# Patient Record
Sex: MALE | Race: WHITE | NOT HISPANIC OR LATINO
[De-identification: names, ages, dates, MRNs, and addresses within clinical notes are randomized per-mention and may not be internally consistent; named-entity substitution may affect disease eponyms.]

---

## 2017-03-26 ENCOUNTER — TRANSCRIPTION ENCOUNTER (OUTPATIENT)
Age: 77
End: 2017-03-26

## 2018-08-14 ENCOUNTER — TRANSCRIPTION ENCOUNTER (OUTPATIENT)
Age: 78
End: 2018-08-14

## 2019-01-01 ENCOUNTER — RESULT REVIEW (OUTPATIENT)
Age: 79
End: 2019-01-01

## 2019-01-01 ENCOUNTER — LABORATORY RESULT (OUTPATIENT)
Age: 79
End: 2019-01-01

## 2019-01-01 ENCOUNTER — APPOINTMENT (OUTPATIENT)
Dept: HEMATOLOGY ONCOLOGY | Facility: CLINIC | Age: 79
End: 2019-01-01

## 2019-01-01 ENCOUNTER — APPOINTMENT (OUTPATIENT)
Dept: HEMATOLOGY ONCOLOGY | Facility: CLINIC | Age: 79
End: 2019-01-01
Payer: MEDICARE

## 2019-01-01 ENCOUNTER — APPOINTMENT (OUTPATIENT)
Dept: INFUSION THERAPY | Facility: HOSPITAL | Age: 79
End: 2019-01-01

## 2019-01-01 ENCOUNTER — TRANSCRIPTION ENCOUNTER (OUTPATIENT)
Age: 79
End: 2019-01-01

## 2019-01-01 ENCOUNTER — INPATIENT (INPATIENT)
Facility: HOSPITAL | Age: 79
LOS: 15 days | Discharge: HOME | DRG: 814 | End: 2019-04-19
Attending: INTERNAL MEDICINE | Admitting: INTERNAL MEDICINE
Payer: MEDICARE

## 2019-01-01 ENCOUNTER — MESSAGE (OUTPATIENT)
Age: 79
End: 2019-01-01

## 2019-01-01 ENCOUNTER — MEDICATION RENEWAL (OUTPATIENT)
Age: 79
End: 2019-01-01

## 2019-01-01 ENCOUNTER — APPOINTMENT (OUTPATIENT)
Dept: NUCLEAR MEDICINE | Facility: CLINIC | Age: 79
End: 2019-01-01

## 2019-01-01 ENCOUNTER — OUTPATIENT (OUTPATIENT)
Dept: OUTPATIENT SERVICES | Facility: HOSPITAL | Age: 79
LOS: 1 days | Discharge: HOME | End: 2019-01-01

## 2019-01-01 ENCOUNTER — FORM ENCOUNTER (OUTPATIENT)
Age: 79
End: 2019-01-01

## 2019-01-01 ENCOUNTER — OTHER (OUTPATIENT)
Age: 79
End: 2019-01-01

## 2019-01-01 ENCOUNTER — RX RENEWAL (OUTPATIENT)
Age: 79
End: 2019-01-01

## 2019-01-01 ENCOUNTER — OUTPATIENT (OUTPATIENT)
Dept: OUTPATIENT SERVICES | Facility: HOSPITAL | Age: 79
LOS: 1 days | End: 2019-01-01
Payer: MEDICARE

## 2019-01-01 ENCOUNTER — INPATIENT (INPATIENT)
Facility: HOSPITAL | Age: 79
LOS: 12 days | DRG: 823 | End: 2019-11-17
Attending: INTERNAL MEDICINE | Admitting: INTERNAL MEDICINE
Payer: MEDICARE

## 2019-01-01 ENCOUNTER — OUTPATIENT (OUTPATIENT)
Dept: OUTPATIENT SERVICES | Facility: HOSPITAL | Age: 79
LOS: 1 days | Discharge: HOME | End: 2019-01-01
Payer: MEDICARE

## 2019-01-01 ENCOUNTER — APPOINTMENT (OUTPATIENT)
Dept: NUCLEAR MEDICINE | Facility: CLINIC | Age: 79
End: 2019-01-01
Payer: MEDICARE

## 2019-01-01 VITALS
OXYGEN SATURATION: 100 % | WEIGHT: 198.42 LBS | TEMPERATURE: 100.4 F | SYSTOLIC BLOOD PRESSURE: 122 MMHG | HEART RATE: 100 BPM | RESPIRATION RATE: 17 BRPM | DIASTOLIC BLOOD PRESSURE: 72 MMHG | BODY MASS INDEX: 26.58 KG/M2

## 2019-01-01 VITALS
HEART RATE: 110 BPM | DIASTOLIC BLOOD PRESSURE: 67 MMHG | RESPIRATION RATE: 17 BRPM | WEIGHT: 180.34 LBS | BODY MASS INDEX: 24.16 KG/M2 | TEMPERATURE: 97.6 F | SYSTOLIC BLOOD PRESSURE: 105 MMHG | OXYGEN SATURATION: 98 %

## 2019-01-01 VITALS
HEART RATE: 114 BPM | RESPIRATION RATE: 18 BRPM | SYSTOLIC BLOOD PRESSURE: 106 MMHG | DIASTOLIC BLOOD PRESSURE: 66 MMHG | OXYGEN SATURATION: 98 %

## 2019-01-01 VITALS
WEIGHT: 190.48 LBS | TEMPERATURE: 97.8 F | OXYGEN SATURATION: 99 % | DIASTOLIC BLOOD PRESSURE: 75 MMHG | RESPIRATION RATE: 16 BRPM | BODY MASS INDEX: 25.52 KG/M2 | SYSTOLIC BLOOD PRESSURE: 127 MMHG | HEART RATE: 93 BPM

## 2019-01-01 VITALS
OXYGEN SATURATION: 96 % | BODY MASS INDEX: 26.14 KG/M2 | WEIGHT: 195.11 LBS | SYSTOLIC BLOOD PRESSURE: 100 MMHG | RESPIRATION RATE: 17 BRPM | TEMPERATURE: 98.1 F | DIASTOLIC BLOOD PRESSURE: 63 MMHG | HEART RATE: 108 BPM

## 2019-01-01 VITALS
TEMPERATURE: 98 F | RESPIRATION RATE: 18 BRPM | HEIGHT: 75 IN | DIASTOLIC BLOOD PRESSURE: 70 MMHG | OXYGEN SATURATION: 96 % | WEIGHT: 199.96 LBS | SYSTOLIC BLOOD PRESSURE: 115 MMHG | HEART RATE: 112 BPM

## 2019-01-01 VITALS
DIASTOLIC BLOOD PRESSURE: 54 MMHG | RESPIRATION RATE: 18 BRPM | WEIGHT: 179.9 LBS | SYSTOLIC BLOOD PRESSURE: 95 MMHG | HEART RATE: 89 BPM | TEMPERATURE: 98 F | OXYGEN SATURATION: 98 % | HEIGHT: 75 IN

## 2019-01-01 VITALS
WEIGHT: 184.97 LBS | DIASTOLIC BLOOD PRESSURE: 82 MMHG | SYSTOLIC BLOOD PRESSURE: 112 MMHG | BODY MASS INDEX: 24.78 KG/M2 | HEART RATE: 80 BPM | HEIGHT: 72.44 IN | RESPIRATION RATE: 16 BRPM

## 2019-01-01 VITALS
RESPIRATION RATE: 16 BRPM | DIASTOLIC BLOOD PRESSURE: 69 MMHG | HEART RATE: 89 BPM | TEMPERATURE: 97.8 F | OXYGEN SATURATION: 100 % | WEIGHT: 185.19 LBS | SYSTOLIC BLOOD PRESSURE: 109 MMHG | BODY MASS INDEX: 24.81 KG/M2

## 2019-01-01 VITALS
RESPIRATION RATE: 16 BRPM | HEART RATE: 86 BPM | OXYGEN SATURATION: 99 % | BODY MASS INDEX: 24.13 KG/M2 | TEMPERATURE: 97.8 F | DIASTOLIC BLOOD PRESSURE: 73 MMHG | WEIGHT: 180.11 LBS | SYSTOLIC BLOOD PRESSURE: 122 MMHG

## 2019-01-01 VITALS
BODY MASS INDEX: 24.37 KG/M2 | SYSTOLIC BLOOD PRESSURE: 117 MMHG | DIASTOLIC BLOOD PRESSURE: 74 MMHG | OXYGEN SATURATION: 100 % | HEART RATE: 90 BPM | WEIGHT: 181.88 LBS | RESPIRATION RATE: 17 BRPM | TEMPERATURE: 97.7 F

## 2019-01-01 VITALS
BODY MASS INDEX: 24.04 KG/M2 | WEIGHT: 179.46 LBS | HEART RATE: 114 BPM | SYSTOLIC BLOOD PRESSURE: 110 MMHG | RESPIRATION RATE: 18 BRPM | OXYGEN SATURATION: 98 % | DIASTOLIC BLOOD PRESSURE: 69 MMHG | TEMPERATURE: 98.5 F

## 2019-01-01 VITALS
HEART RATE: 85 BPM | SYSTOLIC BLOOD PRESSURE: 101 MMHG | TEMPERATURE: 98 F | RESPIRATION RATE: 18 BRPM | DIASTOLIC BLOOD PRESSURE: 61 MMHG | OXYGEN SATURATION: 97 %

## 2019-01-01 VITALS
TEMPERATURE: 97.4 F | RESPIRATION RATE: 16 BRPM | SYSTOLIC BLOOD PRESSURE: 128 MMHG | DIASTOLIC BLOOD PRESSURE: 78 MMHG | HEART RATE: 54 BPM | OXYGEN SATURATION: 100 % | WEIGHT: 190.48 LBS | BODY MASS INDEX: 25.52 KG/M2

## 2019-01-01 VITALS
TEMPERATURE: 97.9 F | SYSTOLIC BLOOD PRESSURE: 138 MMHG | BODY MASS INDEX: 24.07 KG/M2 | DIASTOLIC BLOOD PRESSURE: 75 MMHG | RESPIRATION RATE: 16 BRPM | OXYGEN SATURATION: 98 % | HEART RATE: 101 BPM | WEIGHT: 179.68 LBS

## 2019-01-01 VITALS
TEMPERATURE: 97 F | DIASTOLIC BLOOD PRESSURE: 58 MMHG | OXYGEN SATURATION: 92 % | HEART RATE: 86 BPM | SYSTOLIC BLOOD PRESSURE: 58 MMHG | RESPIRATION RATE: 40 BRPM

## 2019-01-01 VITALS — TEMPERATURE: 99.8 F

## 2019-01-01 DIAGNOSIS — R11.2 NAUSEA WITH VOMITING, UNSPECIFIED: ICD-10-CM

## 2019-01-01 DIAGNOSIS — Z51.11 ENCOUNTER FOR ANTINEOPLASTIC CHEMOTHERAPY: ICD-10-CM

## 2019-01-01 DIAGNOSIS — Z98.890 OTHER SPECIFIED POSTPROCEDURAL STATES: Chronic | ICD-10-CM

## 2019-01-01 DIAGNOSIS — Z80.41 FAMILY HISTORY OF MALIGNANT NEOPLASM OF OVARY: ICD-10-CM

## 2019-01-01 DIAGNOSIS — D76.1 HEMOPHAGOCYTIC LYMPHOHISTIOCYTOSIS: ICD-10-CM

## 2019-01-01 DIAGNOSIS — R73.9 HYPERGLYCEMIA, UNSPECIFIED: ICD-10-CM

## 2019-01-01 DIAGNOSIS — R17 UNSPECIFIED JAUNDICE: ICD-10-CM

## 2019-01-01 DIAGNOSIS — C84.71: ICD-10-CM

## 2019-01-01 DIAGNOSIS — K46.9 UNSPECIFIED ABDOMINAL HERNIA WITHOUT OBSTRUCTION OR GANGRENE: Chronic | ICD-10-CM

## 2019-01-01 DIAGNOSIS — D61.818 OTHER PANCYTOPENIA: ICD-10-CM

## 2019-01-01 DIAGNOSIS — Z80.0 FAMILY HISTORY OF MALIGNANT NEOPLASM OF DIGESTIVE ORGANS: ICD-10-CM

## 2019-01-01 DIAGNOSIS — Z51.5 ENCOUNTER FOR PALLIATIVE CARE: ICD-10-CM

## 2019-01-01 DIAGNOSIS — A41.9 SEPSIS, UNSPECIFIED ORGANISM: ICD-10-CM

## 2019-01-01 DIAGNOSIS — Z51.89 ENCOUNTER FOR OTHER SPECIFIED AFTERCARE: ICD-10-CM

## 2019-01-01 DIAGNOSIS — Z71.89 OTHER SPECIFIED COUNSELING: ICD-10-CM

## 2019-01-01 DIAGNOSIS — G89.3 NEOPLASM RELATED PAIN (ACUTE) (CHRONIC): ICD-10-CM

## 2019-01-01 DIAGNOSIS — C84.68 ANAPLASTIC LARGE CELL LYMPHOMA, ALK-POSITIVE, LYMPH NODES OF MULTIPLE SITES: ICD-10-CM

## 2019-01-01 DIAGNOSIS — Z00.00 ENCOUNTER FOR GENERAL ADULT MEDICAL EXAMINATION W/OUT ABNORMAL FINDINGS: ICD-10-CM

## 2019-01-01 DIAGNOSIS — R53.2 FUNCTIONAL QUADRIPLEGIA: ICD-10-CM

## 2019-01-01 DIAGNOSIS — L03.039 CELLULITIS OF UNSPECIFIED TOE: ICD-10-CM

## 2019-01-01 DIAGNOSIS — N42.9 DISORDER OF PROSTATE, UNSPECIFIED: ICD-10-CM

## 2019-01-01 DIAGNOSIS — S73.109A UNSPECIFIED SPRAIN OF UNSPECIFIED HIP, INITIAL ENCOUNTER: ICD-10-CM

## 2019-01-01 DIAGNOSIS — D69.3 IMMUNE THROMBOCYTOPENIC PURPURA: ICD-10-CM

## 2019-01-01 DIAGNOSIS — D64.9 ANEMIA, UNSPECIFIED: ICD-10-CM

## 2019-01-01 DIAGNOSIS — R06.03 ACUTE RESPIRATORY DISTRESS: ICD-10-CM

## 2019-01-01 DIAGNOSIS — E87.1 HYPO-OSMOLALITY AND HYPONATREMIA: ICD-10-CM

## 2019-01-01 DIAGNOSIS — Z00.8 ENCOUNTER FOR OTHER GENERAL EXAMINATION: ICD-10-CM

## 2019-01-01 LAB
% ALBUMIN: 59 % — SIGNIFICANT CHANGE UP
% ALPHA 1: 8.5 % — SIGNIFICANT CHANGE UP
% ALPHA 2: 8.1 % — SIGNIFICANT CHANGE UP
% BETA: 11.9 % — SIGNIFICANT CHANGE UP
% GAMMA: 12.5 % — SIGNIFICANT CHANGE UP
A PHAGOCYTOPH DNA BLD QL NAA+PROBE: NEGATIVE — SIGNIFICANT CHANGE UP
A PHAGOCYTOPH IGG TITR SER IF: SIGNIFICANT CHANGE UP
A PHAGOCYTOPH IGM SER QL: SIGNIFICANT CHANGE UP
A PHAGOCYTOPH IGM SER QL: SIGNIFICANT CHANGE UP
A PHAGOCYTOPH IGM TITR SER IF: SIGNIFICANT CHANGE UP
ALBUMIN SERPL ELPH-MCNC: 1.4 G/DL — LOW (ref 3.3–5)
ALBUMIN SERPL ELPH-MCNC: 1.5 G/DL — LOW (ref 3.3–5)
ALBUMIN SERPL ELPH-MCNC: 1.6 G/DL — LOW (ref 3.3–5)
ALBUMIN SERPL ELPH-MCNC: 1.6 G/DL — LOW (ref 3.3–5)
ALBUMIN SERPL ELPH-MCNC: 1.7 G/DL — LOW (ref 3.3–5)
ALBUMIN SERPL ELPH-MCNC: 1.9 G/DL — LOW (ref 3.3–5)
ALBUMIN SERPL ELPH-MCNC: 1.9 G/DL — LOW (ref 3.3–5)
ALBUMIN SERPL ELPH-MCNC: 2 G/DL — LOW (ref 3.3–5)
ALBUMIN SERPL ELPH-MCNC: 2 G/DL — LOW (ref 3.3–5)
ALBUMIN SERPL ELPH-MCNC: 2.1 G/DL — LOW (ref 3.3–5)
ALBUMIN SERPL ELPH-MCNC: 2.1 G/DL — LOW (ref 3.3–5)
ALBUMIN SERPL ELPH-MCNC: 2.2 G/DL — LOW (ref 3.3–5)
ALBUMIN SERPL ELPH-MCNC: 2.3 G/DL — LOW (ref 3.3–5)
ALBUMIN SERPL ELPH-MCNC: 2.4 G/DL — LOW (ref 3.3–5)
ALBUMIN SERPL ELPH-MCNC: 2.5 G/DL — LOW (ref 3.3–5)
ALBUMIN SERPL ELPH-MCNC: 2.6 G/DL
ALBUMIN SERPL ELPH-MCNC: 2.6 G/DL — LOW (ref 3.3–5)
ALBUMIN SERPL ELPH-MCNC: 2.6 G/DL — LOW (ref 3.3–5)
ALBUMIN SERPL ELPH-MCNC: 2.7 G/DL
ALBUMIN SERPL ELPH-MCNC: 2.7 G/DL — LOW (ref 3.3–5)
ALBUMIN SERPL ELPH-MCNC: 2.7 G/DL — LOW (ref 3.6–5.5)
ALBUMIN SERPL ELPH-MCNC: 2.8 G/DL
ALBUMIN SERPL ELPH-MCNC: 2.8 G/DL — LOW (ref 3.3–5)
ALBUMIN SERPL ELPH-MCNC: 2.9 G/DL — LOW (ref 3.3–5)
ALBUMIN SERPL ELPH-MCNC: 2.9 G/DL — LOW (ref 3.3–5)
ALBUMIN SERPL ELPH-MCNC: 3 G/DL
ALBUMIN SERPL ELPH-MCNC: 3.1 G/DL
ALBUMIN SERPL ELPH-MCNC: 3.7 G/DL
ALBUMIN SERPL ELPH-MCNC: 3.9 G/DL
ALBUMIN SERPL ELPH-MCNC: 4 G/DL
ALBUMIN/GLOB SERPL ELPH: 1.4 RATIO — SIGNIFICANT CHANGE UP
ALP BLD-CCNC: 225 U/L
ALP BLD-CCNC: 241 U/L
ALP BLD-CCNC: 271 U/L
ALP BLD-CCNC: 343 U/L
ALP BLD-CCNC: 455 U/L
ALP BLD-CCNC: 545 U/L
ALP BLD-CCNC: 90 U/L
ALP BLD-CCNC: 98 U/L
ALP SERPL-CCNC: 1020 U/L — HIGH (ref 40–120)
ALP SERPL-CCNC: 251 U/L — HIGH (ref 40–120)
ALP SERPL-CCNC: 274 U/L — HIGH (ref 40–120)
ALP SERPL-CCNC: 311 U/L — HIGH (ref 40–120)
ALP SERPL-CCNC: 334 U/L — HIGH (ref 40–120)
ALP SERPL-CCNC: 337 U/L — HIGH (ref 40–120)
ALP SERPL-CCNC: 365 U/L — HIGH (ref 40–120)
ALP SERPL-CCNC: 370 U/L — HIGH (ref 40–120)
ALP SERPL-CCNC: 395 U/L — HIGH (ref 40–120)
ALP SERPL-CCNC: 424 U/L — HIGH (ref 40–120)
ALP SERPL-CCNC: 461 U/L — HIGH (ref 40–120)
ALP SERPL-CCNC: 503 U/L — HIGH (ref 40–120)
ALP SERPL-CCNC: 568 U/L — HIGH (ref 40–120)
ALP SERPL-CCNC: 569 U/L — HIGH (ref 40–120)
ALP SERPL-CCNC: 622 U/L — HIGH (ref 40–120)
ALP SERPL-CCNC: 626 U/L — HIGH (ref 40–120)
ALP SERPL-CCNC: 631 U/L — HIGH (ref 40–120)
ALP SERPL-CCNC: 637 U/L — HIGH (ref 40–120)
ALP SERPL-CCNC: 638 U/L — HIGH (ref 40–120)
ALP SERPL-CCNC: 651 U/L — HIGH (ref 40–120)
ALP SERPL-CCNC: 662 U/L — HIGH (ref 40–120)
ALP SERPL-CCNC: 707 U/L — HIGH (ref 40–120)
ALP SERPL-CCNC: 756 U/L — HIGH (ref 40–120)
ALP SERPL-CCNC: 778 U/L — HIGH (ref 40–120)
ALP SERPL-CCNC: 818 U/L — HIGH (ref 40–120)
ALP SERPL-CCNC: 819 U/L — HIGH (ref 40–120)
ALP SERPL-CCNC: 853 U/L — HIGH (ref 40–120)
ALP SERPL-CCNC: 993 U/L — HIGH (ref 40–120)
ALPHA1 GLOB SERPL ELPH-MCNC: 0.4 G/DL — SIGNIFICANT CHANGE UP (ref 0.1–0.4)
ALPHA2 GLOB SERPL ELPH-MCNC: 0.4 G/DL — LOW (ref 0.5–1)
ALT FLD-CCNC: 109 U/L — HIGH (ref 10–45)
ALT FLD-CCNC: 116 U/L — HIGH (ref 10–45)
ALT FLD-CCNC: 116 U/L — HIGH (ref 10–45)
ALT FLD-CCNC: 135 U/L — HIGH (ref 10–45)
ALT FLD-CCNC: 137 U/L — HIGH (ref 10–45)
ALT FLD-CCNC: 159 U/L — HIGH (ref 10–45)
ALT FLD-CCNC: 162 U/L — HIGH (ref 10–45)
ALT FLD-CCNC: 178 U/L — HIGH (ref 10–45)
ALT FLD-CCNC: 180 U/L — HIGH (ref 10–45)
ALT FLD-CCNC: 197 U/L — HIGH (ref 10–45)
ALT FLD-CCNC: 224 U/L — HIGH (ref 10–45)
ALT FLD-CCNC: 232 U/L — HIGH (ref 10–45)
ALT FLD-CCNC: 298 U/L — HIGH (ref 10–45)
ALT FLD-CCNC: 325 U/L — HIGH (ref 10–45)
ALT FLD-CCNC: 388 U/L — HIGH (ref 10–45)
ALT FLD-CCNC: 46 U/L — HIGH (ref 10–45)
ALT FLD-CCNC: 47 U/L — HIGH (ref 10–45)
ALT FLD-CCNC: 49 U/L — HIGH (ref 10–45)
ALT FLD-CCNC: 55 U/L — HIGH (ref 10–45)
ALT FLD-CCNC: 56 U/L — HIGH (ref 10–45)
ALT FLD-CCNC: 62 U/L — HIGH (ref 10–45)
ALT FLD-CCNC: 63 U/L — HIGH (ref 10–45)
ALT FLD-CCNC: 63 U/L — HIGH (ref 10–45)
ALT FLD-CCNC: 66 U/L — HIGH (ref 10–45)
ALT FLD-CCNC: 72 U/L — HIGH (ref 10–45)
ALT FLD-CCNC: 78 U/L — HIGH (ref 10–45)
ALT FLD-CCNC: 82 U/L — HIGH (ref 10–45)
ALT FLD-CCNC: 90 U/L — HIGH (ref 10–45)
ALT SERPL-CCNC: 102 U/L
ALT SERPL-CCNC: 121 U/L
ALT SERPL-CCNC: 21 U/L
ALT SERPL-CCNC: 222 U/L
ALT SERPL-CCNC: 23 U/L
ALT SERPL-CCNC: 48 U/L
ALT SERPL-CCNC: 88 U/L
ALT SERPL-CCNC: 95 U/L
AMYLASE P1 CFR SERPL: 59 U/L — SIGNIFICANT CHANGE UP (ref 25–125)
ANA TITR SER: NEGATIVE — SIGNIFICANT CHANGE UP
ANAPLASMA PHAGOCYTO IGM COMENT: SIGNIFICANT CHANGE UP
ANAPLASMA PHAGOCYTO IGM INTERP: SIGNIFICANT CHANGE UP
ANION GAP SERPL CALC-SCNC: 10 MMOL/L — SIGNIFICANT CHANGE UP (ref 5–17)
ANION GAP SERPL CALC-SCNC: 11 MMOL/L
ANION GAP SERPL CALC-SCNC: 11 MMOL/L — SIGNIFICANT CHANGE UP (ref 5–17)
ANION GAP SERPL CALC-SCNC: 12 MMOL/L
ANION GAP SERPL CALC-SCNC: 12 MMOL/L
ANION GAP SERPL CALC-SCNC: 12 MMOL/L — SIGNIFICANT CHANGE UP (ref 5–17)
ANION GAP SERPL CALC-SCNC: 13 MMOL/L — SIGNIFICANT CHANGE UP (ref 5–17)
ANION GAP SERPL CALC-SCNC: 14 MMOL/L
ANION GAP SERPL CALC-SCNC: 14 MMOL/L — SIGNIFICANT CHANGE UP (ref 5–17)
ANION GAP SERPL CALC-SCNC: 14 MMOL/L — SIGNIFICANT CHANGE UP (ref 5–17)
ANION GAP SERPL CALC-SCNC: 15 MMOL/L — SIGNIFICANT CHANGE UP (ref 5–17)
ANION GAP SERPL CALC-SCNC: 17 MMOL/L — SIGNIFICANT CHANGE UP (ref 5–17)
ANION GAP SERPL CALC-SCNC: 17 MMOL/L — SIGNIFICANT CHANGE UP (ref 5–17)
ANION GAP SERPL CALC-SCNC: 19 MMOL/L — HIGH (ref 5–17)
ANION GAP SERPL CALC-SCNC: 24 MMOL/L — HIGH (ref 5–17)
ANION GAP SERPL CALC-SCNC: 6 MMOL/L — SIGNIFICANT CHANGE UP (ref 5–17)
ANION GAP SERPL CALC-SCNC: 8 MMOL/L — SIGNIFICANT CHANGE UP (ref 5–17)
ANION GAP SERPL CALC-SCNC: 9 MMOL/L — SIGNIFICANT CHANGE UP (ref 5–17)
ANION GAP SERPL CALC-SCNC: 9 MMOL/L — SIGNIFICANT CHANGE UP (ref 5–17)
ANISOCYTOSIS BLD QL: SIGNIFICANT CHANGE UP
ANISOCYTOSIS BLD QL: SLIGHT — SIGNIFICANT CHANGE UP
ANTIBODY ID 1_1: SIGNIFICANT CHANGE UP
ANTIBODY ID 1_2: SIGNIFICANT CHANGE UP
ANTIBODY ID 1_2: SIGNIFICANT CHANGE UP
ANTIBODY ID 1_3: SIGNIFICANT CHANGE UP
APPEARANCE UR: ABNORMAL
APPEARANCE UR: CLEAR — SIGNIFICANT CHANGE UP
APPEARANCE UR: CLEAR — SIGNIFICANT CHANGE UP
APTT BLD: 35.2 SEC — SIGNIFICANT CHANGE UP (ref 27.5–36.3)
APTT BLD: 37 SEC — HIGH (ref 27.5–36.3)
APTT BLD: 37.1 SEC — HIGH (ref 27.5–36.3)
AST SERPL-CCNC: 109 U/L — HIGH (ref 10–40)
AST SERPL-CCNC: 112 U/L — HIGH (ref 10–40)
AST SERPL-CCNC: 114 U/L
AST SERPL-CCNC: 116 U/L
AST SERPL-CCNC: 118 U/L — HIGH (ref 10–40)
AST SERPL-CCNC: 123 U/L — HIGH (ref 10–40)
AST SERPL-CCNC: 132 U/L — HIGH (ref 10–40)
AST SERPL-CCNC: 14 U/L
AST SERPL-CCNC: 142 U/L — HIGH (ref 10–40)
AST SERPL-CCNC: 15 U/L
AST SERPL-CCNC: 156 U/L — HIGH (ref 10–40)
AST SERPL-CCNC: 187 U/L — HIGH (ref 10–40)
AST SERPL-CCNC: 221 U/L — HIGH (ref 10–40)
AST SERPL-CCNC: 228 U/L — HIGH (ref 10–40)
AST SERPL-CCNC: 23 U/L
AST SERPL-CCNC: 259 U/L — HIGH (ref 10–40)
AST SERPL-CCNC: 276 U/L — HIGH (ref 10–40)
AST SERPL-CCNC: 28 U/L — SIGNIFICANT CHANGE UP (ref 10–40)
AST SERPL-CCNC: 28 U/L — SIGNIFICANT CHANGE UP (ref 10–40)
AST SERPL-CCNC: 307 U/L — HIGH (ref 10–40)
AST SERPL-CCNC: 34 U/L — SIGNIFICANT CHANGE UP (ref 10–40)
AST SERPL-CCNC: 36 U/L — SIGNIFICANT CHANGE UP (ref 10–40)
AST SERPL-CCNC: 364 U/L — HIGH (ref 10–40)
AST SERPL-CCNC: 39 U/L — SIGNIFICANT CHANGE UP (ref 10–40)
AST SERPL-CCNC: 396 U/L — HIGH (ref 10–40)
AST SERPL-CCNC: 44 U/L
AST SERPL-CCNC: 458 U/L — HIGH (ref 10–40)
AST SERPL-CCNC: 49 U/L — HIGH (ref 10–40)
AST SERPL-CCNC: 64 U/L — HIGH (ref 10–40)
AST SERPL-CCNC: 71 U/L — HIGH (ref 10–40)
AST SERPL-CCNC: 75 U/L
AST SERPL-CCNC: 77 U/L
AST SERPL-CCNC: 77 U/L — HIGH (ref 10–40)
AST SERPL-CCNC: 80 U/L — HIGH (ref 10–40)
AST SERPL-CCNC: 80 U/L — HIGH (ref 10–40)
AST SERPL-CCNC: 89 U/L — HIGH (ref 10–40)
B-GLOBULIN SERPL ELPH-MCNC: 0.5 G/DL — SIGNIFICANT CHANGE UP (ref 0.5–1)
B-OH-BUTYR SERPL-SCNC: 0.1 MMOL/L — SIGNIFICANT CHANGE UP
B19V IGG SER-ACNC: 0.2 INDEX — SIGNIFICANT CHANGE UP (ref 0–0.8)
B19V IGG+IGM SER-IMP: NEGATIVE — SIGNIFICANT CHANGE UP
B19V IGG+IGM SER-IMP: SIGNIFICANT CHANGE UP
B19V IGM FLD-ACNC: 0.1 INDEX — SIGNIFICANT CHANGE UP (ref 0–0.8)
B19V IGM SER-ACNC: NEGATIVE — SIGNIFICANT CHANGE UP
BACTERIA # UR AUTO: ABNORMAL
BACTERIA # UR AUTO: NEGATIVE — SIGNIFICANT CHANGE UP
BASE EXCESS BLDV CALC-SCNC: -3.2 MMOL/L — LOW (ref -2–2)
BASE EXCESS BLDV CALC-SCNC: -3.5 MMOL/L — LOW (ref -2–2)
BASE EXCESS BLDV CALC-SCNC: -4.5 MMOL/L — LOW (ref -2–2)
BASO STIPL BLD QL SMEAR: PRESENT — SIGNIFICANT CHANGE UP
BASO STIPL BLD QL SMEAR: PRESENT — SIGNIFICANT CHANGE UP
BASOPHILS # BLD AUTO: 0 K/UL — SIGNIFICANT CHANGE UP (ref 0–0.2)
BASOPHILS # BLD AUTO: 0.01 K/UL — SIGNIFICANT CHANGE UP (ref 0–0.2)
BASOPHILS # BLD AUTO: 0.03 K/UL — SIGNIFICANT CHANGE UP (ref 0–0.2)
BASOPHILS # BLD AUTO: 0.1 K/UL — SIGNIFICANT CHANGE UP (ref 0–0.2)
BASOPHILS NFR BLD AUTO: 0 % — SIGNIFICANT CHANGE UP (ref 0–2)
BASOPHILS NFR BLD AUTO: 0.1 % — SIGNIFICANT CHANGE UP (ref 0–2)
BASOPHILS NFR BLD AUTO: 0.1 % — SIGNIFICANT CHANGE UP (ref 0–2)
BASOPHILS NFR BLD AUTO: 0.2 % — SIGNIFICANT CHANGE UP (ref 0–2)
BASOPHILS NFR BLD AUTO: 0.3 % — SIGNIFICANT CHANGE UP (ref 0–2)
BASOPHILS NFR BLD AUTO: 0.4 % — SIGNIFICANT CHANGE UP (ref 0–2)
BASOPHILS NFR BLD AUTO: 0.5 % — SIGNIFICANT CHANGE UP (ref 0–2)
BASOPHILS NFR BLD AUTO: 0.5 % — SIGNIFICANT CHANGE UP (ref 0–2)
BASOPHILS NFR BLD AUTO: 0.6 % — SIGNIFICANT CHANGE UP (ref 0–2)
BASOPHILS NFR BLD AUTO: 0.7 % — SIGNIFICANT CHANGE UP (ref 0–2)
BASOPHILS NFR BLD AUTO: 0.9 % — SIGNIFICANT CHANGE UP (ref 0–2)
BASOPHILS NFR BLD AUTO: 1 % — SIGNIFICANT CHANGE UP (ref 0–2)
BASOPHILS NFR BLD AUTO: 1.6 % — SIGNIFICANT CHANGE UP (ref 0–2)
BASOPHILS NFR BLD AUTO: 2.4 % — HIGH (ref 0–2)
BILIRUB DIRECT SERPL-MCNC: 1.2 MG/DL — HIGH (ref 0–0.2)
BILIRUB DIRECT SERPL-MCNC: 1.2 MG/DL — HIGH (ref 0–0.2)
BILIRUB DIRECT SERPL-MCNC: 1.7 MG/DL — HIGH (ref 0–0.2)
BILIRUB DIRECT SERPL-MCNC: 10 MG/DL — HIGH (ref 0–0.2)
BILIRUB DIRECT SERPL-MCNC: >10 MG/DL — HIGH (ref 0–0.2)
BILIRUB INDIRECT FLD-MCNC: 0.3 MG/DL — SIGNIFICANT CHANGE UP (ref 0.2–1)
BILIRUB INDIRECT FLD-MCNC: 0.5 MG/DL — SIGNIFICANT CHANGE UP (ref 0.2–1)
BILIRUB INDIRECT FLD-MCNC: 0.5 MG/DL — SIGNIFICANT CHANGE UP (ref 0.2–1)
BILIRUB INDIRECT FLD-MCNC: 3.8 MG/DL — HIGH (ref 0.2–1)
BILIRUB INDIRECT FLD-MCNC: <2.8 MG/DL — HIGH (ref 0.2–1)
BILIRUB INDIRECT FLD-MCNC: <4 MG/DL — HIGH (ref 0.2–1)
BILIRUB INDIRECT SERPL-MCNC: 1.6 MG/DL
BILIRUB SERPL-MCNC: 0.7 MG/DL
BILIRUB SERPL-MCNC: 0.8 MG/DL
BILIRUB SERPL-MCNC: 1 MG/DL
BILIRUB SERPL-MCNC: 1.2 MG/DL
BILIRUB SERPL-MCNC: 1.2 MG/DL — SIGNIFICANT CHANGE UP (ref 0.2–1.2)
BILIRUB SERPL-MCNC: 1.4 MG/DL
BILIRUB SERPL-MCNC: 1.7 MG/DL — HIGH (ref 0.2–1.2)
BILIRUB SERPL-MCNC: 1.7 MG/DL — HIGH (ref 0.2–1.2)
BILIRUB SERPL-MCNC: 1.8 MG/DL — HIGH (ref 0.2–1.2)
BILIRUB SERPL-MCNC: 1.8 MG/DL — HIGH (ref 0.2–1.2)
BILIRUB SERPL-MCNC: 1.9 MG/DL — HIGH (ref 0.2–1.2)
BILIRUB SERPL-MCNC: 11.3 MG/DL — HIGH (ref 0.2–1.2)
BILIRUB SERPL-MCNC: 11.4 MG/DL — HIGH (ref 0.2–1.2)
BILIRUB SERPL-MCNC: 12.8 MG/DL — HIGH (ref 0.2–1.2)
BILIRUB SERPL-MCNC: 13.8 MG/DL — HIGH (ref 0.2–1.2)
BILIRUB SERPL-MCNC: 14 MG/DL — HIGH (ref 0.2–1.2)
BILIRUB SERPL-MCNC: 16.3 MG/DL — HIGH (ref 0.2–1.2)
BILIRUB SERPL-MCNC: 16.8 MG/DL — HIGH (ref 0.2–1.2)
BILIRUB SERPL-MCNC: 17.6 MG/DL — HIGH (ref 0.2–1.2)
BILIRUB SERPL-MCNC: 17.8 MG/DL — HIGH (ref 0.2–1.2)
BILIRUB SERPL-MCNC: 18.4 MG/DL — HIGH (ref 0.2–1.2)
BILIRUB SERPL-MCNC: 19.2 MG/DL — HIGH (ref 0.2–1.2)
BILIRUB SERPL-MCNC: 2 MG/DL — HIGH (ref 0.2–1.2)
BILIRUB SERPL-MCNC: 2.1 MG/DL
BILIRUB SERPL-MCNC: 2.2 MG/DL — HIGH (ref 0.2–1.2)
BILIRUB SERPL-MCNC: 2.5 MG/DL — HIGH (ref 0.2–1.2)
BILIRUB SERPL-MCNC: 2.8 MG/DL
BILIRUB SERPL-MCNC: 3.9 MG/DL
BILIRUB SERPL-MCNC: 4 MG/DL — HIGH (ref 0.2–1.2)
BILIRUB SERPL-MCNC: 4.2 MG/DL — HIGH (ref 0.2–1.2)
BILIRUB SERPL-MCNC: 4.7 MG/DL — HIGH (ref 0.2–1.2)
BILIRUB SERPL-MCNC: 4.8 MG/DL — HIGH (ref 0.2–1.2)
BILIRUB SERPL-MCNC: 5.2 MG/DL — HIGH (ref 0.2–1.2)
BILIRUB SERPL-MCNC: 6.1 MG/DL — HIGH (ref 0.2–1.2)
BILIRUB SERPL-MCNC: 6.1 MG/DL — HIGH (ref 0.2–1.2)
BILIRUB SERPL-MCNC: 7.2 MG/DL — HIGH (ref 0.2–1.2)
BILIRUB SERPL-MCNC: 8.2 MG/DL — HIGH (ref 0.2–1.2)
BILIRUB SERPL-MCNC: 9.9 MG/DL — HIGH (ref 0.2–1.2)
BILIRUB UR-MCNC: ABNORMAL
BILIRUB UR-MCNC: ABNORMAL
BILIRUB UR-MCNC: NEGATIVE — SIGNIFICANT CHANGE UP
BLD GP AB SCN SERPL QL: NEGATIVE — SIGNIFICANT CHANGE UP
BLD GP AB SCN SERPL QL: POSITIVE — SIGNIFICANT CHANGE UP
BUN SERPL-MCNC: 100 MG/DL — HIGH (ref 7–23)
BUN SERPL-MCNC: 32 MG/DL
BUN SERPL-MCNC: 33 MG/DL — HIGH (ref 7–23)
BUN SERPL-MCNC: 34 MG/DL
BUN SERPL-MCNC: 35 MG/DL — HIGH (ref 7–23)
BUN SERPL-MCNC: 36 MG/DL — HIGH (ref 7–23)
BUN SERPL-MCNC: 37 MG/DL — HIGH (ref 7–23)
BUN SERPL-MCNC: 38 MG/DL — HIGH (ref 7–23)
BUN SERPL-MCNC: 38 MG/DL — HIGH (ref 7–23)
BUN SERPL-MCNC: 39 MG/DL
BUN SERPL-MCNC: 40 MG/DL
BUN SERPL-MCNC: 40 MG/DL — HIGH (ref 7–23)
BUN SERPL-MCNC: 40 MG/DL — HIGH (ref 7–23)
BUN SERPL-MCNC: 41 MG/DL
BUN SERPL-MCNC: 41 MG/DL
BUN SERPL-MCNC: 41 MG/DL — HIGH (ref 7–23)
BUN SERPL-MCNC: 42 MG/DL — HIGH (ref 7–23)
BUN SERPL-MCNC: 42 MG/DL — HIGH (ref 7–23)
BUN SERPL-MCNC: 43 MG/DL — HIGH (ref 7–23)
BUN SERPL-MCNC: 45 MG/DL — HIGH (ref 7–23)
BUN SERPL-MCNC: 46 MG/DL — HIGH (ref 7–23)
BUN SERPL-MCNC: 48 MG/DL — HIGH (ref 7–23)
BUN SERPL-MCNC: 50 MG/DL — HIGH (ref 7–23)
BUN SERPL-MCNC: 58 MG/DL
BUN SERPL-MCNC: 58 MG/DL — HIGH (ref 7–23)
BUN SERPL-MCNC: 58 MG/DL — HIGH (ref 7–23)
BUN SERPL-MCNC: 66 MG/DL — HIGH (ref 7–23)
BUN SERPL-MCNC: 68 MG/DL — HIGH (ref 7–23)
BUN SERPL-MCNC: 69 MG/DL
BUN SERPL-MCNC: 70 MG/DL — HIGH (ref 7–23)
BUN SERPL-MCNC: 91 MG/DL — HIGH (ref 7–23)
CA-I SERPL-SCNC: 1.06 MMOL/L — LOW (ref 1.12–1.3)
CA-I SERPL-SCNC: 1.12 MMOL/L — SIGNIFICANT CHANGE UP (ref 1.12–1.3)
CA-I SERPL-SCNC: 1.14 MMOL/L — SIGNIFICANT CHANGE UP (ref 1.12–1.3)
CALCIUM SERPL-MCNC: 10.1 MG/DL
CALCIUM SERPL-MCNC: 10.2 MG/DL
CALCIUM SERPL-MCNC: 6.9 MG/DL — LOW (ref 8.4–10.5)
CALCIUM SERPL-MCNC: 6.9 MG/DL — LOW (ref 8.4–10.5)
CALCIUM SERPL-MCNC: 7 MG/DL
CALCIUM SERPL-MCNC: 7.1 MG/DL — LOW (ref 8.4–10.5)
CALCIUM SERPL-MCNC: 7.3 MG/DL — LOW (ref 8.4–10.5)
CALCIUM SERPL-MCNC: 7.3 MG/DL — LOW (ref 8.4–10.5)
CALCIUM SERPL-MCNC: 7.4 MG/DL — LOW (ref 8.4–10.5)
CALCIUM SERPL-MCNC: 7.4 MG/DL — LOW (ref 8.4–10.5)
CALCIUM SERPL-MCNC: 7.5 MG/DL — LOW (ref 8.4–10.5)
CALCIUM SERPL-MCNC: 7.6 MG/DL — LOW (ref 8.4–10.5)
CALCIUM SERPL-MCNC: 7.8 MG/DL — LOW (ref 8.4–10.5)
CALCIUM SERPL-MCNC: 7.8 MG/DL — LOW (ref 8.4–10.5)
CALCIUM SERPL-MCNC: 7.9 MG/DL — LOW (ref 8.4–10.5)
CALCIUM SERPL-MCNC: 8 MG/DL
CALCIUM SERPL-MCNC: 8.2 MG/DL — LOW (ref 8.4–10.5)
CALCIUM SERPL-MCNC: 8.2 MG/DL — LOW (ref 8.4–10.5)
CALCIUM SERPL-MCNC: 8.3 MG/DL — LOW (ref 8.4–10.5)
CALCIUM SERPL-MCNC: 8.3 MG/DL — LOW (ref 8.4–10.5)
CALCIUM SERPL-MCNC: 8.4 MG/DL
CALCIUM SERPL-MCNC: 8.4 MG/DL — SIGNIFICANT CHANGE UP (ref 8.4–10.5)
CALCIUM SERPL-MCNC: 8.4 MG/DL — SIGNIFICANT CHANGE UP (ref 8.4–10.5)
CALCIUM SERPL-MCNC: 8.5 MG/DL — SIGNIFICANT CHANGE UP (ref 8.4–10.5)
CALCIUM SERPL-MCNC: 8.5 MG/DL — SIGNIFICANT CHANGE UP (ref 8.4–10.5)
CALCIUM SERPL-MCNC: 8.6 MG/DL — SIGNIFICANT CHANGE UP (ref 8.4–10.5)
CALCIUM SERPL-MCNC: 8.6 MG/DL — SIGNIFICANT CHANGE UP (ref 8.4–10.5)
CALCIUM SERPL-MCNC: 8.7 MG/DL
CALCIUM SERPL-MCNC: 8.7 MG/DL — SIGNIFICANT CHANGE UP (ref 8.4–10.5)
CALCIUM SERPL-MCNC: 8.8 MG/DL — SIGNIFICANT CHANGE UP (ref 8.4–10.5)
CALCIUM SERPL-MCNC: 9 MG/DL
CALCIUM SERPL-MCNC: 9 MG/DL — SIGNIFICANT CHANGE UP (ref 8.4–10.5)
CALCIUM SERPL-MCNC: 9 MG/DL — SIGNIFICANT CHANGE UP (ref 8.4–10.5)
CALCIUM SERPL-MCNC: 9.1 MG/DL — SIGNIFICANT CHANGE UP (ref 8.4–10.5)
CALCIUM SERPL-MCNC: 9.3 MG/DL — SIGNIFICANT CHANGE UP (ref 8.4–10.5)
CALCIUM SERPL-MCNC: 9.6 MG/DL
CHLORIDE BLDV-SCNC: 104 MMOL/L — SIGNIFICANT CHANGE UP (ref 96–108)
CHLORIDE BLDV-SCNC: 104 MMOL/L — SIGNIFICANT CHANGE UP (ref 96–108)
CHLORIDE BLDV-SCNC: 107 MMOL/L — SIGNIFICANT CHANGE UP (ref 96–108)
CHLORIDE SERPL-SCNC: 100 MMOL/L — SIGNIFICANT CHANGE UP (ref 96–108)
CHLORIDE SERPL-SCNC: 100 MMOL/L — SIGNIFICANT CHANGE UP (ref 96–108)
CHLORIDE SERPL-SCNC: 101 MMOL/L
CHLORIDE SERPL-SCNC: 101 MMOL/L
CHLORIDE SERPL-SCNC: 101 MMOL/L — SIGNIFICANT CHANGE UP (ref 96–108)
CHLORIDE SERPL-SCNC: 102 MMOL/L
CHLORIDE SERPL-SCNC: 102 MMOL/L — SIGNIFICANT CHANGE UP (ref 96–108)
CHLORIDE SERPL-SCNC: 102 MMOL/L — SIGNIFICANT CHANGE UP (ref 96–108)
CHLORIDE SERPL-SCNC: 103 MMOL/L
CHLORIDE SERPL-SCNC: 103 MMOL/L
CHLORIDE SERPL-SCNC: 103 MMOL/L — SIGNIFICANT CHANGE UP (ref 96–108)
CHLORIDE SERPL-SCNC: 104 MMOL/L — SIGNIFICANT CHANGE UP (ref 96–108)
CHLORIDE SERPL-SCNC: 104 MMOL/L — SIGNIFICANT CHANGE UP (ref 96–108)
CHLORIDE SERPL-SCNC: 105 MMOL/L — SIGNIFICANT CHANGE UP (ref 96–108)
CHLORIDE SERPL-SCNC: 106 MMOL/L — SIGNIFICANT CHANGE UP (ref 96–108)
CHLORIDE SERPL-SCNC: 107 MMOL/L
CHLORIDE SERPL-SCNC: 107 MMOL/L — SIGNIFICANT CHANGE UP (ref 96–108)
CHLORIDE SERPL-SCNC: 107 MMOL/L — SIGNIFICANT CHANGE UP (ref 96–108)
CHLORIDE SERPL-SCNC: 94 MMOL/L — LOW (ref 96–108)
CHLORIDE SERPL-SCNC: 95 MMOL/L — LOW (ref 96–108)
CHLORIDE SERPL-SCNC: 96 MMOL/L — SIGNIFICANT CHANGE UP (ref 96–108)
CHLORIDE SERPL-SCNC: 97 MMOL/L — SIGNIFICANT CHANGE UP (ref 96–108)
CHLORIDE SERPL-SCNC: 99 MMOL/L — SIGNIFICANT CHANGE UP (ref 96–108)
CHLORIDE SERPL-SCNC: 99 MMOL/L — SIGNIFICANT CHANGE UP (ref 96–108)
CHLORIDE UR-SCNC: <35 MMOL/L — SIGNIFICANT CHANGE UP
CHROM ANALY INTERPHASE BLD FISH-IMP: SIGNIFICANT CHANGE UP
CHROM ANALY INTERPHASE BLD FISH-IMP: SIGNIFICANT CHANGE UP
CHROM ANALY OVERALL INTERP SPEC-IMP: SIGNIFICANT CHANGE UP
CMV DNA CSF QL NAA+PROBE: SIGNIFICANT CHANGE UP
CMV DNA CSF QL NAA+PROBE: SIGNIFICANT CHANGE UP
CMV DNA SPEC NAA+PROBE-LOG#: SIGNIFICANT CHANGE UP LOGIU/ML
CMV DNA SPEC NAA+PROBE-LOG#: SIGNIFICANT CHANGE UP LOGIU/ML
CMV IGG FLD QL: <0.2 U/ML — SIGNIFICANT CHANGE UP
CMV IGG FLD QL: <0.2 U/ML — SIGNIFICANT CHANGE UP
CMV IGG SERPL-IMP: NEGATIVE — SIGNIFICANT CHANGE UP
CMV IGG SERPL-IMP: NEGATIVE — SIGNIFICANT CHANGE UP
CMV IGM FLD-ACNC: <8 AU/ML — SIGNIFICANT CHANGE UP
CMV IGM FLD-ACNC: <8 AU/ML — SIGNIFICANT CHANGE UP
CMV IGM SERPL QL: NEGATIVE — SIGNIFICANT CHANGE UP
CMV IGM SERPL QL: NEGATIVE — SIGNIFICANT CHANGE UP
CO2 BLDV-SCNC: 20 MMOL/L — LOW (ref 22–30)
CO2 BLDV-SCNC: 21 MMOL/L — LOW (ref 22–30)
CO2 BLDV-SCNC: 22 MMOL/L — SIGNIFICANT CHANGE UP (ref 22–30)
CO2 SERPL-SCNC: 12 MMOL/L — LOW (ref 22–31)
CO2 SERPL-SCNC: 15 MMOL/L — LOW (ref 22–31)
CO2 SERPL-SCNC: 15 MMOL/L — LOW (ref 22–31)
CO2 SERPL-SCNC: 16 MMOL/L
CO2 SERPL-SCNC: 17 MMOL/L
CO2 SERPL-SCNC: 17 MMOL/L — LOW (ref 22–31)
CO2 SERPL-SCNC: 18 MMOL/L
CO2 SERPL-SCNC: 18 MMOL/L — LOW (ref 22–31)
CO2 SERPL-SCNC: 19 MMOL/L
CO2 SERPL-SCNC: 19 MMOL/L — LOW (ref 22–31)
CO2 SERPL-SCNC: 20 MMOL/L — LOW (ref 22–31)
CO2 SERPL-SCNC: 21 MMOL/L
CO2 SERPL-SCNC: 21 MMOL/L
CO2 SERPL-SCNC: 22 MMOL/L
CO2 SERPL-SCNC: 22 MMOL/L — SIGNIFICANT CHANGE UP (ref 22–31)
CO2 SERPL-SCNC: 23 MMOL/L
CO2 SERPL-SCNC: 23 MMOL/L — SIGNIFICANT CHANGE UP (ref 22–31)
CO2 SERPL-SCNC: 23 MMOL/L — SIGNIFICANT CHANGE UP (ref 22–31)
COD CRY URNS QL: ABNORMAL
COLOR SPEC: ABNORMAL
COLOR SPEC: ABNORMAL
COLOR SPEC: YELLOW — SIGNIFICANT CHANGE UP
COMMENT - PATHOLOGY: SIGNIFICANT CHANGE UP
COMMENT - URINE: SIGNIFICANT CHANGE UP
CORTIS AM PEAK SERPL-MCNC: 17.2 UG/DL — SIGNIFICANT CHANGE UP (ref 6–18.4)
CREAT ?TM UR-MCNC: 64 MG/DL — SIGNIFICANT CHANGE UP
CREAT SERPL-MCNC: 1.08 MG/DL
CREAT SERPL-MCNC: 1.12 MG/DL — SIGNIFICANT CHANGE UP (ref 0.5–1.3)
CREAT SERPL-MCNC: 1.21 MG/DL
CREAT SERPL-MCNC: 1.23 MG/DL — SIGNIFICANT CHANGE UP (ref 0.5–1.3)
CREAT SERPL-MCNC: 1.26 MG/DL — SIGNIFICANT CHANGE UP (ref 0.5–1.3)
CREAT SERPL-MCNC: 1.27 MG/DL
CREAT SERPL-MCNC: 1.27 MG/DL — SIGNIFICANT CHANGE UP (ref 0.5–1.3)
CREAT SERPL-MCNC: 1.28 MG/DL — SIGNIFICANT CHANGE UP (ref 0.5–1.3)
CREAT SERPL-MCNC: 1.3 MG/DL
CREAT SERPL-MCNC: 1.32 MG/DL — HIGH (ref 0.5–1.3)
CREAT SERPL-MCNC: 1.33 MG/DL — HIGH (ref 0.5–1.3)
CREAT SERPL-MCNC: 1.38 MG/DL — HIGH (ref 0.5–1.3)
CREAT SERPL-MCNC: 1.39 MG/DL — HIGH (ref 0.5–1.3)
CREAT SERPL-MCNC: 1.4 MG/DL
CREAT SERPL-MCNC: 1.42 MG/DL — HIGH (ref 0.5–1.3)
CREAT SERPL-MCNC: 1.46 MG/DL — HIGH (ref 0.5–1.3)
CREAT SERPL-MCNC: 1.46 MG/DL — HIGH (ref 0.5–1.3)
CREAT SERPL-MCNC: 1.48 MG/DL — HIGH (ref 0.5–1.3)
CREAT SERPL-MCNC: 1.51 MG/DL — HIGH (ref 0.5–1.3)
CREAT SERPL-MCNC: 1.51 MG/DL — HIGH (ref 0.5–1.3)
CREAT SERPL-MCNC: 1.53 MG/DL — HIGH (ref 0.5–1.3)
CREAT SERPL-MCNC: 1.54 MG/DL — HIGH (ref 0.5–1.3)
CREAT SERPL-MCNC: 1.56 MG/DL
CREAT SERPL-MCNC: 1.58 MG/DL
CREAT SERPL-MCNC: 1.61 MG/DL — HIGH (ref 0.5–1.3)
CREAT SERPL-MCNC: 1.63 MG/DL — HIGH (ref 0.5–1.3)
CREAT SERPL-MCNC: 1.65 MG/DL — HIGH (ref 0.5–1.3)
CREAT SERPL-MCNC: 1.68 MG/DL — HIGH (ref 0.5–1.3)
CREAT SERPL-MCNC: 1.69 MG/DL — HIGH (ref 0.5–1.3)
CREAT SERPL-MCNC: 1.75 MG/DL — HIGH (ref 0.5–1.3)
CREAT SERPL-MCNC: 1.76 MG/DL — HIGH (ref 0.5–1.3)
CREAT SERPL-MCNC: 1.89 MG/DL — HIGH (ref 0.5–1.3)
CREAT SERPL-MCNC: 1.92 MG/DL
CREAT SERPL-MCNC: 2.08 MG/DL — HIGH (ref 0.5–1.3)
CREAT SERPL-MCNC: 2.46 MG/DL — HIGH (ref 0.5–1.3)
CREAT SERPL-MCNC: 3.15 MG/DL — HIGH (ref 0.5–1.3)
CRYPTOC AG FLD QL: NEGATIVE — SIGNIFICANT CHANGE UP
CULTURE RESULTS: NO GROWTH — SIGNIFICANT CHANGE UP
CULTURE RESULTS: NO GROWTH — SIGNIFICANT CHANGE UP
CULTURE RESULTS: SIGNIFICANT CHANGE UP
CYCLOSPORINE SER-MCNC: 122 NG/ML
DACRYOCYTES BLD QL SMEAR: SLIGHT — SIGNIFICANT CHANGE UP
DAT C3-SP REAG RBC QL: NEGATIVE — SIGNIFICANT CHANGE UP
DAT POLY-SP REAG RBC QL: NEGATIVE — SIGNIFICANT CHANGE UP
DATE OF TRANSF RX: SIGNIFICANT CHANGE UP
DIFF PNL FLD: ABNORMAL
DIRECT COOMBS IGG: NEGATIVE — SIGNIFICANT CHANGE UP
DNA PLOIDY SPEC FC-IMP: SIGNIFICANT CHANGE UP
DRVVT SCREEN TO CONFIRM RATIO: SIGNIFICANT CHANGE UP
E CHAFFEENSIS DNA BLD QL NAA+PROBE: NEGATIVE — SIGNIFICANT CHANGE UP
E CHAFFEENSIS IGG TITR SER IF: SIGNIFICANT CHANGE UP
E CHAFFEENSIS IGG TITR SER IF: SIGNIFICANT CHANGE UP
E CHAFFEENSIS IGG TITR SER: SIGNIFICANT CHANGE UP
E CHAFFEENSIS IGM TITR SER IF: SIGNIFICANT CHANGE UP
E EWINGII DNA SPEC QL NAA+PROBE: NEGATIVE — SIGNIFICANT CHANGE UP
EBV EA AB SER IA-ACNC: 93.5 U/ML — HIGH
EBV EA AB TITR SER IF: POSITIVE
EBV EA IGG SER-ACNC: POSITIVE
EBV NA IGG SER IA-ACNC: 211 U/ML — HIGH
EBV PATRN SPEC IB-IMP: SIGNIFICANT CHANGE UP
EBV VCA IGG AVIDITY SER QL IA: POSITIVE
EBV VCA IGM SER IA-ACNC: 694 U/ML — HIGH
EBV VCA IGM SER IA-ACNC: <10 U/ML — SIGNIFICANT CHANGE UP
EBV VCA IGM TITR FLD: NEGATIVE — SIGNIFICANT CHANGE UP
EHRLICHIA CHAFFEENSIS IGG COMENT: SIGNIFICANT CHANGE UP
EHRLICHIA CHAFFEENSIS IGG INTERP: SIGNIFICANT CHANGE UP
EHRLICHIA DNA SPEC QL NAA+PROBE: NEGATIVE — SIGNIFICANT CHANGE UP
ELLIPTOCYTES BLD QL SMEAR: SLIGHT — SIGNIFICANT CHANGE UP
EOSINOPHIL # BLD AUTO: 0 K/UL — SIGNIFICANT CHANGE UP (ref 0–0.5)
EOSINOPHIL # BLD AUTO: 0.01 K/UL — SIGNIFICANT CHANGE UP (ref 0–0.5)
EOSINOPHIL # BLD AUTO: 0.02 K/UL — SIGNIFICANT CHANGE UP (ref 0–0.5)
EOSINOPHIL # BLD AUTO: 0.03 K/UL — SIGNIFICANT CHANGE UP (ref 0–0.5)
EOSINOPHIL # BLD AUTO: 0.03 K/UL — SIGNIFICANT CHANGE UP (ref 0–0.5)
EOSINOPHIL # BLD AUTO: 0.06 K/UL — SIGNIFICANT CHANGE UP (ref 0–0.5)
EOSINOPHIL # BLD AUTO: 0.1 K/UL — SIGNIFICANT CHANGE UP (ref 0–0.5)
EOSINOPHIL # BLD AUTO: 0.2 K/UL — SIGNIFICANT CHANGE UP (ref 0–0.5)
EOSINOPHIL NFR BLD AUTO: 0 % — SIGNIFICANT CHANGE UP (ref 0–6)
EOSINOPHIL NFR BLD AUTO: 0.1 % — SIGNIFICANT CHANGE UP (ref 0–6)
EOSINOPHIL NFR BLD AUTO: 0.2 % — SIGNIFICANT CHANGE UP (ref 0–6)
EOSINOPHIL NFR BLD AUTO: 0.2 % — SIGNIFICANT CHANGE UP (ref 0–6)
EOSINOPHIL NFR BLD AUTO: 0.5 % — SIGNIFICANT CHANGE UP (ref 0–6)
EOSINOPHIL NFR BLD AUTO: 0.6 % — SIGNIFICANT CHANGE UP (ref 0–6)
EOSINOPHIL NFR BLD AUTO: 0.8 % — SIGNIFICANT CHANGE UP (ref 0–6)
EOSINOPHIL NFR BLD AUTO: 0.8 % — SIGNIFICANT CHANGE UP (ref 0–6)
EOSINOPHIL NFR BLD AUTO: 0.9 % — SIGNIFICANT CHANGE UP (ref 0–6)
EOSINOPHIL NFR BLD AUTO: 1 % — SIGNIFICANT CHANGE UP (ref 0–6)
EOSINOPHIL NFR BLD AUTO: 1.1 % — SIGNIFICANT CHANGE UP (ref 0–6)
EOSINOPHIL NFR BLD AUTO: 1.2 % — SIGNIFICANT CHANGE UP (ref 0–6)
EOSINOPHIL NFR BLD AUTO: 1.4 % — SIGNIFICANT CHANGE UP (ref 0–6)
EOSINOPHIL NFR BLD AUTO: 1.4 % — SIGNIFICANT CHANGE UP (ref 0–6)
EOSINOPHIL NFR BLD AUTO: 1.5 % — SIGNIFICANT CHANGE UP (ref 0–6)
EOSINOPHIL NFR BLD AUTO: 1.7 % — SIGNIFICANT CHANGE UP (ref 0–6)
EOSINOPHIL NFR BLD AUTO: 1.9 % — SIGNIFICANT CHANGE UP (ref 0–6)
EOSINOPHIL NFR BLD AUTO: 1.9 % — SIGNIFICANT CHANGE UP (ref 0–6)
EOSINOPHIL NFR BLD AUTO: 2 % — SIGNIFICANT CHANGE UP (ref 0–6)
EOSINOPHIL NFR BLD AUTO: 2 % — SIGNIFICANT CHANGE UP (ref 0–6)
EOSINOPHIL NFR BLD AUTO: 3 % — SIGNIFICANT CHANGE UP (ref 0–6)
EOSINOPHIL NFR BLD AUTO: 3 % — SIGNIFICANT CHANGE UP (ref 0–6)
EOSINOPHIL NFR BLD AUTO: 3.1 % — SIGNIFICANT CHANGE UP (ref 0–6)
EOSINOPHIL NFR BLD AUTO: 3.3 % — SIGNIFICANT CHANGE UP (ref 0–6)
EOSINOPHIL NFR BLD AUTO: 3.4 % — SIGNIFICANT CHANGE UP (ref 0–6)
EOSINOPHIL NFR BLD AUTO: 3.6 % — SIGNIFICANT CHANGE UP (ref 0–6)
EOSINOPHIL NFR BLD AUTO: 4.7 % — SIGNIFICANT CHANGE UP (ref 0–6)
EOSINOPHIL NFR BLD AUTO: 5 % — SIGNIFICANT CHANGE UP (ref 0–6)
EPI CELLS # UR: 1 /HPF — SIGNIFICANT CHANGE UP (ref 0–5)
EPI CELLS # UR: 1 — SIGNIFICANT CHANGE UP
EPI CELLS # UR: 2 /HPF — SIGNIFICANT CHANGE UP
EPI CELLS # UR: 2 — SIGNIFICANT CHANGE UP
FERRITIN SERPL-MCNC: 1469 NG/ML
FERRITIN SERPL-MCNC: 4778 NG/ML — HIGH (ref 30–400)
FERRITIN SERPL-MCNC: 4893 NG/ML — HIGH (ref 30–400)
FERRITIN SERPL-MCNC: 4918 NG/ML — HIGH (ref 30–400)
FERRITIN SERPL-MCNC: 5561 NG/ML — HIGH (ref 30–400)
FERRITIN SERPL-MCNC: 5652 NG/ML — HIGH (ref 30–400)
FERRITIN SERPL-MCNC: 677 NG/ML
FERRITIN SERPL-MCNC: 7563 NG/ML — HIGH (ref 30–400)
FERRITIN SERPL-MCNC: 7688 NG/ML
FERRITIN SERPL-MCNC: 9195 NG/ML — HIGH (ref 30–400)
FERRITIN SERPL-MCNC: 9880 NG/ML
FERRITIN SERPL-MCNC: ABNORMAL NG/ML
FERRITIN SERPL-MCNC: HIGH NG/ML (ref 30–400)
FIBRINOGEN PPP-MCNC: 100 MG/DL — CRITICAL LOW (ref 320–500)
FIBRINOGEN PPP-MCNC: 123 MG/DL — LOW (ref 320–500)
FIBRINOGEN PPP-MCNC: 139 MG/DL — LOW (ref 320–500)
FIBRINOGEN PPP-MCNC: 176 MG/DL — LOW (ref 350–510)
FIBRINOGEN PPP-MCNC: 230 MG/DL — LOW (ref 350–510)
FIBRINOGEN PPP-MCNC: 241 MG/DL — LOW (ref 320–500)
FIBRINOGEN PPP-MCNC: 260 MG/DL — LOW (ref 320–500)
FIBRINOGEN PPP-MCNC: 273 MG/DL — LOW (ref 350–510)
FIBRINOGEN PPP-MCNC: 274 MG/DL — LOW (ref 350–510)
FIBRINOGEN PPP-MCNC: 92 MG/DL — CRITICAL LOW (ref 320–500)
FOLATE SERPL-MCNC: 17 NG/ML — SIGNIFICANT CHANGE UP
FOLATE SERPL-MCNC: >20 NG/ML
FOLATE SERPL-MCNC: >20 NG/ML
FOLATE SERPL-MCNC: >20 NG/ML — SIGNIFICANT CHANGE UP
G6PD SER-CCNC: 15.7 U/G HGB
GAMMA GLOBULIN: 0.6 G/DL — SIGNIFICANT CHANGE UP (ref 0.6–1.6)
GAS PNL BLDV: 125 MMOL/L — LOW (ref 135–145)
GAS PNL BLDV: 127 MMOL/L — LOW (ref 135–145)
GAS PNL BLDV: 132 MMOL/L — LOW (ref 135–145)
GAS PNL BLDV: SIGNIFICANT CHANGE UP
GLUCOSE BLDC GLUCOMTR-MCNC: 100 MG/DL — HIGH (ref 70–99)
GLUCOSE BLDC GLUCOMTR-MCNC: 102 MG/DL — HIGH (ref 70–99)
GLUCOSE BLDC GLUCOMTR-MCNC: 104 MG/DL — HIGH (ref 70–99)
GLUCOSE BLDC GLUCOMTR-MCNC: 105 MG/DL — HIGH (ref 70–99)
GLUCOSE BLDC GLUCOMTR-MCNC: 107 MG/DL — HIGH (ref 70–99)
GLUCOSE BLDC GLUCOMTR-MCNC: 108 MG/DL — HIGH (ref 70–99)
GLUCOSE BLDC GLUCOMTR-MCNC: 108 MG/DL — HIGH (ref 70–99)
GLUCOSE BLDC GLUCOMTR-MCNC: 109 MG/DL — HIGH (ref 70–99)
GLUCOSE BLDC GLUCOMTR-MCNC: 109 MG/DL — HIGH (ref 70–99)
GLUCOSE BLDC GLUCOMTR-MCNC: 110 MG/DL — HIGH (ref 70–99)
GLUCOSE BLDC GLUCOMTR-MCNC: 111 MG/DL — HIGH (ref 70–99)
GLUCOSE BLDC GLUCOMTR-MCNC: 111 MG/DL — HIGH (ref 70–99)
GLUCOSE BLDC GLUCOMTR-MCNC: 112 MG/DL — HIGH (ref 70–99)
GLUCOSE BLDC GLUCOMTR-MCNC: 113 MG/DL — HIGH (ref 70–99)
GLUCOSE BLDC GLUCOMTR-MCNC: 116 MG/DL — HIGH (ref 70–99)
GLUCOSE BLDC GLUCOMTR-MCNC: 119 MG/DL — HIGH (ref 70–99)
GLUCOSE BLDC GLUCOMTR-MCNC: 120 MG/DL — HIGH (ref 70–99)
GLUCOSE BLDC GLUCOMTR-MCNC: 120 MG/DL — HIGH (ref 70–99)
GLUCOSE BLDC GLUCOMTR-MCNC: 121 MG/DL — HIGH (ref 70–99)
GLUCOSE BLDC GLUCOMTR-MCNC: 121 MG/DL — HIGH (ref 70–99)
GLUCOSE BLDC GLUCOMTR-MCNC: 122 MG/DL — HIGH (ref 70–99)
GLUCOSE BLDC GLUCOMTR-MCNC: 123 MG/DL — HIGH (ref 70–99)
GLUCOSE BLDC GLUCOMTR-MCNC: 124 MG/DL — HIGH (ref 70–99)
GLUCOSE BLDC GLUCOMTR-MCNC: 126 MG/DL — HIGH (ref 70–99)
GLUCOSE BLDC GLUCOMTR-MCNC: 128 MG/DL — HIGH (ref 70–99)
GLUCOSE BLDC GLUCOMTR-MCNC: 129 MG/DL — HIGH (ref 70–99)
GLUCOSE BLDC GLUCOMTR-MCNC: 129 MG/DL — HIGH (ref 70–99)
GLUCOSE BLDC GLUCOMTR-MCNC: 134 MG/DL — HIGH (ref 70–99)
GLUCOSE BLDC GLUCOMTR-MCNC: 135 MG/DL — HIGH (ref 70–99)
GLUCOSE BLDC GLUCOMTR-MCNC: 137 MG/DL — HIGH (ref 70–99)
GLUCOSE BLDC GLUCOMTR-MCNC: 137 MG/DL — HIGH (ref 70–99)
GLUCOSE BLDC GLUCOMTR-MCNC: 138 MG/DL — HIGH (ref 70–99)
GLUCOSE BLDC GLUCOMTR-MCNC: 161 MG/DL — HIGH (ref 70–99)
GLUCOSE BLDC GLUCOMTR-MCNC: 163 MG/DL — HIGH (ref 70–99)
GLUCOSE BLDC GLUCOMTR-MCNC: 189 MG/DL — HIGH (ref 70–99)
GLUCOSE BLDC GLUCOMTR-MCNC: 190 MG/DL — HIGH (ref 70–99)
GLUCOSE BLDC GLUCOMTR-MCNC: 216 MG/DL — HIGH (ref 70–99)
GLUCOSE BLDC GLUCOMTR-MCNC: 91 MG/DL — SIGNIFICANT CHANGE UP (ref 70–99)
GLUCOSE BLDC GLUCOMTR-MCNC: 97 MG/DL — SIGNIFICANT CHANGE UP (ref 70–99)
GLUCOSE BLDC GLUCOMTR-MCNC: 99 MG/DL — SIGNIFICANT CHANGE UP (ref 70–99)
GLUCOSE BLDV-MCNC: 88 MG/DL — SIGNIFICANT CHANGE UP (ref 70–99)
GLUCOSE BLDV-MCNC: 88 MG/DL — SIGNIFICANT CHANGE UP (ref 70–99)
GLUCOSE BLDV-MCNC: 91 MG/DL — SIGNIFICANT CHANGE UP (ref 70–99)
GLUCOSE SERPL-MCNC: 100 MG/DL — HIGH (ref 70–99)
GLUCOSE SERPL-MCNC: 101 MG/DL — HIGH (ref 70–99)
GLUCOSE SERPL-MCNC: 102 MG/DL — HIGH (ref 70–99)
GLUCOSE SERPL-MCNC: 102 MG/DL — HIGH (ref 70–99)
GLUCOSE SERPL-MCNC: 103 MG/DL — HIGH (ref 70–99)
GLUCOSE SERPL-MCNC: 105 MG/DL — HIGH (ref 70–99)
GLUCOSE SERPL-MCNC: 115 MG/DL
GLUCOSE SERPL-MCNC: 115 MG/DL — HIGH (ref 70–99)
GLUCOSE SERPL-MCNC: 116 MG/DL — HIGH (ref 70–99)
GLUCOSE SERPL-MCNC: 119 MG/DL — HIGH (ref 70–99)
GLUCOSE SERPL-MCNC: 123 MG/DL
GLUCOSE SERPL-MCNC: 127 MG/DL
GLUCOSE SERPL-MCNC: 144 MG/DL
GLUCOSE SERPL-MCNC: 151 MG/DL
GLUCOSE SERPL-MCNC: 168 MG/DL — HIGH (ref 70–99)
GLUCOSE SERPL-MCNC: 181 MG/DL
GLUCOSE SERPL-MCNC: 65 MG/DL — LOW (ref 70–99)
GLUCOSE SERPL-MCNC: 66 MG/DL — LOW (ref 70–99)
GLUCOSE SERPL-MCNC: 70 MG/DL — SIGNIFICANT CHANGE UP (ref 70–99)
GLUCOSE SERPL-MCNC: 73 MG/DL — SIGNIFICANT CHANGE UP (ref 70–99)
GLUCOSE SERPL-MCNC: 73 MG/DL — SIGNIFICANT CHANGE UP (ref 70–99)
GLUCOSE SERPL-MCNC: 76 MG/DL — SIGNIFICANT CHANGE UP (ref 70–99)
GLUCOSE SERPL-MCNC: 80 MG/DL — SIGNIFICANT CHANGE UP (ref 70–99)
GLUCOSE SERPL-MCNC: 82 MG/DL
GLUCOSE SERPL-MCNC: 82 MG/DL — SIGNIFICANT CHANGE UP (ref 70–99)
GLUCOSE SERPL-MCNC: 85 MG/DL — SIGNIFICANT CHANGE UP (ref 70–99)
GLUCOSE SERPL-MCNC: 89 MG/DL — SIGNIFICANT CHANGE UP (ref 70–99)
GLUCOSE SERPL-MCNC: 90 MG/DL — SIGNIFICANT CHANGE UP (ref 70–99)
GLUCOSE SERPL-MCNC: 91 MG/DL — SIGNIFICANT CHANGE UP (ref 70–99)
GLUCOSE SERPL-MCNC: 92 MG/DL — SIGNIFICANT CHANGE UP (ref 70–99)
GLUCOSE SERPL-MCNC: 93 MG/DL — SIGNIFICANT CHANGE UP (ref 70–99)
GLUCOSE SERPL-MCNC: 94 MG/DL — SIGNIFICANT CHANGE UP (ref 70–99)
GLUCOSE SERPL-MCNC: 95 MG/DL — SIGNIFICANT CHANGE UP (ref 70–99)
GLUCOSE SERPL-MCNC: 96 MG/DL — SIGNIFICANT CHANGE UP (ref 70–99)
GLUCOSE SERPL-MCNC: 96 MG/DL — SIGNIFICANT CHANGE UP (ref 70–99)
GLUCOSE SERPL-MCNC: 97 MG/DL — SIGNIFICANT CHANGE UP (ref 70–99)
GLUCOSE SERPL-MCNC: 98 MG/DL
GLUCOSE SERPL-MCNC: 98 MG/DL — SIGNIFICANT CHANGE UP (ref 70–99)
GLUCOSE SERPL-MCNC: 99 MG/DL — SIGNIFICANT CHANGE UP (ref 70–99)
GLUCOSE UR QL: NEGATIVE — SIGNIFICANT CHANGE UP
GLYCOPROTEIN IV ANTIBODY: NEGATIVE
HAPTOGLOB SERPL-MCNC: 105 MG/DL
HAPTOGLOB SERPL-MCNC: 110 MG/DL
HAPTOGLOB SERPL-MCNC: 54 MG/DL
HAPTOGLOB SERPL-MCNC: 63 MG/DL
HAPTOGLOB SERPL-MCNC: 67 MG/DL — SIGNIFICANT CHANGE UP (ref 34–200)
HAPTOGLOB SERPL-MCNC: 78 MG/DL — SIGNIFICANT CHANGE UP (ref 34–200)
HAPTOGLOB SERPL-MCNC: 79 MG/DL
HAPTOGLOB SERPL-MCNC: 81 MG/DL
HAPTOGLOB SERPL-MCNC: <20 MG/DL
HAPTOGLOB SERPL-MCNC: <20 MG/DL — LOW (ref 34–200)
HAV IGM SER-ACNC: SIGNIFICANT CHANGE UP
HAV IGM SER-ACNC: SIGNIFICANT CHANGE UP
HBA1C BLD-MCNC: 5.9 % — HIGH (ref 4–5.6)
HBV CORE AB SER-ACNC: SIGNIFICANT CHANGE UP
HBV CORE IGG+IGM SER QL: NONREACTIVE
HBV CORE IGM SER-ACNC: SIGNIFICANT CHANGE UP
HBV SURFACE AB SER QL: NONREACTIVE
HBV SURFACE AG SER QL: NONREACTIVE
HBV SURFACE AG SER-ACNC: SIGNIFICANT CHANGE UP
HBV SURFACE AG SER-ACNC: SIGNIFICANT CHANGE UP
HCO3 BLDV-SCNC: 19 MMOL/L — LOW (ref 21–29)
HCO3 BLDV-SCNC: 20 MMOL/L — LOW (ref 21–29)
HCO3 BLDV-SCNC: 21 MMOL/L — SIGNIFICANT CHANGE UP (ref 21–29)
HCT VFR BLD CALC: 19.1 % — CRITICAL LOW (ref 39–50)
HCT VFR BLD CALC: 19.7 % — CRITICAL LOW (ref 39–50)
HCT VFR BLD CALC: 20.5 % — CRITICAL LOW (ref 39–50)
HCT VFR BLD CALC: 20.7 % — CRITICAL LOW (ref 39–50)
HCT VFR BLD CALC: 21 % — CRITICAL LOW (ref 39–50)
HCT VFR BLD CALC: 21 % — CRITICAL LOW (ref 39–50)
HCT VFR BLD CALC: 21.1 % — LOW (ref 39–50)
HCT VFR BLD CALC: 21.1 % — LOW (ref 39–50)
HCT VFR BLD CALC: 21.6 % — LOW (ref 39–50)
HCT VFR BLD CALC: 21.8 % — LOW (ref 39–50)
HCT VFR BLD CALC: 21.8 % — LOW (ref 39–50)
HCT VFR BLD CALC: 22.3 % — LOW (ref 39–50)
HCT VFR BLD CALC: 23 % — LOW (ref 39–50)
HCT VFR BLD CALC: 23 % — LOW (ref 39–50)
HCT VFR BLD CALC: 23.1 % — LOW (ref 39–50)
HCT VFR BLD CALC: 23.1 % — LOW (ref 39–50)
HCT VFR BLD CALC: 23.2 % — LOW (ref 39–50)
HCT VFR BLD CALC: 23.2 % — LOW (ref 39–50)
HCT VFR BLD CALC: 23.4 % — LOW (ref 39–50)
HCT VFR BLD CALC: 23.7 % — LOW (ref 39–50)
HCT VFR BLD CALC: 24.4 % — LOW (ref 39–50)
HCT VFR BLD CALC: 24.6 % — LOW (ref 39–50)
HCT VFR BLD CALC: 24.6 % — LOW (ref 39–50)
HCT VFR BLD CALC: 24.8 % — LOW (ref 39–50)
HCT VFR BLD CALC: 25 % — LOW (ref 39–50)
HCT VFR BLD CALC: 25 % — LOW (ref 39–50)
HCT VFR BLD CALC: 25.1 % — LOW (ref 39–50)
HCT VFR BLD CALC: 25.2 % — LOW (ref 39–50)
HCT VFR BLD CALC: 25.2 % — LOW (ref 39–50)
HCT VFR BLD CALC: 25.3 % — LOW (ref 39–50)
HCT VFR BLD CALC: 25.4 % — LOW (ref 39–50)
HCT VFR BLD CALC: 25.4 % — LOW (ref 39–50)
HCT VFR BLD CALC: 25.7 % — LOW (ref 39–50)
HCT VFR BLD CALC: 25.8 % — LOW (ref 39–50)
HCT VFR BLD CALC: 25.8 % — LOW (ref 39–50)
HCT VFR BLD CALC: 25.9 % — LOW (ref 39–50)
HCT VFR BLD CALC: 26.1 % — LOW (ref 39–50)
HCT VFR BLD CALC: 26.4 % — LOW (ref 39–50)
HCT VFR BLD CALC: 27.2 % — LOW (ref 39–50)
HCT VFR BLD CALC: 27.6 % — LOW (ref 39–50)
HCT VFR BLD CALC: 27.8 % — LOW (ref 39–50)
HCT VFR BLD CALC: 28.2 % — LOW (ref 39–50)
HCT VFR BLD CALC: 28.8 % — LOW (ref 39–50)
HCT VFR BLD CALC: 28.9 % — LOW (ref 39–50)
HCT VFR BLD CALC: 29.1 % — LOW (ref 39–50)
HCT VFR BLD CALC: 29.4 % — LOW (ref 39–50)
HCT VFR BLD CALC: 29.4 % — LOW (ref 39–50)
HCT VFR BLD CALC: 29.7 % — LOW (ref 39–50)
HCT VFR BLD CALC: 29.9 % — LOW (ref 39–50)
HCT VFR BLD CALC: 30 % — LOW (ref 39–50)
HCT VFR BLD CALC: 30 % — LOW (ref 39–50)
HCT VFR BLD CALC: 30.1 % — LOW (ref 39–50)
HCT VFR BLD CALC: 30.6 % — LOW (ref 39–50)
HCT VFR BLD CALC: 30.8 % — LOW (ref 39–50)
HCT VFR BLD CALC: 31.3 % — LOW (ref 39–50)
HCT VFR BLD CALC: 32.2 % — LOW (ref 39–50)
HCT VFR BLD CALC: 32.2 % — LOW (ref 39–50)
HCT VFR BLD CALC: 32.4 % — LOW (ref 39–50)
HCT VFR BLD CALC: 34.3 % — LOW (ref 39–50)
HCT VFR BLD CALC: 35.9 % — LOW (ref 39–50)
HCT VFR BLD CALC: 39.2 % — SIGNIFICANT CHANGE UP (ref 39–50)
HCT VFR BLD CALC: 41.6 % — SIGNIFICANT CHANGE UP (ref 39–50)
HCT VFR BLDA CALC: 21 % — CRITICAL LOW (ref 39–50)
HCT VFR BLDA CALC: 23 % — LOW (ref 39–50)
HCT VFR BLDA CALC: 23 % — LOW (ref 39–50)
HCV AB S/CO SERPL IA: 0.08 S/CO — SIGNIFICANT CHANGE UP (ref 0–0.99)
HCV AB S/CO SERPL IA: 0.08 S/CO — SIGNIFICANT CHANGE UP (ref 0–0.99)
HCV AB SER QL: NONREACTIVE
HCV AB SERPL-IMP: SIGNIFICANT CHANGE UP
HCV AB SERPL-IMP: SIGNIFICANT CHANGE UP
HCV RNA FLD QL NAA+PROBE: SIGNIFICANT CHANGE UP
HCV RNA SPEC QL PROBE+SIG AMP: SIGNIFICANT CHANGE UP
HCV S/CO RATIO: 0.18 S/CO
HDV IGM SER QL IA: SIGNIFICANT CHANGE UP
HEMATOPATHOLOGY REPORT: SIGNIFICANT CHANGE UP
HEV IGM SER QL: SIGNIFICANT CHANGE UP
HGB BLD CALC-MCNC: 6.7 G/DL — CRITICAL LOW (ref 13–17)
HGB BLD CALC-MCNC: 7.3 G/DL — LOW (ref 13–17)
HGB BLD CALC-MCNC: 7.5 G/DL — LOW (ref 13–17)
HGB BLD-MCNC: 10 G/DL — LOW (ref 13–17)
HGB BLD-MCNC: 10.1 G/DL — LOW (ref 13–17)
HGB BLD-MCNC: 10.3 G/DL — LOW (ref 13–17)
HGB BLD-MCNC: 10.3 G/DL — LOW (ref 13–17)
HGB BLD-MCNC: 10.4 G/DL — LOW (ref 13–17)
HGB BLD-MCNC: 10.5 G/DL — LOW (ref 13–17)
HGB BLD-MCNC: 10.8 G/DL — LOW (ref 13–17)
HGB BLD-MCNC: 10.8 G/DL — LOW (ref 13–17)
HGB BLD-MCNC: 10.9 G/DL — LOW (ref 13–17)
HGB BLD-MCNC: 10.9 G/DL — LOW (ref 13–17)
HGB BLD-MCNC: 11.2 G/DL — LOW (ref 13–17)
HGB BLD-MCNC: 11.2 G/DL — LOW (ref 13–17)
HGB BLD-MCNC: 11.6 G/DL — LOW (ref 13–17)
HGB BLD-MCNC: 12.5 G/DL — LOW (ref 13–17)
HGB BLD-MCNC: 12.8 G/DL — LOW (ref 13–17)
HGB BLD-MCNC: 13.3 G/DL — SIGNIFICANT CHANGE UP (ref 13–17)
HGB BLD-MCNC: 6.3 G/DL — CRITICAL LOW (ref 13–17)
HGB BLD-MCNC: 6.4 G/DL — CRITICAL LOW (ref 13–17)
HGB BLD-MCNC: 6.6 G/DL — CRITICAL LOW (ref 13–17)
HGB BLD-MCNC: 6.9 G/DL — CRITICAL LOW (ref 13–17)
HGB BLD-MCNC: 7.1 G/DL — LOW (ref 13–17)
HGB BLD-MCNC: 7.2 G/DL — LOW (ref 13–17)
HGB BLD-MCNC: 7.3 G/DL — LOW (ref 13–17)
HGB BLD-MCNC: 7.4 G/DL — LOW (ref 13–17)
HGB BLD-MCNC: 7.5 G/DL — LOW (ref 13–17)
HGB BLD-MCNC: 7.5 G/DL — LOW (ref 13–17)
HGB BLD-MCNC: 7.6 G/DL — LOW (ref 13–17)
HGB BLD-MCNC: 7.6 G/DL — LOW (ref 13–17)
HGB BLD-MCNC: 7.7 G/DL — LOW (ref 13–17)
HGB BLD-MCNC: 7.8 G/DL — LOW (ref 13–17)
HGB BLD-MCNC: 7.9 G/DL — LOW (ref 13–17)
HGB BLD-MCNC: 8 G/DL — LOW (ref 13–17)
HGB BLD-MCNC: 8.1 G/DL — LOW (ref 13–17)
HGB BLD-MCNC: 8.3 G/DL — LOW (ref 13–17)
HGB BLD-MCNC: 8.4 G/DL — LOW (ref 13–17)
HGB BLD-MCNC: 8.5 G/DL — LOW (ref 13–17)
HGB BLD-MCNC: 8.5 G/DL — LOW (ref 13–17)
HGB BLD-MCNC: 8.6 G/DL — LOW (ref 13–17)
HGB BLD-MCNC: 8.6 G/DL — LOW (ref 13–17)
HGB BLD-MCNC: 8.7 G/DL — LOW (ref 13–17)
HGB BLD-MCNC: 8.8 G/DL — LOW (ref 13–17)
HGB BLD-MCNC: 8.9 G/DL — LOW (ref 13–17)
HGB BLD-MCNC: 8.9 G/DL — LOW (ref 13–17)
HGB BLD-MCNC: 9.1 G/DL — LOW (ref 13–17)
HGB BLD-MCNC: 9.1 G/DL — LOW (ref 13–17)
HGB BLD-MCNC: 9.2 G/DL — LOW (ref 13–17)
HGB BLD-MCNC: 9.2 G/DL — LOW (ref 13–17)
HGB BLD-MCNC: 9.3 G/DL — LOW (ref 13–17)
HGB BLD-MCNC: 9.5 G/DL — LOW (ref 13–17)
HGB BLD-MCNC: 9.7 G/DL — LOW (ref 13–17)
HGB BLD-MCNC: 9.8 G/DL — LOW (ref 13–17)
HGB BLD-MCNC: 9.9 G/DL — LOW (ref 13–17)
HIV 1+2 AB+HIV1 P24 AG SERPL QL IA: SIGNIFICANT CHANGE UP
HLA CLASS 1 AB: NEGATIVE
HYALINE CASTS # UR AUTO: 2 /LPF — SIGNIFICANT CHANGE UP (ref 0–2)
HYALINE CASTS # UR AUTO: 2 /LPF — SIGNIFICANT CHANGE UP (ref 0–7)
HYALINE CASTS # UR AUTO: 3 /LPF — SIGNIFICANT CHANGE UP (ref 0–7)
HYALINE CASTS # UR AUTO: 8 /LPF — HIGH (ref 0–7)
HYPOCHROMIA BLD QL: SLIGHT — SIGNIFICANT CHANGE UP
IA/IIA AB: NEGATIVE
IB/IX AB: POSITIVE
IIB/IIIA AB: NEGATIVE
IL2 SERPL-MCNC: HIGH PG/ML
IMM GRANULOCYTES NFR BLD AUTO: 1 % — SIGNIFICANT CHANGE UP (ref 0–1.5)
IMM GRANULOCYTES NFR BLD AUTO: 1.3 % — SIGNIFICANT CHANGE UP (ref 0–1.5)
IMM GRANULOCYTES NFR BLD AUTO: 1.7 % — HIGH (ref 0–1.5)
IMM GRANULOCYTES NFR BLD AUTO: 2.1 % — HIGH (ref 0–1.5)
IMM GRANULOCYTES NFR BLD AUTO: 2.3 % — HIGH (ref 0–1.5)
IMM GRANULOCYTES NFR BLD AUTO: 2.6 % — HIGH (ref 0–1.5)
INR BLD: 1.08 RATIO — SIGNIFICANT CHANGE UP (ref 0.88–1.16)
INR BLD: 1.18 RATIO — HIGH (ref 0.88–1.16)
INR BLD: 1.25 RATIO — HIGH (ref 0.88–1.16)
INR BLD: 1.29 RATIO — HIGH (ref 0.88–1.16)
INR BLD: 1.31 RATIO — HIGH (ref 0.88–1.16)
INTERPRETATION SERPL IFE-IMP: SIGNIFICANT CHANGE UP
IRON SATN MFR SERPL: 121 UG/DL — SIGNIFICANT CHANGE UP (ref 45–165)
IRON SATN MFR SERPL: 144 UG/DL — SIGNIFICANT CHANGE UP (ref 45–165)
IRON SATN MFR SERPL: 19 %
IRON SATN MFR SERPL: 70 % — HIGH (ref 16–55)
IRON SERPL-MCNC: 47 UG/DL
KETONES UR-MCNC: NEGATIVE — SIGNIFICANT CHANGE UP
LA NT DPL PPP QL: 32.6 SEC — SIGNIFICANT CHANGE UP
LACTATE BLDV-MCNC: 1.3 MMOL/L — SIGNIFICANT CHANGE UP (ref 0.7–2)
LACTATE BLDV-MCNC: 3.1 MMOL/L — HIGH (ref 0.7–2)
LACTATE BLDV-MCNC: 3.5 MMOL/L — HIGH (ref 0.7–2)
LACTATE SERPL-SCNC: 2 MMOL/L — SIGNIFICANT CHANGE UP (ref 0.7–2)
LACTATE SERPL-SCNC: 2.3 MMOL/L — HIGH (ref 0.7–2)
LDH SERPL L TO P-CCNC: 168 U/L — SIGNIFICANT CHANGE UP (ref 50–242)
LDH SERPL L TO P-CCNC: 169 U/L — SIGNIFICANT CHANGE UP (ref 50–242)
LDH SERPL L TO P-CCNC: 174 U/L — SIGNIFICANT CHANGE UP (ref 50–242)
LDH SERPL L TO P-CCNC: 180 U/L — SIGNIFICANT CHANGE UP (ref 50–242)
LDH SERPL L TO P-CCNC: 182 U/L — SIGNIFICANT CHANGE UP (ref 50–242)
LDH SERPL L TO P-CCNC: 185 U/L — SIGNIFICANT CHANGE UP (ref 50–242)
LDH SERPL L TO P-CCNC: 192 U/L — SIGNIFICANT CHANGE UP (ref 50–242)
LDH SERPL L TO P-CCNC: 216 U/L — SIGNIFICANT CHANGE UP (ref 50–242)
LDH SERPL L TO P-CCNC: 245 U/L — HIGH (ref 50–242)
LDH SERPL L TO P-CCNC: 283 U/L — HIGH (ref 50–242)
LDH SERPL L TO P-CCNC: 405 U/L — HIGH (ref 50–242)
LDH SERPL L TO P-CCNC: 460 U/L — HIGH (ref 50–242)
LDH SERPL L TO P-CCNC: 668 U/L — HIGH (ref 50–242)
LDH SERPL L TO P-CCNC: 683 U/L — HIGH (ref 50–242)
LDH SERPL-CCNC: 136 U/L
LDH SERPL-CCNC: 143 U/L
LDH SERPL-CCNC: 155 U/L
LDH SERPL-CCNC: 255 U/L
LDH SERPL-CCNC: 286 U/L
LDH SERPL-CCNC: 322 U/L
LDH SERPL-CCNC: 389 U/L
LDH SERPL-CCNC: 484 U/L
LEPTOSPIRA AB TITR SER: NEGATIVE — SIGNIFICANT CHANGE UP
LEUKOCYTE ESTERASE UR-ACNC: ABNORMAL
LEUKOCYTE ESTERASE UR-ACNC: NEGATIVE — SIGNIFICANT CHANGE UP
LG PLATELETS BLD QL AUTO: SLIGHT — SIGNIFICANT CHANGE UP
LIDOCAIN IGE QN: 18 U/L — SIGNIFICANT CHANGE UP (ref 7–60)
LIDOCAIN IGE QN: 18 U/L — SIGNIFICANT CHANGE UP (ref 7–60)
LYMPHOCYTES # BLD AUTO: 0.1 K/UL — LOW (ref 1–3.3)
LYMPHOCYTES # BLD AUTO: 0.2 K/UL — LOW (ref 1–3.3)
LYMPHOCYTES # BLD AUTO: 0.24 K/UL — LOW (ref 1–3.3)
LYMPHOCYTES # BLD AUTO: 0.27 K/UL — LOW (ref 1–3.3)
LYMPHOCYTES # BLD AUTO: 0.29 K/UL — LOW (ref 1–3.3)
LYMPHOCYTES # BLD AUTO: 0.3 K/UL — LOW (ref 1–3.3)
LYMPHOCYTES # BLD AUTO: 0.3 K/UL — LOW (ref 1–3.3)
LYMPHOCYTES # BLD AUTO: 0.32 K/UL — LOW (ref 1–3.3)
LYMPHOCYTES # BLD AUTO: 0.32 K/UL — LOW (ref 1–3.3)
LYMPHOCYTES # BLD AUTO: 0.33 K/UL — LOW (ref 1–3.3)
LYMPHOCYTES # BLD AUTO: 0.39 K/UL — LOW (ref 1–3.3)
LYMPHOCYTES # BLD AUTO: 0.4 K/UL — LOW (ref 1–3.3)
LYMPHOCYTES # BLD AUTO: 0.5 K/UL — LOW (ref 1–3.3)
LYMPHOCYTES # BLD AUTO: 0.57 K/UL — LOW (ref 1–3.3)
LYMPHOCYTES # BLD AUTO: 0.6 K/UL — LOW (ref 1–3.3)
LYMPHOCYTES # BLD AUTO: 0.7 K/UL — LOW (ref 1–3.3)
LYMPHOCYTES # BLD AUTO: 0.8 K/UL — LOW (ref 1–3.3)
LYMPHOCYTES # BLD AUTO: 0.8 K/UL — LOW (ref 1–3.3)
LYMPHOCYTES # BLD AUTO: 0.9 K/UL — LOW (ref 1–3.3)
LYMPHOCYTES # BLD AUTO: 0.9 K/UL — LOW (ref 1–3.3)
LYMPHOCYTES # BLD AUTO: 1.1 K/UL — SIGNIFICANT CHANGE UP (ref 1–3.3)
LYMPHOCYTES # BLD AUTO: 1.2 K/UL — SIGNIFICANT CHANGE UP (ref 1–3.3)
LYMPHOCYTES # BLD AUTO: 10 % — LOW (ref 13–44)
LYMPHOCYTES # BLD AUTO: 11 % — LOW (ref 13–44)
LYMPHOCYTES # BLD AUTO: 12 % — LOW (ref 13–44)
LYMPHOCYTES # BLD AUTO: 12 % — LOW (ref 13–44)
LYMPHOCYTES # BLD AUTO: 12.3 % — LOW (ref 13–44)
LYMPHOCYTES # BLD AUTO: 13 % — SIGNIFICANT CHANGE UP (ref 13–44)
LYMPHOCYTES # BLD AUTO: 14.2 % — SIGNIFICANT CHANGE UP (ref 13–44)
LYMPHOCYTES # BLD AUTO: 14.9 % — SIGNIFICANT CHANGE UP (ref 13–44)
LYMPHOCYTES # BLD AUTO: 15 % — SIGNIFICANT CHANGE UP (ref 13–44)
LYMPHOCYTES # BLD AUTO: 15.2 % — SIGNIFICANT CHANGE UP (ref 13–44)
LYMPHOCYTES # BLD AUTO: 16 % — SIGNIFICANT CHANGE UP (ref 13–44)
LYMPHOCYTES # BLD AUTO: 16.7 % — SIGNIFICANT CHANGE UP (ref 13–44)
LYMPHOCYTES # BLD AUTO: 17 % — SIGNIFICANT CHANGE UP (ref 13–44)
LYMPHOCYTES # BLD AUTO: 17.2 % — SIGNIFICANT CHANGE UP (ref 13–44)
LYMPHOCYTES # BLD AUTO: 17.8 % — SIGNIFICANT CHANGE UP (ref 13–44)
LYMPHOCYTES # BLD AUTO: 18.2 % — SIGNIFICANT CHANGE UP (ref 13–44)
LYMPHOCYTES # BLD AUTO: 19.5 % — SIGNIFICANT CHANGE UP (ref 13–44)
LYMPHOCYTES # BLD AUTO: 19.8 % — SIGNIFICANT CHANGE UP (ref 13–44)
LYMPHOCYTES # BLD AUTO: 2.9 % — LOW (ref 13–44)
LYMPHOCYTES # BLD AUTO: 22 % — SIGNIFICANT CHANGE UP (ref 13–44)
LYMPHOCYTES # BLD AUTO: 24.7 % — SIGNIFICANT CHANGE UP (ref 13–44)
LYMPHOCYTES # BLD AUTO: 28 % — SIGNIFICANT CHANGE UP (ref 13–44)
LYMPHOCYTES # BLD AUTO: 28.2 % — SIGNIFICANT CHANGE UP (ref 13–44)
LYMPHOCYTES # BLD AUTO: 29 % — SIGNIFICANT CHANGE UP (ref 13–44)
LYMPHOCYTES # BLD AUTO: 3.2 % — LOW (ref 13–44)
LYMPHOCYTES # BLD AUTO: 3.4 % — LOW (ref 13–44)
LYMPHOCYTES # BLD AUTO: 31.2 % — SIGNIFICANT CHANGE UP (ref 13–44)
LYMPHOCYTES # BLD AUTO: 31.4 % — SIGNIFICANT CHANGE UP (ref 13–44)
LYMPHOCYTES # BLD AUTO: 34 % — SIGNIFICANT CHANGE UP (ref 13–44)
LYMPHOCYTES # BLD AUTO: 4 % — LOW (ref 13–44)
LYMPHOCYTES # BLD AUTO: 4 % — LOW (ref 13–44)
LYMPHOCYTES # BLD AUTO: 50 % — HIGH (ref 13–44)
LYMPHOCYTES # BLD AUTO: 8.5 % — LOW (ref 13–44)
LYMPHOCYTES # BLD AUTO: 8.6 % — LOW (ref 13–44)
LYMPHOCYTES # BLD AUTO: 9 % — LOW (ref 13–44)
LYMPHOCYTES # BLD AUTO: 9 % — LOW (ref 13–44)
LYMPHOCYTES # BLD AUTO: 9.1 % — LOW (ref 13–44)
LYMPHOCYTES # BLD AUTO: 9.2 % — LOW (ref 13–44)
MACROCYTES BLD QL: SLIGHT — SIGNIFICANT CHANGE UP
MAGNESIUM SERPL-MCNC: 1.4 MG/DL
MAGNESIUM SERPL-MCNC: 1.8 MG/DL — SIGNIFICANT CHANGE UP (ref 1.6–2.6)
MAGNESIUM SERPL-MCNC: 2.4 MG/DL — SIGNIFICANT CHANGE UP (ref 1.6–2.6)
MANUAL SMEAR VERIFICATION: SIGNIFICANT CHANGE UP
MCHC RBC-ENTMCNC: 27.8 PG — SIGNIFICANT CHANGE UP (ref 27–34)
MCHC RBC-ENTMCNC: 28 PG — SIGNIFICANT CHANGE UP (ref 27–34)
MCHC RBC-ENTMCNC: 28.2 PG — SIGNIFICANT CHANGE UP (ref 27–34)
MCHC RBC-ENTMCNC: 28.3 PG — SIGNIFICANT CHANGE UP (ref 27–34)
MCHC RBC-ENTMCNC: 28.3 PG — SIGNIFICANT CHANGE UP (ref 27–34)
MCHC RBC-ENTMCNC: 28.4 PG — SIGNIFICANT CHANGE UP (ref 27–34)
MCHC RBC-ENTMCNC: 28.4 PG — SIGNIFICANT CHANGE UP (ref 27–34)
MCHC RBC-ENTMCNC: 28.6 PG — SIGNIFICANT CHANGE UP (ref 27–34)
MCHC RBC-ENTMCNC: 28.7 PG — SIGNIFICANT CHANGE UP (ref 27–34)
MCHC RBC-ENTMCNC: 28.7 PG — SIGNIFICANT CHANGE UP (ref 27–34)
MCHC RBC-ENTMCNC: 28.8 PG — SIGNIFICANT CHANGE UP (ref 27–34)
MCHC RBC-ENTMCNC: 28.9 PG — SIGNIFICANT CHANGE UP (ref 27–34)
MCHC RBC-ENTMCNC: 28.9 PG — SIGNIFICANT CHANGE UP (ref 27–34)
MCHC RBC-ENTMCNC: 29 PG — SIGNIFICANT CHANGE UP (ref 27–34)
MCHC RBC-ENTMCNC: 29 PG — SIGNIFICANT CHANGE UP (ref 27–34)
MCHC RBC-ENTMCNC: 29.1 PG — SIGNIFICANT CHANGE UP (ref 27–34)
MCHC RBC-ENTMCNC: 29.3 PG — SIGNIFICANT CHANGE UP (ref 27–34)
MCHC RBC-ENTMCNC: 29.4 PG — SIGNIFICANT CHANGE UP (ref 27–34)
MCHC RBC-ENTMCNC: 29.4 PG — SIGNIFICANT CHANGE UP (ref 27–34)
MCHC RBC-ENTMCNC: 29.5 PG — SIGNIFICANT CHANGE UP (ref 27–34)
MCHC RBC-ENTMCNC: 29.7 PG — SIGNIFICANT CHANGE UP (ref 27–34)
MCHC RBC-ENTMCNC: 29.8 PG — SIGNIFICANT CHANGE UP (ref 27–34)
MCHC RBC-ENTMCNC: 29.9 PG — SIGNIFICANT CHANGE UP (ref 27–34)
MCHC RBC-ENTMCNC: 29.9 PG — SIGNIFICANT CHANGE UP (ref 27–34)
MCHC RBC-ENTMCNC: 30.1 PG — SIGNIFICANT CHANGE UP (ref 27–34)
MCHC RBC-ENTMCNC: 30.2 PG — SIGNIFICANT CHANGE UP (ref 27–34)
MCHC RBC-ENTMCNC: 30.3 PG — SIGNIFICANT CHANGE UP (ref 27–34)
MCHC RBC-ENTMCNC: 30.3 PG — SIGNIFICANT CHANGE UP (ref 27–34)
MCHC RBC-ENTMCNC: 30.4 PG — SIGNIFICANT CHANGE UP (ref 27–34)
MCHC RBC-ENTMCNC: 30.4 PG — SIGNIFICANT CHANGE UP (ref 27–34)
MCHC RBC-ENTMCNC: 30.5 PG — SIGNIFICANT CHANGE UP (ref 27–34)
MCHC RBC-ENTMCNC: 30.6 PG — SIGNIFICANT CHANGE UP (ref 27–34)
MCHC RBC-ENTMCNC: 30.7 PG — SIGNIFICANT CHANGE UP (ref 27–34)
MCHC RBC-ENTMCNC: 30.8 PG — SIGNIFICANT CHANGE UP (ref 27–34)
MCHC RBC-ENTMCNC: 30.9 PG — SIGNIFICANT CHANGE UP (ref 27–34)
MCHC RBC-ENTMCNC: 31 PG — SIGNIFICANT CHANGE UP (ref 27–34)
MCHC RBC-ENTMCNC: 31.1 PG — SIGNIFICANT CHANGE UP (ref 27–34)
MCHC RBC-ENTMCNC: 31.2 GM/DL — LOW (ref 32–36)
MCHC RBC-ENTMCNC: 31.2 PG — SIGNIFICANT CHANGE UP (ref 27–34)
MCHC RBC-ENTMCNC: 31.3 PG — SIGNIFICANT CHANGE UP (ref 27–34)
MCHC RBC-ENTMCNC: 31.4 PG — SIGNIFICANT CHANGE UP (ref 27–34)
MCHC RBC-ENTMCNC: 31.5 GM/DL — LOW (ref 32–36)
MCHC RBC-ENTMCNC: 31.5 PG — SIGNIFICANT CHANGE UP (ref 27–34)
MCHC RBC-ENTMCNC: 31.9 PG — SIGNIFICANT CHANGE UP (ref 27–34)
MCHC RBC-ENTMCNC: 32 G/DL — SIGNIFICANT CHANGE UP (ref 32–36)
MCHC RBC-ENTMCNC: 32 GM/DL — SIGNIFICANT CHANGE UP (ref 32–36)
MCHC RBC-ENTMCNC: 32.1 GM/DL — SIGNIFICANT CHANGE UP (ref 32–36)
MCHC RBC-ENTMCNC: 32.2 GM/DL — SIGNIFICANT CHANGE UP (ref 32–36)
MCHC RBC-ENTMCNC: 32.2 PG — SIGNIFICANT CHANGE UP (ref 27–34)
MCHC RBC-ENTMCNC: 32.3 GM/DL — SIGNIFICANT CHANGE UP (ref 32–36)
MCHC RBC-ENTMCNC: 32.3 PG — SIGNIFICANT CHANGE UP (ref 27–34)
MCHC RBC-ENTMCNC: 32.5 GM/DL — SIGNIFICANT CHANGE UP (ref 32–36)
MCHC RBC-ENTMCNC: 32.6 G/DL — SIGNIFICANT CHANGE UP (ref 32–36)
MCHC RBC-ENTMCNC: 32.6 GM/DL — SIGNIFICANT CHANGE UP (ref 32–36)
MCHC RBC-ENTMCNC: 32.6 GM/DL — SIGNIFICANT CHANGE UP (ref 32–36)
MCHC RBC-ENTMCNC: 32.7 GM/DL — SIGNIFICANT CHANGE UP (ref 32–36)
MCHC RBC-ENTMCNC: 32.7 PG — SIGNIFICANT CHANGE UP (ref 27–34)
MCHC RBC-ENTMCNC: 32.8 G/DL — SIGNIFICANT CHANGE UP (ref 32–36)
MCHC RBC-ENTMCNC: 32.8 G/DL — SIGNIFICANT CHANGE UP (ref 32–36)
MCHC RBC-ENTMCNC: 32.8 GM/DL — SIGNIFICANT CHANGE UP (ref 32–36)
MCHC RBC-ENTMCNC: 32.9 GM/DL — SIGNIFICANT CHANGE UP (ref 32–36)
MCHC RBC-ENTMCNC: 32.9 GM/DL — SIGNIFICANT CHANGE UP (ref 32–36)
MCHC RBC-ENTMCNC: 33 GM/DL — SIGNIFICANT CHANGE UP (ref 32–36)
MCHC RBC-ENTMCNC: 33 PG — SIGNIFICANT CHANGE UP (ref 27–34)
MCHC RBC-ENTMCNC: 33.2 GM/DL — SIGNIFICANT CHANGE UP (ref 32–36)
MCHC RBC-ENTMCNC: 33.3 GM/DL — SIGNIFICANT CHANGE UP (ref 32–36)
MCHC RBC-ENTMCNC: 33.4 G/DL — SIGNIFICANT CHANGE UP (ref 32–36)
MCHC RBC-ENTMCNC: 33.5 G/DL — SIGNIFICANT CHANGE UP (ref 32–36)
MCHC RBC-ENTMCNC: 33.5 GM/DL — SIGNIFICANT CHANGE UP (ref 32–36)
MCHC RBC-ENTMCNC: 33.5 PG — SIGNIFICANT CHANGE UP (ref 27–34)
MCHC RBC-ENTMCNC: 33.6 G/DL — SIGNIFICANT CHANGE UP (ref 32–36)
MCHC RBC-ENTMCNC: 33.7 PG — SIGNIFICANT CHANGE UP (ref 27–34)
MCHC RBC-ENTMCNC: 33.8 G/DL — SIGNIFICANT CHANGE UP (ref 32–36)
MCHC RBC-ENTMCNC: 33.9 G/DL — SIGNIFICANT CHANGE UP (ref 32–36)
MCHC RBC-ENTMCNC: 34 PG — SIGNIFICANT CHANGE UP (ref 27–34)
MCHC RBC-ENTMCNC: 34.2 GM/DL — SIGNIFICANT CHANGE UP (ref 32–36)
MCHC RBC-ENTMCNC: 34.2 GM/DL — SIGNIFICANT CHANGE UP (ref 32–36)
MCHC RBC-ENTMCNC: 34.3 GM/DL — SIGNIFICANT CHANGE UP (ref 32–36)
MCHC RBC-ENTMCNC: 34.3 GM/DL — SIGNIFICANT CHANGE UP (ref 32–36)
MCHC RBC-ENTMCNC: 34.3 PG — HIGH (ref 27–34)
MCHC RBC-ENTMCNC: 34.5 G/DL — SIGNIFICANT CHANGE UP (ref 32–36)
MCHC RBC-ENTMCNC: 34.5 G/DL — SIGNIFICANT CHANGE UP (ref 32–36)
MCHC RBC-ENTMCNC: 34.5 GM/DL — SIGNIFICANT CHANGE UP (ref 32–36)
MCHC RBC-ENTMCNC: 34.6 PG — HIGH (ref 27–34)
MCHC RBC-ENTMCNC: 34.7 G/DL — SIGNIFICANT CHANGE UP (ref 32–36)
MCHC RBC-ENTMCNC: 34.7 GM/DL — SIGNIFICANT CHANGE UP (ref 32–36)
MCHC RBC-ENTMCNC: 34.8 G/DL — SIGNIFICANT CHANGE UP (ref 32–36)
MCHC RBC-ENTMCNC: 34.8 GM/DL — SIGNIFICANT CHANGE UP (ref 32–36)
MCHC RBC-ENTMCNC: 34.9 G/DL — SIGNIFICANT CHANGE UP (ref 32–36)
MCHC RBC-ENTMCNC: 34.9 GM/DL — SIGNIFICANT CHANGE UP (ref 32–36)
MCHC RBC-ENTMCNC: 34.9 GM/DL — SIGNIFICANT CHANGE UP (ref 32–36)
MCHC RBC-ENTMCNC: 34.9 PG — HIGH (ref 27–34)
MCHC RBC-ENTMCNC: 35 GM/DL — SIGNIFICANT CHANGE UP (ref 32–36)
MCHC RBC-ENTMCNC: 35.1 GM/DL — SIGNIFICANT CHANGE UP (ref 32–36)
MCHC RBC-ENTMCNC: 35.1 GM/DL — SIGNIFICANT CHANGE UP (ref 32–36)
MCHC RBC-ENTMCNC: 35.2 PG — HIGH (ref 27–34)
MCHC RBC-ENTMCNC: 35.2 PG — HIGH (ref 27–34)
MCHC RBC-ENTMCNC: 35.3 GM/DL — SIGNIFICANT CHANGE UP (ref 32–36)
MCHC RBC-ENTMCNC: 35.4 G/DL — SIGNIFICANT CHANGE UP (ref 32–36)
MCHC RBC-ENTMCNC: 35.5 G/DL — SIGNIFICANT CHANGE UP (ref 32–36)
MCHC RBC-ENTMCNC: 35.5 G/DL — SIGNIFICANT CHANGE UP (ref 32–36)
MCHC RBC-ENTMCNC: 35.5 GM/DL — SIGNIFICANT CHANGE UP (ref 32–36)
MCHC RBC-ENTMCNC: 35.9 G/DL — SIGNIFICANT CHANGE UP (ref 32–36)
MCHC RBC-ENTMCNC: 36 G/DL — SIGNIFICANT CHANGE UP (ref 32–36)
MCHC RBC-ENTMCNC: 36 G/DL — SIGNIFICANT CHANGE UP (ref 32–36)
MCHC RBC-ENTMCNC: 36.1 GM/DL — HIGH (ref 32–36)
MCHC RBC-ENTMCNC: 36.1 GM/DL — HIGH (ref 32–36)
MCHC RBC-ENTMCNC: 36.2 GM/DL — HIGH (ref 32–36)
MCHC RBC-ENTMCNC: 36.4 G/DL — HIGH (ref 32–36)
MCHC RBC-ENTMCNC: 36.4 G/DL — HIGH (ref 32–36)
MCV RBC AUTO: 100 FL — SIGNIFICANT CHANGE UP (ref 80–100)
MCV RBC AUTO: 100 FL — SIGNIFICANT CHANGE UP (ref 80–100)
MCV RBC AUTO: 101 FL — HIGH (ref 80–100)
MCV RBC AUTO: 79.7 FL — LOW (ref 80–100)
MCV RBC AUTO: 79.9 FL — LOW (ref 80–100)
MCV RBC AUTO: 80.1 FL — SIGNIFICANT CHANGE UP (ref 80–100)
MCV RBC AUTO: 80.2 FL — SIGNIFICANT CHANGE UP (ref 80–100)
MCV RBC AUTO: 80.8 FL — SIGNIFICANT CHANGE UP (ref 80–100)
MCV RBC AUTO: 80.9 FL — SIGNIFICANT CHANGE UP (ref 80–100)
MCV RBC AUTO: 81.9 FL — SIGNIFICANT CHANGE UP (ref 80–100)
MCV RBC AUTO: 82.1 FL — SIGNIFICANT CHANGE UP (ref 80–100)
MCV RBC AUTO: 82.2 FL — SIGNIFICANT CHANGE UP (ref 80–100)
MCV RBC AUTO: 82.3 FL — SIGNIFICANT CHANGE UP (ref 80–100)
MCV RBC AUTO: 82.3 FL — SIGNIFICANT CHANGE UP (ref 80–100)
MCV RBC AUTO: 82.8 FL — SIGNIFICANT CHANGE UP (ref 80–100)
MCV RBC AUTO: 82.9 FL — SIGNIFICANT CHANGE UP (ref 80–100)
MCV RBC AUTO: 83 FL — SIGNIFICANT CHANGE UP (ref 80–100)
MCV RBC AUTO: 83.4 FL — SIGNIFICANT CHANGE UP (ref 80–100)
MCV RBC AUTO: 83.5 FL — SIGNIFICANT CHANGE UP (ref 80–100)
MCV RBC AUTO: 83.9 FL — SIGNIFICANT CHANGE UP (ref 80–100)
MCV RBC AUTO: 83.9 FL — SIGNIFICANT CHANGE UP (ref 80–100)
MCV RBC AUTO: 84.2 FL — SIGNIFICANT CHANGE UP (ref 80–100)
MCV RBC AUTO: 84.4 FL — SIGNIFICANT CHANGE UP (ref 80–100)
MCV RBC AUTO: 85.6 FL — SIGNIFICANT CHANGE UP (ref 80–100)
MCV RBC AUTO: 85.9 FL — SIGNIFICANT CHANGE UP (ref 80–100)
MCV RBC AUTO: 86.4 FL — SIGNIFICANT CHANGE UP (ref 80–100)
MCV RBC AUTO: 86.6 FL — SIGNIFICANT CHANGE UP (ref 80–100)
MCV RBC AUTO: 86.6 FL — SIGNIFICANT CHANGE UP (ref 80–100)
MCV RBC AUTO: 86.8 FL — SIGNIFICANT CHANGE UP (ref 80–100)
MCV RBC AUTO: 87.2 FL — SIGNIFICANT CHANGE UP (ref 80–100)
MCV RBC AUTO: 87.7 FL — SIGNIFICANT CHANGE UP (ref 80–100)
MCV RBC AUTO: 88 FL — SIGNIFICANT CHANGE UP (ref 80–100)
MCV RBC AUTO: 88.2 FL — SIGNIFICANT CHANGE UP (ref 80–100)
MCV RBC AUTO: 90.3 FL — SIGNIFICANT CHANGE UP (ref 80–100)
MCV RBC AUTO: 90.5 FL — SIGNIFICANT CHANGE UP (ref 80–100)
MCV RBC AUTO: 91.7 FL — SIGNIFICANT CHANGE UP (ref 80–100)
MCV RBC AUTO: 92.1 FL — SIGNIFICANT CHANGE UP (ref 80–100)
MCV RBC AUTO: 92.3 FL — SIGNIFICANT CHANGE UP (ref 80–100)
MCV RBC AUTO: 93.6 FL — SIGNIFICANT CHANGE UP (ref 80–100)
MCV RBC AUTO: 93.9 FL — SIGNIFICANT CHANGE UP (ref 80–100)
MCV RBC AUTO: 94 FL — SIGNIFICANT CHANGE UP (ref 80–100)
MCV RBC AUTO: 94.1 FL — SIGNIFICANT CHANGE UP (ref 80–100)
MCV RBC AUTO: 94.1 FL — SIGNIFICANT CHANGE UP (ref 80–100)
MCV RBC AUTO: 94.2 FL — SIGNIFICANT CHANGE UP (ref 80–100)
MCV RBC AUTO: 94.3 FL — SIGNIFICANT CHANGE UP (ref 80–100)
MCV RBC AUTO: 94.4 FL — SIGNIFICANT CHANGE UP (ref 80–100)
MCV RBC AUTO: 94.5 FL — SIGNIFICANT CHANGE UP (ref 80–100)
MCV RBC AUTO: 94.6 FL — SIGNIFICANT CHANGE UP (ref 80–100)
MCV RBC AUTO: 95.5 FL — SIGNIFICANT CHANGE UP (ref 80–100)
MCV RBC AUTO: 95.7 FL — SIGNIFICANT CHANGE UP (ref 80–100)
MCV RBC AUTO: 95.9 FL — SIGNIFICANT CHANGE UP (ref 80–100)
MCV RBC AUTO: 96 FL — SIGNIFICANT CHANGE UP (ref 80–100)
MCV RBC AUTO: 96.3 FL — SIGNIFICANT CHANGE UP (ref 80–100)
MCV RBC AUTO: 96.5 FL — SIGNIFICANT CHANGE UP (ref 80–100)
MCV RBC AUTO: 96.7 FL — SIGNIFICANT CHANGE UP (ref 80–100)
MCV RBC AUTO: 97 FL — SIGNIFICANT CHANGE UP (ref 80–100)
MCV RBC AUTO: 97.1 FL — SIGNIFICANT CHANGE UP (ref 80–100)
MCV RBC AUTO: 98.2 FL — SIGNIFICANT CHANGE UP (ref 80–100)
MCV RBC AUTO: 98.5 FL — SIGNIFICANT CHANGE UP (ref 80–100)
MCV RBC AUTO: 98.8 FL — SIGNIFICANT CHANGE UP (ref 80–100)
MCV RBC AUTO: 99 FL — SIGNIFICANT CHANGE UP (ref 80–100)
MCV RBC AUTO: 99.4 FL — SIGNIFICANT CHANGE UP (ref 80–100)
MCV RBC AUTO: 99.6 FL — SIGNIFICANT CHANGE UP (ref 80–100)
MCV RBC AUTO: 99.6 FL — SIGNIFICANT CHANGE UP (ref 80–100)
METAMYELOCYTES # FLD: 1 % — HIGH (ref 0–0)
METAMYELOCYTES # FLD: 1 % — HIGH (ref 0–0)
METAMYELOCYTES # FLD: 3 % — HIGH (ref 0–0)
METAMYELOCYTES # FLD: 5 % — HIGH (ref 0–0)
MICROCYTES BLD QL: SLIGHT — SIGNIFICANT CHANGE UP
MITOCHONDRIA AB SER-ACNC: SIGNIFICANT CHANGE UP
MONOCYTES # BLD AUTO: 0 K/UL — SIGNIFICANT CHANGE UP (ref 0–0.9)
MONOCYTES # BLD AUTO: 0 K/UL — SIGNIFICANT CHANGE UP (ref 0–0.9)
MONOCYTES # BLD AUTO: 0.06 K/UL — SIGNIFICANT CHANGE UP (ref 0–0.9)
MONOCYTES # BLD AUTO: 0.1 K/UL — SIGNIFICANT CHANGE UP (ref 0–0.9)
MONOCYTES # BLD AUTO: 0.11 K/UL — SIGNIFICANT CHANGE UP (ref 0–0.9)
MONOCYTES # BLD AUTO: 0.11 K/UL — SIGNIFICANT CHANGE UP (ref 0–0.9)
MONOCYTES # BLD AUTO: 0.13 K/UL — SIGNIFICANT CHANGE UP (ref 0–0.9)
MONOCYTES # BLD AUTO: 0.16 K/UL — SIGNIFICANT CHANGE UP (ref 0–0.9)
MONOCYTES # BLD AUTO: 0.2 K/UL — SIGNIFICANT CHANGE UP (ref 0–0.9)
MONOCYTES # BLD AUTO: 0.3 K/UL — SIGNIFICANT CHANGE UP (ref 0–0.9)
MONOCYTES # BLD AUTO: 0.31 K/UL — SIGNIFICANT CHANGE UP (ref 0–0.9)
MONOCYTES # BLD AUTO: 0.36 K/UL — SIGNIFICANT CHANGE UP (ref 0–0.9)
MONOCYTES # BLD AUTO: 0.4 K/UL — SIGNIFICANT CHANGE UP (ref 0–0.9)
MONOCYTES # BLD AUTO: 0.4 K/UL — SIGNIFICANT CHANGE UP (ref 0–0.9)
MONOCYTES # BLD AUTO: 0.5 K/UL — SIGNIFICANT CHANGE UP (ref 0–0.9)
MONOCYTES # BLD AUTO: 0.6 K/UL — SIGNIFICANT CHANGE UP (ref 0–0.9)
MONOCYTES # BLD AUTO: 0.7 K/UL — SIGNIFICANT CHANGE UP (ref 0–0.9)
MONOCYTES # BLD AUTO: 1.3 K/UL — HIGH (ref 0–0.9)
MONOCYTES NFR BLD AUTO: 1 % — LOW (ref 2–14)
MONOCYTES NFR BLD AUTO: 1 % — LOW (ref 2–14)
MONOCYTES NFR BLD AUTO: 10 % — SIGNIFICANT CHANGE UP (ref 2–14)
MONOCYTES NFR BLD AUTO: 10.5 % — SIGNIFICANT CHANGE UP (ref 2–14)
MONOCYTES NFR BLD AUTO: 10.5 % — SIGNIFICANT CHANGE UP (ref 2–14)
MONOCYTES NFR BLD AUTO: 10.8 % — SIGNIFICANT CHANGE UP (ref 2–14)
MONOCYTES NFR BLD AUTO: 10.8 % — SIGNIFICANT CHANGE UP (ref 2–14)
MONOCYTES NFR BLD AUTO: 11 % — SIGNIFICANT CHANGE UP (ref 2–14)
MONOCYTES NFR BLD AUTO: 11.2 % — SIGNIFICANT CHANGE UP (ref 2–14)
MONOCYTES NFR BLD AUTO: 12.1 % — SIGNIFICANT CHANGE UP (ref 2–14)
MONOCYTES NFR BLD AUTO: 13.2 % — SIGNIFICANT CHANGE UP (ref 2–14)
MONOCYTES NFR BLD AUTO: 15.9 % — HIGH (ref 2–14)
MONOCYTES NFR BLD AUTO: 17 % — HIGH (ref 2–14)
MONOCYTES NFR BLD AUTO: 17 % — HIGH (ref 2–14)
MONOCYTES NFR BLD AUTO: 2 % — SIGNIFICANT CHANGE UP (ref 2–14)
MONOCYTES NFR BLD AUTO: 2.2 % — SIGNIFICANT CHANGE UP (ref 2–14)
MONOCYTES NFR BLD AUTO: 22 % — HIGH (ref 2–14)
MONOCYTES NFR BLD AUTO: 3 % — SIGNIFICANT CHANGE UP (ref 2–14)
MONOCYTES NFR BLD AUTO: 3.1 % — SIGNIFICANT CHANGE UP (ref 2–14)
MONOCYTES NFR BLD AUTO: 3.6 % — SIGNIFICANT CHANGE UP (ref 2–14)
MONOCYTES NFR BLD AUTO: 3.6 % — SIGNIFICANT CHANGE UP (ref 2–14)
MONOCYTES NFR BLD AUTO: 4 % — SIGNIFICANT CHANGE UP (ref 2–14)
MONOCYTES NFR BLD AUTO: 4.2 % — SIGNIFICANT CHANGE UP (ref 2–14)
MONOCYTES NFR BLD AUTO: 4.4 % — SIGNIFICANT CHANGE UP (ref 2–14)
MONOCYTES NFR BLD AUTO: 6 % — SIGNIFICANT CHANGE UP (ref 2–14)
MONOCYTES NFR BLD AUTO: 6.1 % — SIGNIFICANT CHANGE UP (ref 2–14)
MONOCYTES NFR BLD AUTO: 6.4 % — SIGNIFICANT CHANGE UP (ref 2–14)
MONOCYTES NFR BLD AUTO: 7 % — SIGNIFICANT CHANGE UP (ref 2–14)
MONOCYTES NFR BLD AUTO: 7 % — SIGNIFICANT CHANGE UP (ref 2–14)
MONOCYTES NFR BLD AUTO: 7.3 % — SIGNIFICANT CHANGE UP (ref 2–14)
MONOCYTES NFR BLD AUTO: 7.9 % — SIGNIFICANT CHANGE UP (ref 2–14)
MONOCYTES NFR BLD AUTO: 8 % — SIGNIFICANT CHANGE UP (ref 2–14)
MONOCYTES NFR BLD AUTO: 8.5 % — SIGNIFICANT CHANGE UP (ref 2–14)
MONOCYTES NFR BLD AUTO: 8.5 % — SIGNIFICANT CHANGE UP (ref 2–14)
MONOCYTES NFR BLD AUTO: 8.9 % — SIGNIFICANT CHANGE UP (ref 2–14)
MONOCYTES NFR BLD AUTO: 9 % — SIGNIFICANT CHANGE UP (ref 2–14)
MYELOCYTES NFR BLD: 1 % — HIGH (ref 0–0)
MYELOCYTES NFR BLD: 1 % — HIGH (ref 0–0)
MYELOCYTES NFR BLD: 3 % — HIGH (ref 0–0)
NEUTROPHILS # BLD AUTO: 0.4 K/UL — LOW (ref 1.8–7.4)
NEUTROPHILS # BLD AUTO: 0.9 K/UL — LOW (ref 1.8–7.4)
NEUTROPHILS # BLD AUTO: 1 K/UL — LOW (ref 1.8–7.4)
NEUTROPHILS # BLD AUTO: 1.03 K/UL — LOW (ref 1.8–7.4)
NEUTROPHILS # BLD AUTO: 1.1 K/UL — LOW (ref 1.8–7.4)
NEUTROPHILS # BLD AUTO: 1.2 K/UL — LOW (ref 1.8–7.4)
NEUTROPHILS # BLD AUTO: 1.3 K/UL — LOW (ref 1.8–7.4)
NEUTROPHILS # BLD AUTO: 1.36 K/UL — LOW (ref 1.8–7.4)
NEUTROPHILS # BLD AUTO: 1.42 K/UL — LOW (ref 1.8–7.4)
NEUTROPHILS # BLD AUTO: 1.6 K/UL — LOW (ref 1.8–7.4)
NEUTROPHILS # BLD AUTO: 1.6 K/UL — LOW (ref 1.8–7.4)
NEUTROPHILS # BLD AUTO: 1.64 K/UL — LOW (ref 1.8–7.4)
NEUTROPHILS # BLD AUTO: 1.7 K/UL — LOW (ref 1.8–7.4)
NEUTROPHILS # BLD AUTO: 1.8 K/UL — SIGNIFICANT CHANGE UP (ref 1.8–7.4)
NEUTROPHILS # BLD AUTO: 1.9 K/UL — SIGNIFICANT CHANGE UP (ref 1.8–7.4)
NEUTROPHILS # BLD AUTO: 1.91 K/UL — SIGNIFICANT CHANGE UP (ref 1.8–7.4)
NEUTROPHILS # BLD AUTO: 18.1 K/UL — HIGH (ref 1.8–7.4)
NEUTROPHILS # BLD AUTO: 19.5 K/UL — HIGH (ref 1.8–7.4)
NEUTROPHILS # BLD AUTO: 2 K/UL — SIGNIFICANT CHANGE UP (ref 1.8–7.4)
NEUTROPHILS # BLD AUTO: 2.2 K/UL — SIGNIFICANT CHANGE UP (ref 1.8–7.4)
NEUTROPHILS # BLD AUTO: 2.24 K/UL — SIGNIFICANT CHANGE UP (ref 1.8–7.4)
NEUTROPHILS # BLD AUTO: 2.29 K/UL — SIGNIFICANT CHANGE UP (ref 1.8–7.4)
NEUTROPHILS # BLD AUTO: 2.9 K/UL — SIGNIFICANT CHANGE UP (ref 1.8–7.4)
NEUTROPHILS # BLD AUTO: 3 K/UL — SIGNIFICANT CHANGE UP (ref 1.8–7.4)
NEUTROPHILS # BLD AUTO: 3.3 K/UL — SIGNIFICANT CHANGE UP (ref 1.8–7.4)
NEUTROPHILS # BLD AUTO: 3.6 K/UL — SIGNIFICANT CHANGE UP (ref 1.8–7.4)
NEUTROPHILS # BLD AUTO: 3.6 K/UL — SIGNIFICANT CHANGE UP (ref 1.8–7.4)
NEUTROPHILS # BLD AUTO: 3.8 K/UL — SIGNIFICANT CHANGE UP (ref 1.8–7.4)
NEUTROPHILS # BLD AUTO: 3.94 K/UL — SIGNIFICANT CHANGE UP (ref 1.8–7.4)
NEUTROPHILS # BLD AUTO: 4.2 K/UL — SIGNIFICANT CHANGE UP (ref 1.8–7.4)
NEUTROPHILS # BLD AUTO: 4.5 K/UL — SIGNIFICANT CHANGE UP (ref 1.8–7.4)
NEUTROPHILS # BLD AUTO: 4.6 K/UL — SIGNIFICANT CHANGE UP (ref 1.8–7.4)
NEUTROPHILS # BLD AUTO: 5.1 K/UL — SIGNIFICANT CHANGE UP (ref 1.8–7.4)
NEUTROPHILS # BLD AUTO: 5.5 K/UL — SIGNIFICANT CHANGE UP (ref 1.8–7.4)
NEUTROPHILS # BLD AUTO: SIGNIFICANT CHANGE UP (ref 1.8–7.4)
NEUTROPHILS NFR BLD AUTO: 39 % — LOW (ref 43–77)
NEUTROPHILS NFR BLD AUTO: 47 % — SIGNIFICANT CHANGE UP (ref 43–77)
NEUTROPHILS NFR BLD AUTO: 52 % — SIGNIFICANT CHANGE UP (ref 43–77)
NEUTROPHILS NFR BLD AUTO: 59 % — SIGNIFICANT CHANGE UP (ref 43–77)
NEUTROPHILS NFR BLD AUTO: 61.1 % — SIGNIFICANT CHANGE UP (ref 43–77)
NEUTROPHILS NFR BLD AUTO: 62 % — SIGNIFICANT CHANGE UP (ref 43–77)
NEUTROPHILS NFR BLD AUTO: 62 % — SIGNIFICANT CHANGE UP (ref 43–77)
NEUTROPHILS NFR BLD AUTO: 62.3 % — SIGNIFICANT CHANGE UP (ref 43–77)
NEUTROPHILS NFR BLD AUTO: 62.9 % — SIGNIFICANT CHANGE UP (ref 43–77)
NEUTROPHILS NFR BLD AUTO: 63.4 % — SIGNIFICANT CHANGE UP (ref 43–77)
NEUTROPHILS NFR BLD AUTO: 63.7 % — SIGNIFICANT CHANGE UP (ref 43–77)
NEUTROPHILS NFR BLD AUTO: 65.1 % — SIGNIFICANT CHANGE UP (ref 43–77)
NEUTROPHILS NFR BLD AUTO: 65.3 % — SIGNIFICANT CHANGE UP (ref 43–77)
NEUTROPHILS NFR BLD AUTO: 66.9 % — SIGNIFICANT CHANGE UP (ref 43–77)
NEUTROPHILS NFR BLD AUTO: 69.4 % — SIGNIFICANT CHANGE UP (ref 43–77)
NEUTROPHILS NFR BLD AUTO: 70.1 % — SIGNIFICANT CHANGE UP (ref 43–77)
NEUTROPHILS NFR BLD AUTO: 71 % — SIGNIFICANT CHANGE UP (ref 43–77)
NEUTROPHILS NFR BLD AUTO: 72.4 % — SIGNIFICANT CHANGE UP (ref 43–77)
NEUTROPHILS NFR BLD AUTO: 74 % — SIGNIFICANT CHANGE UP (ref 43–77)
NEUTROPHILS NFR BLD AUTO: 74.1 % — SIGNIFICANT CHANGE UP (ref 43–77)
NEUTROPHILS NFR BLD AUTO: 75 % — SIGNIFICANT CHANGE UP (ref 43–77)
NEUTROPHILS NFR BLD AUTO: 75.5 % — SIGNIFICANT CHANGE UP (ref 43–77)
NEUTROPHILS NFR BLD AUTO: 76.4 % — SIGNIFICANT CHANGE UP (ref 43–77)
NEUTROPHILS NFR BLD AUTO: 78 % — HIGH (ref 43–77)
NEUTROPHILS NFR BLD AUTO: 78.7 % — HIGH (ref 43–77)
NEUTROPHILS NFR BLD AUTO: 80.3 % — HIGH (ref 43–77)
NEUTROPHILS NFR BLD AUTO: 81 % — HIGH (ref 43–77)
NEUTROPHILS NFR BLD AUTO: 81 % — HIGH (ref 43–77)
NEUTROPHILS NFR BLD AUTO: 81.4 % — HIGH (ref 43–77)
NEUTROPHILS NFR BLD AUTO: 82 % — HIGH (ref 43–77)
NEUTROPHILS NFR BLD AUTO: 83 % — HIGH (ref 43–77)
NEUTROPHILS NFR BLD AUTO: 83.8 % — HIGH (ref 43–77)
NEUTROPHILS NFR BLD AUTO: 84 % — HIGH (ref 43–77)
NEUTROPHILS NFR BLD AUTO: 84.9 % — HIGH (ref 43–77)
NEUTROPHILS NFR BLD AUTO: 86 % — HIGH (ref 43–77)
NEUTROPHILS NFR BLD AUTO: 87 % — HIGH (ref 43–77)
NEUTROPHILS NFR BLD AUTO: 92.5 % — HIGH (ref 43–77)
NEUTROPHILS NFR BLD AUTO: 93.2 % — HIGH (ref 43–77)
NEUTS BAND # BLD: 21 % — HIGH (ref 0–8)
NEUTS BAND # BLD: 5 % — SIGNIFICANT CHANGE UP (ref 0–8)
NITRITE UR-MCNC: NEGATIVE — SIGNIFICANT CHANGE UP
NORMALIZED SCT PPP-RTO: 0.61 RATIO — SIGNIFICANT CHANGE UP (ref 0–1.16)
NORMALIZED SCT PPP-RTO: SIGNIFICANT CHANGE UP
NRBC # BLD: 0 /100 WBCS — SIGNIFICANT CHANGE UP (ref 0–0)
NRBC # BLD: 0 /100 — SIGNIFICANT CHANGE UP (ref 0–0)
OB PNL STL: NEGATIVE — SIGNIFICANT CHANGE UP
OB PNL STL: POSITIVE
OSMOLALITY UR: 399 MOS/KG — SIGNIFICANT CHANGE UP (ref 300–900)
OSMOLALITY UR: 560 MOS/KG — SIGNIFICANT CHANGE UP (ref 50–1200)
OTHER CELLS CSF MANUAL: 9 ML/DL — LOW (ref 18–22)
OVALOCYTES BLD QL SMEAR: SLIGHT — SIGNIFICANT CHANGE UP
PCO2 BLDV: 30 MMHG — LOW (ref 35–50)
PCO2 BLDV: 30 MMHG — LOW (ref 35–50)
PCO2 BLDV: 36 MMHG — SIGNIFICANT CHANGE UP (ref 35–50)
PH BLDV: 7.38 — SIGNIFICANT CHANGE UP (ref 7.35–7.45)
PH BLDV: 7.41 — SIGNIFICANT CHANGE UP (ref 7.35–7.45)
PH BLDV: 7.43 — SIGNIFICANT CHANGE UP (ref 7.35–7.45)
PH UR: 6 — SIGNIFICANT CHANGE UP (ref 5–8)
PHOSPHATE SERPL-MCNC: 3.3 MG/DL — SIGNIFICANT CHANGE UP (ref 2.5–4.5)
PHOSPHATE SERPL-MCNC: 3.5 MG/DL — SIGNIFICANT CHANGE UP (ref 2.5–4.5)
PLAT MORPH BLD: NORMAL — SIGNIFICANT CHANGE UP
PLATELET # BLD AUTO: 101 K/UL — SIGNIFICANT CHANGE UP (ref 150–400)
PLATELET # BLD AUTO: 111 K/UL — LOW (ref 150–400)
PLATELET # BLD AUTO: 126 K/UL — LOW (ref 150–400)
PLATELET # BLD AUTO: 128 K/UL — LOW (ref 150–400)
PLATELET # BLD AUTO: 143 K/UL — LOW (ref 150–400)
PLATELET # BLD AUTO: 15 K/UL — CRITICAL LOW (ref 150–400)
PLATELET # BLD AUTO: 15 K/UL — CRITICAL LOW (ref 150–400)
PLATELET # BLD AUTO: 16 K/UL — CRITICAL LOW (ref 150–400)
PLATELET # BLD AUTO: 165 K/UL — SIGNIFICANT CHANGE UP (ref 150–400)
PLATELET # BLD AUTO: 165 K/UL — SIGNIFICANT CHANGE UP (ref 150–400)
PLATELET # BLD AUTO: 18 K/UL — CRITICAL LOW (ref 150–400)
PLATELET # BLD AUTO: 19 K/UL — CRITICAL LOW (ref 150–400)
PLATELET # BLD AUTO: 193 K/UL — SIGNIFICANT CHANGE UP (ref 150–400)
PLATELET # BLD AUTO: 20 K/UL — CRITICAL LOW (ref 150–400)
PLATELET # BLD AUTO: 21 K/UL — LOW (ref 150–400)
PLATELET # BLD AUTO: 21 K/UL — LOW (ref 150–400)
PLATELET # BLD AUTO: 22 K/UL — LOW (ref 150–400)
PLATELET # BLD AUTO: 223 K/UL — SIGNIFICANT CHANGE UP (ref 150–400)
PLATELET # BLD AUTO: 23 K/UL — LOW (ref 150–400)
PLATELET # BLD AUTO: 24 K/UL — LOW (ref 150–400)
PLATELET # BLD AUTO: 25 K/UL — LOW (ref 150–400)
PLATELET # BLD AUTO: 257 K/UL — SIGNIFICANT CHANGE UP (ref 150–400)
PLATELET # BLD AUTO: 274 K/UL — SIGNIFICANT CHANGE UP (ref 150–400)
PLATELET # BLD AUTO: 28 K/UL — LOW (ref 150–400)
PLATELET # BLD AUTO: 32 K/UL — LOW (ref 150–400)
PLATELET # BLD AUTO: 33 K/UL — LOW (ref 150–400)
PLATELET # BLD AUTO: 34 K/UL — LOW (ref 150–400)
PLATELET # BLD AUTO: 341 K/UL — SIGNIFICANT CHANGE UP (ref 150–400)
PLATELET # BLD AUTO: 35 K/UL — LOW (ref 150–400)
PLATELET # BLD AUTO: 35 K/UL — LOW (ref 150–400)
PLATELET # BLD AUTO: 37 K/UL — LOW (ref 150–400)
PLATELET # BLD AUTO: 38 K/UL — LOW (ref 150–400)
PLATELET # BLD AUTO: 39 K/UL — LOW (ref 150–400)
PLATELET # BLD AUTO: 41 K/UL — LOW (ref 150–400)
PLATELET # BLD AUTO: 41 K/UL — LOW (ref 150–400)
PLATELET # BLD AUTO: 43 K/UL — LOW (ref 150–400)
PLATELET # BLD AUTO: 43 K/UL — LOW (ref 150–400)
PLATELET # BLD AUTO: 45 K/UL — LOW (ref 150–400)
PLATELET # BLD AUTO: 47 K/UL — LOW (ref 150–400)
PLATELET # BLD AUTO: 49 K/UL — LOW (ref 150–400)
PLATELET # BLD AUTO: 50 K/UL — LOW (ref 150–400)
PLATELET # BLD AUTO: 51 K/UL — LOW (ref 150–400)
PLATELET # BLD AUTO: 54 K/UL — LOW (ref 150–400)
PLATELET # BLD AUTO: 57 K/UL — LOW (ref 150–400)
PLATELET # BLD AUTO: 59 K/UL — LOW (ref 150–400)
PLATELET # BLD AUTO: 60 K/UL — LOW (ref 150–400)
PLATELET # BLD AUTO: 64 K/UL — LOW (ref 150–400)
PLATELET # BLD AUTO: 66 K/UL — LOW (ref 150–400)
PLATELET # BLD AUTO: 68 K/UL — LOW (ref 150–400)
PLATELET # BLD AUTO: 69 K/UL — LOW (ref 150–400)
PLATELET # BLD AUTO: 72 K/UL — LOW (ref 150–400)
PLATELET # BLD AUTO: 72 K/UL — LOW (ref 150–400)
PLATELET # BLD AUTO: 73 K/UL — LOW (ref 150–400)
PLATELET # BLD AUTO: 77 K/UL — LOW (ref 150–400)
PLATELET # BLD AUTO: 81 K/UL — LOW (ref 150–400)
PLATELET # BLD MANUAL: 26 K/UL — LOW (ref 150–400)
PNH GRANULOCYTES: 0 % — SIGNIFICANT CHANGE UP (ref 0–0.01)
PNH MONOCYTES: 0 % — SIGNIFICANT CHANGE UP (ref 0–0.05)
PNH RBC-COMPLETE AG LOSS: 0 % — SIGNIFICANT CHANGE UP (ref 0–0.01)
PNH RBC-PARTIAL AG LOSS: 0 % — SIGNIFICANT CHANGE UP (ref 0–0.99)
PO2 BLDV: 25 MMHG — SIGNIFICANT CHANGE UP (ref 25–45)
PO2 BLDV: 40 MMHG — SIGNIFICANT CHANGE UP (ref 25–45)
PO2 BLDV: 72 MMHG — HIGH (ref 25–45)
POIKILOCYTOSIS BLD QL AUTO: SLIGHT — SIGNIFICANT CHANGE UP
POLYCHROMASIA BLD QL SMEAR: SLIGHT — SIGNIFICANT CHANGE UP
POTASSIUM BLDV-SCNC: 3.9 MMOL/L — SIGNIFICANT CHANGE UP (ref 3.5–5.3)
POTASSIUM BLDV-SCNC: 4.2 MMOL/L — SIGNIFICANT CHANGE UP (ref 3.5–5.3)
POTASSIUM BLDV-SCNC: 4.3 MMOL/L — SIGNIFICANT CHANGE UP (ref 3.5–5.3)
POTASSIUM SERPL-MCNC: 3.7 MMOL/L — SIGNIFICANT CHANGE UP (ref 3.5–5.3)
POTASSIUM SERPL-MCNC: 3.7 MMOL/L — SIGNIFICANT CHANGE UP (ref 3.5–5.3)
POTASSIUM SERPL-MCNC: 3.8 MMOL/L — SIGNIFICANT CHANGE UP (ref 3.5–5.3)
POTASSIUM SERPL-MCNC: 3.9 MMOL/L — SIGNIFICANT CHANGE UP (ref 3.5–5.3)
POTASSIUM SERPL-MCNC: 4 MMOL/L — SIGNIFICANT CHANGE UP (ref 3.5–5.3)
POTASSIUM SERPL-MCNC: 4.1 MMOL/L — SIGNIFICANT CHANGE UP (ref 3.5–5.3)
POTASSIUM SERPL-MCNC: 4.1 MMOL/L — SIGNIFICANT CHANGE UP (ref 3.5–5.3)
POTASSIUM SERPL-MCNC: 4.2 MMOL/L — SIGNIFICANT CHANGE UP (ref 3.5–5.3)
POTASSIUM SERPL-MCNC: 4.3 MMOL/L — SIGNIFICANT CHANGE UP (ref 3.5–5.3)
POTASSIUM SERPL-MCNC: 4.4 MMOL/L — SIGNIFICANT CHANGE UP (ref 3.5–5.3)
POTASSIUM SERPL-MCNC: 4.5 MMOL/L — SIGNIFICANT CHANGE UP (ref 3.5–5.3)
POTASSIUM SERPL-MCNC: 4.6 MMOL/L — SIGNIFICANT CHANGE UP (ref 3.5–5.3)
POTASSIUM SERPL-MCNC: 4.7 MMOL/L — SIGNIFICANT CHANGE UP (ref 3.5–5.3)
POTASSIUM SERPL-MCNC: 5.2 MMOL/L — SIGNIFICANT CHANGE UP (ref 3.5–5.3)
POTASSIUM SERPL-SCNC: 3.7 MMOL/L — SIGNIFICANT CHANGE UP (ref 3.5–5.3)
POTASSIUM SERPL-SCNC: 3.7 MMOL/L — SIGNIFICANT CHANGE UP (ref 3.5–5.3)
POTASSIUM SERPL-SCNC: 3.8 MMOL/L — SIGNIFICANT CHANGE UP (ref 3.5–5.3)
POTASSIUM SERPL-SCNC: 3.9 MMOL/L — SIGNIFICANT CHANGE UP (ref 3.5–5.3)
POTASSIUM SERPL-SCNC: 4 MMOL/L — SIGNIFICANT CHANGE UP (ref 3.5–5.3)
POTASSIUM SERPL-SCNC: 4.1 MMOL/L — SIGNIFICANT CHANGE UP (ref 3.5–5.3)
POTASSIUM SERPL-SCNC: 4.1 MMOL/L — SIGNIFICANT CHANGE UP (ref 3.5–5.3)
POTASSIUM SERPL-SCNC: 4.2 MMOL/L — SIGNIFICANT CHANGE UP (ref 3.5–5.3)
POTASSIUM SERPL-SCNC: 4.3 MMOL/L — SIGNIFICANT CHANGE UP (ref 3.5–5.3)
POTASSIUM SERPL-SCNC: 4.4 MMOL/L — SIGNIFICANT CHANGE UP (ref 3.5–5.3)
POTASSIUM SERPL-SCNC: 4.5 MMOL/L
POTASSIUM SERPL-SCNC: 4.5 MMOL/L — SIGNIFICANT CHANGE UP (ref 3.5–5.3)
POTASSIUM SERPL-SCNC: 4.6 MMOL/L
POTASSIUM SERPL-SCNC: 4.6 MMOL/L
POTASSIUM SERPL-SCNC: 4.6 MMOL/L — SIGNIFICANT CHANGE UP (ref 3.5–5.3)
POTASSIUM SERPL-SCNC: 4.7 MMOL/L — SIGNIFICANT CHANGE UP (ref 3.5–5.3)
POTASSIUM SERPL-SCNC: 5 MMOL/L
POTASSIUM SERPL-SCNC: 5.1 MMOL/L
POTASSIUM SERPL-SCNC: 5.1 MMOL/L
POTASSIUM SERPL-SCNC: 5.2 MMOL/L
POTASSIUM SERPL-SCNC: 5.2 MMOL/L — SIGNIFICANT CHANGE UP (ref 3.5–5.3)
POTASSIUM SERPL-SCNC: 5.3 MMOL/L
POTASSIUM UR-SCNC: 55 MMOL/L — SIGNIFICANT CHANGE UP
PROCALCITONIN SERPL-MCNC: 1.57 NG/ML — HIGH (ref 0.02–0.1)
PROT PATTERN SERPL ELPH-IMP: SIGNIFICANT CHANGE UP
PROT SERPL-MCNC: 3 G/DL — LOW (ref 6–8.3)
PROT SERPL-MCNC: 3.2 G/DL — LOW (ref 6–8.3)
PROT SERPL-MCNC: 3.3 G/DL — LOW (ref 6–8.3)
PROT SERPL-MCNC: 3.3 G/DL — LOW (ref 6–8.3)
PROT SERPL-MCNC: 3.4 G/DL — LOW (ref 6–8.3)
PROT SERPL-MCNC: 3.6 G/DL — LOW (ref 6–8.3)
PROT SERPL-MCNC: 3.6 G/DL — LOW (ref 6–8.3)
PROT SERPL-MCNC: 3.7 G/DL — LOW (ref 6–8.3)
PROT SERPL-MCNC: 3.7 G/DL — LOW (ref 6–8.3)
PROT SERPL-MCNC: 3.8 G/DL — LOW (ref 6–8.3)
PROT SERPL-MCNC: 3.9 G/DL — LOW (ref 6–8.3)
PROT SERPL-MCNC: 3.9 G/DL — LOW (ref 6–8.3)
PROT SERPL-MCNC: 4.1 G/DL — LOW (ref 6–8.3)
PROT SERPL-MCNC: 4.1 G/DL — LOW (ref 6–8.3)
PROT SERPL-MCNC: 4.3 G/DL — LOW (ref 6–8.3)
PROT SERPL-MCNC: 4.3 G/DL — LOW (ref 6–8.3)
PROT SERPL-MCNC: 4.4 G/DL — LOW (ref 6–8.3)
PROT SERPL-MCNC: 4.4 G/DL — LOW (ref 6–8.3)
PROT SERPL-MCNC: 4.5 G/DL — LOW (ref 6–8.3)
PROT SERPL-MCNC: 4.5 G/DL — LOW (ref 6–8.3)
PROT SERPL-MCNC: 4.6 G/DL
PROT SERPL-MCNC: 4.6 G/DL — LOW (ref 6–8.3)
PROT SERPL-MCNC: 4.7 G/DL — LOW (ref 6–8.3)
PROT SERPL-MCNC: 4.9 G/DL
PROT SERPL-MCNC: 5.1 G/DL
PROT SERPL-MCNC: 5.1 G/DL — LOW (ref 6–8.3)
PROT SERPL-MCNC: 5.4 G/DL
PROT SERPL-MCNC: 5.4 G/DL
PROT SERPL-MCNC: 5.5 G/DL
PROT SERPL-MCNC: 5.7 G/DL
PROT SERPL-MCNC: 5.8 G/DL
PROT UR-MCNC: 100 — SIGNIFICANT CHANGE UP
PROT UR-MCNC: ABNORMAL
PROTHROM AB SERPL-ACNC: 12.4 SEC — SIGNIFICANT CHANGE UP (ref 10–13.1)
PROTHROM AB SERPL-ACNC: 13.5 SEC — HIGH (ref 10–12.9)
PROTHROM AB SERPL-ACNC: 14.4 SEC — HIGH (ref 10–12.9)
PROTHROM AB SERPL-ACNC: 14.9 SEC — HIGH (ref 10–12.9)
PROTHROM AB SERPL-ACNC: 15.2 SEC — HIGH (ref 10–12.9)
PTR INTERPRETATION: SIGNIFICANT CHANGE UP
RBC # BLD: 2.03 M/UL — LOW (ref 4.2–5.8)
RBC # BLD: 2.03 M/UL — LOW (ref 4.2–5.8)
RBC # BLD: 2.06 M/UL — LOW (ref 4.2–5.8)
RBC # BLD: 2.17 M/UL — LOW (ref 4.2–5.8)
RBC # BLD: 2.2 M/UL — LOW (ref 4.2–5.8)
RBC # BLD: 2.2 M/UL — LOW (ref 4.2–5.8)
RBC # BLD: 2.27 M/UL — LOW (ref 4.2–5.8)
RBC # BLD: 2.29 M/UL — LOW (ref 4.2–5.8)
RBC # BLD: 2.32 M/UL — LOW (ref 4.2–5.8)
RBC # BLD: 2.37 M/UL — LOW (ref 4.2–5.8)
RBC # BLD: 2.39 M/UL — LOW (ref 4.2–5.8)
RBC # BLD: 2.42 M/UL — LOW (ref 4.2–5.8)
RBC # BLD: 2.44 M/UL — LOW (ref 4.2–5.8)
RBC # BLD: 2.45 M/UL — LOW (ref 4.2–5.8)
RBC # BLD: 2.46 M/UL — LOW (ref 4.2–5.8)
RBC # BLD: 2.47 M/UL — LOW (ref 4.2–5.8)
RBC # BLD: 2.52 M/UL — LOW (ref 4.2–5.8)
RBC # BLD: 2.57 M/UL — LOW (ref 4.2–5.8)
RBC # BLD: 2.6 M/UL — LOW (ref 4.2–5.8)
RBC # BLD: 2.6 M/UL — LOW (ref 4.2–5.8)
RBC # BLD: 2.63 M/UL — LOW (ref 4.2–5.8)
RBC # BLD: 2.64 M/UL — LOW (ref 4.2–5.8)
RBC # BLD: 2.67 M/UL — LOW (ref 4.2–5.8)
RBC # BLD: 2.68 M/UL — LOW (ref 4.2–5.8)
RBC # BLD: 2.68 M/UL — LOW (ref 4.2–5.8)
RBC # BLD: 2.69 M/UL — LOW (ref 4.2–5.8)
RBC # BLD: 2.71 M/UL — LOW (ref 4.2–5.8)
RBC # BLD: 2.76 M/UL — LOW (ref 4.2–5.8)
RBC # BLD: 2.78 M/UL — LOW (ref 4.2–5.8)
RBC # BLD: 2.79 M/UL — LOW (ref 4.2–5.8)
RBC # BLD: 2.82 M/UL — LOW (ref 4.2–5.8)
RBC # BLD: 2.83 M/UL — LOW (ref 4.2–5.8)
RBC # BLD: 2.84 M/UL — LOW (ref 4.2–5.8)
RBC # BLD: 2.89 M/UL — LOW (ref 4.2–5.8)
RBC # BLD: 2.94 M/UL — LOW (ref 4.2–5.8)
RBC # BLD: 2.99 M/UL — LOW (ref 4.2–5.8)
RBC # BLD: 2.99 M/UL — LOW (ref 4.2–5.8)
RBC # BLD: 3 M/UL — LOW (ref 4.2–5.8)
RBC # BLD: 3.02 M/UL — LOW (ref 4.2–5.8)
RBC # BLD: 3.04 M/UL — LOW (ref 4.2–5.8)
RBC # BLD: 3.06 M/UL — LOW (ref 4.2–5.8)
RBC # BLD: 3.06 M/UL — LOW (ref 4.2–5.8)
RBC # BLD: 3.07 M/UL — LOW (ref 4.2–5.8)
RBC # BLD: 3.23 M/UL — LOW (ref 4.2–5.8)
RBC # BLD: 3.25 M/UL — LOW (ref 4.2–5.8)
RBC # BLD: 3.29 M/UL — LOW (ref 4.2–5.8)
RBC # BLD: 3.3 M/UL — LOW (ref 4.2–5.8)
RBC # BLD: 3.31 M/UL — LOW (ref 4.2–5.8)
RBC # BLD: 3.32 M/UL — LOW (ref 4.2–5.8)
RBC # BLD: 3.34 M/UL — LOW (ref 4.2–5.8)
RBC # BLD: 3.47 M/UL — LOW (ref 4.2–5.8)
RBC # BLD: 3.48 M/UL — LOW (ref 4.2–5.8)
RBC # BLD: 3.49 M/UL — LOW (ref 4.2–5.8)
RBC # BLD: 3.53 M/UL — LOW (ref 4.2–5.8)
RBC # BLD: 3.56 M/UL — LOW (ref 4.2–5.8)
RBC # BLD: 3.59 M/UL — LOW (ref 4.2–5.8)
RBC # BLD: 3.6 M/UL — LOW (ref 4.2–5.8)
RBC # BLD: 3.72 M/UL — LOW (ref 4.2–5.8)
RBC # BLD: 3.76 M/UL — LOW (ref 4.2–5.8)
RBC # BLD: 3.87 M/UL — LOW (ref 4.2–5.8)
RBC # BLD: 3.92 M/UL — LOW (ref 4.2–5.8)
RBC # BLD: 3.92 M/UL — LOW (ref 4.2–5.8)
RBC # BLD: 3.98 M/UL — LOW (ref 4.2–5.8)
RBC # BLD: 4.18 M/UL — LOW (ref 4.2–5.8)
RBC # BLD: 4.2 M/UL — SIGNIFICANT CHANGE UP (ref 4.2–5.8)
RBC # FLD: 15 % — HIGH (ref 10.3–14.5)
RBC # FLD: 16 % — HIGH (ref 10.3–14.5)
RBC # FLD: 16.2 % — HIGH (ref 10.3–14.5)
RBC # FLD: 16.7 % — HIGH (ref 10.3–14.5)
RBC # FLD: 16.8 % — HIGH (ref 10.3–14.5)
RBC # FLD: 16.9 % — HIGH (ref 10.3–14.5)
RBC # FLD: 17 % — HIGH (ref 10.3–14.5)
RBC # FLD: 17.1 % — HIGH (ref 10.3–14.5)
RBC # FLD: 17.3 % — HIGH (ref 10.3–14.5)
RBC # FLD: 17.4 % — HIGH (ref 10.3–14.5)
RBC # FLD: 17.6 % — HIGH (ref 10.3–14.5)
RBC # FLD: 17.7 % — HIGH (ref 10.3–14.5)
RBC # FLD: 17.7 % — HIGH (ref 10.3–14.5)
RBC # FLD: 17.8 % — HIGH (ref 10.3–14.5)
RBC # FLD: 17.8 % — HIGH (ref 10.3–14.5)
RBC # FLD: 17.9 % — HIGH (ref 10.3–14.5)
RBC # FLD: 17.9 % — HIGH (ref 10.3–14.5)
RBC # FLD: 18.1 % — HIGH (ref 10.3–14.5)
RBC # FLD: 18.4 % — HIGH (ref 10.3–14.5)
RBC # FLD: 18.5 % — HIGH (ref 10.3–14.5)
RBC # FLD: 18.6 % — HIGH (ref 10.3–14.5)
RBC # FLD: 18.6 % — HIGH (ref 10.3–14.5)
RBC # FLD: 18.7 % — HIGH (ref 10.3–14.5)
RBC # FLD: 18.8 % — HIGH (ref 10.3–14.5)
RBC # FLD: 18.9 % — HIGH (ref 10.3–14.5)
RBC # FLD: 19 % — HIGH (ref 10.3–14.5)
RBC # FLD: 19 % — HIGH (ref 10.3–14.5)
RBC # FLD: 19.3 % — HIGH (ref 10.3–14.5)
RBC # FLD: 19.4 % — HIGH (ref 10.3–14.5)
RBC # FLD: 19.6 % — HIGH (ref 10.3–14.5)
RBC # FLD: 19.6 % — HIGH (ref 10.3–14.5)
RBC # FLD: 19.9 % — HIGH (ref 10.3–14.5)
RBC # FLD: 20.4 % — HIGH (ref 10.3–14.5)
RBC # FLD: 20.4 % — HIGH (ref 10.3–14.5)
RBC # FLD: 20.6 % — HIGH (ref 10.3–14.5)
RBC # FLD: 20.6 % — HIGH (ref 10.3–14.5)
RBC # FLD: 20.8 % — HIGH (ref 10.3–14.5)
RBC # FLD: 21.1 % — HIGH (ref 10.3–14.5)
RBC # FLD: 21.1 % — HIGH (ref 10.3–14.5)
RBC # FLD: 21.3 % — HIGH (ref 10.3–14.5)
RBC # FLD: 21.5 % — HIGH (ref 10.3–14.5)
RBC # FLD: 21.7 % — HIGH (ref 10.3–14.5)
RBC # FLD: 21.9 % — HIGH (ref 10.3–14.5)
RBC # FLD: 22 % — HIGH (ref 10.3–14.5)
RBC # FLD: 22.1 % — HIGH (ref 10.3–14.5)
RBC # FLD: 23.2 % — HIGH (ref 10.3–14.5)
RBC # FLD: 23.5 % — HIGH (ref 10.3–14.5)
RBC # FLD: 23.8 % — HIGH (ref 10.3–14.5)
RBC # FLD: 24 % — HIGH (ref 10.3–14.5)
RBC # FLD: 24.1 % — HIGH (ref 10.3–14.5)
RBC # FLD: 24.4 % — HIGH (ref 10.3–14.5)
RBC # FLD: 24.5 % — HIGH (ref 10.3–14.5)
RBC # FLD: 24.7 % — HIGH (ref 10.3–14.5)
RBC # FLD: 24.9 % — HIGH (ref 10.3–14.5)
RBC # FLD: 25 % — HIGH (ref 10.3–14.5)
RBC # FLD: 25.1 % — HIGH (ref 10.3–14.5)
RBC # FLD: 25.7 % — HIGH (ref 10.3–14.5)
RBC # FLD: 25.7 % — HIGH (ref 10.3–14.5)
RBC # FLD: 26.2 % — HIGH (ref 10.3–14.5)
RBC # FLD: 26.2 % — HIGH (ref 10.3–14.5)
RBC # FLD: 26.3 % — HIGH (ref 10.3–14.5)
RBC # FLD: 26.4 % — HIGH (ref 10.3–14.5)
RBC # FLD: 26.6 % — HIGH (ref 10.3–14.5)
RBC # FLD: 27 % — HIGH (ref 10.3–14.5)
RBC BLD AUTO: ABNORMAL
RBC BLD AUTO: SIGNIFICANT CHANGE UP
RBC CASTS # UR COMP ASSIST: 246 /HPF — HIGH (ref 0–4)
RBC CASTS # UR COMP ASSIST: 3 /HPF — SIGNIFICANT CHANGE UP (ref 0–4)
RBC CASTS # UR COMP ASSIST: 5 /HPF — HIGH (ref 0–4)
RBC CASTS # UR COMP ASSIST: 712 /HPF — HIGH (ref 0–4)
RETICS #: 108 K/UL — SIGNIFICANT CHANGE UP (ref 25–125)
RETICS #: 133 K/UL — HIGH (ref 25–125)
RETICS #: 161.9 K/UL — HIGH (ref 25–125)
RETICS #: 41.9 K/UL — SIGNIFICANT CHANGE UP (ref 25–125)
RETICS #: 52.9 K/UL — SIGNIFICANT CHANGE UP (ref 25–125)
RETICS/RBC NFR: 1.1 % — SIGNIFICANT CHANGE UP (ref 0.5–2.5)
RETICS/RBC NFR: 1.5 % — SIGNIFICANT CHANGE UP (ref 0.5–2.5)
RETICS/RBC NFR: 3.6 % — HIGH (ref 0.5–2.5)
RETICS/RBC NFR: 4.4 % — HIGH (ref 0.5–2.5)
RETICS/RBC NFR: 7.1 % — HIGH (ref 0.5–2.5)
RH IG SCN BLD-IMP: POSITIVE — SIGNIFICANT CHANGE UP
SAO2 % BLDV: 38 % — LOW (ref 67–88)
SAO2 % BLDV: 67 % — SIGNIFICANT CHANGE UP (ref 67–88)
SAO2 % BLDV: 96 % — HIGH (ref 67–88)
SCHISTOCYTES BLD QL AUTO: SLIGHT — SIGNIFICANT CHANGE UP
SCHISTOCYTES BLD QL AUTO: SLIGHT — SIGNIFICANT CHANGE UP
SMOOTH MUSCLE AB SER-ACNC: SIGNIFICANT CHANGE UP
SODIUM SERPL-SCNC: 125 MMOL/L — LOW (ref 135–145)
SODIUM SERPL-SCNC: 128 MMOL/L — LOW (ref 135–145)
SODIUM SERPL-SCNC: 129 MMOL/L — LOW (ref 135–145)
SODIUM SERPL-SCNC: 129 MMOL/L — LOW (ref 135–145)
SODIUM SERPL-SCNC: 130 MMOL/L — LOW (ref 135–145)
SODIUM SERPL-SCNC: 131 MMOL/L
SODIUM SERPL-SCNC: 131 MMOL/L
SODIUM SERPL-SCNC: 131 MMOL/L — LOW (ref 135–145)
SODIUM SERPL-SCNC: 131 MMOL/L — LOW (ref 135–145)
SODIUM SERPL-SCNC: 132 MMOL/L — LOW (ref 135–145)
SODIUM SERPL-SCNC: 132 MMOL/L — LOW (ref 135–145)
SODIUM SERPL-SCNC: 133 MMOL/L
SODIUM SERPL-SCNC: 133 MMOL/L — LOW (ref 135–145)
SODIUM SERPL-SCNC: 134 MMOL/L
SODIUM SERPL-SCNC: 134 MMOL/L — LOW (ref 135–145)
SODIUM SERPL-SCNC: 135 MMOL/L — SIGNIFICANT CHANGE UP (ref 135–145)
SODIUM SERPL-SCNC: 135 MMOL/L — SIGNIFICANT CHANGE UP (ref 135–145)
SODIUM SERPL-SCNC: 136 MMOL/L
SODIUM SERPL-SCNC: 136 MMOL/L — SIGNIFICANT CHANGE UP (ref 135–145)
SODIUM SERPL-SCNC: 137 MMOL/L
SODIUM SERPL-SCNC: 137 MMOL/L
SODIUM SERPL-SCNC: 137 MMOL/L — SIGNIFICANT CHANGE UP (ref 135–145)
SODIUM SERPL-SCNC: 138 MMOL/L
SODIUM SERPL-SCNC: 138 MMOL/L — SIGNIFICANT CHANGE UP (ref 135–145)
SODIUM SERPL-SCNC: 138 MMOL/L — SIGNIFICANT CHANGE UP (ref 135–145)
SODIUM UR-SCNC: 24 MMOL/L — SIGNIFICANT CHANGE UP
SODIUM UR-SCNC: 46 MMOL/L — SIGNIFICANT CHANGE UP
SP GR SPEC: 1.02 — SIGNIFICANT CHANGE UP (ref 1.01–1.02)
SPECIMEN SOURCE: SIGNIFICANT CHANGE UP
SPHEROCYTES BLD QL SMEAR: SLIGHT — SIGNIFICANT CHANGE UP
TARGETS BLD QL SMEAR: SLIGHT — SIGNIFICANT CHANGE UP
TIBC SERPL-MCNC: 205 UG/DL — LOW (ref 220–430)
TIBC SERPL-MCNC: 252 UG/DL
TM INTERPRETATION: SIGNIFICANT CHANGE UP
TRIGL SERPL-MCNC: 252 MG/DL — HIGH (ref 10–149)
TRIGL SERPL-MCNC: 320 MG/DL — HIGH (ref 10–149)
TROPONIN T, HIGH SENSITIVITY RESULT: 65 NG/L — HIGH (ref 0–51)
TROPONIN T, HIGH SENSITIVITY RESULT: 99 NG/L — HIGH (ref 0–51)
TSH SERPL-MCNC: 1.93 UIU/ML — SIGNIFICANT CHANGE UP (ref 0.27–4.2)
UIBC SERPL-MCNC: 205 UG/DL
UIBC SERPL-MCNC: 61 UG/DL — LOW (ref 110–370)
URATE SERPL-MCNC: 5.7 MG/DL — SIGNIFICANT CHANGE UP (ref 3.4–8.8)
URATE SERPL-MCNC: 6 MG/DL — SIGNIFICANT CHANGE UP (ref 3.4–8.8)
UROBILINOGEN FLD QL: ABNORMAL
UROBILINOGEN FLD QL: NEGATIVE — SIGNIFICANT CHANGE UP
UROBILINOGEN FLD QL: SIGNIFICANT CHANGE UP
VARIANT LYMPHS # BLD: 2 % — SIGNIFICANT CHANGE UP (ref 0–6)
VARIANT LYMPHS # BLD: 9 % — HIGH (ref 0–6)
VIT B12 SERPL-MCNC: 1111 PG/ML
VIT B12 SERPL-MCNC: 1932 PG/ML — HIGH (ref 232–1245)
VIT B12 SERPL-MCNC: 743 PG/ML
VIT B12 SERPL-MCNC: >2000 PG/ML — HIGH (ref 232–1245)
WBC # BLD: 0.3 K/UL — CRITICAL LOW (ref 3.8–10.5)
WBC # BLD: 0.3 K/UL — CRITICAL LOW (ref 3.8–10.5)
WBC # BLD: 0.4 K/UL — CRITICAL LOW (ref 3.8–10.5)
WBC # BLD: 1 K/UL — CRITICAL LOW (ref 3.8–10.5)
WBC # BLD: 1.58 K/UL — LOW (ref 3.8–10.5)
WBC # BLD: 1.6 K/UL — LOW (ref 3.8–10.5)
WBC # BLD: 1.62 K/UL — LOW (ref 3.8–10.5)
WBC # BLD: 1.64 K/UL — LOW (ref 3.8–10.5)
WBC # BLD: 1.8 K/UL — LOW (ref 3.8–10.5)
WBC # BLD: 1.88 K/UL — LOW (ref 3.8–10.5)
WBC # BLD: 1.9 K/UL — LOW (ref 3.8–10.5)
WBC # BLD: 1.92 K/UL — LOW (ref 3.8–10.5)
WBC # BLD: 1.95 K/UL — LOW (ref 3.8–10.5)
WBC # BLD: 19.6 K/UL — HIGH (ref 3.8–10.5)
WBC # BLD: 2 K/UL — LOW (ref 3.8–10.5)
WBC # BLD: 2.02 K/UL — LOW (ref 3.8–10.5)
WBC # BLD: 2.1 K/UL — LOW (ref 3.8–10.5)
WBC # BLD: 2.13 K/UL — LOW (ref 3.8–10.5)
WBC # BLD: 2.17 K/UL — LOW (ref 3.8–10.5)
WBC # BLD: 2.2 K/UL — LOW (ref 3.8–10.5)
WBC # BLD: 2.3 K/UL — LOW (ref 3.8–10.5)
WBC # BLD: 2.3 K/UL — LOW (ref 3.8–10.5)
WBC # BLD: 2.36 K/UL — LOW (ref 3.8–10.5)
WBC # BLD: 2.4 K/UL — LOW (ref 3.8–10.5)
WBC # BLD: 2.43 K/UL — LOW (ref 3.8–10.5)
WBC # BLD: 2.44 K/UL — LOW (ref 3.8–10.5)
WBC # BLD: 2.58 K/UL — LOW (ref 3.8–10.5)
WBC # BLD: 2.6 K/UL — LOW (ref 3.8–10.5)
WBC # BLD: 2.6 K/UL — LOW (ref 3.8–10.5)
WBC # BLD: 2.63 K/UL — LOW (ref 3.8–10.5)
WBC # BLD: 2.7 K/UL — LOW (ref 3.8–10.5)
WBC # BLD: 2.88 K/UL — LOW (ref 3.8–10.5)
WBC # BLD: 2.9 K/UL — LOW (ref 3.8–10.5)
WBC # BLD: 2.94 K/UL — LOW (ref 3.8–10.5)
WBC # BLD: 20.9 K/UL — HIGH (ref 3.8–10.5)
WBC # BLD: 3.05 K/UL — LOW (ref 3.8–10.5)
WBC # BLD: 3.08 K/UL — LOW (ref 3.8–10.5)
WBC # BLD: 3.1 K/UL — LOW (ref 3.8–10.5)
WBC # BLD: 3.2 K/UL — LOW (ref 3.8–10.5)
WBC # BLD: 3.3 K/UL — LOW (ref 3.8–10.5)
WBC # BLD: 3.33 K/UL — LOW (ref 3.8–10.5)
WBC # BLD: 3.8 K/UL — SIGNIFICANT CHANGE UP (ref 3.8–10.5)
WBC # BLD: 4 K/UL — SIGNIFICANT CHANGE UP (ref 3.8–10.5)
WBC # BLD: 4.04 K/UL — SIGNIFICANT CHANGE UP (ref 3.8–10.5)
WBC # BLD: 4.7 K/UL — SIGNIFICANT CHANGE UP (ref 3.8–10.5)
WBC # BLD: 4.89 K/UL — SIGNIFICANT CHANGE UP (ref 3.8–10.5)
WBC # BLD: 5 K/UL — SIGNIFICANT CHANGE UP (ref 3.8–10.5)
WBC # BLD: 5.19 K/UL — SIGNIFICANT CHANGE UP (ref 3.8–10.5)
WBC # BLD: 5.5 K/UL — SIGNIFICANT CHANGE UP (ref 3.8–10.5)
WBC # BLD: 5.8 K/UL — SIGNIFICANT CHANGE UP (ref 3.8–10.5)
WBC # BLD: 5.87 K/UL — SIGNIFICANT CHANGE UP (ref 3.8–10.5)
WBC # BLD: 5.9 K/UL — SIGNIFICANT CHANGE UP (ref 3.8–10.5)
WBC # BLD: 6 K/UL — SIGNIFICANT CHANGE UP (ref 3.8–10.5)
WBC # BLD: 6.4 K/UL — SIGNIFICANT CHANGE UP (ref 3.8–10.5)
WBC # BLD: 6.4 K/UL — SIGNIFICANT CHANGE UP (ref 3.8–10.5)
WBC # BLD: 7.1 K/UL — SIGNIFICANT CHANGE UP (ref 3.8–10.5)
WBC # FLD AUTO: 0.3 K/UL — CRITICAL LOW (ref 3.8–10.5)
WBC # FLD AUTO: 0.3 K/UL — CRITICAL LOW (ref 3.8–10.5)
WBC # FLD AUTO: 0.4 K/UL — CRITICAL LOW (ref 3.8–10.5)
WBC # FLD AUTO: 1 K/UL — CRITICAL LOW (ref 3.8–10.5)
WBC # FLD AUTO: 1.58 K/UL — LOW (ref 3.8–10.5)
WBC # FLD AUTO: 1.6 K/UL — LOW (ref 3.8–10.5)
WBC # FLD AUTO: 1.62 K/UL — LOW (ref 3.8–10.5)
WBC # FLD AUTO: 1.64 K/UL — LOW (ref 3.8–10.5)
WBC # FLD AUTO: 1.8 K/UL — LOW (ref 3.8–10.5)
WBC # FLD AUTO: 1.88 K/UL — LOW (ref 3.8–10.5)
WBC # FLD AUTO: 1.9 K/UL — LOW (ref 3.8–10.5)
WBC # FLD AUTO: 1.92 K/UL — LOW (ref 3.8–10.5)
WBC # FLD AUTO: 1.95 K/UL — LOW (ref 3.8–10.5)
WBC # FLD AUTO: 19.6 K/UL — HIGH (ref 3.8–10.5)
WBC # FLD AUTO: 2 K/UL — LOW (ref 3.8–10.5)
WBC # FLD AUTO: 2.02 K/UL — LOW (ref 3.8–10.5)
WBC # FLD AUTO: 2.1 K/UL — LOW (ref 3.8–10.5)
WBC # FLD AUTO: 2.13 K/UL — LOW (ref 3.8–10.5)
WBC # FLD AUTO: 2.17 K/UL — LOW (ref 3.8–10.5)
WBC # FLD AUTO: 2.2 K/UL — LOW (ref 3.8–10.5)
WBC # FLD AUTO: 2.3 K/UL — LOW (ref 3.8–10.5)
WBC # FLD AUTO: 2.3 K/UL — LOW (ref 3.8–10.5)
WBC # FLD AUTO: 2.36 K/UL — LOW (ref 3.8–10.5)
WBC # FLD AUTO: 2.4 K/UL — LOW (ref 3.8–10.5)
WBC # FLD AUTO: 2.43 K/UL — LOW (ref 3.8–10.5)
WBC # FLD AUTO: 2.44 K/UL — LOW (ref 3.8–10.5)
WBC # FLD AUTO: 2.58 K/UL — LOW (ref 3.8–10.5)
WBC # FLD AUTO: 2.6 K/UL — LOW (ref 3.8–10.5)
WBC # FLD AUTO: 2.6 K/UL — LOW (ref 3.8–10.5)
WBC # FLD AUTO: 2.63 K/UL — LOW (ref 3.8–10.5)
WBC # FLD AUTO: 2.7 K/UL — LOW (ref 3.8–10.5)
WBC # FLD AUTO: 2.88 K/UL — LOW (ref 3.8–10.5)
WBC # FLD AUTO: 2.9 K/UL — LOW (ref 3.8–10.5)
WBC # FLD AUTO: 2.94 K/UL — LOW (ref 3.8–10.5)
WBC # FLD AUTO: 20.9 K/UL — HIGH (ref 3.8–10.5)
WBC # FLD AUTO: 3.05 K/UL — LOW (ref 3.8–10.5)
WBC # FLD AUTO: 3.08 K/UL — LOW (ref 3.8–10.5)
WBC # FLD AUTO: 3.1 K/UL — LOW (ref 3.8–10.5)
WBC # FLD AUTO: 3.2 K/UL — LOW (ref 3.8–10.5)
WBC # FLD AUTO: 3.3 K/UL — LOW (ref 3.8–10.5)
WBC # FLD AUTO: 3.33 K/UL — LOW (ref 3.8–10.5)
WBC # FLD AUTO: 3.8 K/UL — SIGNIFICANT CHANGE UP (ref 3.8–10.5)
WBC # FLD AUTO: 4 K/UL — SIGNIFICANT CHANGE UP (ref 3.8–10.5)
WBC # FLD AUTO: 4.04 K/UL — SIGNIFICANT CHANGE UP (ref 3.8–10.5)
WBC # FLD AUTO: 4.7 K/UL — SIGNIFICANT CHANGE UP (ref 3.8–10.5)
WBC # FLD AUTO: 4.89 K/UL — SIGNIFICANT CHANGE UP (ref 3.8–10.5)
WBC # FLD AUTO: 5 K/UL — SIGNIFICANT CHANGE UP (ref 3.8–10.5)
WBC # FLD AUTO: 5.19 K/UL — SIGNIFICANT CHANGE UP (ref 3.8–10.5)
WBC # FLD AUTO: 5.5 K/UL — SIGNIFICANT CHANGE UP (ref 3.8–10.5)
WBC # FLD AUTO: 5.8 K/UL — SIGNIFICANT CHANGE UP (ref 3.8–10.5)
WBC # FLD AUTO: 5.87 K/UL — SIGNIFICANT CHANGE UP (ref 3.8–10.5)
WBC # FLD AUTO: 5.9 K/UL — SIGNIFICANT CHANGE UP (ref 3.8–10.5)
WBC # FLD AUTO: 6 K/UL — SIGNIFICANT CHANGE UP (ref 3.8–10.5)
WBC # FLD AUTO: 6.4 K/UL — SIGNIFICANT CHANGE UP (ref 3.8–10.5)
WBC # FLD AUTO: 6.4 K/UL — SIGNIFICANT CHANGE UP (ref 3.8–10.5)
WBC # FLD AUTO: 7.1 K/UL — SIGNIFICANT CHANGE UP (ref 3.8–10.5)
WBC UR QL: 2 /HPF — SIGNIFICANT CHANGE UP (ref 0–5)
WBC UR QL: 3 /HPF — SIGNIFICANT CHANGE UP (ref 0–5)
WBC UR QL: 7 /HPF — HIGH (ref 0–5)
WBC UR QL: 8 /HPF — HIGH (ref 0–5)

## 2019-01-01 PROCEDURE — 84443 ASSAY THYROID STIM HORMONE: CPT

## 2019-01-01 PROCEDURE — 96374 THER/PROPH/DIAG INJ IV PUSH: CPT

## 2019-01-01 PROCEDURE — 37191 INS ENDOVAS VENA CAVA FILTR: CPT

## 2019-01-01 PROCEDURE — 81340 TRB@ GENE REARRANGE AMPLIFY: CPT

## 2019-01-01 PROCEDURE — 83690 ASSAY OF LIPASE: CPT

## 2019-01-01 PROCEDURE — 99223 1ST HOSP IP/OBS HIGH 75: CPT | Mod: GC

## 2019-01-01 PROCEDURE — P9040: CPT

## 2019-01-01 PROCEDURE — 88264 CHROMOSOME ANALYSIS 20-25: CPT

## 2019-01-01 PROCEDURE — 86078 PHYS BLOOD BANK SERV REACTJ: CPT

## 2019-01-01 PROCEDURE — 97162 PT EVAL MOD COMPLEX 30 MIN: CPT

## 2019-01-01 PROCEDURE — A9585: CPT

## 2019-01-01 PROCEDURE — 99232 SBSQ HOSP IP/OBS MODERATE 35: CPT

## 2019-01-01 PROCEDURE — 99213 OFFICE O/P EST LOW 20 MIN: CPT

## 2019-01-01 PROCEDURE — C1880: CPT

## 2019-01-01 PROCEDURE — G0452: CPT | Mod: 26

## 2019-01-01 PROCEDURE — 74176 CT ABD & PELVIS W/O CONTRAST: CPT

## 2019-01-01 PROCEDURE — A9552: CPT

## 2019-01-01 PROCEDURE — 80048 BASIC METABOLIC PNL TOTAL CA: CPT

## 2019-01-01 PROCEDURE — 86900 BLOOD TYPING SEROLOGIC ABO: CPT

## 2019-01-01 PROCEDURE — 74176 CT ABD & PELVIS W/O CONTRAST: CPT | Mod: 26

## 2019-01-01 PROCEDURE — 80076 HEPATIC FUNCTION PANEL: CPT

## 2019-01-01 PROCEDURE — 84165 PROTEIN E-PHORESIS SERUM: CPT

## 2019-01-01 PROCEDURE — 93005 ELECTROCARDIOGRAM TRACING: CPT

## 2019-01-01 PROCEDURE — 99214 OFFICE O/P EST MOD 30 MIN: CPT

## 2019-01-01 PROCEDURE — 87521 HEPATITIS C PROBE&RVRS TRNSC: CPT

## 2019-01-01 PROCEDURE — 88305 TISSUE EXAM BY PATHOLOGIST: CPT

## 2019-01-01 PROCEDURE — 83935 ASSAY OF URINE OSMOLALITY: CPT

## 2019-01-01 PROCEDURE — 88184 FLOWCYTOMETRY/ TC 1 MARKER: CPT

## 2019-01-01 PROCEDURE — 86880 COOMBS TEST DIRECT: CPT

## 2019-01-01 PROCEDURE — 82435 ASSAY OF BLOOD CHLORIDE: CPT

## 2019-01-01 PROCEDURE — 86901 BLOOD TYPING SEROLOGIC RH(D): CPT

## 2019-01-01 PROCEDURE — 78815 PET IMAGE W/CT SKULL-THIGH: CPT

## 2019-01-01 PROCEDURE — 78582 LUNG VENTILAT&PERFUS IMAGING: CPT

## 2019-01-01 PROCEDURE — 72156 MRI NECK SPINE W/O & W/DYE: CPT | Mod: 26

## 2019-01-01 PROCEDURE — 71045 X-RAY EXAM CHEST 1 VIEW: CPT

## 2019-01-01 PROCEDURE — 77262 THER RADIOLOGY TX PLNG INTRM: CPT

## 2019-01-01 PROCEDURE — 80074 ACUTE HEPATITIS PANEL: CPT

## 2019-01-01 PROCEDURE — 83605 ASSAY OF LACTIC ACID: CPT

## 2019-01-01 PROCEDURE — 83010 ASSAY OF HAPTOGLOBIN QUANT: CPT

## 2019-01-01 PROCEDURE — 88271 CYTOGENETICS DNA PROBE: CPT

## 2019-01-01 PROCEDURE — 86922 COMPATIBILITY TEST ANTIGLOB: CPT

## 2019-01-01 PROCEDURE — 81241 F5 GENE: CPT

## 2019-01-01 PROCEDURE — 76770 US EXAM ABDO BACK WALL COMP: CPT | Mod: 26

## 2019-01-01 PROCEDURE — 88364 INSITU HYBRIDIZATION (FISH): CPT | Mod: 26

## 2019-01-01 PROCEDURE — 71250 CT THORAX DX C-: CPT

## 2019-01-01 PROCEDURE — 78815 PET IMAGE W/CT SKULL-THIGH: CPT | Mod: 26,PS

## 2019-01-01 PROCEDURE — 86850 RBC ANTIBODY SCREEN: CPT

## 2019-01-01 PROCEDURE — 88341 IMHCHEM/IMCYTCHM EA ADD ANTB: CPT | Mod: 26,59

## 2019-01-01 PROCEDURE — 74183 MRI ABD W/O CNTR FLWD CNTR: CPT

## 2019-01-01 PROCEDURE — 84478 ASSAY OF TRIGLYCERIDES: CPT

## 2019-01-01 PROCEDURE — 82272 OCCULT BLD FECES 1-3 TESTS: CPT

## 2019-01-01 PROCEDURE — 88365 INSITU HYBRIDIZATION (FISH): CPT | Mod: 26,59

## 2019-01-01 PROCEDURE — 85097 BONE MARROW INTERPRETATION: CPT

## 2019-01-01 PROCEDURE — 88185 FLOWCYTOMETRY/TC ADD-ON: CPT

## 2019-01-01 PROCEDURE — 72156 MRI NECK SPINE W/O & W/DYE: CPT

## 2019-01-01 PROCEDURE — 88237 TISSUE CULTURE BONE MARROW: CPT

## 2019-01-01 PROCEDURE — 97161 PT EVAL LOW COMPLEX 20 MIN: CPT

## 2019-01-01 PROCEDURE — 72157 MRI CHEST SPINE W/O & W/DYE: CPT

## 2019-01-01 PROCEDURE — 99285 EMERGENCY DEPT VISIT HI MDM: CPT

## 2019-01-01 PROCEDURE — 87205 SMEAR GRAM STAIN: CPT

## 2019-01-01 PROCEDURE — 81240 F2 GENE: CPT

## 2019-01-01 PROCEDURE — 87040 BLOOD CULTURE FOR BACTERIA: CPT

## 2019-01-01 PROCEDURE — 99232 SBSQ HOSP IP/OBS MODERATE 35: CPT | Mod: GC

## 2019-01-01 PROCEDURE — 78227 HEPATOBIL SYST IMAGE W/DRUG: CPT | Mod: 26

## 2019-01-01 PROCEDURE — 88280 CHROMOSOME KARYOTYPE STUDY: CPT

## 2019-01-01 PROCEDURE — 82803 BLOOD GASES ANY COMBINATION: CPT

## 2019-01-01 PROCEDURE — 86720 LEPTOSPIRA ANTIBODY: CPT

## 2019-01-01 PROCEDURE — 88342 IMHCHEM/IMCYTCHM 1ST ANTB: CPT

## 2019-01-01 PROCEDURE — 87507 IADNA-DNA/RNA PROBE TQ 12-25: CPT

## 2019-01-01 PROCEDURE — 83540 ASSAY OF IRON: CPT

## 2019-01-01 PROCEDURE — 86790 VIRUS ANTIBODY NOS: CPT

## 2019-01-01 PROCEDURE — 82248 BILIRUBIN DIRECT: CPT

## 2019-01-01 PROCEDURE — 88275 CYTOGENETICS 100-300: CPT

## 2019-01-01 PROCEDURE — 99233 SBSQ HOSP IP/OBS HIGH 50: CPT

## 2019-01-01 PROCEDURE — 84133 ASSAY OF URINE POTASSIUM: CPT

## 2019-01-01 PROCEDURE — 93970 EXTREMITY STUDY: CPT | Mod: 26

## 2019-01-01 PROCEDURE — 93306 TTE W/DOPPLER COMPLETE: CPT | Mod: 26

## 2019-01-01 PROCEDURE — 85027 COMPLETE CBC AUTOMATED: CPT

## 2019-01-01 PROCEDURE — 86665 EPSTEIN-BARR CAPSID VCA: CPT

## 2019-01-01 PROCEDURE — 86923 COMPATIBILITY TEST ELECTRIC: CPT

## 2019-01-01 PROCEDURE — 86705 HEP B CORE ANTIBODY IGM: CPT

## 2019-01-01 PROCEDURE — P9016: CPT

## 2019-01-01 PROCEDURE — 88342 IMHCHEM/IMCYTCHM 1ST ANTB: CPT | Mod: 26,59

## 2019-01-01 PROCEDURE — 88365 INSITU HYBRIDIZATION (FISH): CPT | Mod: 26

## 2019-01-01 PROCEDURE — 88360 TUMOR IMMUNOHISTOCHEM/MANUAL: CPT

## 2019-01-01 PROCEDURE — 87045 FECES CULTURE AEROBIC BACT: CPT

## 2019-01-01 PROCEDURE — 86645 CMV ANTIBODY IGM: CPT

## 2019-01-01 PROCEDURE — 99231 SBSQ HOSP IP/OBS SF/LOW 25: CPT

## 2019-01-01 PROCEDURE — 83735 ASSAY OF MAGNESIUM: CPT

## 2019-01-01 PROCEDURE — 85014 HEMATOCRIT: CPT

## 2019-01-01 PROCEDURE — 86905 BLOOD TYPING RBC ANTIGENS: CPT

## 2019-01-01 PROCEDURE — 71045 X-RAY EXAM CHEST 1 VIEW: CPT | Mod: 26

## 2019-01-01 PROCEDURE — 85730 THROMBOPLASTIN TIME PARTIAL: CPT

## 2019-01-01 PROCEDURE — 77290 THER RAD SIMULAJ FIELD CPLX: CPT | Mod: 26

## 2019-01-01 PROCEDURE — 81261 IGH GENE REARRANGE AMP METH: CPT

## 2019-01-01 PROCEDURE — 82962 GLUCOSE BLOOD TEST: CPT

## 2019-01-01 PROCEDURE — 84300 ASSAY OF URINE SODIUM: CPT

## 2019-01-01 PROCEDURE — 99497 ADVNCD CARE PLAN 30 MIN: CPT | Mod: 25

## 2019-01-01 PROCEDURE — 82150 ASSAY OF AMYLASE: CPT

## 2019-01-01 PROCEDURE — 76700 US EXAM ABDOM COMPLETE: CPT

## 2019-01-01 PROCEDURE — 81342 TRG GENE REARRANGEMENT ANAL: CPT

## 2019-01-01 PROCEDURE — 86902 BLOOD TYPE ANTIGEN DONOR EA: CPT

## 2019-01-01 PROCEDURE — 86077 PHYS BLOOD BANK SERV XMATCH: CPT

## 2019-01-01 PROCEDURE — 82330 ASSAY OF CALCIUM: CPT

## 2019-01-01 PROCEDURE — 84295 ASSAY OF SERUM SODIUM: CPT

## 2019-01-01 PROCEDURE — 93010 ELECTROCARDIOGRAM REPORT: CPT

## 2019-01-01 PROCEDURE — C1894: CPT

## 2019-01-01 PROCEDURE — 84100 ASSAY OF PHOSPHORUS: CPT

## 2019-01-01 PROCEDURE — 88305 TISSUE EXAM BY PATHOLOGIST: CPT | Mod: 26

## 2019-01-01 PROCEDURE — 83615 LACTATE (LD) (LDH) ENZYME: CPT

## 2019-01-01 PROCEDURE — 74177 CT ABD & PELVIS W/CONTRAST: CPT

## 2019-01-01 PROCEDURE — 97530 THERAPEUTIC ACTIVITIES: CPT

## 2019-01-01 PROCEDURE — 87389 HIV-1 AG W/HIV-1&-2 AB AG IA: CPT

## 2019-01-01 PROCEDURE — 84550 ASSAY OF BLOOD/URIC ACID: CPT

## 2019-01-01 PROCEDURE — 82728 ASSAY OF FERRITIN: CPT

## 2019-01-01 PROCEDURE — 88365 INSITU HYBRIDIZATION (FISH): CPT

## 2019-01-01 PROCEDURE — 86704 HEP B CORE ANTIBODY TOTAL: CPT

## 2019-01-01 PROCEDURE — 86644 CMV ANTIBODY: CPT

## 2019-01-01 PROCEDURE — 83520 IMMUNOASSAY QUANT NOS NONAB: CPT

## 2019-01-01 PROCEDURE — 99223 1ST HOSP IP/OBS HIGH 75: CPT

## 2019-01-01 PROCEDURE — 87086 URINE CULTURE/COLONY COUNT: CPT

## 2019-01-01 PROCEDURE — 99233 SBSQ HOSP IP/OBS HIGH 50: CPT | Mod: GC

## 2019-01-01 PROCEDURE — 87207 SMEAR SPECIAL STAIN: CPT

## 2019-01-01 PROCEDURE — 83550 IRON BINDING TEST: CPT

## 2019-01-01 PROCEDURE — 38222 DX BONE MARROW BX & ASPIR: CPT | Mod: GC

## 2019-01-01 PROCEDURE — 84132 ASSAY OF SERUM POTASSIUM: CPT

## 2019-01-01 PROCEDURE — 86255 FLUORESCENT ANTIBODY SCREEN: CPT

## 2019-01-01 PROCEDURE — 86381 MITOCHONDRIAL ANTIBODY EACH: CPT

## 2019-01-01 PROCEDURE — 86403 PARTICLE AGGLUT ANTBDY SCRN: CPT

## 2019-01-01 PROCEDURE — 85610 PROTHROMBIN TIME: CPT

## 2019-01-01 PROCEDURE — 88189 FLOWCYTOMETRY/READ 16 & >: CPT

## 2019-01-01 PROCEDURE — 88360 TUMOR IMMUNOHISTOCHEM/MANUAL: CPT | Mod: 26

## 2019-01-01 PROCEDURE — 86747 PARVOVIRUS ANTIBODY: CPT

## 2019-01-01 PROCEDURE — 82746 ASSAY OF FOLIC ACID SERUM: CPT

## 2019-01-01 PROCEDURE — 74183 MRI ABD W/O CNTR FLWD CNTR: CPT | Mod: 26

## 2019-01-01 PROCEDURE — 88341 IMHCHEM/IMCYTCHM EA ADD ANTB: CPT

## 2019-01-01 PROCEDURE — 86870 RBC ANTIBODY IDENTIFICATION: CPT

## 2019-01-01 PROCEDURE — 99222 1ST HOSP IP/OBS MODERATE 55: CPT | Mod: GC

## 2019-01-01 PROCEDURE — 81001 URINALYSIS AUTO W/SCOPE: CPT

## 2019-01-01 PROCEDURE — 86664 EPSTEIN-BARR NUCLEAR ANTIGEN: CPT

## 2019-01-01 PROCEDURE — 86692 HEPATITIS DELTA AGENT ANTBDY: CPT

## 2019-01-01 PROCEDURE — 77334 RADIATION TREATMENT AID(S): CPT | Mod: 26

## 2019-01-01 PROCEDURE — 97110 THERAPEUTIC EXERCISES: CPT

## 2019-01-01 PROCEDURE — 80053 COMPREHEN METABOLIC PANEL: CPT

## 2019-01-01 PROCEDURE — 38222 DX BONE MARROW BX & ASPIR: CPT | Mod: LT

## 2019-01-01 PROCEDURE — 88313 SPECIAL STAINS GROUP 2: CPT | Mod: 26

## 2019-01-01 PROCEDURE — 85384 FIBRINOGEN ACTIVITY: CPT

## 2019-01-01 PROCEDURE — 99291 CRITICAL CARE FIRST HOUR: CPT

## 2019-01-01 PROCEDURE — 96375 TX/PRO/DX INJ NEW DRUG ADDON: CPT

## 2019-01-01 PROCEDURE — P9045: CPT

## 2019-01-01 PROCEDURE — 86666 EHRLICHIA ANTIBODY: CPT

## 2019-01-01 PROCEDURE — 86038 ANTINUCLEAR ANTIBODIES: CPT

## 2019-01-01 PROCEDURE — 85045 AUTOMATED RETICULOCYTE COUNT: CPT

## 2019-01-01 PROCEDURE — 76700 US EXAM ABDOM COMPLETE: CPT | Mod: 26

## 2019-01-01 PROCEDURE — P9012: CPT

## 2019-01-01 PROCEDURE — 82607 VITAMIN B-12: CPT

## 2019-01-01 PROCEDURE — 99285 EMERGENCY DEPT VISIT HI MDM: CPT | Mod: 25

## 2019-01-01 PROCEDURE — 72158 MRI LUMBAR SPINE W/O & W/DYE: CPT

## 2019-01-01 PROCEDURE — 84145 PROCALCITONIN (PCT): CPT

## 2019-01-01 PROCEDURE — 72158 MRI LUMBAR SPINE W/O & W/DYE: CPT | Mod: 26

## 2019-01-01 PROCEDURE — 84484 ASSAY OF TROPONIN QUANT: CPT

## 2019-01-01 PROCEDURE — 82570 ASSAY OF URINE CREATININE: CPT

## 2019-01-01 PROCEDURE — 86663 EPSTEIN-BARR ANTIBODY: CPT

## 2019-01-01 PROCEDURE — 99232 SBSQ HOSP IP/OBS MODERATE 35: CPT | Mod: GC,25

## 2019-01-01 PROCEDURE — 83036 HEMOGLOBIN GLYCOSYLATED A1C: CPT

## 2019-01-01 PROCEDURE — 72157 MRI CHEST SPINE W/O & W/DYE: CPT | Mod: 26

## 2019-01-01 PROCEDURE — 93306 TTE W/DOPPLER COMPLETE: CPT

## 2019-01-01 PROCEDURE — 88313 SPECIAL STAINS GROUP 2: CPT

## 2019-01-01 PROCEDURE — 36430 TRANSFUSION BLD/BLD COMPNT: CPT

## 2019-01-01 PROCEDURE — 71250 CT THORAX DX C-: CPT | Mod: 26

## 2019-01-01 PROCEDURE — A9567: CPT

## 2019-01-01 PROCEDURE — 81264 IGK REARRANGEABN CLONAL POP: CPT

## 2019-01-01 PROCEDURE — 76770 US EXAM ABDO BACK WALL COMP: CPT

## 2019-01-01 PROCEDURE — 78582 LUNG VENTILAT&PERFUS IMAGING: CPT | Mod: 26

## 2019-01-01 PROCEDURE — 78227 HEPATOBIL SYST IMAGE W/DRUG: CPT

## 2019-01-01 PROCEDURE — 82010 KETONE BODYS QUAN: CPT

## 2019-01-01 PROCEDURE — 77306 TELETHX ISODOSE PLAN SIMPLE: CPT | Mod: 26

## 2019-01-01 PROCEDURE — 86334 IMMUNOFIX E-PHORESIS SERUM: CPT

## 2019-01-01 PROCEDURE — 82947 ASSAY GLUCOSE BLOOD QUANT: CPT

## 2019-01-01 PROCEDURE — 82436 ASSAY OF URINE CHLORIDE: CPT

## 2019-01-01 PROCEDURE — 88188 FLOWCYTOMETRY/READ 9-15: CPT | Mod: 59

## 2019-01-01 PROCEDURE — C1769: CPT

## 2019-01-01 PROCEDURE — 87046 STOOL CULTR AEROBIC BACT EA: CPT

## 2019-01-01 PROCEDURE — A9540: CPT

## 2019-01-01 PROCEDURE — 74177 CT ABD & PELVIS W/CONTRAST: CPT | Mod: 26

## 2019-01-01 PROCEDURE — 82247 BILIRUBIN TOTAL: CPT

## 2019-01-01 PROCEDURE — 87798 DETECT AGENT NOS DNA AMP: CPT

## 2019-01-01 PROCEDURE — 93970 EXTREMITY STUDY: CPT

## 2019-01-01 PROCEDURE — 84155 ASSAY OF PROTEIN SERUM: CPT

## 2019-01-01 PROCEDURE — 77431 RADIATION THERAPY MANAGEMENT: CPT

## 2019-01-01 PROCEDURE — C8929: CPT

## 2019-01-01 PROCEDURE — 82533 TOTAL CORTISOL: CPT

## 2019-01-01 PROCEDURE — P9037: CPT

## 2019-01-01 RX ORDER — TAMSULOSIN HYDROCHLORIDE 0.4 MG/1
0.4 CAPSULE ORAL
Qty: 90 | Refills: 3 | Status: ACTIVE | COMMUNITY
Start: 2019-01-01 | End: 1900-01-01

## 2019-01-01 RX ORDER — DEXAMETHASONE 0.5 MG/5ML
5 ELIXIR ORAL
Qty: 98 | Refills: 0 | OUTPATIENT
Start: 2019-01-01 | End: 2019-01-01

## 2019-01-01 RX ORDER — LIDOCAINE HCL 20 MG/ML
20 VIAL (ML) INJECTION ONCE
Qty: 0 | Refills: 0 | Status: COMPLETED | OUTPATIENT
Start: 2019-01-01 | End: 2019-01-01

## 2019-01-01 RX ORDER — TAMSULOSIN HYDROCHLORIDE 0.4 MG/1
0.4 CAPSULE ORAL
Qty: 0 | Refills: 0 | Status: DISCONTINUED | OUTPATIENT
Start: 2019-01-01 | End: 2019-01-01

## 2019-01-01 RX ORDER — HYDROMORPHONE HYDROCHLORIDE 2 MG/ML
1 INJECTION INTRAMUSCULAR; INTRAVENOUS; SUBCUTANEOUS
Refills: 0 | Status: DISCONTINUED | OUTPATIENT
Start: 2019-01-01 | End: 2019-01-01

## 2019-01-01 RX ORDER — DEXAMETHASONE 0.5 MG/5ML
20 ELIXIR ORAL ONCE
Qty: 0 | Refills: 0 | Status: DISCONTINUED | OUTPATIENT
Start: 2019-01-01 | End: 2019-01-01

## 2019-01-01 RX ORDER — FUROSEMIDE 40 MG
20 TABLET ORAL DAILY
Refills: 0 | Status: DISCONTINUED | OUTPATIENT
Start: 2019-01-01 | End: 2019-01-01

## 2019-01-01 RX ORDER — HEPARIN SODIUM 5000 [USP'U]/ML
5000 INJECTION INTRAVENOUS; SUBCUTANEOUS ONCE
Qty: 0 | Refills: 0 | Status: COMPLETED | OUTPATIENT
Start: 2019-01-01 | End: 2019-01-01

## 2019-01-01 RX ORDER — SODIUM CHLORIDE 9 MG/ML
250 INJECTION INTRAMUSCULAR; INTRAVENOUS; SUBCUTANEOUS ONCE
Refills: 0 | Status: COMPLETED | OUTPATIENT
Start: 2019-01-01 | End: 2019-01-01

## 2019-01-01 RX ORDER — PIPERACILLIN AND TAZOBACTAM 4; .5 G/20ML; G/20ML
3.38 INJECTION, POWDER, LYOPHILIZED, FOR SOLUTION INTRAVENOUS ONCE
Qty: 0 | Refills: 0 | Status: COMPLETED | OUTPATIENT
Start: 2019-01-01 | End: 2019-01-01

## 2019-01-01 RX ORDER — ATOVAQUONE 750 MG/5ML
1500 SUSPENSION ORAL DAILY
Refills: 0 | Status: DISCONTINUED | OUTPATIENT
Start: 2019-01-01 | End: 2019-01-01

## 2019-01-01 RX ORDER — SODIUM CHLORIDE 9 MG/ML
500 INJECTION INTRAMUSCULAR; INTRAVENOUS; SUBCUTANEOUS ONCE
Refills: 0 | Status: COMPLETED | OUTPATIENT
Start: 2019-01-01 | End: 2019-01-01

## 2019-01-01 RX ORDER — DEXAMETHASONE 2 MG/1
2 TABLET ORAL
Qty: 112 | Refills: 0 | Status: DISCONTINUED | COMMUNITY
Start: 2019-01-01 | End: 2019-01-01

## 2019-01-01 RX ORDER — ACETAMINOPHEN 500 MG
650 TABLET ORAL ONCE
Refills: 0 | Status: COMPLETED | OUTPATIENT
Start: 2019-01-01 | End: 2019-01-01

## 2019-01-01 RX ORDER — HYDROMORPHONE HYDROCHLORIDE 2 MG/ML
0.2 INJECTION INTRAMUSCULAR; INTRAVENOUS; SUBCUTANEOUS
Refills: 0 | Status: DISCONTINUED | OUTPATIENT
Start: 2019-01-01 | End: 2019-01-01

## 2019-01-01 RX ORDER — SODIUM BICARBONATE 1 MEQ/ML
650 SYRINGE (ML) INTRAVENOUS
Refills: 0 | Status: DISCONTINUED | OUTPATIENT
Start: 2019-01-01 | End: 2019-01-01

## 2019-01-01 RX ORDER — INSULIN LISPRO 100/ML
VIAL (ML) SUBCUTANEOUS
Qty: 0 | Refills: 0 | Status: DISCONTINUED | OUTPATIENT
Start: 2019-01-01 | End: 2019-01-01

## 2019-01-01 RX ORDER — ATORVASTATIN CALCIUM 80 MG/1
1 TABLET, FILM COATED ORAL
Qty: 0 | Refills: 0 | DISCHARGE

## 2019-01-01 RX ORDER — ALBUMIN HUMAN 25 %
250 VIAL (ML) INTRAVENOUS ONCE
Refills: 0 | Status: COMPLETED | OUTPATIENT
Start: 2019-01-01 | End: 2019-01-01

## 2019-01-01 RX ORDER — ALFUZOSIN HYDROCHLORIDE 10 MG/1
10 TABLET, EXTENDED RELEASE ORAL DAILY
Refills: 0 | Status: DISCONTINUED | COMMUNITY
Start: 2019-01-01 | End: 2019-01-01

## 2019-01-01 RX ORDER — ASPIRIN/CALCIUM CARB/MAGNESIUM 324 MG
1 TABLET ORAL
Qty: 0 | Refills: 0 | DISCHARGE

## 2019-01-01 RX ORDER — SODIUM CHLORIDE 9 MG/ML
1000 INJECTION, SOLUTION INTRAVENOUS
Refills: 0 | Status: DISCONTINUED | OUTPATIENT
Start: 2019-01-01 | End: 2019-01-01

## 2019-01-01 RX ORDER — ALFUZOSIN HYDROCHLORIDE 10 MG/1
10 TABLET, EXTENDED RELEASE ORAL AT BEDTIME
Qty: 0 | Refills: 0 | Status: DISCONTINUED | OUTPATIENT
Start: 2019-01-01 | End: 2019-01-01

## 2019-01-01 RX ORDER — GABAPENTIN 400 MG/1
200 CAPSULE ORAL THREE TIMES A DAY
Refills: 0 | Status: DISCONTINUED | OUTPATIENT
Start: 2019-01-01 | End: 2019-01-01

## 2019-01-01 RX ORDER — L.ACIDOPH/B.ANIMALIS/B.LONGUM 15B CELL
1 CAPSULE ORAL
Qty: 0 | Refills: 0 | COMMUNITY

## 2019-01-01 RX ORDER — CEFEPIME 1 G/1
1000 INJECTION, POWDER, FOR SOLUTION INTRAMUSCULAR; INTRAVENOUS ONCE
Refills: 0 | Status: COMPLETED | OUTPATIENT
Start: 2019-01-01 | End: 2019-01-01

## 2019-01-01 RX ORDER — FINASTERIDE 5 MG/1
5 TABLET, FILM COATED ORAL DAILY
Qty: 30 | Refills: 5 | Status: ACTIVE | COMMUNITY
Start: 2019-01-01

## 2019-01-01 RX ORDER — TAMSULOSIN HYDROCHLORIDE 0.4 MG/1
0.8 CAPSULE ORAL AT BEDTIME
Refills: 0 | Status: DISCONTINUED | OUTPATIENT
Start: 2019-01-01 | End: 2019-01-01

## 2019-01-01 RX ORDER — CALCIUM GLUCONATE 100 MG/ML
1 VIAL (ML) INTRAVENOUS ONCE
Refills: 0 | Status: COMPLETED | OUTPATIENT
Start: 2019-01-01 | End: 2019-01-01

## 2019-01-01 RX ORDER — HEPARIN SODIUM 5000 [USP'U]/ML
5000 INJECTION INTRAVENOUS; SUBCUTANEOUS EVERY 12 HOURS
Qty: 0 | Refills: 0 | Status: DISCONTINUED | OUTPATIENT
Start: 2019-01-01 | End: 2019-01-01

## 2019-01-01 RX ORDER — PREDNISONE 50 MG/1
50 TABLET ORAL
Qty: 40 | Refills: 0 | Status: ACTIVE | COMMUNITY
Start: 2019-01-01 | End: 1900-01-01

## 2019-01-01 RX ORDER — DIPHENHYDRAMINE HCL 50 MG
25 CAPSULE ORAL ONCE
Refills: 0 | Status: COMPLETED | OUTPATIENT
Start: 2019-01-01 | End: 2019-01-01

## 2019-01-01 RX ORDER — CYCLOSPORINE 100 MG/1
100 CAPSULE, GELATIN COATED ORAL TWICE DAILY
Qty: 60 | Refills: 1 | Status: DISCONTINUED | COMMUNITY
Start: 2019-01-01 | End: 2019-01-01

## 2019-01-01 RX ORDER — PIPERACILLIN AND TAZOBACTAM 4; .5 G/20ML; G/20ML
3.38 INJECTION, POWDER, LYOPHILIZED, FOR SOLUTION INTRAVENOUS EVERY 8 HOURS
Refills: 0 | Status: DISCONTINUED | OUTPATIENT
Start: 2019-01-01 | End: 2019-01-01

## 2019-01-01 RX ORDER — ATOVAQUONE 750 MG/5ML
750 SUSPENSION ORAL TWICE DAILY
Refills: 0 | Status: DISCONTINUED | COMMUNITY
Start: 2019-01-01 | End: 2019-01-01

## 2019-01-01 RX ORDER — SODIUM CHLORIDE 9 MG/ML
1000 INJECTION INTRAMUSCULAR; INTRAVENOUS; SUBCUTANEOUS ONCE
Refills: 0 | Status: COMPLETED | OUTPATIENT
Start: 2019-01-01 | End: 2019-01-01

## 2019-01-01 RX ORDER — URSODIOL 250 MG/1
500 TABLET, FILM COATED ORAL
Qty: 0 | Refills: 0 | Status: DISCONTINUED | OUTPATIENT
Start: 2019-01-01 | End: 2019-01-01

## 2019-01-01 RX ORDER — DEXTROSE 50 % IN WATER 50 %
25 SYRINGE (ML) INTRAVENOUS ONCE
Qty: 0 | Refills: 0 | Status: DISCONTINUED | OUTPATIENT
Start: 2019-01-01 | End: 2019-01-01

## 2019-01-01 RX ORDER — ONDANSETRON 8 MG/1
4 TABLET, FILM COATED ORAL EVERY 6 HOURS
Refills: 0 | Status: DISCONTINUED | OUTPATIENT
Start: 2019-01-01 | End: 2019-01-01

## 2019-01-01 RX ORDER — PANTOPRAZOLE SODIUM 20 MG/1
1 TABLET, DELAYED RELEASE ORAL
Qty: 30 | Refills: 0
Start: 2019-01-01 | End: 2019-01-01

## 2019-01-01 RX ORDER — ETOPOSIDE 20 MG/ML
160 VIAL (ML) INTRAVENOUS ONCE
Qty: 0 | Refills: 0 | Status: COMPLETED | OUTPATIENT
Start: 2019-01-01 | End: 2019-01-01

## 2019-01-01 RX ORDER — FINASTERIDE 5 MG/1
5 TABLET, FILM COATED ORAL DAILY
Qty: 0 | Refills: 0 | Status: DISCONTINUED | OUTPATIENT
Start: 2019-01-01 | End: 2019-01-01

## 2019-01-01 RX ORDER — ETOPOSIDE 20 MG/ML
156 VIAL (ML) INTRAVENOUS ONCE
Qty: 0 | Refills: 0 | Status: DISCONTINUED | OUTPATIENT
Start: 2019-01-01 | End: 2019-01-01

## 2019-01-01 RX ORDER — ATOVAQUONE 750 MG/5ML
1500 SUSPENSION ORAL DAILY
Qty: 0 | Refills: 0 | Status: DISCONTINUED | OUTPATIENT
Start: 2019-01-01 | End: 2019-01-01

## 2019-01-01 RX ORDER — OXYCODONE AND ACETAMINOPHEN 5; 325 MG/1; MG/1
5-325 TABLET ORAL EVERY 6 HOURS
Qty: 60 | Refills: 0 | Status: DISCONTINUED | COMMUNITY
Start: 2019-01-01 | End: 2019-01-01

## 2019-01-01 RX ORDER — FINASTERIDE 5 MG/1
5 TABLET, FILM COATED ORAL DAILY
Refills: 0 | Status: DISCONTINUED | OUTPATIENT
Start: 2019-01-01 | End: 2019-01-01

## 2019-01-01 RX ORDER — ACETAMINOPHEN 500 MG
1000 TABLET ORAL ONCE
Qty: 0 | Refills: 0 | Status: COMPLETED | OUTPATIENT
Start: 2019-01-01 | End: 2019-01-01

## 2019-01-01 RX ORDER — GABAPENTIN 400 MG/1
100 CAPSULE ORAL THREE TIMES A DAY
Refills: 0 | Status: DISCONTINUED | OUTPATIENT
Start: 2019-01-01 | End: 2019-01-01

## 2019-01-01 RX ORDER — DEXAMETHASONE 0.5 MG/5ML
1 ELIXIR ORAL
Qty: 14 | Refills: 0
Start: 2019-01-01 | End: 2019-01-01

## 2019-01-01 RX ORDER — ACETAMINOPHEN 500 MG
1000 TABLET ORAL ONCE
Qty: 0 | Refills: 0 | Status: DISCONTINUED | OUTPATIENT
Start: 2019-01-01 | End: 2019-01-01

## 2019-01-01 RX ORDER — PANTOPRAZOLE SODIUM 20 MG/1
40 TABLET, DELAYED RELEASE ORAL EVERY 12 HOURS
Refills: 0 | Status: DISCONTINUED | OUTPATIENT
Start: 2019-01-01 | End: 2019-01-01

## 2019-01-01 RX ORDER — DEXTROSE 50 % IN WATER 50 %
15 SYRINGE (ML) INTRAVENOUS ONCE
Qty: 0 | Refills: 0 | Status: DISCONTINUED | OUTPATIENT
Start: 2019-01-01 | End: 2019-01-01

## 2019-01-01 RX ORDER — METOCLOPRAMIDE 10 MG/1
10 TABLET ORAL 3 TIMES DAILY
Qty: 90 | Refills: 0 | Status: ACTIVE | COMMUNITY
Start: 2019-01-01 | End: 1900-01-01

## 2019-01-01 RX ORDER — DEXAMETHASONE 1 MG/1
1 TABLET ORAL
Qty: 90 | Refills: 0 | Status: DISCONTINUED | COMMUNITY
Start: 2019-01-01 | End: 2019-01-01

## 2019-01-01 RX ORDER — METOCLOPRAMIDE 10 MG/1
10 TABLET ORAL 3 TIMES DAILY
Qty: 30 | Refills: 1 | Status: ACTIVE | COMMUNITY
Start: 2019-01-01 | End: 1900-01-01

## 2019-01-01 RX ORDER — DEXAMETHASONE 1 MG/1
1 TABLET ORAL DAILY
Qty: 90 | Refills: 0 | Status: DISCONTINUED | COMMUNITY
Start: 2019-01-01 | End: 2019-01-01

## 2019-01-01 RX ORDER — PANTOPRAZOLE SODIUM 20 MG/1
1 TABLET, DELAYED RELEASE ORAL
Qty: 30 | Refills: 0 | OUTPATIENT
Start: 2019-01-01 | End: 2019-01-01

## 2019-01-01 RX ORDER — FUROSEMIDE 40 MG
20 TABLET ORAL ONCE
Refills: 0 | Status: COMPLETED | OUTPATIENT
Start: 2019-01-01 | End: 2019-01-01

## 2019-01-01 RX ORDER — DEXAMETHASONE 4 MG/1
4 TABLET ORAL DAILY
Qty: 150 | Refills: 0 | Status: DISCONTINUED | COMMUNITY
Start: 2019-01-01 | End: 2019-01-01

## 2019-01-01 RX ORDER — ATOVAQUONE 750 MG/5ML
10 SUSPENSION ORAL
Qty: 300 | Refills: 0 | OUTPATIENT
Start: 2019-01-01 | End: 2019-01-01

## 2019-01-01 RX ORDER — SULFAMETHOXAZOLE AND TRIMETHOPRIM 800; 160 MG/1; MG/1
800-160 TABLET ORAL DAILY
Qty: 30 | Refills: 3 | Status: ACTIVE | COMMUNITY
Start: 2019-01-01 | End: 1900-01-01

## 2019-01-01 RX ORDER — TRAMADOL HYDROCHLORIDE 50 MG/1
50 TABLET, COATED ORAL 4 TIMES DAILY
Qty: 120 | Refills: 0 | Status: ACTIVE | COMMUNITY
Start: 2019-01-01 | End: 1900-01-01

## 2019-01-01 RX ORDER — URSODIOL 250 MG/1
1 TABLET, FILM COATED ORAL
Qty: 60 | Refills: 0 | OUTPATIENT
Start: 2019-01-01 | End: 2019-01-01

## 2019-01-01 RX ORDER — PANTOPRAZOLE 40 MG/1
40 TABLET, DELAYED RELEASE ORAL DAILY
Qty: 1 | Refills: 3 | Status: ACTIVE | COMMUNITY
Start: 2019-01-01 | End: 1900-01-01

## 2019-01-01 RX ORDER — URSODIOL 500 MG/1
500 TABLET ORAL TWICE DAILY
Refills: 0 | Status: DISCONTINUED | COMMUNITY
Start: 2019-01-01 | End: 2019-01-01

## 2019-01-01 RX ORDER — HYDROMORPHONE HYDROCHLORIDE 2 MG/ML
0.5 INJECTION INTRAMUSCULAR; INTRAVENOUS; SUBCUTANEOUS
Refills: 0 | Status: DISCONTINUED | OUTPATIENT
Start: 2019-01-01 | End: 2019-01-01

## 2019-01-01 RX ORDER — SODIUM CHLORIDE 9 MG/ML
1000 INJECTION, SOLUTION INTRAVENOUS
Qty: 0 | Refills: 0 | Status: DISCONTINUED | OUTPATIENT
Start: 2019-01-01 | End: 2019-01-01

## 2019-01-01 RX ORDER — CYCLOSPORINE 100 MG/1
100 CAPSULE, GELATIN COATED ORAL
Qty: 60 | Refills: 1 | Status: DISCONTINUED | COMMUNITY
Start: 2019-01-01 | End: 2019-01-01

## 2019-01-01 RX ORDER — DEXTROSE 50 % IN WATER 50 %
12.5 SYRINGE (ML) INTRAVENOUS ONCE
Qty: 0 | Refills: 0 | Status: DISCONTINUED | OUTPATIENT
Start: 2019-01-01 | End: 2019-01-01

## 2019-01-01 RX ORDER — FUROSEMIDE 40 MG
40 TABLET ORAL ONCE
Refills: 0 | Status: DISCONTINUED | OUTPATIENT
Start: 2019-01-01 | End: 2019-01-01

## 2019-01-01 RX ORDER — DEXAMETHASONE 4 MG/1
4 TABLET ORAL
Qty: 120 | Refills: 0 | Status: DISCONTINUED | COMMUNITY
Start: 2019-01-01 | End: 2019-01-01

## 2019-01-01 RX ORDER — PIPERACILLIN AND TAZOBACTAM 4; .5 G/20ML; G/20ML
3.38 INJECTION, POWDER, LYOPHILIZED, FOR SOLUTION INTRAVENOUS ONCE
Refills: 0 | Status: COMPLETED | OUTPATIENT
Start: 2019-01-01 | End: 2019-01-01

## 2019-01-01 RX ORDER — OXYCODONE HYDROCHLORIDE 5 MG/1
10 TABLET ORAL ONCE
Refills: 0 | Status: DISCONTINUED | OUTPATIENT
Start: 2019-01-01 | End: 2019-01-01

## 2019-01-01 RX ORDER — DEXAMETHASONE 0.5 MG/5ML
20 ELIXIR ORAL DAILY
Qty: 0 | Refills: 0 | Status: COMPLETED | OUTPATIENT
Start: 2019-01-01 | End: 2019-01-01

## 2019-01-01 RX ORDER — PANTOPRAZOLE SODIUM 20 MG/1
40 TABLET, DELAYED RELEASE ORAL
Refills: 0 | Status: DISCONTINUED | OUTPATIENT
Start: 2019-01-01 | End: 2019-01-01

## 2019-01-01 RX ORDER — ACETAMINOPHEN 500 MG
650 TABLET ORAL EVERY 6 HOURS
Refills: 0 | Status: DISCONTINUED | OUTPATIENT
Start: 2019-01-01 | End: 2019-01-01

## 2019-01-01 RX ORDER — ACYCLOVIR 400 MG/1
400 TABLET ORAL TWICE DAILY
Qty: 60 | Refills: 3 | Status: ACTIVE | COMMUNITY
Start: 2019-01-01 | End: 1900-01-01

## 2019-01-01 RX ORDER — SALIVA SUBSTITUTE COMB NO.11 351 MG
5 POWDER IN PACKET (EA) MUCOUS MEMBRANE
Refills: 0 | Status: DISCONTINUED | OUTPATIENT
Start: 2019-01-01 | End: 2019-01-01

## 2019-01-01 RX ORDER — URSODIOL 250 MG/1
1 TABLET, FILM COATED ORAL
Qty: 60 | Refills: 0
Start: 2019-01-01 | End: 2019-01-01

## 2019-01-01 RX ORDER — SODIUM CHLORIDE 9 MG/ML
1000 INJECTION INTRAMUSCULAR; INTRAVENOUS; SUBCUTANEOUS
Refills: 0 | Status: DISCONTINUED | OUTPATIENT
Start: 2019-01-01 | End: 2019-01-01

## 2019-01-01 RX ORDER — FUROSEMIDE 40 MG
20 TABLET ORAL ONCE
Refills: 0 | Status: DISCONTINUED | OUTPATIENT
Start: 2019-01-01 | End: 2019-01-01

## 2019-01-01 RX ORDER — FUROSEMIDE 40 MG
40 TABLET ORAL ONCE
Refills: 0 | Status: COMPLETED | OUTPATIENT
Start: 2019-01-01 | End: 2019-01-01

## 2019-01-01 RX ORDER — FUROSEMIDE 40 MG
20 TABLET ORAL DAILY
Qty: 0 | Refills: 0 | Status: DISCONTINUED | OUTPATIENT
Start: 2019-01-01 | End: 2019-01-01

## 2019-01-01 RX ORDER — SODIUM CHLORIDE 9 MG/ML
1000 INJECTION INTRAMUSCULAR; INTRAVENOUS; SUBCUTANEOUS
Qty: 0 | Refills: 0 | Status: DISCONTINUED | OUTPATIENT
Start: 2019-01-01 | End: 2019-01-01

## 2019-01-01 RX ORDER — DEXAMETHASONE 0.5 MG/5ML
1 ELIXIR ORAL
Qty: 14 | Refills: 0 | OUTPATIENT
Start: 2019-01-01 | End: 2019-01-01

## 2019-01-01 RX ORDER — MIDODRINE HYDROCHLORIDE 2.5 MG/1
10 TABLET ORAL ONCE
Refills: 0 | Status: COMPLETED | OUTPATIENT
Start: 2019-01-01 | End: 2019-01-01

## 2019-01-01 RX ORDER — AMOXICILLIN AND CLAVULANATE POTASSIUM 875; 125 MG/1; MG/1
875-125 TABLET, COATED ORAL
Qty: 14 | Refills: 0 | Status: ACTIVE | COMMUNITY
Start: 2019-01-01 | End: 1900-01-01

## 2019-01-01 RX ORDER — LIDOCAINE 4 G/100G
1 CREAM TOPICAL DAILY
Refills: 0 | Status: DISCONTINUED | OUTPATIENT
Start: 2019-01-01 | End: 2019-01-01

## 2019-01-01 RX ORDER — ATOVAQUONE 750 MG/5ML
10 SUSPENSION ORAL
Qty: 300 | Refills: 0
Start: 2019-01-01 | End: 2019-01-01

## 2019-01-01 RX ORDER — SODIUM CHLORIDE 9 MG/ML
500 INJECTION INTRAMUSCULAR; INTRAVENOUS; SUBCUTANEOUS ONCE
Qty: 0 | Refills: 0 | Status: COMPLETED | OUTPATIENT
Start: 2019-01-01 | End: 2019-01-01

## 2019-01-01 RX ORDER — MEPERIDINE HYDROCHLORIDE 50 MG/1
50 TABLET ORAL
Qty: 60 | Refills: 0 | Status: ACTIVE | COMMUNITY
Start: 2019-01-01 | End: 1900-01-01

## 2019-01-01 RX ORDER — DEXAMETHASONE 0.5 MG/5ML
20 ELIXIR ORAL DAILY
Qty: 0 | Refills: 0 | Status: DISCONTINUED | OUTPATIENT
Start: 2019-01-01 | End: 2019-01-01

## 2019-01-01 RX ORDER — INSULIN LISPRO 100/ML
VIAL (ML) SUBCUTANEOUS AT BEDTIME
Qty: 0 | Refills: 0 | Status: DISCONTINUED | OUTPATIENT
Start: 2019-01-01 | End: 2019-01-01

## 2019-01-01 RX ORDER — DEXAMETHASONE 0.5 MG/5ML
5 ELIXIR ORAL
Qty: 98 | Refills: 0
Start: 2019-01-01 | End: 2019-01-01

## 2019-01-01 RX ORDER — LOPERAMIDE HCL 2 MG
2 TABLET ORAL
Refills: 0 | Status: DISCONTINUED | OUTPATIENT
Start: 2019-01-01 | End: 2019-01-01

## 2019-01-01 RX ORDER — CALCIUM CARBONATE 500(1250)
1 TABLET ORAL
Qty: 0 | Refills: 0 | DISCHARGE

## 2019-01-01 RX ORDER — PIPERACILLIN AND TAZOBACTAM 4; .5 G/20ML; G/20ML
3.38 INJECTION, POWDER, LYOPHILIZED, FOR SOLUTION INTRAVENOUS EVERY 8 HOURS
Qty: 0 | Refills: 0 | Status: DISCONTINUED | OUTPATIENT
Start: 2019-01-01 | End: 2019-01-01

## 2019-01-01 RX ORDER — VANCOMYCIN HCL 1 G
1000 VIAL (EA) INTRAVENOUS ONCE
Refills: 0 | Status: COMPLETED | OUTPATIENT
Start: 2019-01-01 | End: 2019-01-01

## 2019-01-01 RX ORDER — GLUCAGON INJECTION, SOLUTION 0.5 MG/.1ML
1 INJECTION, SOLUTION SUBCUTANEOUS ONCE
Qty: 0 | Refills: 0 | Status: DISCONTINUED | OUTPATIENT
Start: 2019-01-01 | End: 2019-01-01

## 2019-01-01 RX ORDER — FUROSEMIDE 20 MG/1
20 TABLET ORAL DAILY
Qty: 30 | Refills: 0 | Status: ACTIVE | COMMUNITY
Start: 2019-01-01 | End: 1900-01-01

## 2019-01-01 RX ORDER — ALFUZOSIN HYDROCHLORIDE 10 MG/1
10 TABLET, EXTENDED RELEASE ORAL AT BEDTIME
Refills: 0 | Status: DISCONTINUED | OUTPATIENT
Start: 2019-01-01 | End: 2019-01-01

## 2019-01-01 RX ORDER — PANTOPRAZOLE SODIUM 20 MG/1
40 TABLET, DELAYED RELEASE ORAL DAILY
Qty: 0 | Refills: 0 | Status: DISCONTINUED | OUTPATIENT
Start: 2019-01-01 | End: 2019-01-01

## 2019-01-01 RX ORDER — ASCORBIC ACID 60 MG
500 TABLET,CHEWABLE ORAL DAILY
Refills: 0 | Status: DISCONTINUED | OUTPATIENT
Start: 2019-01-01 | End: 2019-01-01

## 2019-01-01 RX ADMIN — ALFUZOSIN HYDROCHLORIDE 10 MILLIGRAM(S): 10 TABLET, EXTENDED RELEASE ORAL at 17:02

## 2019-01-01 RX ADMIN — PANTOPRAZOLE SODIUM 40 MILLIGRAM(S): 20 TABLET, DELAYED RELEASE ORAL at 05:16

## 2019-01-01 RX ADMIN — LIDOCAINE 1 PATCH: 4 CREAM TOPICAL at 05:15

## 2019-01-01 RX ADMIN — PIPERACILLIN AND TAZOBACTAM 25 GRAM(S): 4; .5 INJECTION, POWDER, LYOPHILIZED, FOR SOLUTION INTRAVENOUS at 10:09

## 2019-01-01 RX ADMIN — PANTOPRAZOLE SODIUM 40 MILLIGRAM(S): 20 TABLET, DELAYED RELEASE ORAL at 05:01

## 2019-01-01 RX ADMIN — Medication 25 MILLIGRAM(S): at 03:42

## 2019-01-01 RX ADMIN — Medication 500 MILLIGRAM(S): at 12:21

## 2019-01-01 RX ADMIN — LIDOCAINE 1 PATCH: 4 CREAM TOPICAL at 00:18

## 2019-01-01 RX ADMIN — ALFUZOSIN HYDROCHLORIDE 10 MILLIGRAM(S): 10 TABLET, EXTENDED RELEASE ORAL at 17:45

## 2019-01-01 RX ADMIN — LIDOCAINE 1 PATCH: 4 CREAM TOPICAL at 17:00

## 2019-01-01 RX ADMIN — Medication 110 MILLIGRAM(S): at 06:00

## 2019-01-01 RX ADMIN — LIDOCAINE 1 PATCH: 4 CREAM TOPICAL at 01:51

## 2019-01-01 RX ADMIN — ATOVAQUONE 1500 MILLIGRAM(S): 750 SUSPENSION ORAL at 12:31

## 2019-01-01 RX ADMIN — FINASTERIDE 5 MILLIGRAM(S): 5 TABLET, FILM COATED ORAL at 17:51

## 2019-01-01 RX ADMIN — Medication 650 MILLIGRAM(S): at 20:15

## 2019-01-01 RX ADMIN — Medication 25 MILLIGRAM(S): at 01:20

## 2019-01-01 RX ADMIN — LIDOCAINE 1 PATCH: 4 CREAM TOPICAL at 17:25

## 2019-01-01 RX ADMIN — URSODIOL 500 MILLIGRAM(S): 250 TABLET, FILM COATED ORAL at 05:57

## 2019-01-01 RX ADMIN — FINASTERIDE 5 MILLIGRAM(S): 5 TABLET, FILM COATED ORAL at 17:24

## 2019-01-01 RX ADMIN — Medication 20 MILLILITER(S): at 17:30

## 2019-01-01 RX ADMIN — Medication 650 MILLIGRAM(S): at 17:33

## 2019-01-01 RX ADMIN — LIDOCAINE 1 PATCH: 4 CREAM TOPICAL at 13:18

## 2019-01-01 RX ADMIN — FINASTERIDE 5 MILLIGRAM(S): 5 TABLET, FILM COATED ORAL at 17:40

## 2019-01-01 RX ADMIN — Medication 110 MILLIGRAM(S): at 06:24

## 2019-01-01 RX ADMIN — PIPERACILLIN AND TAZOBACTAM 25 GRAM(S): 4; .5 INJECTION, POWDER, LYOPHILIZED, FOR SOLUTION INTRAVENOUS at 05:00

## 2019-01-01 RX ADMIN — PANTOPRAZOLE SODIUM 40 MILLIGRAM(S): 20 TABLET, DELAYED RELEASE ORAL at 05:36

## 2019-01-01 RX ADMIN — LIDOCAINE 1 PATCH: 4 CREAM TOPICAL at 19:47

## 2019-01-01 RX ADMIN — Medication 5 MILLILITER(S): at 18:24

## 2019-01-01 RX ADMIN — URSODIOL 500 MILLIGRAM(S): 250 TABLET, FILM COATED ORAL at 17:02

## 2019-01-01 RX ADMIN — Medication 40 MILLIGRAM(S): at 15:17

## 2019-01-01 RX ADMIN — Medication 25 MILLIGRAM(S): at 22:08

## 2019-01-01 RX ADMIN — Medication 650 MILLIGRAM(S): at 17:36

## 2019-01-01 RX ADMIN — Medication 1000 MILLIGRAM(S): at 18:04

## 2019-01-01 RX ADMIN — URSODIOL 500 MILLIGRAM(S): 250 TABLET, FILM COATED ORAL at 18:53

## 2019-01-01 RX ADMIN — PANTOPRAZOLE SODIUM 40 MILLIGRAM(S): 20 TABLET, DELAYED RELEASE ORAL at 05:17

## 2019-01-01 RX ADMIN — ALFUZOSIN HYDROCHLORIDE 10 MILLIGRAM(S): 10 TABLET, EXTENDED RELEASE ORAL at 21:41

## 2019-01-01 RX ADMIN — FINASTERIDE 5 MILLIGRAM(S): 5 TABLET, FILM COATED ORAL at 13:19

## 2019-01-01 RX ADMIN — Medication 650 MILLIGRAM(S): at 16:20

## 2019-01-01 RX ADMIN — FINASTERIDE 5 MILLIGRAM(S): 5 TABLET, FILM COATED ORAL at 11:44

## 2019-01-01 RX ADMIN — PIPERACILLIN AND TAZOBACTAM 25 GRAM(S): 4; .5 INJECTION, POWDER, LYOPHILIZED, FOR SOLUTION INTRAVENOUS at 22:34

## 2019-01-01 RX ADMIN — PANTOPRAZOLE SODIUM 40 MILLIGRAM(S): 20 TABLET, DELAYED RELEASE ORAL at 05:12

## 2019-01-01 RX ADMIN — Medication 5 MILLILITER(S): at 05:20

## 2019-01-01 RX ADMIN — Medication 650 MILLIGRAM(S): at 04:21

## 2019-01-01 RX ADMIN — PANTOPRAZOLE SODIUM 40 MILLIGRAM(S): 20 TABLET, DELAYED RELEASE ORAL at 17:36

## 2019-01-01 RX ADMIN — ALFUZOSIN HYDROCHLORIDE 10 MILLIGRAM(S): 10 TABLET, EXTENDED RELEASE ORAL at 21:40

## 2019-01-01 RX ADMIN — PIPERACILLIN AND TAZOBACTAM 25 GRAM(S): 4; .5 INJECTION, POWDER, LYOPHILIZED, FOR SOLUTION INTRAVENOUS at 12:55

## 2019-01-01 RX ADMIN — SODIUM CHLORIDE 1000 MILLILITER(S): 9 INJECTION INTRAMUSCULAR; INTRAVENOUS; SUBCUTANEOUS at 12:30

## 2019-01-01 RX ADMIN — ALFUZOSIN HYDROCHLORIDE 10 MILLIGRAM(S): 10 TABLET, EXTENDED RELEASE ORAL at 17:24

## 2019-01-01 RX ADMIN — PANTOPRAZOLE SODIUM 40 MILLIGRAM(S): 20 TABLET, DELAYED RELEASE ORAL at 17:27

## 2019-01-01 RX ADMIN — Medication 650 MILLIGRAM(S): at 12:21

## 2019-01-01 RX ADMIN — ATOVAQUONE 1500 MILLIGRAM(S): 750 SUSPENSION ORAL at 11:48

## 2019-01-01 RX ADMIN — SODIUM CHLORIDE 1000 MILLILITER(S): 9 INJECTION INTRAMUSCULAR; INTRAVENOUS; SUBCUTANEOUS at 19:10

## 2019-01-01 RX ADMIN — Medication 650 MILLIGRAM(S): at 15:34

## 2019-01-01 RX ADMIN — URSODIOL 500 MILLIGRAM(S): 250 TABLET, FILM COATED ORAL at 06:13

## 2019-01-01 RX ADMIN — SODIUM CHLORIDE 1000 MILLILITER(S): 9 INJECTION INTRAMUSCULAR; INTRAVENOUS; SUBCUTANEOUS at 17:45

## 2019-01-01 RX ADMIN — FINASTERIDE 5 MILLIGRAM(S): 5 TABLET, FILM COATED ORAL at 17:46

## 2019-01-01 RX ADMIN — URSODIOL 500 MILLIGRAM(S): 250 TABLET, FILM COATED ORAL at 06:33

## 2019-01-01 RX ADMIN — PIPERACILLIN AND TAZOBACTAM 25 GRAM(S): 4; .5 INJECTION, POWDER, LYOPHILIZED, FOR SOLUTION INTRAVENOUS at 22:00

## 2019-01-01 RX ADMIN — ALFUZOSIN HYDROCHLORIDE 10 MILLIGRAM(S): 10 TABLET, EXTENDED RELEASE ORAL at 21:27

## 2019-01-01 RX ADMIN — Medication 650 MILLIGRAM(S): at 11:00

## 2019-01-01 RX ADMIN — PIPERACILLIN AND TAZOBACTAM 25 GRAM(S): 4; .5 INJECTION, POWDER, LYOPHILIZED, FOR SOLUTION INTRAVENOUS at 05:23

## 2019-01-01 RX ADMIN — URSODIOL 500 MILLIGRAM(S): 250 TABLET, FILM COATED ORAL at 17:46

## 2019-01-01 RX ADMIN — Medication 5 MILLILITER(S): at 20:54

## 2019-01-01 RX ADMIN — PANTOPRAZOLE SODIUM 40 MILLIGRAM(S): 20 TABLET, DELAYED RELEASE ORAL at 06:28

## 2019-01-01 RX ADMIN — SODIUM CHLORIDE 60 MILLILITER(S): 9 INJECTION INTRAMUSCULAR; INTRAVENOUS; SUBCUTANEOUS at 23:27

## 2019-01-01 RX ADMIN — Medication 650 MILLIGRAM(S): at 11:48

## 2019-01-01 RX ADMIN — ALFUZOSIN HYDROCHLORIDE 10 MILLIGRAM(S): 10 TABLET, EXTENDED RELEASE ORAL at 21:26

## 2019-01-01 RX ADMIN — PIPERACILLIN AND TAZOBACTAM 25 GRAM(S): 4; .5 INJECTION, POWDER, LYOPHILIZED, FOR SOLUTION INTRAVENOUS at 08:35

## 2019-01-01 RX ADMIN — FINASTERIDE 5 MILLIGRAM(S): 5 TABLET, FILM COATED ORAL at 18:30

## 2019-01-01 RX ADMIN — Medication 20 MILLIGRAM(S): at 05:18

## 2019-01-01 RX ADMIN — Medication 1 TABLET(S): at 11:42

## 2019-01-01 RX ADMIN — Medication 650 MILLIGRAM(S): at 06:10

## 2019-01-01 RX ADMIN — GABAPENTIN 100 MILLIGRAM(S): 400 CAPSULE ORAL at 05:11

## 2019-01-01 RX ADMIN — FINASTERIDE 5 MILLIGRAM(S): 5 TABLET, FILM COATED ORAL at 11:55

## 2019-01-01 RX ADMIN — PIPERACILLIN AND TAZOBACTAM 25 GRAM(S): 4; .5 INJECTION, POWDER, LYOPHILIZED, FOR SOLUTION INTRAVENOUS at 05:15

## 2019-01-01 RX ADMIN — HYDROMORPHONE HYDROCHLORIDE 0.5 MILLIGRAM(S): 2 INJECTION INTRAMUSCULAR; INTRAVENOUS; SUBCUTANEOUS at 17:35

## 2019-01-01 RX ADMIN — Medication 20 MILLIGRAM(S): at 05:17

## 2019-01-01 RX ADMIN — Medication 650 MILLIGRAM(S): at 22:21

## 2019-01-01 RX ADMIN — GABAPENTIN 200 MILLIGRAM(S): 400 CAPSULE ORAL at 05:18

## 2019-01-01 RX ADMIN — Medication 110 MILLIGRAM(S): at 05:26

## 2019-01-01 RX ADMIN — Medication 5 MILLILITER(S): at 05:15

## 2019-01-01 RX ADMIN — Medication 650 MILLIGRAM(S): at 01:20

## 2019-01-01 RX ADMIN — PIPERACILLIN AND TAZOBACTAM 25 GRAM(S): 4; .5 INJECTION, POWDER, LYOPHILIZED, FOR SOLUTION INTRAVENOUS at 13:37

## 2019-01-01 RX ADMIN — Medication 650 MILLIGRAM(S): at 13:00

## 2019-01-01 RX ADMIN — Medication 5 MILLILITER(S): at 05:01

## 2019-01-01 RX ADMIN — Medication 650 MILLIGRAM(S): at 01:45

## 2019-01-01 RX ADMIN — LIDOCAINE 1 PATCH: 4 CREAM TOPICAL at 13:38

## 2019-01-01 RX ADMIN — ATOVAQUONE 1500 MILLIGRAM(S): 750 SUSPENSION ORAL at 11:54

## 2019-01-01 RX ADMIN — PANTOPRAZOLE SODIUM 40 MILLIGRAM(S): 20 TABLET, DELAYED RELEASE ORAL at 13:07

## 2019-01-01 RX ADMIN — PANTOPRAZOLE SODIUM 40 MILLIGRAM(S): 20 TABLET, DELAYED RELEASE ORAL at 05:18

## 2019-01-01 RX ADMIN — FINASTERIDE 5 MILLIGRAM(S): 5 TABLET, FILM COATED ORAL at 17:02

## 2019-01-01 RX ADMIN — Medication 508 MILLIGRAM(S): at 15:14

## 2019-01-01 RX ADMIN — TAMSULOSIN HYDROCHLORIDE 0.8 MILLIGRAM(S): 0.4 CAPSULE ORAL at 21:59

## 2019-01-01 RX ADMIN — URSODIOL 500 MILLIGRAM(S): 250 TABLET, FILM COATED ORAL at 17:24

## 2019-01-01 RX ADMIN — Medication 500 MILLIGRAM(S): at 11:54

## 2019-01-01 RX ADMIN — PANTOPRAZOLE SODIUM 40 MILLIGRAM(S): 20 TABLET, DELAYED RELEASE ORAL at 05:50

## 2019-01-01 RX ADMIN — SODIUM CHLORIDE 60 MILLILITER(S): 9 INJECTION INTRAMUSCULAR; INTRAVENOUS; SUBCUTANEOUS at 05:23

## 2019-01-01 RX ADMIN — PIPERACILLIN AND TAZOBACTAM 25 GRAM(S): 4; .5 INJECTION, POWDER, LYOPHILIZED, FOR SOLUTION INTRAVENOUS at 21:55

## 2019-01-01 RX ADMIN — Medication 20 MILLIGRAM(S): at 06:33

## 2019-01-01 RX ADMIN — PIPERACILLIN AND TAZOBACTAM 25 GRAM(S): 4; .5 INJECTION, POWDER, LYOPHILIZED, FOR SOLUTION INTRAVENOUS at 06:29

## 2019-01-01 RX ADMIN — Medication 5 MILLILITER(S): at 17:03

## 2019-01-01 RX ADMIN — Medication 1 TABLET(S): at 11:46

## 2019-01-01 RX ADMIN — Medication 110 MILLIGRAM(S): at 05:57

## 2019-01-01 RX ADMIN — Medication 110 MILLIGRAM(S): at 23:27

## 2019-01-01 RX ADMIN — LIDOCAINE 1 PATCH: 4 CREAM TOPICAL at 01:34

## 2019-01-01 RX ADMIN — URSODIOL 500 MILLIGRAM(S): 250 TABLET, FILM COATED ORAL at 05:28

## 2019-01-01 RX ADMIN — Medication 500 MILLIGRAM(S): at 13:38

## 2019-01-01 RX ADMIN — Medication 650 MILLIGRAM(S): at 05:16

## 2019-01-01 RX ADMIN — PIPERACILLIN AND TAZOBACTAM 25 GRAM(S): 4; .5 INJECTION, POWDER, LYOPHILIZED, FOR SOLUTION INTRAVENOUS at 05:49

## 2019-01-01 RX ADMIN — PANTOPRAZOLE SODIUM 40 MILLIGRAM(S): 20 TABLET, DELAYED RELEASE ORAL at 12:43

## 2019-01-01 RX ADMIN — SODIUM CHLORIDE 60 MILLILITER(S): 9 INJECTION INTRAMUSCULAR; INTRAVENOUS; SUBCUTANEOUS at 05:20

## 2019-01-01 RX ADMIN — ATOVAQUONE 1500 MILLIGRAM(S): 750 SUSPENSION ORAL at 12:22

## 2019-01-01 RX ADMIN — ATOVAQUONE 1500 MILLIGRAM(S): 750 SUSPENSION ORAL at 11:27

## 2019-01-01 RX ADMIN — ALFUZOSIN HYDROCHLORIDE 10 MILLIGRAM(S): 10 TABLET, EXTENDED RELEASE ORAL at 17:51

## 2019-01-01 RX ADMIN — Medication 1 TABLET(S): at 11:57

## 2019-01-01 RX ADMIN — Medication 650 MILLIGRAM(S): at 22:43

## 2019-01-01 RX ADMIN — PANTOPRAZOLE SODIUM 40 MILLIGRAM(S): 20 TABLET, DELAYED RELEASE ORAL at 17:40

## 2019-01-01 RX ADMIN — URSODIOL 500 MILLIGRAM(S): 250 TABLET, FILM COATED ORAL at 06:15

## 2019-01-01 RX ADMIN — Medication 20 MILLIGRAM(S): at 16:15

## 2019-01-01 RX ADMIN — Medication 1 TABLET(S): at 13:37

## 2019-01-01 RX ADMIN — PIPERACILLIN AND TAZOBACTAM 25 GRAM(S): 4; .5 INJECTION, POWDER, LYOPHILIZED, FOR SOLUTION INTRAVENOUS at 14:57

## 2019-01-01 RX ADMIN — ATOVAQUONE 1500 MILLIGRAM(S): 750 SUSPENSION ORAL at 13:19

## 2019-01-01 RX ADMIN — Medication 650 MILLIGRAM(S): at 12:00

## 2019-01-01 RX ADMIN — LIDOCAINE 1 PATCH: 4 CREAM TOPICAL at 19:59

## 2019-01-01 RX ADMIN — GABAPENTIN 200 MILLIGRAM(S): 400 CAPSULE ORAL at 21:26

## 2019-01-01 RX ADMIN — Medication 650 MILLIGRAM(S): at 03:35

## 2019-01-01 RX ADMIN — FINASTERIDE 5 MILLIGRAM(S): 5 TABLET, FILM COATED ORAL at 12:30

## 2019-01-01 RX ADMIN — PANTOPRAZOLE SODIUM 40 MILLIGRAM(S): 20 TABLET, DELAYED RELEASE ORAL at 12:24

## 2019-01-01 RX ADMIN — PIPERACILLIN AND TAZOBACTAM 25 GRAM(S): 4; .5 INJECTION, POWDER, LYOPHILIZED, FOR SOLUTION INTRAVENOUS at 17:30

## 2019-01-01 RX ADMIN — OXYCODONE HYDROCHLORIDE 10 MILLIGRAM(S): 5 TABLET ORAL at 18:24

## 2019-01-01 RX ADMIN — Medication 25 MILLIGRAM(S): at 16:33

## 2019-01-01 RX ADMIN — PIPERACILLIN AND TAZOBACTAM 25 GRAM(S): 4; .5 INJECTION, POWDER, LYOPHILIZED, FOR SOLUTION INTRAVENOUS at 20:55

## 2019-01-01 RX ADMIN — GABAPENTIN 100 MILLIGRAM(S): 400 CAPSULE ORAL at 05:16

## 2019-01-01 RX ADMIN — HYDROMORPHONE HYDROCHLORIDE 1 MILLIGRAM(S): 2 INJECTION INTRAMUSCULAR; INTRAVENOUS; SUBCUTANEOUS at 21:16

## 2019-01-01 RX ADMIN — ALFUZOSIN HYDROCHLORIDE 10 MILLIGRAM(S): 10 TABLET, EXTENDED RELEASE ORAL at 21:22

## 2019-01-01 RX ADMIN — PANTOPRAZOLE SODIUM 40 MILLIGRAM(S): 20 TABLET, DELAYED RELEASE ORAL at 12:18

## 2019-01-01 RX ADMIN — ALFUZOSIN HYDROCHLORIDE 10 MILLIGRAM(S): 10 TABLET, EXTENDED RELEASE ORAL at 21:33

## 2019-01-01 RX ADMIN — LIDOCAINE 1 PATCH: 4 CREAM TOPICAL at 11:55

## 2019-01-01 RX ADMIN — URSODIOL 500 MILLIGRAM(S): 250 TABLET, FILM COATED ORAL at 17:15

## 2019-01-01 RX ADMIN — PIPERACILLIN AND TAZOBACTAM 25 GRAM(S): 4; .5 INJECTION, POWDER, LYOPHILIZED, FOR SOLUTION INTRAVENOUS at 05:58

## 2019-01-01 RX ADMIN — LIDOCAINE 1 PATCH: 4 CREAM TOPICAL at 12:31

## 2019-01-01 RX ADMIN — Medication 110 MILLIGRAM(S): at 05:28

## 2019-01-01 RX ADMIN — Medication 250 MILLIGRAM(S): at 18:31

## 2019-01-01 RX ADMIN — PANTOPRAZOLE SODIUM 40 MILLIGRAM(S): 20 TABLET, DELAYED RELEASE ORAL at 12:45

## 2019-01-01 RX ADMIN — FINASTERIDE 5 MILLIGRAM(S): 5 TABLET, FILM COATED ORAL at 18:53

## 2019-01-01 RX ADMIN — LIDOCAINE 1 PATCH: 4 CREAM TOPICAL at 20:09

## 2019-01-01 RX ADMIN — PANTOPRAZOLE SODIUM 40 MILLIGRAM(S): 20 TABLET, DELAYED RELEASE ORAL at 11:23

## 2019-01-01 RX ADMIN — FINASTERIDE 5 MILLIGRAM(S): 5 TABLET, FILM COATED ORAL at 12:22

## 2019-01-01 RX ADMIN — Medication 500 MILLIGRAM(S): at 11:42

## 2019-01-01 RX ADMIN — Medication 650 MILLIGRAM(S): at 21:26

## 2019-01-01 RX ADMIN — Medication 110 MILLIGRAM(S): at 11:02

## 2019-01-01 RX ADMIN — Medication 500 MILLIGRAM(S): at 13:19

## 2019-01-01 RX ADMIN — ATOVAQUONE 1500 MILLIGRAM(S): 750 SUSPENSION ORAL at 11:56

## 2019-01-01 RX ADMIN — Medication 650 MILLIGRAM(S): at 02:48

## 2019-01-01 RX ADMIN — PIPERACILLIN AND TAZOBACTAM 25 GRAM(S): 4; .5 INJECTION, POWDER, LYOPHILIZED, FOR SOLUTION INTRAVENOUS at 16:00

## 2019-01-01 RX ADMIN — LIDOCAINE 1 PATCH: 4 CREAM TOPICAL at 11:57

## 2019-01-01 RX ADMIN — Medication 110 MILLIGRAM(S): at 05:20

## 2019-01-01 RX ADMIN — ALFUZOSIN HYDROCHLORIDE 10 MILLIGRAM(S): 10 TABLET, EXTENDED RELEASE ORAL at 18:57

## 2019-01-01 RX ADMIN — URSODIOL 500 MILLIGRAM(S): 250 TABLET, FILM COATED ORAL at 17:45

## 2019-01-01 RX ADMIN — GABAPENTIN 100 MILLIGRAM(S): 400 CAPSULE ORAL at 15:19

## 2019-01-01 RX ADMIN — PIPERACILLIN AND TAZOBACTAM 25 GRAM(S): 4; .5 INJECTION, POWDER, LYOPHILIZED, FOR SOLUTION INTRAVENOUS at 14:45

## 2019-01-01 RX ADMIN — URSODIOL 500 MILLIGRAM(S): 250 TABLET, FILM COATED ORAL at 06:00

## 2019-01-01 RX ADMIN — FINASTERIDE 5 MILLIGRAM(S): 5 TABLET, FILM COATED ORAL at 17:45

## 2019-01-01 RX ADMIN — Medication 650 MILLIGRAM(S): at 12:10

## 2019-01-01 RX ADMIN — Medication 508 MILLIGRAM(S): at 15:59

## 2019-01-01 RX ADMIN — SODIUM CHLORIDE 1000 MILLILITER(S): 9 INJECTION INTRAMUSCULAR; INTRAVENOUS; SUBCUTANEOUS at 00:19

## 2019-01-01 RX ADMIN — ATOVAQUONE 1500 MILLIGRAM(S): 750 SUSPENSION ORAL at 13:30

## 2019-01-01 RX ADMIN — LIDOCAINE 1 PATCH: 4 CREAM TOPICAL at 12:39

## 2019-01-01 RX ADMIN — LIDOCAINE 1 PATCH: 4 CREAM TOPICAL at 23:13

## 2019-01-01 RX ADMIN — Medication 1 TABLET(S): at 12:21

## 2019-01-01 RX ADMIN — FINASTERIDE 5 MILLIGRAM(S): 5 TABLET, FILM COATED ORAL at 11:47

## 2019-01-01 RX ADMIN — URSODIOL 500 MILLIGRAM(S): 250 TABLET, FILM COATED ORAL at 05:30

## 2019-01-01 RX ADMIN — Medication 650 MILLIGRAM(S): at 22:08

## 2019-01-01 RX ADMIN — ALFUZOSIN HYDROCHLORIDE 10 MILLIGRAM(S): 10 TABLET, EXTENDED RELEASE ORAL at 21:55

## 2019-01-01 RX ADMIN — MIDODRINE HYDROCHLORIDE 10 MILLIGRAM(S): 2.5 TABLET ORAL at 00:36

## 2019-01-01 RX ADMIN — URSODIOL 500 MILLIGRAM(S): 250 TABLET, FILM COATED ORAL at 06:27

## 2019-01-01 RX ADMIN — HYDROMORPHONE HYDROCHLORIDE 0.2 MILLIGRAM(S): 2 INJECTION INTRAMUSCULAR; INTRAVENOUS; SUBCUTANEOUS at 16:17

## 2019-01-01 RX ADMIN — Medication 5 MILLILITER(S): at 05:22

## 2019-01-01 RX ADMIN — Medication 650 MILLIGRAM(S): at 01:31

## 2019-01-01 RX ADMIN — Medication 500 MILLIGRAM(S): at 11:57

## 2019-01-01 RX ADMIN — ALFUZOSIN HYDROCHLORIDE 10 MILLIGRAM(S): 10 TABLET, EXTENDED RELEASE ORAL at 20:54

## 2019-01-01 RX ADMIN — Medication 2: at 12:43

## 2019-01-01 RX ADMIN — PIPERACILLIN AND TAZOBACTAM 25 GRAM(S): 4; .5 INJECTION, POWDER, LYOPHILIZED, FOR SOLUTION INTRAVENOUS at 00:25

## 2019-01-01 RX ADMIN — SODIUM CHLORIDE 60 MILLILITER(S): 9 INJECTION INTRAMUSCULAR; INTRAVENOUS; SUBCUTANEOUS at 13:17

## 2019-01-01 RX ADMIN — HYDROMORPHONE HYDROCHLORIDE 0.5 MILLIGRAM(S): 2 INJECTION INTRAMUSCULAR; INTRAVENOUS; SUBCUTANEOUS at 19:39

## 2019-01-01 RX ADMIN — FINASTERIDE 5 MILLIGRAM(S): 5 TABLET, FILM COATED ORAL at 17:57

## 2019-01-01 RX ADMIN — GABAPENTIN 200 MILLIGRAM(S): 400 CAPSULE ORAL at 14:35

## 2019-01-01 RX ADMIN — Medication 400 MILLIGRAM(S): at 18:00

## 2019-01-01 RX ADMIN — Medication 110 MILLIGRAM(S): at 06:32

## 2019-01-01 RX ADMIN — FINASTERIDE 5 MILLIGRAM(S): 5 TABLET, FILM COATED ORAL at 12:53

## 2019-01-01 RX ADMIN — Medication 5 MILLILITER(S): at 05:50

## 2019-01-01 RX ADMIN — ATOVAQUONE 1500 MILLIGRAM(S): 750 SUSPENSION ORAL at 12:21

## 2019-01-01 RX ADMIN — Medication 650 MILLIGRAM(S): at 16:30

## 2019-01-01 RX ADMIN — FINASTERIDE 5 MILLIGRAM(S): 5 TABLET, FILM COATED ORAL at 11:42

## 2019-01-01 RX ADMIN — LIDOCAINE 1 PATCH: 4 CREAM TOPICAL at 11:37

## 2019-01-01 RX ADMIN — FINASTERIDE 5 MILLIGRAM(S): 5 TABLET, FILM COATED ORAL at 14:45

## 2019-01-01 RX ADMIN — Medication 0.5 MILLIGRAM(S): at 20:10

## 2019-01-01 RX ADMIN — ALFUZOSIN HYDROCHLORIDE 10 MILLIGRAM(S): 10 TABLET, EXTENDED RELEASE ORAL at 21:14

## 2019-01-01 RX ADMIN — URSODIOL 500 MILLIGRAM(S): 250 TABLET, FILM COATED ORAL at 06:17

## 2019-01-01 RX ADMIN — PIPERACILLIN AND TAZOBACTAM 25 GRAM(S): 4; .5 INJECTION, POWDER, LYOPHILIZED, FOR SOLUTION INTRAVENOUS at 00:08

## 2019-01-01 RX ADMIN — PIPERACILLIN AND TAZOBACTAM 25 GRAM(S): 4; .5 INJECTION, POWDER, LYOPHILIZED, FOR SOLUTION INTRAVENOUS at 12:00

## 2019-01-01 RX ADMIN — Medication 508 MILLIGRAM(S): at 15:12

## 2019-01-01 RX ADMIN — HYDROMORPHONE HYDROCHLORIDE 0.2 MILLIGRAM(S): 2 INJECTION INTRAMUSCULAR; INTRAVENOUS; SUBCUTANEOUS at 15:17

## 2019-01-01 RX ADMIN — SODIUM CHLORIDE 500 MILLILITER(S): 9 INJECTION INTRAMUSCULAR; INTRAVENOUS; SUBCUTANEOUS at 16:23

## 2019-01-01 RX ADMIN — PIPERACILLIN AND TAZOBACTAM 25 GRAM(S): 4; .5 INJECTION, POWDER, LYOPHILIZED, FOR SOLUTION INTRAVENOUS at 22:12

## 2019-01-01 RX ADMIN — ALFUZOSIN HYDROCHLORIDE 10 MILLIGRAM(S): 10 TABLET, EXTENDED RELEASE ORAL at 17:57

## 2019-01-01 RX ADMIN — PANTOPRAZOLE SODIUM 40 MILLIGRAM(S): 20 TABLET, DELAYED RELEASE ORAL at 18:19

## 2019-01-01 RX ADMIN — SODIUM CHLORIDE 75 MILLILITER(S): 9 INJECTION, SOLUTION INTRAVENOUS at 22:17

## 2019-01-01 RX ADMIN — SODIUM CHLORIDE 60 MILLILITER(S): 9 INJECTION INTRAMUSCULAR; INTRAVENOUS; SUBCUTANEOUS at 18:45

## 2019-01-01 RX ADMIN — CEFEPIME 100 MILLIGRAM(S): 1 INJECTION, POWDER, FOR SOLUTION INTRAMUSCULAR; INTRAVENOUS at 18:04

## 2019-01-01 RX ADMIN — ALFUZOSIN HYDROCHLORIDE 10 MILLIGRAM(S): 10 TABLET, EXTENDED RELEASE ORAL at 23:10

## 2019-01-01 RX ADMIN — Medication 650 MILLIGRAM(S): at 00:49

## 2019-01-01 RX ADMIN — Medication 5 MILLILITER(S): at 17:58

## 2019-01-01 RX ADMIN — URSODIOL 500 MILLIGRAM(S): 250 TABLET, FILM COATED ORAL at 17:57

## 2019-01-01 RX ADMIN — LIDOCAINE 1 PATCH: 4 CREAM TOPICAL at 23:23

## 2019-01-01 RX ADMIN — Medication 650 MILLIGRAM(S): at 04:30

## 2019-01-01 RX ADMIN — ATOVAQUONE 1500 MILLIGRAM(S): 750 SUSPENSION ORAL at 12:52

## 2019-01-01 RX ADMIN — Medication 650 MILLIGRAM(S): at 21:14

## 2019-01-01 RX ADMIN — ALFUZOSIN HYDROCHLORIDE 10 MILLIGRAM(S): 10 TABLET, EXTENDED RELEASE ORAL at 22:47

## 2019-01-01 RX ADMIN — Medication 650 MILLIGRAM(S): at 18:10

## 2019-01-01 RX ADMIN — ATOVAQUONE 1500 MILLIGRAM(S): 750 SUSPENSION ORAL at 13:37

## 2019-01-01 RX ADMIN — LIDOCAINE 1 PATCH: 4 CREAM TOPICAL at 16:54

## 2019-01-01 RX ADMIN — Medication 650 MILLIGRAM(S): at 10:30

## 2019-01-01 RX ADMIN — LIDOCAINE 1 PATCH: 4 CREAM TOPICAL at 22:43

## 2019-01-01 RX ADMIN — Medication 1 TABLET(S): at 11:55

## 2019-01-01 RX ADMIN — ALFUZOSIN HYDROCHLORIDE 10 MILLIGRAM(S): 10 TABLET, EXTENDED RELEASE ORAL at 17:27

## 2019-01-01 RX ADMIN — ALFUZOSIN HYDROCHLORIDE 10 MILLIGRAM(S): 10 TABLET, EXTENDED RELEASE ORAL at 21:11

## 2019-01-01 RX ADMIN — LIDOCAINE 1 PATCH: 4 CREAM TOPICAL at 20:11

## 2019-01-01 RX ADMIN — Medication 125 MILLILITER(S): at 00:59

## 2019-01-01 RX ADMIN — FINASTERIDE 5 MILLIGRAM(S): 5 TABLET, FILM COATED ORAL at 11:48

## 2019-01-01 RX ADMIN — FINASTERIDE 5 MILLIGRAM(S): 5 TABLET, FILM COATED ORAL at 17:27

## 2019-01-01 RX ADMIN — PANTOPRAZOLE SODIUM 40 MILLIGRAM(S): 20 TABLET, DELAYED RELEASE ORAL at 11:27

## 2019-01-01 RX ADMIN — ALFUZOSIN HYDROCHLORIDE 10 MILLIGRAM(S): 10 TABLET, EXTENDED RELEASE ORAL at 17:46

## 2019-01-01 RX ADMIN — Medication 650 MILLIGRAM(S): at 06:16

## 2019-01-01 RX ADMIN — PIPERACILLIN AND TAZOBACTAM 25 GRAM(S): 4; .5 INJECTION, POWDER, LYOPHILIZED, FOR SOLUTION INTRAVENOUS at 14:30

## 2019-01-01 RX ADMIN — FINASTERIDE 5 MILLIGRAM(S): 5 TABLET, FILM COATED ORAL at 12:45

## 2019-01-01 RX ADMIN — Medication 650 MILLIGRAM(S): at 05:14

## 2019-01-01 RX ADMIN — Medication 650 MILLIGRAM(S): at 05:01

## 2019-01-01 RX ADMIN — Medication 110 MILLIGRAM(S): at 06:27

## 2019-01-01 RX ADMIN — Medication 650 MILLIGRAM(S): at 11:47

## 2019-01-01 RX ADMIN — FINASTERIDE 5 MILLIGRAM(S): 5 TABLET, FILM COATED ORAL at 11:57

## 2019-01-01 RX ADMIN — ATOVAQUONE 1500 MILLIGRAM(S): 750 SUSPENSION ORAL at 17:48

## 2019-01-01 RX ADMIN — ALFUZOSIN HYDROCHLORIDE 10 MILLIGRAM(S): 10 TABLET, EXTENDED RELEASE ORAL at 18:53

## 2019-01-01 RX ADMIN — Medication 650 MILLIGRAM(S): at 13:55

## 2019-01-01 RX ADMIN — Medication 1 TABLET(S): at 13:20

## 2019-01-01 RX ADMIN — Medication 20 MILLIGRAM(S): at 05:11

## 2019-01-01 RX ADMIN — PIPERACILLIN AND TAZOBACTAM 25 GRAM(S): 4; .5 INJECTION, POWDER, LYOPHILIZED, FOR SOLUTION INTRAVENOUS at 21:45

## 2019-01-01 RX ADMIN — FINASTERIDE 5 MILLIGRAM(S): 5 TABLET, FILM COATED ORAL at 11:49

## 2019-01-01 RX ADMIN — PANTOPRAZOLE SODIUM 40 MILLIGRAM(S): 20 TABLET, DELAYED RELEASE ORAL at 06:38

## 2019-01-01 RX ADMIN — Medication 650 MILLIGRAM(S): at 22:50

## 2019-01-01 RX ADMIN — PIPERACILLIN AND TAZOBACTAM 25 GRAM(S): 4; .5 INJECTION, POWDER, LYOPHILIZED, FOR SOLUTION INTRAVENOUS at 13:17

## 2019-01-01 RX ADMIN — PANTOPRAZOLE SODIUM 40 MILLIGRAM(S): 20 TABLET, DELAYED RELEASE ORAL at 13:30

## 2019-01-01 RX ADMIN — SODIUM CHLORIDE 60 MILLILITER(S): 9 INJECTION INTRAMUSCULAR; INTRAVENOUS; SUBCUTANEOUS at 11:50

## 2019-01-01 RX ADMIN — SODIUM CHLORIDE 1000 MILLILITER(S): 9 INJECTION INTRAMUSCULAR; INTRAVENOUS; SUBCUTANEOUS at 13:25

## 2019-01-01 RX ADMIN — PANTOPRAZOLE SODIUM 40 MILLIGRAM(S): 20 TABLET, DELAYED RELEASE ORAL at 11:49

## 2019-01-01 RX ADMIN — URSODIOL 500 MILLIGRAM(S): 250 TABLET, FILM COATED ORAL at 17:51

## 2019-01-01 RX ADMIN — Medication 650 MILLIGRAM(S): at 23:08

## 2019-01-01 RX ADMIN — GABAPENTIN 100 MILLIGRAM(S): 400 CAPSULE ORAL at 21:41

## 2019-01-01 RX ADMIN — Medication 650 MILLIGRAM(S): at 05:06

## 2019-01-01 RX ADMIN — GABAPENTIN 100 MILLIGRAM(S): 400 CAPSULE ORAL at 21:33

## 2019-01-01 RX ADMIN — ATOVAQUONE 1500 MILLIGRAM(S): 750 SUSPENSION ORAL at 17:40

## 2019-01-01 RX ADMIN — FINASTERIDE 5 MILLIGRAM(S): 5 TABLET, FILM COATED ORAL at 12:18

## 2019-01-01 RX ADMIN — LIDOCAINE 1 PATCH: 4 CREAM TOPICAL at 20:10

## 2019-01-01 RX ADMIN — PANTOPRAZOLE SODIUM 40 MILLIGRAM(S): 20 TABLET, DELAYED RELEASE ORAL at 18:03

## 2019-01-01 RX ADMIN — SODIUM CHLORIDE 75 MILLILITER(S): 9 INJECTION, SOLUTION INTRAVENOUS at 03:47

## 2019-01-01 RX ADMIN — ATOVAQUONE 1500 MILLIGRAM(S): 750 SUSPENSION ORAL at 11:42

## 2019-01-01 RX ADMIN — ATOVAQUONE 1500 MILLIGRAM(S): 750 SUSPENSION ORAL at 13:15

## 2019-01-01 RX ADMIN — ALFUZOSIN HYDROCHLORIDE 10 MILLIGRAM(S): 10 TABLET, EXTENDED RELEASE ORAL at 21:06

## 2019-01-01 RX ADMIN — PANTOPRAZOLE SODIUM 40 MILLIGRAM(S): 20 TABLET, DELAYED RELEASE ORAL at 12:27

## 2019-01-01 RX ADMIN — SODIUM CHLORIDE 1000 MILLILITER(S): 9 INJECTION INTRAMUSCULAR; INTRAVENOUS; SUBCUTANEOUS at 18:23

## 2019-01-01 RX ADMIN — SODIUM CHLORIDE 75 MILLILITER(S): 9 INJECTION, SOLUTION INTRAVENOUS at 12:10

## 2019-01-01 RX ADMIN — Medication 400 MILLIGRAM(S): at 16:22

## 2019-01-01 RX ADMIN — PANTOPRAZOLE SODIUM 40 MILLIGRAM(S): 20 TABLET, DELAYED RELEASE ORAL at 06:10

## 2019-01-01 RX ADMIN — PANTOPRAZOLE SODIUM 40 MILLIGRAM(S): 20 TABLET, DELAYED RELEASE ORAL at 05:21

## 2019-01-01 RX ADMIN — PIPERACILLIN AND TAZOBACTAM 25 GRAM(S): 4; .5 INJECTION, POWDER, LYOPHILIZED, FOR SOLUTION INTRAVENOUS at 23:09

## 2019-01-01 RX ADMIN — ALFUZOSIN HYDROCHLORIDE 10 MILLIGRAM(S): 10 TABLET, EXTENDED RELEASE ORAL at 21:38

## 2019-01-01 RX ADMIN — FINASTERIDE 5 MILLIGRAM(S): 5 TABLET, FILM COATED ORAL at 13:37

## 2019-01-01 RX ADMIN — Medication 650 MILLIGRAM(S): at 17:41

## 2019-01-01 RX ADMIN — PIPERACILLIN AND TAZOBACTAM 25 GRAM(S): 4; .5 INJECTION, POWDER, LYOPHILIZED, FOR SOLUTION INTRAVENOUS at 06:10

## 2019-01-01 RX ADMIN — PIPERACILLIN AND TAZOBACTAM 25 GRAM(S): 4; .5 INJECTION, POWDER, LYOPHILIZED, FOR SOLUTION INTRAVENOUS at 23:27

## 2019-01-01 RX ADMIN — ATOVAQUONE 1500 MILLIGRAM(S): 750 SUSPENSION ORAL at 12:53

## 2019-01-01 RX ADMIN — PANTOPRAZOLE SODIUM 40 MILLIGRAM(S): 20 TABLET, DELAYED RELEASE ORAL at 05:10

## 2019-01-01 RX ADMIN — Medication 110 MILLIGRAM(S): at 14:27

## 2019-01-01 RX ADMIN — PIPERACILLIN AND TAZOBACTAM 200 GRAM(S): 4; .5 INJECTION, POWDER, LYOPHILIZED, FOR SOLUTION INTRAVENOUS at 07:03

## 2019-01-01 RX ADMIN — URSODIOL 500 MILLIGRAM(S): 250 TABLET, FILM COATED ORAL at 05:20

## 2019-01-01 RX ADMIN — Medication 650 MILLIGRAM(S): at 11:43

## 2019-01-01 RX ADMIN — Medication 500 MILLIGRAM(S): at 11:47

## 2019-01-01 RX ADMIN — Medication 650 MILLIGRAM(S): at 15:08

## 2019-01-01 RX ADMIN — PIPERACILLIN AND TAZOBACTAM 25 GRAM(S): 4; .5 INJECTION, POWDER, LYOPHILIZED, FOR SOLUTION INTRAVENOUS at 12:48

## 2019-01-01 RX ADMIN — PIPERACILLIN AND TAZOBACTAM 200 GRAM(S): 4; .5 INJECTION, POWDER, LYOPHILIZED, FOR SOLUTION INTRAVENOUS at 17:28

## 2019-01-01 RX ADMIN — HEPARIN SODIUM 5000 UNIT(S): 5000 INJECTION INTRAVENOUS; SUBCUTANEOUS at 17:31

## 2019-01-01 RX ADMIN — Medication 650 MILLIGRAM(S): at 12:24

## 2019-01-01 RX ADMIN — Medication 650 MILLIGRAM(S): at 20:24

## 2019-01-01 RX ADMIN — FINASTERIDE 5 MILLIGRAM(S): 5 TABLET, FILM COATED ORAL at 17:15

## 2019-01-01 RX ADMIN — URSODIOL 500 MILLIGRAM(S): 250 TABLET, FILM COATED ORAL at 18:30

## 2019-01-01 RX ADMIN — URSODIOL 500 MILLIGRAM(S): 250 TABLET, FILM COATED ORAL at 17:27

## 2019-01-01 RX ADMIN — Medication 650 MILLIGRAM(S): at 16:33

## 2019-01-01 RX ADMIN — Medication 650 MILLIGRAM(S): at 04:31

## 2019-01-01 RX ADMIN — Medication 650 MILLIGRAM(S): at 05:52

## 2019-01-01 RX ADMIN — Medication 25 MILLIGRAM(S): at 17:36

## 2019-01-01 RX ADMIN — PANTOPRAZOLE SODIUM 40 MILLIGRAM(S): 20 TABLET, DELAYED RELEASE ORAL at 13:15

## 2019-01-01 RX ADMIN — PANTOPRAZOLE SODIUM 40 MILLIGRAM(S): 20 TABLET, DELAYED RELEASE ORAL at 13:54

## 2019-01-01 RX ADMIN — Medication 200 GRAM(S): at 21:41

## 2019-01-01 RX ADMIN — PANTOPRAZOLE SODIUM 40 MILLIGRAM(S): 20 TABLET, DELAYED RELEASE ORAL at 12:13

## 2019-01-01 RX ADMIN — LIDOCAINE 1 PATCH: 4 CREAM TOPICAL at 19:53

## 2019-04-03 NOTE — PROVIDER CONTACT NOTE (OTHER) - ACTION/TREATMENT ORDERED:
BC x2 drawn and sent to lab; fluid 500 Ns and tylenol administered. zosyn x1 dose given. repeat temp 99. 7

## 2019-04-03 NOTE — ED PROVIDER NOTE - OBJECTIVE STATEMENT
77 y/o male presenting c/ a cc of jaundice having been found to have abnormal LFTs on an out-patient basis. His wife said he had a CT of his A/P on Friday which also was abnormal. He has been jaundice for three weeks. It has been constant, moderate, without relieving or exacerbating factor and a/w diminished appetite and dark urine.

## 2019-04-03 NOTE — CHART NOTE - NSCHARTNOTEFT_GEN_A_CORE
LEODAN BURGOS  78y Male  Patient is a 78y old  Male who presents with a chief complaint of same (03 Apr 2019 10:19)      Event Summary:  Notified by RN, pt febrile 102.6 (first recorded temp since arrival to ED). Pt admitted for concerning ?obstructive jaundice, with elevated LFTs & bilirubin, and hematuria. US abd & HIDA scan negative for acute cholecystitis.     #SIRS (unidentified source of infection at this time)  1.) BC x2, Ucx, lactate STAT  2.) Zosyn IV x 1  3.) IV hydration  4.) Discussed with patient the importance of performing CT abd/pel to attempt to identify source of infection. Pt initially refusing due to recent outpatient CT scan, now agreeable. CT abd/pel w/IV & PO pending    Dr. Judge aware of fever & sepsis w/u, agrees with above plan. Pending consults to be called by attending.    Nathaniel Saintus [facility]  #[phone]

## 2019-04-03 NOTE — ED ADULT NURSE NOTE - OBJECTIVE STATEMENT
0945 78 yr old WM sent to ER by Dr Marin PATRICIA for further eval and tx of elevated liver enzymes 0945 78 yr old WM sent to ER by Dr Marin PATRICIA for further eval and tx of elevated liver enzymes. Not feeling well x 2 wks associated with fatigue, decreased appetite, dark urine and generalized weakness. A&Ox4. ambulatory. Skin and sclera jaundiced. Voided 100cc rust colored urine. Denies fever or chills. Denies chest pain, SOB or palp. Lungs clear. abd soft

## 2019-04-03 NOTE — ED CLERICAL - NS ED CLERK NOTE PRE-ARRIVAL INFORMATION; ADDITIONAL PRE-ARRIVAL INFORMATION
CC/Reason For referral:  DEHYDRATION & ELEVATED LFT  Preferred Consultant(if applicable):  Who admits for you (if needed):   Do you have documents you would like to fax over? NO  Would you still like to speak to an ED attending?  PLEASE CALL AFTER PATIENT IS SEEN

## 2019-04-03 NOTE — H&P ADULT - ASSESSMENT
79 yo male w cholestatic hepatitis w pericholecystic fluid and GB sludge, high fevers, w elevated PT and INR, elev lactate, w ARF, hyponatremia, w hematuria, pancytopenia, but not neutropenic no biliary obstruction on CT scan, neg HIDA scan, splenic infarct on CT,  Blood Cx sent, urine Cx sent. start empiric Doxy and ZOSYN  differential diagnoses include infectious , auto-immune, neoplastic etiologies. Gi and ID on board, will call renal and Heme for consult. Echo ordered. DDx ID list: parvovirus, infectious hepatits, CMV/EBV, rickestial dz, ehrlichia, babesia, leptospirosis. PMD reported outptn ferritin level is nearly 30,000, Need to R/O HLH, will need a Bone Marrow Bx. DVT ppx w PAS 2/2 elevated INR.

## 2019-04-03 NOTE — H&P ADULT - HISTORY OF PRESENT ILLNESS
79 yo male w h/o BPH, HLD presented to his urologist last week w hematuria, was given 3 days BACTRIM( neg final Cx) and sent for CT scan, Scan was done last week and 4 days ago saw his PMD for blood work who noted PTN was jaundiced and sent him to the ER. Ptn states he has been lethargic for about a month w achiness, hasnt noted to be jaundiced but has had a dark urine for a few weeks, denies fevers but has had chills, no N/V, has poor appetite and lost 20 lbs. Denies abd pain, denies gross hematuria , thought "dark urine" was 2/2 blood. denies recent travel, eats shellfish and pork

## 2019-04-03 NOTE — H&P ADULT - NSHPPHYSICALEXAM_GEN_ALL_CORE
wd frail male, NAD, jaundiced  115/04-69-29-TMAX 102.6F-98%RA  HEENT-NC/AT, PERRL, CP/Sicteric  NECK-SUPPLE  LUNGS- CTA  CARD- RRR, S1S2  ABD- NT, ND, POS BS, NO HSM  EXT- NO EDEMA  NEURO- NONFOCAL, AxOx3

## 2019-04-03 NOTE — ED PROVIDER NOTE - CLINICAL SUMMARY MEDICAL DECISION MAKING FREE TEXT BOX
Jaundiced. Sent in due to abnormal LFTs. CT of A/P already performed on o/p basis. Basic screening studies for admission ordered. Spoke to his PCP. Admit to Dr. Judge.

## 2019-04-03 NOTE — CONSULT NOTE ADULT - SUBJECTIVE AND OBJECTIVE BOX
Patient is a 78y Male     Patient is a 78y old  Male who presents with a chief complaint of     HPI:      PAST MEDICAL & SURGICAL HISTORY:  BPH (benign prostatic hyperplasia)  H/O knee surgery  Hernia      MEDICATIONS  (STANDING):      Allergies    No Known Allergies    Intolerances        SOCIAL HISTORY:  Denies ETOh,Smoking,     FAMILY HISTORY:      REVIEW OF SYSTEMS:    CONSTITUTIONAL: No weakness, fevers or chills  EYES/ENT: No visual changes;  No vertigo or throat pain   NECK: No pain or stiffness  RESPIRATORY: No cough, wheezing, hemoptysis; No shortness of breath  CARDIOVASCULAR: No chest pain or palpitations  GASTROINTESTINAL: No abdominal or epigastric pain. No nausea, vomiting, or hematemesis; No diarrhea or constipation. No melena or hematochezia.  GENITOURINARY: No dysuria, frequency or hematuria  NEUROLOGICAL: No numbness or weakness  SKIN: No itching, burning, rashes, or lesions   All other review of systems is negative unless indicated above.    VITAL:  T(C): , Max: 36.7 (04-03-19 @ 09:07)  T(F): , Max: 98 (04-03-19 @ 09:07)  HR: 112 (04-03-19 @ 09:07)  BP: 115/70 (04-03-19 @ 09:07)  BP(mean): --  RR: 18 (04-03-19 @ 09:07)  SpO2: 96% (04-03-19 @ 09:07)  Wt(kg): --    I and O's:    Height (cm): 190.5 (04-03 @ 09:07)  Weight (kg): 90.7 (04-03 @ 09:07)  BMI (kg/m2): 25 (04-03 @ 09:07)  BSA (m2): 2.19 (04-03 @ 09:07)    PHYSICAL EXAM:    Constitutional: NAD  HEENT: PERRLA,   Neck: No JVD  Respiratory: CTA B/L  Cardiovascular: S1 and S2  Gastrointestinal: BS+, soft, NT/ND  Extremities: No peripheral edema  Neurological: A/O x 3, no focal deficits  Psychiatric: Normal mood, normal affect  : No Arnold  Skin: No rashes  Access: Not applicable  Back: No CVA tenderness    LABS:                RADIOLOGY & ADDITIONAL STUDIES:

## 2019-04-03 NOTE — ED ADULT NURSE NOTE - ED STAT RN HANDOFF DETAILS
hand off given to oncoming RN Stephane Ovalle. Pt out of department at Nuclear Medicine. TBA. No bed yet

## 2019-04-03 NOTE — CONSULT NOTE ADULT - ASSESSMENT
pleaseant 77 year old man wih not reported medical problems.  has bph, denies meds, 2 wks of hematuria, ct at [facility], saw celestine in office,painless jaundice.   I do not have reports.  margaret's syndrome uncommone.  will get blood wrok and ct and make further recommenations. pt nottoxi

## 2019-04-04 NOTE — PROGRESS NOTE ADULT - ASSESSMENT
I did not have fever curve or blood work yesterday from hospital when seen.  pt's labs and ct reviewed from last week.  Pt now with fever, negative ct and significant cholestatic hepatitis.  Pt does not report shellfish or recent travel to [facility] or [facility]  although this can all be sepsis related (cholangitis lenta - cholestasis of sepsis) with resulting thrombocytopenia, it can also be tick born illness.  would consider starting emperic doxycycline and order thick and thin.  can check hep e.  will follow with you.  Pt is very ill.  Pt relates being wobly, but is stable.

## 2019-04-04 NOTE — DISCHARGE NOTE NURSING/CASE MANAGEMENT/SOCIAL WORK - NSDCPEWEB_GEN_ALL_CORE
[facility] for Tobacco Control website --- [email or web address] website --- [email or web address]

## 2019-04-04 NOTE — CONSULT NOTE ADULT - SUBJECTIVE AND OBJECTIVE BOX
Patient is a 78y old  Male who presents with a chief complaint of Jaundice, hematuria (2019 08:21)      HPI: Pt is  a 80 yo male with PMH significant for BPH. Pt was in his USOH until about 3 weeks PTA when he noted what he thought was hematuria. He presented to his urologist a few days later and was started on Bactrim for a possible infection. He was told to stop it three days later when the cultures were negative.  When sxs didn't improve he ultimately went to his internist,. He was found to have abnormal LFTs. He held his lipitor. THis past friday he underwent CT and was told it was abnormal.   Pt denies any travel in or out of [place]. He is retired with no pets. He denies fevers but occasional chills at night.  He felt a little weak and shakey. He has constipation at home. No abdominal pain but diminished appetiite      PAST MEDICAL & SURGICAL HISTORY:  BPH (benign prostatic hyperplasia)  HLD  H/O knee surgery  Hernia      Social history:   Marital Status:    Occupation:  retired, was a  for [employer]  Lives with: wife in [place]    Substance Use : denies  Tobacco Usage:  ( x  ) never smoked   (   ) former smoker   (   ) current smoker  (     ) pack year  (        ) last tobacco use date  Alcohol Usage: denies  Travel: denies  Pets: denies    FAMILY HISTORY:  mother - deceaed colon cancer  father :  ; renal cancer with mets    REVIEW OF SYSTEMS  General: fatigue  occasinal chills occasional sweats    Skin:No rash  	  Ophthalmologic:Denies any visual complaints,discharge redness or photophobia  	  ENMT:No nasal discharge,headache,sinus congestion or throat pain.No dental complaints    Respiratory and Thorax:No cough,sputum or chest pain.Denies shortness of breath  	  Cardiovascular:	No chest pain,palpitaions or dizziness    Gastrointestinal:	NO nausea,abdominal pain or diarrhea.    Genitourinary:	dark urine    Musculoskeletal:	No joint swelling or pain.No weakness    Neurological:No confusion,diziness.No extremity weakness.No bladder or bowel incontinence	    Psychiatric:No delusions or hallucinations	    Hematology/Lymphatics:	No LN swelling.No gum bleeding     Allergic/Immunologic:	No hives or rash     Allergies    No Known Allergies    Intolerances    meds at home included a MVI, finestasisde, proscar and lipitor    Antimicrobials:    doxycycline IVPB      doxycycline IVPB 100 milliGRAM(s) IV Intermittent once  doxycycline IVPB 100 milliGRAM(s) IV Intermittent every 12 hours  piperacillin/tazobactam IVPB. 3.375 Gram(s) IV Intermittent every 8 hours      MEDICATIONS  (STANDING):  doxycycline IVPB      doxycycline IVPB 100 milliGRAM(s) IV Intermittent once  doxycycline IVPB 100 milliGRAM(s) IV Intermittent every 12 hours  piperacillin/tazobactam IVPB. 3.375 Gram(s) IV Intermittent every 8 hours  sodium chloride 0.45%. 1000 milliLiter(s) (60 mL/Hr) IV Continuous <Continuous>    MEDICATIONS  (PRN):        Vital Signs Last 24 Hrs  T(C): 37.7 (2019 04:35), Max: 39.2 (2019 15:55)  T(F): 99.8 (2019 04:35), Max: 102.6 (2019 15:55)  HR: 97 (2019 04:35) (89 - 112)  BP: 117/71 (2019 04:35) (110/67 - 119/68)  BP(mean): --  RR: 18 (2019 04:35) (16 - 18)  SpO2: 97% (2019 04:35) (94% - 100%)    PHYSICAL EXAM:Pleasant patient in no acute distress. Markedly jaundices      Constitutional:Comfortable.Awake and alert       Eyes:PERRL EOMI.NO discharge or conjunctival injection  sclera icteric    ENMT:No sinus tenderness.No thrush.No pharyngeal exudate or erythema .Fair - poor dental hygiene    Neck:Supple,No LN,no JVD      Respiratory:Good air entry bilaterally,CTA    Cardiovascular:S1 S2 wnl,    Gastrointestinal:Soft BS(+) no tenderness no masses ,    Extremities:No cyanosis,clubbing or edema.    Vascular:peripheral pulses felt    Neurological:AAO X 3,No grossly focal deficits    Skin:No rash     Lymph Nodes:No palpable LNs    Musculoskeletal:No joint swelling or LOM    Psychiatric:Affect normal.                                11.2   2.6   )-----------( 43       ( 2019 17:23 )             32.4     LIVER FUNCTIONS - ( 2019 17:23 )  Alb: 2.4 g/dL / Pro: 4.4 g/dL / ALK PHOS: 638 U/L / ALT: 325 U/L / AST: 396 U/L / GGT: x                 128<L>  |  96  |  38<H>  ----------------------------<  95  4.6   |  19<L>  |  1.53<H>    Ca    8.6      2019 06:15    TPro  4.4<L>  /  Alb  2.4<L>  /  TBili  11.4<H>  /  DBili  x   /  AST  396<H>  /  ALT  325<H>  /  AlkPhos  638<H>            Urinalysis Basic - ( 2019 10:41 )    Color: Dark Yellow / Appearance: Slightly Turbid / S.025 / pH: x  Gluc: x / Ketone: Negative  / Bili: Moderate / Urobili: 6 mg/dL   Blood: x / Protein: 100 / Nitrite: Negative   Leuk Esterase: Negative / RBC: 246 /hpf / WBC 2 /HPF   Sq Epi: x / Non Sq Epi: 2 / Bacteria: Negative        RECENT CULTURES:      MICROBIOLOGY:          Radiology: Patient is a 78y old  Male who presents with a chief complaint of Jaundice, hematuria (2019 08:21)      HPI: Pt is  a 80 yo male with PMH significant for BPH. Pt was in his USOH until about 3 weeks PTA when he noted what he thought was hematuria. He presented to his urologist a few days later and was started on Bactrim for a possible infection. He was told to stop it three days later when the cultures were negative.  When sxs didn't improve he ultimately went to his internist,. He was found to have abnormal LFTs. He held his lipitor. THis past friday he underwent CT and was told it was abnormal.   Pt denies any travel in or out of [place]. He is retired with no pets. He denies fevers but occasional chills at night.  He felt a little weak and shakey. He has constipation at home. No abdominal pain but diminished appetiite Pt notes about 20 lb weight loss He eats shellfish ( clams ) and pork      PAST MEDICAL & SURGICAL HISTORY:  BPH (benign prostatic hyperplasia)  HLD  H/O knee surgery  Hernia      Social history:   Marital Status:    Occupation:  retired, was a  for [employer]  Lives with: wife in [place]    Substance Use : denies  Tobacco Usage:  ( x  ) never smoked   (   ) former smoker   (   ) current smoker  (     ) pack year  (        ) last tobacco use date  Alcohol Usage: denies  Travel: denies  Pets: denies    FAMILY HISTORY:  mother - deceaed colon cancer  father :  ; renal cancer with mets    REVIEW OF SYSTEMS  General: fatigue  occasinal chills occasional sweats    Skin:No rash  	  Ophthalmologic:Denies any visual complaints,discharge redness or photophobia  	  ENMT:No nasal discharge,headache,sinus congestion or throat pain.No dental complaints    Respiratory and Thorax:No cough,sputum or chest pain.Denies shortness of breath  	  Cardiovascular:	No chest pain,palpitaions or dizziness    Gastrointestinal:	NO nausea,abdominal pain or diarrhea.    Genitourinary:	dark urine    Musculoskeletal:	No joint swelling or pain.No weakness    Neurological:No confusion,diziness.No extremity weakness.No bladder or bowel incontinence	    Psychiatric:No delusions or hallucinations	    Hematology/Lymphatics:	No LN swelling.No gum bleeding     Allergic/Immunologic:	No hives or rash     Allergies    No Known Allergies    Intolerances    meds at home included a MVI, finestasisde, proscar and lipitor    Antimicrobials:    doxycycline IVPB      doxycycline IVPB 100 milliGRAM(s) IV Intermittent once  doxycycline IVPB 100 milliGRAM(s) IV Intermittent every 12 hours  piperacillin/tazobactam IVPB. 3.375 Gram(s) IV Intermittent every 8 hours      MEDICATIONS  (STANDING):  doxycycline IVPB      doxycycline IVPB 100 milliGRAM(s) IV Intermittent once  doxycycline IVPB 100 milliGRAM(s) IV Intermittent every 12 hours  piperacillin/tazobactam IVPB. 3.375 Gram(s) IV Intermittent every 8 hours  sodium chloride 0.45%. 1000 milliLiter(s) (60 mL/Hr) IV Continuous <Continuous>    MEDICATIONS  (PRN):        Vital Signs Last 24 Hrs  T(C): 37.7 (2019 04:35), Max: 39.2 (2019 15:55)  T(F): 99.8 (2019 04:35), Max: 102.6 (2019 15:55)  HR: 97 (2019 04:35) (89 - 112)  BP: 117/71 (2019 04:35) (110/67 - 119/68)  BP(mean): --  RR: 18 (2019 04:35) (16 - 18)  SpO2: 97% (2019 04:35) (94% - 100%)    PHYSICAL EXAM:Pleasant patient in no acute distress. Markedly jaundices      Constitutional:Comfortable.Awake and alert       Eyes:PERRL EOMI.NO discharge or conjunctival injection  sclera icteric    ENMT:No sinus tenderness.No thrush.No pharyngeal exudate or erythema .Fair - poor dental hygiene    Neck:Supple,No LN,no JVD      Respiratory:Good air entry bilaterally,CTA    Cardiovascular:S1 S2 wnl,    Gastrointestinal:Soft BS(+) no tenderness no masses ,    Extremities:No cyanosis,clubbing or edema.    Vascular:peripheral pulses felt    Neurological:AAO X 3,No grossly focal deficits    Skin:No rash     Lymph Nodes:No palpable LNs    Musculoskeletal:No joint swelling or LOM    Psychiatric:Affect normal.                                11.2   2.6   )-----------( 43       ( 2019 17:23 )             32.4     LIVER FUNCTIONS - ( 2019 17:23 )  Alb: 2.4 g/dL / Pro: 4.4 g/dL / ALK PHOS: 638 U/L / ALT: 325 U/L / AST: 396 U/L / GGT: x                 128<L>  |  96  |  38<H>  ----------------------------<  95  4.6   |  19<L>  |  1.53<H>    Ca    8.6      2019 06:15    TPro  4.4<L>  /  Alb  2.4<L>  /  TBili  11.4<H>  /  DBili  x   /  AST  396<H>  /  ALT  325<H>  /  AlkPhos  638<H>            Urinalysis Basic - ( 2019 10:41 )    Color: Dark Yellow / Appearance: Slightly Turbid / S.025 / pH: x  Gluc: x / Ketone: Negative  / Bili: Moderate / Urobili: 6 mg/dL   Blood: x / Protein: 100 / Nitrite: Negative   Leuk Esterase: Negative / RBC: 246 /hpf / WBC 2 /HPF   Sq Epi: x / Non Sq Epi: 2 / Bacteria: Negative        RECENT CULTURES:      MICROBIOLOGY:          Radiology: Patient is a 78y old  Male who presents with a chief complaint of Jaundice, hematuria (2019 08:21)      HPI: Pt is  a 80 yo male with PMH significant for BPH. Pt was in his USOH until about 3 weeks PTA when he noted what he thought was hematuria. He presented to his urologist a few days later and was started on Bactrim for a possible infection. He was told to stop it three days later when the cultures were negative.  When sxs didn't improve he ultimately went to his internist,. He was found to have abnormal LFTs. He held his lipitor. THis past friday he underwent CT and was told it was abnormal.   Pt denies any travel in or out of [place]. He is retired with no pets. He denies fevers but occasional chills at night.  He felt a little weak and shakey. He has constipation at home. No abdominal pain but diminished appetiite Pt notes about 20 lb weight loss He eats shellfish ( clams ) and pork      PAST MEDICAL & SURGICAL HISTORY:  BPH (benign prostatic hyperplasia)  HLD  H/O knee surgery  Hernia      Social history:   Marital Status:    Occupation:  retired, was a  for [employer]  Lives with: wife in [place]    Substance Use : denies  Tobacco Usage:  ( x  ) never smoked   (   ) former smoker   (   ) current smoker  (     ) pack year  (        ) last tobacco use date  Alcohol Usage: denies  Travel: denies  Pets: denies    FAMILY HISTORY:  mother - deceaed colon cancer  father :  ; renal cancer with mets    REVIEW OF SYSTEMS  General: fatigue  occasinal chills occasional sweats    Skin:No rash  	  Ophthalmologic:Denies any visual complaints,discharge redness or photophobia  	  ENMT:No nasal discharge,headache,sinus congestion or throat pain.No dental complaints    Respiratory and Thorax:No cough,sputum or chest pain.Denies shortness of breath  	  Cardiovascular:	No chest pain,palpitaions or dizziness    Gastrointestinal:	NO nausea,abdominal pain or diarrhea.    Genitourinary:	dark urine    Musculoskeletal:	No joint swelling or pain.No weakness    Neurological:No confusion,diziness.No extremity weakness.No bladder or bowel incontinence	    Psychiatric:No delusions or hallucinations	    Hematology/Lymphatics:	No LN swelling.No gum bleeding     Allergic/Immunologic:	No hives or rash     Allergies    No Known Allergies    Intolerances    meds at home included a MVI, finestasisde, proscar and lipitor    Antimicrobials:    doxycycline IVPB      doxycycline IVPB 100 milliGRAM(s) IV Intermittent once  doxycycline IVPB 100 milliGRAM(s) IV Intermittent every 12 hours  piperacillin/tazobactam IVPB. 3.375 Gram(s) IV Intermittent every 8 hours      MEDICATIONS  (STANDING):  doxycycline IVPB      doxycycline IVPB 100 milliGRAM(s) IV Intermittent once  doxycycline IVPB 100 milliGRAM(s) IV Intermittent every 12 hours  piperacillin/tazobactam IVPB. 3.375 Gram(s) IV Intermittent every 8 hours  sodium chloride 0.45%. 1000 milliLiter(s) (60 mL/Hr) IV Continuous <Continuous>    MEDICATIONS  (PRN):        Vital Signs Last 24 Hrs  T(C): 37.7 (2019 04:35), Max: 39.2 (2019 15:55)  T(F): 99.8 (2019 04:35), Max: 102.6 (2019 15:55)  HR: 97 (2019 04:35) (89 - 112)  BP: 117/71 (2019 04:35) (110/67 - 119/68)  BP(mean): --  RR: 18 (2019 04:35) (16 - 18)  SpO2: 97% (2019 04:35) (94% - 100%)    PHYSICAL EXAM:Pleasant patient in no acute distress. Markedly jaundices      Constitutional:Comfortable.Awake and alert       Eyes:PERRL EOMI.NO discharge or conjunctival injection  sclera icteric    ENMT:No sinus tenderness.No thrush.No pharyngeal exudate or erythema .Fair - poor dental hygiene  crusted lips     Neck:Supple,No LN,no JVD      Respiratory:Good air entry bilaterally,CTA    Cardiovascular:S1 S2 wnl,    Gastrointestinal:Soft BS(+) no tenderness no masses ,    Extremities:No cyanosis,clubbing or edema.    Vascular:peripheral pulses felt    Neurological:AAO X 3,No grossly focal deficits    Skin:No rash     Lymph Nodes:No palpable LNs    Musculoskeletal:No joint swelling or LOM    Psychiatric:Affect normal.                            11.2   2.6   )-----------( 43       ( 2019 17:23 )             32.4     LIVER FUNCTIONS - ( 2019 17:23 )  Alb: 2.4 g/dL / Pro: 4.4 g/dL / ALK PHOS: 638 U/L / ALT: 325 U/L / AST: 396 U/L / GGT: x                 128<L>  |  96  |  38<H>  ----------------------------<  95  4.6   |  19<L>  |  1.53<H>    Ca    8.6      2019 06:15    TPro  4.4<L>  /  Alb  2.4<L>  /  TBili  11.4<H>  /  DBili  x   /  AST  396<H>  /  ALT  325<H>  /  AlkPhos  638<H>            Urinalysis Basic - ( 2019 10:41 )    Color: Dark Yellow / Appearance: Slightly Turbid / S.025 / pH: x  Gluc: x / Ketone: Negative  / Bili: Moderate / Urobili: 6 mg/dL   Blood: x / Protein: 100 / Nitrite: Negative   Leuk Esterase: Negative / RBC: 246 /hpf / WBC 2 /HPF   Sq Epi: x / Non Sq Epi: 2 / Bacteria: Negative        RECENT CULTURES:      MICROBIOLOGY:          Radiology: Patient is a 78y old  Male who presents with a chief complaint of Jaundice, hematuria (2019 08:21)      HPI: Pt is  a 80 yo male with PMH significant for BPH. Pt was in his USOH until about 3 weeks PTA when he noted what he thought was hematuria. He presented to his urologist a few days later and was started on Bactrim for a possible infection. He was told to stop it three days later when the cultures were negative.  When sxs didn't improve he ultimately went to his internist,. He was found to have abnormal LFTs. He held his lipitor. THis past friday he underwent CT and was told it was abnormal.   Pt denies any travel in or out of [place]. He is retired with no pets. He denies fevers but occasional chills at night.  He felt a little weak and shakey. He has constipation at home. No abdominal pain but diminished appetiite Pt notes about 20 lb weight loss He eats shellfish ( clams ) and pork      PAST MEDICAL & SURGICAL HISTORY:  BPH (benign prostatic hyperplasia)  HLD  H/O knee surgery  Hernia      Social history:   Marital Status:    Occupation:  retired, was a  for [employer]  Lives with: wife in [place]    Substance Use : denies  Tobacco Usage:  ( x  ) never smoked   (   ) former smoker   (   ) current smoker  (     ) pack year  (        ) last tobacco use date  Alcohol Usage: denies  Travel: denies  Pets: denies    FAMILY HISTORY:  mother - deceaed colon cancer  father :  ; renal cancer with mets    REVIEW OF SYSTEMS  General: fatigue  occasinal chills occasional sweats    Skin:No rash  	  Ophthalmologic:Denies any visual complaints,discharge redness or photophobia  	  ENMT:No nasal discharge,headache,sinus congestion or throat pain.No dental complaints    Respiratory and Thorax:No cough,sputum or chest pain.Denies shortness of breath  	  Cardiovascular:	No chest pain,palpitaions or dizziness    Gastrointestinal:	NO nausea,abdominal pain or diarrhea.    Genitourinary:	dark urine    Musculoskeletal:	No joint swelling or pain.No weakness    Neurological:No confusion,diziness.No extremity weakness.No bladder or bowel incontinence	    Psychiatric:No delusions or hallucinations	    Hematology/Lymphatics:	No LN swelling.No gum bleeding     Allergic/Immunologic:	No hives or rash     Allergies    No Known Allergies    Intolerances    meds at home included a MVI, finestasisde, proscar and lipitor    Antimicrobials:    doxycycline IVPB      doxycycline IVPB 100 milliGRAM(s) IV Intermittent once  doxycycline IVPB 100 milliGRAM(s) IV Intermittent every 12 hours  piperacillin/tazobactam IVPB. 3.375 Gram(s) IV Intermittent every 8 hours      MEDICATIONS  (STANDING):  doxycycline IVPB      doxycycline IVPB 100 milliGRAM(s) IV Intermittent once  doxycycline IVPB 100 milliGRAM(s) IV Intermittent every 12 hours  piperacillin/tazobactam IVPB. 3.375 Gram(s) IV Intermittent every 8 hours  sodium chloride 0.45%. 1000 milliLiter(s) (60 mL/Hr) IV Continuous <Continuous>    MEDICATIONS  (PRN):        Vital Signs Last 24 Hrs  T(C): 37.7 (2019 04:35), Max: 39.2 (2019 15:55)  T(F): 99.8 (2019 04:35), Max: 102.6 (2019 15:55)  HR: 97 (2019 04:35) (89 - 112)  BP: 117/71 (2019 04:35) (110/67 - 119/68)  BP(mean): --  RR: 18 (2019 04:35) (16 - 18)  SpO2: 97% (2019 04:35) (94% - 100%)    PHYSICAL EXAM:Pleasant patient in no acute distress. Markedly jaundices      Constitutional:Comfortable.Awake and alert       Eyes:PERRL EOMI.NO discharge or conjunctival injection  sclera icteric    ENMT:No sinus tenderness.No thrush.No pharyngeal exudate or erythema .Fair - poor dental hygiene  crusted lips     Neck:Supple,No LN,no JVD      Respiratory:Good air entry bilaterally,CTA    Cardiovascular:S1 S2 wnl,    Gastrointestinal:Soft BS(+) no tenderness no masses ,    Extremities:No cyanosis,clubbing or edema.    Vascular:peripheral pulses felt    Neurological:AAO X 3,No grossly focal deficits    Skin:No rash     Lymph Nodes:No palpable LNs    Musculoskeletal:No joint swelling or LOM    Psychiatric:Affect normal.                            11.2   2.6   )-----------( 43       ( 2019 17:23 )             32.4     LIVER FUNCTIONS - ( 2019 17:23 )  Alb: 2.4 g/dL / Pro: 4.4 g/dL / ALK PHOS: 638 U/L / ALT: 325 U/L / AST: 396 U/L / GGT: x                 128<L>  |  96  |  38<H>  ----------------------------<  95  4.6   |  19<L>  |  1.53<H>    Ca    8.6      2019 06:15    TPro  4.4<L>  /  Alb  2.4<L>  /  TBili  11.4<H>  /  DBili  x   /  AST  396<H>  /  ALT  325<H>  /  AlkPhos  638<H>            Urinalysis Basic - ( 2019 10:41 )    Color: Dark Yellow / Appearance: Slightly Turbid / S.025 / pH: x  Gluc: x / Ketone: Negative  / Bili: Moderate / Urobili: 6 mg/dL   Blood: x / Protein: 100 / Nitrite: Negative   Leuk Esterase: Negative / RBC: 246 /hpf / WBC 2 /HPF   Sq Epi: x / Non Sq Epi: 2 / Bacteria: Negative        RECENT CULTURES:      MICROBIOLOGY:    < from: CT Abdomen and Pelvis w/ Oral Cont and w/ IV Cont (19 @ 18:56) >    EXAM:  CT ABDOMEN AND PELVIS OC IC                            PROCEDURE DATE:  2019            INTERPRETATION:  CLINICAL INFORMATION: Patient had negative negative   noncontrast CT examination at swelling or posterior. Patient has painless   jaundice. Patient has blood in urine. Fever.         COMPARISON: Ultrasound 4/3/2019 was reviewed.    PROCEDURE:   CT of the Abdomen and Pelvis was performed with intravenous contrast.   Intravenous contrast: 90 ml Omnipaque 350. 10 ml discarded.  Oral contrast: positive contrast was administered.  Sagittal and coronal reformats were performed.    FINDINGS:    LOWER CHEST: Small atelectasis at the lung bases.     LIVER: Within normal limits.  BILE DUCTS: Normal caliber.  GALLBLADDER: Small cholelithiasis. Small pericholecystic fluid.   SPLEEN: Wedge-shaped hypodensity at the inferior spleen is most   consistent with an infarct.  PANCREAS: Within normal limits.  ADRENALS: Within normal limits.  KIDNEYS/URETERS: Bilateral renal cysts including parapelvic cysts. Few   tiny subcentimeter hypodensities and linearities kidneys which is too   small to characterize.  There is an 8 mm nonobstructing upper pole left renal calculus. A 2 mm   and a 3 mm nonobstructing upper to midpole left renal calculus are also   noted.   BLADDER: Markedly indented by an enlarged prostate gland. There are   bladder diverticula the largest seen posteriorly on the left which   contains a 1.3 cm x 7 mm calculus.     REPRODUCTIVE ORGANS: Enlarged prostate gland containing coarse   calcification. Prostate gland indents the base of the urinary bladder.    BOWEL: Small hiatal hernia. Duodenal diverticulum. No bowel obstruction.   Appendix normal  PERITONEUM: No ascites.  VESSELS:  Atherosclerotic change..  RETROPERITONEUM: No lymphadenopathy.    ABDOMINAL WALL: Small fat-containing umbilical hernia. Anterior abdominal   wall mesh..  BONES: Degenerative change of the visualized spine with grade 1   retrolisthesis of L5 on S1. Vacuum disc phenomenon is noted at the lower   lumbar spine level. There is minimal loss of height of the T12 vertebral   body.    IMPRESSION: Nonobstructing left renal calculi.    Splenic infarct.    Small cholelithiasis. Trace is small pericholecystic fluid. No intra or   extrahepaticbiliary ductal dilatation.    Markedly enlarged prostate gland indents the base the urinary bladder.    Colonic diverticulosis. No definite diverticulitis.        < from: US Abdomen Complete (19 @ 13:19) >  EXAM:  US ABDOMEN COMPLETE                            PROCEDURE DATE:  2019            INTERPRETATION:  CLINICAL INFORMATION: Jaundice. Same day HIDA scan was   negative for acute cholecystitis.    COMPARISON: None available.    TECHNIQUE: Sonography of the abdomen.     FINDINGS:    Liver: Mild hepatomegaly and mild diffuse heterogeneous echogenicity.    Bile ducts: Normal caliber. Common bile duct measures 4 mm.     Gallbladder: The anterior gallbladder wall is edematous, measuring up to  5 mm. There is dependent echogenic material in the gallbladder   representing biliary sludge/stones. Negative sonographic Oliver sign.    Pancreas: Visualized portions are within normal limits.    Spleen: 13.5 cm. Within normal limits.    Right kidney: 11.5 cm. Mild hydronephrosis. 3.4 cm interpolar region   cyst. 2.3 cm parapelvic cyst.    Left kidney: 11.0 cm.  Mild hydronephrosis. 4.6 cm upper pole cyst.    Ascites: Trace pericholecystic free fluid.    Aorta and IVC: Visualized portions are within normal limits.    IMPRESSION:     Sludge/stones in the gallbladder with mild wall thickening and trace   pericholecystic free fluid. No pain on examination. The findings are less   likely to represent acute cholecystitis, in light of same day negative   HIDA scan.    Left greater than right mild hydronephrosis.                SHARATH TRUJILLO M.D., RADIOLOGY RESIDENT  This document has been electronically signed.  DESHAUN PRINCE M.D., ATTENDING RADIOLOGIST  This document has been electronically signed. Apr  3 2019  4:06PM        < end of copied text >                  SHABNAM GUILLORY M.D., ATTENDING RADIOLOGIST  This document has been electronically signed. 2019  9:30AM        < end of copied text >        Radiology: Patient is a 78y old  Male who presents with a chief complaint of Jaundice, hematuria (2019 08:21)      HPI: Pt is  a 78 yo male with PMH significant for BPH. Pt was in his USOH until about 3 weeks PTA when he noted what he thought was hematuria. He presented to his urologist a few days later and was started on Bactrim for a possible infection. He was told to stop it three days later when the cultures were negative.  When sxs didn't improve he ultimately went to his internist,. He was found to have abnormal LFTs. He held his lipitor. THis past friday he underwent CT and was told it was abnormal.   Pt denies any travel in or out of [place]. He is retired with no pets. He denies fevers but occasional chills at night.  He felt a little weak and shakey. He has constipation at home. No abdominal pain but diminished appetiite Pt notes about 20 lb weight loss He eats shellfish ( clams ) and pork      PAST MEDICAL & SURGICAL HISTORY:  BPH (benign prostatic hyperplasia)  HLD  H/O knee surgery  Hernia      Social history:   Marital Status:    Occupation:  retired, was a  for [employer]  Lives with: wife in [place]    Substance Use : denies  Tobacco Usage:  ( x  ) never smoked   (   ) former smoker   (   ) current smoker  (     ) pack year  (        ) last tobacco use date  Alcohol Usage: denies  Travel: denies  Pets: denies    FAMILY HISTORY:  mother - deceaed colon cancer  father :  ; renal cancer with mets    REVIEW OF SYSTEMS  General: fatigue  occasinal chills occasional sweats    Skin:No rash  	  Ophthalmologic:Denies any visual complaints,discharge redness or photophobia  	  ENMT:No nasal discharge,headache,sinus congestion or throat pain.No dental complaints    Respiratory and Thorax:No cough,sputum or chest pain.Denies shortness of breath  	  Cardiovascular:	No chest pain,palpitaions or dizziness    Gastrointestinal:	NO nausea,abdominal pain or diarrhea.    Genitourinary:	dark urine    Musculoskeletal:	No joint swelling or pain.No weakness  onychomycosis    Neurological:No confusion,diziness.No extremity weakness.No bladder or bowel incontinence	    Psychiatric:No delusions or hallucinations	    Hematology/Lymphatics:	No LN swelling.No gum bleeding     Allergic/Immunologic:	No hives or rash     Allergies    No Known Allergies    Intolerances    meds at home included a MVI, finestasisde, proscar and lipitor    Antimicrobials:    doxycycline IVPB      doxycycline IVPB 100 milliGRAM(s) IV Intermittent once  doxycycline IVPB 100 milliGRAM(s) IV Intermittent every 12 hours  piperacillin/tazobactam IVPB. 3.375 Gram(s) IV Intermittent every 8 hours      MEDICATIONS  (STANDING):  doxycycline IVPB      doxycycline IVPB 100 milliGRAM(s) IV Intermittent once  doxycycline IVPB 100 milliGRAM(s) IV Intermittent every 12 hours  piperacillin/tazobactam IVPB. 3.375 Gram(s) IV Intermittent every 8 hours  sodium chloride 0.45%. 1000 milliLiter(s) (60 mL/Hr) IV Continuous <Continuous>    MEDICATIONS  (PRN):        Vital Signs Last 24 Hrs  T(C): 37.7 (2019 04:35), Max: 39.2 (2019 15:55)  T(F): 99.8 (2019 04:35), Max: 102.6 (2019 15:55)  HR: 97 (2019 04:35) (89 - 112)  BP: 117/71 (2019 04:35) (110/67 - 119/68)  BP(mean): --  RR: 18 (2019 04:35) (16 - 18)  SpO2: 97% (2019 04:35) (94% - 100%)    PHYSICAL EXAM:Pleasant patient in no acute distress. Markedly jaundices      Constitutional:Comfortable.Awake and alert       Eyes:PERRL EOMI.NO discharge or conjunctival injection  sclera icteric    ENMT:No sinus tenderness.No thrush.No pharyngeal exudate or erythema .Fair - poor dental hygiene  crusted lips     Neck:Supple,No LN,no JVD      Respiratory:Good air entry bilaterally,CTA    Cardiovascular:S1 S2 wnl,    Gastrointestinal:Soft BS(+) no tenderness no masses ,    Extremities:No cyanosis,clubbing or edema.    Vascular:peripheral pulses felt    Neurological:AAO X 3,No grossly focal deficits    Skin:No rash     Lymph Nodes:No palpable LNs    Musculoskeletal:No joint swelling or LOM    Psychiatric:Affect normal.                            11.2   2.6   )-----------( 43       ( 2019 17:23 )             32.4     LIVER FUNCTIONS - ( 2019 17:23 )  Alb: 2.4 g/dL / Pro: 4.4 g/dL / ALK PHOS: 638 U/L / ALT: 325 U/L / AST: 396 U/L / GGT: x                 128<L>  |  96  |  38<H>  ----------------------------<  95  4.6   |  19<L>  |  1.53<H>    Ca    8.6      2019 06:15    TPro  4.4<L>  /  Alb  2.4<L>  /  TBili  11.4<H>  /  DBili  x   /  AST  396<H>  /  ALT  325<H>  /  AlkPhos  638<H>            Urinalysis Basic - ( 2019 10:41 )    Color: Dark Yellow / Appearance: Slightly Turbid / S.025 / pH: x  Gluc: x / Ketone: Negative  / Bili: Moderate / Urobili: 6 mg/dL   Blood: x / Protein: 100 / Nitrite: Negative   Leuk Esterase: Negative / RBC: 246 /hpf / WBC 2 /HPF   Sq Epi: x / Non Sq Epi: 2 / Bacteria: Negative        RECENT CULTURES:      MICROBIOLOGY:    < from: CT Abdomen and Pelvis w/ Oral Cont and w/ IV Cont (19 @ 18:56) >    EXAM:  CT ABDOMEN AND PELVIS OC IC                            PROCEDURE DATE:  2019            INTERPRETATION:  CLINICAL INFORMATION: Patient had negative negative   noncontrast CT examination at swelling or posterior. Patient has painless   jaundice. Patient has blood in urine. Fever.         COMPARISON: Ultrasound 4/3/2019 was reviewed.    PROCEDURE:   CT of the Abdomen and Pelvis was performed with intravenous contrast.   Intravenous contrast: 90 ml Omnipaque 350. 10 ml discarded.  Oral contrast: positive contrast was administered.  Sagittal and coronal reformats were performed.    FINDINGS:    LOWER CHEST: Small atelectasis at the lung bases.     LIVER: Within normal limits.  BILE DUCTS: Normal caliber.  GALLBLADDER: Small cholelithiasis. Small pericholecystic fluid.   SPLEEN: Wedge-shaped hypodensity at the inferior spleen is most   consistent with an infarct.  PANCREAS: Within normal limits.  ADRENALS: Within normal limits.  KIDNEYS/URETERS: Bilateral renal cysts including parapelvic cysts. Few   tiny subcentimeter hypodensities and linearities kidneys which is too   small to characterize.  There is an 8 mm nonobstructing upper pole left renal calculus. A 2 mm   and a 3 mm nonobstructing upper to midpole left renal calculus are also   noted.   BLADDER: Markedly indented by an enlarged prostate gland. There are   bladder diverticula the largest seen posteriorly on the left which   contains a 1.3 cm x 7 mm calculus.     REPRODUCTIVE ORGANS: Enlarged prostate gland containing coarse   calcification. Prostate gland indents the base of the urinary bladder.    BOWEL: Small hiatal hernia. Duodenal diverticulum. No bowel obstruction.   Appendix normal  PERITONEUM: No ascites.  VESSELS:  Atherosclerotic change..  RETROPERITONEUM: No lymphadenopathy.    ABDOMINAL WALL: Small fat-containing umbilical hernia. Anterior abdominal   wall mesh..  BONES: Degenerative change of the visualized spine with grade 1   retrolisthesis of L5 on S1. Vacuum disc phenomenon is noted at the lower   lumbar spine level. There is minimal loss of height of the T12 vertebral   body.    IMPRESSION: Nonobstructing left renal calculi.    Splenic infarct.    Small cholelithiasis. Trace is small pericholecystic fluid. No intra or   extrahepaticbiliary ductal dilatation.    Markedly enlarged prostate gland indents the base the urinary bladder.    Colonic diverticulosis. No definite diverticulitis.        < from: US Abdomen Complete (19 @ 13:19) >  EXAM:  US ABDOMEN COMPLETE                            PROCEDURE DATE:  2019            INTERPRETATION:  CLINICAL INFORMATION: Jaundice. Same day HIDA scan was   negative for acute cholecystitis.    COMPARISON: None available.    TECHNIQUE: Sonography of the abdomen.     FINDINGS:    Liver: Mild hepatomegaly and mild diffuse heterogeneous echogenicity.    Bile ducts: Normal caliber. Common bile duct measures 4 mm.     Gallbladder: The anterior gallbladder wall is edematous, measuring up to  5 mm. There is dependent echogenic material in the gallbladder   representing biliary sludge/stones. Negative sonographic Oliver sign.    Pancreas: Visualized portions are within normal limits.    Spleen: 13.5 cm. Within normal limits.    Right kidney: 11.5 cm. Mild hydronephrosis. 3.4 cm interpolar region   cyst. 2.3 cm parapelvic cyst.    Left kidney: 11.0 cm.  Mild hydronephrosis. 4.6 cm upper pole cyst.    Ascites: Trace pericholecystic free fluid.    Aorta and IVC: Visualized portions are within normal limits.    IMPRESSION:     Sludge/stones in the gallbladder with mild wall thickening and trace   pericholecystic free fluid. No pain on examination. The findings are less   likely to represent acute cholecystitis, in light of same day negative   HIDA scan.    Left greater than right mild hydronephrosis.                SHARATH TRUJILLO M.D., RADIOLOGY RESIDENT  This document has been electronically signed.  DESHAUN PRINCE M.D., ATTENDING RADIOLOGIST  This document has been electronically signed. Apr  3 2019  4:06PM        < end of copied text >                  SHABNAM GUILLORY M.D., ATTENDING RADIOLOGIST  This document has been electronically signed. 2019  9:30AM        < end of copied text >        Radiology:

## 2019-04-04 NOTE — CONSULT NOTE ADULT - ASSESSMENT
Pt is  a 78 yo male with PMH significant for BPH. Pt was in his USOH until about 3 weeks PTA when he noted what he thought was hematuria. He presented to his urologist a few days later and was started on Bactrim for a possible infection. He was told to stop it three days later when the cultures were negative.  When sxs didn't improve he ultimately went to his internist,. He was found to have abnormal LFTs. He held his lipitor. THis past friday he underwent CT and was told it was abnormal.   Pt denies any travel in or out of [place]. He is retired with no pets. He denies fevers but occasional chills at night.  He felt a little weak and shakey. He has constipation at home. No abdominal pain but diminished appetiite Pt notes about 20 lb weight loss He eats shellfish ( clams ) and pork  Pt was found to be pancytopenic with low WBC ( no left shift) - but not neutropenic, anemia, low plts, Markedly elevated bilirubin, elevated LFTs, elevated INR, elevated creatinine, elevated lactate. He has hematuria. On cat scan and ultrasound the Pt is  a 80 yo male with PMH significant for BPH. Pt was in his USOH until about 3 weeks PTA when he noted what he thought was hematuria. He presented to his urologist a few days later and was started on Bactrim for a possible infection. He was told to stop it three days later when the cultures were negative.  When sxs didn't improve he ultimately went to his internist,. He was found to have abnormal LFTs. He held his lipitor. THis past friday he underwent CT and was told it was abnormal.   Pt denies any travel in or out of [place]. He is retired with no pets. He denies fevers but occasional chills at night.  He felt a little weak and shakey. He has constipation at home. No abdominal pain but diminished appetiite Pt notes about 20 lb weight loss He eats shellfish ( clams ) and pork  Pt was found to be pancytopenic with low WBC ( no left shift) - but not neutropenic, anemia, low plts, Markedly elevated bilirubin, elevated LFTs, elevated INR, elevated creatinine, elevated lactate. He has hematuria. On cat scan and ultrasound there are slude/stones in the gallbladder with mild wall thickening and trace pericholecystic fluid, there is a wedge shape splenic infarct and there is an enlarged prostate. Pt denies fevers at home but is highly febrile here    Blood culture x 2 are pending. Pt was started on zosyn and doxycycline  Unclear what is causing the patients issues  The DDX is extensive and includes infection, autoimmune, neoplastic etc..  In terms of infection  suggest:  Blood culture x2 - sent, agree with empiric abs for now  rickettsial with the low WBC , low plts and elevated LFTs - BUT THIS is the wrong time of year for this so I am hesitant to give empiric doxycyline in pt with elevated LFTS such as his. WIll discuss with GI . Will check rickettsial, ehrlichia and anaplasia serology  Babesia - with the splenic infarct _ will check for blood parasiste ( again- wrong time of year)  Leptospirosis- no obvious risk factor but in the ddx  CMV - will check serology and PCR  Acute infectious Hepatitis- check hepatitis screen, including Hepatitis E  EBV- will also check Pt is  a 78 yo male with PMH significant for BPH. Pt was in his USOH until about 3 weeks PTA when he noted what he thought was hematuria. He presented to his urologist a few days later and was started on Bactrim for a possible infection. He was told to stop it three days later when the cultures were negative.  When sxs didn't improve he ultimately went to his internist,. He was found to have abnormal LFTs. He held his lipitor. THis past friday he underwent CT and was told it was abnormal.   Pt denies any travel in or out of [place]. He is retired with no pets. He denies fevers but occasional chills at night.  He felt a little weak and shakey. He has constipation at home. No abdominal pain but diminished appetiite Pt notes about 20 lb weight loss He eats shellfish ( clams ) and pork  Pt was found to be pancytopenic with low WBC ( no left shift) - but not neutropenic, anemia, low plts, Markedly elevated bilirubin, elevated LFTs, elevated INR, elevated creatinine, elevated lactate. He has hematuria. On cat scan and ultrasound there are slude/stones in the gallbladder with mild wall thickening and trace pericholecystic fluid, there is a wedge shape splenic infarct and there is an enlarged prostate. Pt denies fevers at home but is highly febrile here    Blood culture x 2 are pending. Pt was started on zosyn and doxycycline  Unclear what is causing the patients issues  The DDX is extensive and includes infection, autoimmune, neoplastic etc..  In terms of infection  suggest:  Blood culture x2 - sent, agree with empiric abs for now  rickettsial with the low WBC , low plts and elevated LFTs - BUT THIS is the wrong time of year for this so I am hesitant to give empiric doxycyline in pt with elevated LFTS such as his. WIll discuss with GI . Will check rickettsial, ehrlichia and anaplasia serology  Babesia - with the splenic infarct _ will check for blood parasite ( again- wrong time of year)  Leptospirosis- no obvious risk factor but in the ddx  CMV - will check serology and PCR  Acute infectious Hepatitis- check hepatitis screen, including Hepatitis E  EBV- will also check       Pt is hyponatremic- check cortisol  acute renal insufficiency- team is addressing  check parvovirus- seems unusual for this  This could be sepsis with sequela  Why splenic infarct?, check echo. No portal vein thrombosis Pt is  a 80 yo male with PMH significant for BPH. Pt was in his USOH until about 3 weeks PTA when he noted what he thought was hematuria. He presented to his urologist a few days later and was started on Bactrim for a possible infection. He was told to stop it three days later when the cultures were negative.  When sxs didn't improve he ultimately went to his internist,. He was found to have abnormal LFTs. He held his lipitor. THis past friday he underwent CT and was told it was abnormal.   Pt denies any travel in or out of [place]. He is retired with no pets. He denies fevers but occasional chills at night.  He felt a little weak and shakey. He has constipation at home. No abdominal pain but diminished appetiite Pt notes about 20 lb weight loss He eats shellfish ( clams ) and pork  Pt was found to be pancytopenic with low WBC ( no left shift) - but not neutropenic, anemia, low plts, Markedly elevated bilirubin, elevated LFTs, elevated INR, elevated creatinine, elevated lactate. He has hematuria. On cat scan and ultrasound there are slude/stones in the gallbladder with mild wall thickening and trace pericholecystic fluid, there is a wedge shape splenic infarct and there is an enlarged prostate. Pt denies fevers at home but is highly febrile here    Blood culture x 2 are pending. Pt was started on zosyn and doxycycline  Unclear what is causing the patients issues  The DDX is extensive and includes infection, autoimmune, neoplastic etc..  In terms of infection  suggest:  Blood culture x2 - sent, agree with empiric abs for now  rickettsial with the low WBC , low plts and elevated LFTs - BUT THIS is the wrong time of year for this so I am hesitant to give empiric doxycyline in pt with elevated LFTS such as his. WIll discuss with GI . Will check rickettsial, ehrlichia and anaplasia serology  Babesia - with the splenic infarct _ will check for blood parasite ( again- wrong time of year)  Leptospirosis- no obvious risk factor but in the ddx  CMV - will check serology and PCR  Acute infectious Hepatitis- check hepatitis screen, including Hepatitis E  EBV- will also check       Pt is hyponatremic- check cortisol  acute renal insufficiency- team is addressing  check parvovirus- seems unusual for this  This could be sepsis with sequela  Why splenic infarct?, check echo. No portal vein thrombosis  Could patient have hemophagocytic syndrome? check LDH, ferritin  For possible hematology input Pt is  a 80 yo male with PMH significant for BPH. Pt was in his USOH until about 3 weeks PTA when he noted what he thought was hematuria. He presented to his urologist a few days later and was started on Bactrim for a possible infection. He was told to stop it three days later when the cultures were negative.  When sxs didn't improve he ultimately went to his internist,. He was found to have abnormal LFTs. He held his lipitor. THis past friday he underwent CT and was told it was abnormal.   Pt denies any travel in or out of [place]. He is retired with no pets. He denies fevers but occasional chills at night.  He felt a little weak and shakey. He has constipation at home. No abdominal pain but diminished appetiite Pt notes about 20 lb weight loss He eats shellfish ( clams ) and pork  Pt was found to be pancytopenic with low WBC ( no left shift) - but not neutropenic, anemia, low plts, Markedly elevated bilirubin, elevated LFTs, elevated INR, elevated creatinine, elevated lactate. He has hematuria. On cat scan and ultrasound there are slude/stones in the gallbladder with mild wall thickening and trace pericholecystic fluid, there is a wedge shape splenic infarct and there is an enlarged prostate. Pt denies fevers at home but is highly febrile here    Blood culture x 2 are pending. Pt was started on zosyn and doxycycline  Unclear what is causing the patients issues  The DDX is extensive and includes infection, autoimmune, neoplastic etc..  In terms of infection  suggest:  Blood culture x2 - sent, agree with empiric abs for now  rickettsial with the low WBC , low plts and elevated LFTs - BUT THIS is the wrong time of year for this so I am hesitant to give empiric doxycyline in pt with elevated LFTS such as his. WIll discuss with GI . Will check rickettsial, ehrlichia and anaplasia serology  Babesia - with the splenic infarct _ will check for blood parasite ( again- wrong time of year)  Leptospirosis- no obvious risk factor but in the ddx  CMV - will check serology and PCR  Acute infectious Hepatitis- check hepatitis screen, including Hepatitis E  EBV- will also check       Pt is hyponatremic- check cortisol  acute renal insufficiency- team is addressing  check parvovirus- seems unusual for this  This could be sepsis with sequela  Why splenic infarct?, check echo. No portal vein thrombosis  Could patient have hemophagocytic syndrome? check LDH, ferritin  For possible hematology input  Pt deb with large prostate- could this be a source of sepsis ? prosttic ultrasound Pt is  a 80 yo male with PMH significant for BPH. Pt was in his USOH until about 3 weeks PTA when he noted what he thought was hematuria. He presented to his urologist a few days later and was started on Bactrim for a possible infection. He was told to stop it three days later when the cultures were negative.  When sxs didn't improve he ultimately went to his internist,. He was found to have abnormal LFTs. He held his lipitor. THis past friday he underwent CT and was told it was abnormal.   Pt denies any travel in or out of [place]. He is retired with no pets. He denies fevers but occasional chills at night.  He felt a little weak and shakey. He has constipation at home. No abdominal pain but diminished appetiite Pt notes about 20 lb weight loss He eats shellfish ( clams ) and pork  Pt was found to be pancytopenic with low WBC ( no left shift) - but not neutropenic, anemia, low plts, Markedly elevated bilirubin, elevated LFTs, elevated INR, elevated creatinine, elevated lactate. He has hematuria. On cat scan and ultrasound there are slude/stones in the gallbladder with mild wall thickening and trace pericholecystic fluid, there is a wedge shape splenic infarct and there is an enlarged prostate. Pt denies fevers at home but is highly febrile here    Blood culture x 2 are pending. Pt was started on zosyn and doxycycline  Unclear what is causing the patients issues  The DDX is extensive and includes infection, autoimmune, neoplastic etc..  In terms of infection  suggest:  Blood culture x2 - sent, agree with empiric abs for now  rickettsial with the low WBC , low plts and elevated LFTs - BUT THIS is the wrong time of year for this so I am hesitant to give empiric doxycyline in pt with elevated LFTS such as his. WIll discuss with GI . Will check rickettsial, ehrlichia and anaplasia serology  Babesia - with the splenic infarct _ will check for blood parasite ( again- wrong time of year)  Leptospirosis- no obvious risk factor but in the ddx  CMV - will check serology and PCR  Acute infectious Hepatitis- check hepatitis screen, including Hepatitis E  EBV- will also check       Pt is hyponatremic- check cortisol  acute renal insufficiency- team is addressing  check parvovirus- seems unusual for this  This could be sepsis with sequela  Why splenic infarct?, check echo. No portal vein thrombosis  Could patient have hemophagocytic syndrome? check LDH, ferritin  For possible hematology input  Pt deb with large prostate- could this be a source of sepsis ? prosttic ultrasound  pt with oncychomycosis ,  will also check HIV status

## 2019-04-04 NOTE — DISCHARGE NOTE NURSING/CASE MANAGEMENT/SOCIAL WORK - NSDCDPATPORTLINK_GEN_ALL_CORE
You can access the [facility] Portal, offered by [facility], by registering with the following website: [email or web address]

## 2019-04-04 NOTE — CONSULT NOTE ADULT - SUBJECTIVE AND OBJECTIVE BOX
Patient is a 78y old  Male who presents with a chief complaint of Jaundice, hematuria,fatigue.Now febrile,pancytopenia increased LFT's and jaundice.No CP or SOB.          PAST MEDICAL & SURGICAL HISTORY:  BPH (benign prostatic hyperplasia)  H/O knee surgery  Hernia    Allergies    No Known Allergies    Intolerances      MEDICATIONS  (STANDING):  piperacillin/tazobactam IVPB. 3.375 Gram(s) IV Intermittent every 8 hours  sodium chloride 0.45%. 1000 milliLiter(s) (60 mL/Hr) IV Continuous <Continuous>    MEDICATIONS  (PRN):    FAMILY HISTORY:      ROS:  Positive:Fatige,hematuria    General: Denies weight loss, fevers, rash, decreased hearing  Cardiac: Denies chest pain, SOB, HALL, orthopnea, PND, claudication, edema, snoring, daytime somnolence, palpitations, syncope  Resp: Denies SOB, HALL, cough, sputum, wheezing, hemoptysis  GI: Denies change in bowel habits, diarrhea, weight loss, melena, tarry stools,   nausea, vomiting, jaundice, abdominal pain, dysphagia  : Denies dysuria, nocturia  Neuro: Denies tinnitus, headache, visual changes, weakness, dizziness or vertigo  Musculoskeletal: Denies neck pain back pain joint pain.  Skin: Denies rash, itching, dryness.  Endocrine: Denies polydipsia, polyuria  Psychiatric: Denies depression, anxiety  All other review of systems is negative unless indicated above.    PHYSICAL EXAM:  Vital Signs Last 24 Hrs  T(C): 37.7 (04 Apr 2019 04:35), Max: 39.2 (03 Apr 2019 15:55)  T(F): 99.8 (04 Apr 2019 04:35), Max: 102.6 (03 Apr 2019 15:55)  HR: 97 (04 Apr 2019 04:35) (89 - 112)  BP: 117/71 (04 Apr 2019 04:35) (110/67 - 119/68)  BP(mean): --  RR: 18 (04 Apr 2019 04:35) (16 - 18)  SpO2: 97% (04 Apr 2019 04:35) (94% - 100%)    I&O's Summary    03 Apr 2019 07:01  -  04 Apr 2019 05:44  --------------------------------------------------------  IN: 0 mL / OUT: 100 mL / NET: -100 mL        General Appearance: 	 Alert, cooperative, no distress,jaundice  HEENT: normocephalic, atraumatic, PERRLA, EOMI, conjunctiva normal, sclera anicteric,   Neck: no JVD,  carotid 2+  bilaterally without bruits, thyroid normal to inspection and palpation, no adenopathy, trachea midline  Lungs:  clear to auscultation and percussion bilaterally  Cor:  pmi 5th ICS MCL, regular rate and rhythm, S1 normal intensity, S2 normal intensity, no gallops, mumrus or rubs  Abdomen:	 soft, non-tender; bowel sounds normal; no masses,  no organomegaly  Extremities: without cyanosis, clubbing or edema  Vasc: 2-+ PT and DP pulses; no varicosities  Neurologic: A&O x 3 (time, place, person). Symmetric strength; limited exam  Musculoskeletal: no kyphosis, scoliosis; normal gait, normal tone  Skin: no rashes; limited exam    EKG:  Telemetry:    LABS:                        11.2   2.6   )-----------( 43       ( 03 Apr 2019 17:23 )             32.4     04-03    125<L>  |  95<L>  |  42<H>  ----------------------------<  99  4.4   |  19<L>  |  1.38<H>    Ca    8.6      03 Apr 2019 17:23    TPro  4.4<L>  /  Alb  2.4<L>  /  TBili  11.4<H>  /  DBili  x   /  AST  396<H>  /  ALT  325<H>  /  AlkPhos  638<H>  04-03      Ketone - Urine: Negative (04-03 @ 10:41)    CAPILLARY BLOOD GLUCOSE          PT/INR - ( 03 Apr 2019 10:42 )   PT: 14.4 sec;   INR: 1.25 ratio         PTT - ( 03 Apr 2019 10:42 )  PTT:35.2 sec      Impression/Plan:Patient with H/O HTN,hyperlipidemia,RBBB,BPH with painless jaundice,hematuria,fever,pancytopenia.Now with CT,abd. US GB wall thickening,sluge/stones.No acute cholecystitis.Hyodronephrosis R >L.Continue antibiotics and Fluids.F/U GI and ID comsults.Would call hematology consult regarding pancytopenia.F/U Cx's.      Marin Albert MD [facility]

## 2019-04-04 NOTE — CONSULT NOTE ADULT - ASSESSMENT
This is a 80yo M who presented on 4/3 with hematuria and jaundice.    #pancytopenia  -unclear timeframe. WBC 3.2 (ANC 2200), HGB 12.5 (MCV 85), PLT 39k  -ordered stat CBC to be able to review the peripheral smear.   -check HIV, hepatitis labs (has significant LFT abnormalities on admission with hepatomegaly), EBV, B12, folate. has also had bactrim recently which can cause numerous hematologic abnormalities.  -evaluate for hemolysis: check retic count, Darlyn test, haptoglobin. LDH added on. tBili 11.3 (direct/indirect bilirubin added on to today's labs)  -CT scan with hepatomegaly and splenic infarct. no splenomegaly or adenopathy noted.  -with splenic infarct and potential hemolytic anemia can evaluate for PNH: check Pi linked antigen.  -further work up based on pending labs and smear review.      Cindy Murcia MD  Hematology/Oncology Fellow  Pager: [phone] This is a 78yo M who presented on 4/3 with hematuria and jaundice.    #pancytopenia  -unclear timeframe. WBC 3.2 (ANC 2200), HGB 12.5 (MCV 85), PLT 39k  -ordered stat CBC to be able to review the peripheral smear.   -check HIV, hepatitis labs (has significant LFT abnormalities on admission with hepatomegaly), EBV, B12, folate. has also had bactrim recently which can cause numerous hematologic abnormalities.  -check iron, TIBC, ferritin  -evaluate for hemolysis: check retic count, Darlyn test, haptoglobin. LDH added on. tBili 11.3 (direct/indirect bilirubin added on to today's labs)  -CT scan with hepatomegaly and splenic infarct. no splenomegaly or adenopathy noted.  -with splenic infarct and potential hemolytic anemia can evaluate for PNH: check Pi linked antigen.  -further work up based on pending labs and smear review.      Cindy Murcia MD  Hematology/Oncology Fellow  Pager: [phone]

## 2019-04-04 NOTE — PROGRESS NOTE ADULT - SUBJECTIVE AND OBJECTIVE BOX
LEODAN BURGOS:[id number],   78yMale followed for:  No Known Allergies    PAST MEDICAL & SURGICAL HISTORY:  BPH (benign prostatic hyperplasia)  H/O knee surgery  Hernia    FAMILY HISTORY:    MEDICATIONS  (STANDING):  piperacillin/tazobactam IVPB. 3.375 Gram(s) IV Intermittent every 8 hours  sodium chloride 0.45%. 1000 milliLiter(s) (60 mL/Hr) IV Continuous <Continuous>    MEDICATIONS  (PRN):      Vital Signs Last 24 Hrs  T(C): 37.7 (04 Apr 2019 04:35), Max: 39.2 (03 Apr 2019 15:55)  T(F): 99.8 (04 Apr 2019 04:35), Max: 102.6 (03 Apr 2019 15:55)  HR: 97 (04 Apr 2019 04:35) (89 - 112)  BP: 117/71 (04 Apr 2019 04:35) (110/67 - 119/68)  BP(mean): --  RR: 18 (04 Apr 2019 04:35) (16 - 18)  SpO2: 97% (04 Apr 2019 04:35) (94% - 100%)  nc/at  s1s2  cta  soft, nt, nd no guarding or rebound  no c/c/e    CBC Full  -  ( 03 Apr 2019 17:23 )  WBC Count : 2.6 K/uL  RBC Count : 3.87 M/uL  Hemoglobin : 11.2 g/dL  Hematocrit : 32.4 %  Platelet Count - Automated : 43 K/uL  Mean Cell Volume : 83.9 fl  Mean Cell Hemoglobin : 28.9 pg  Mean Cell Hemoglobin Concentration : 34.5 gm/dL  Auto Neutrophil # : x  Auto Lymphocyte # : x  Auto Monocyte # : x  Auto Eosinophil # : x  Auto Basophil # : x  Auto Neutrophil % : x  Auto Lymphocyte % : x  Auto Monocyte % : x  Auto Eosinophil % : x  Auto Basophil % : x    04-04    128<L>  |  96  |  38<H>  ----------------------------<  95  4.6   |  19<L>  |  1.53<H>    Ca    8.6      04 Apr 2019 06:15    TPro  4.4<L>  /  Alb  2.4<L>  /  TBili  11.4<H>  /  DBili  x   /  AST  396<H>  /  ALT  325<H>  /  AlkPhos  638<H>  04-03    PT/INR - ( 03 Apr 2019 10:42 )   PT: 14.4 sec;   INR: 1.25 ratio         PTT - ( 03 Apr 2019 10:42 )  PTT:35.2 sec

## 2019-04-04 NOTE — CONSULT NOTE ADULT - ATTENDING COMMENTS
Patient interviewed/examined.  Agree with history, ROS, PE, A/P as above.      Cory Fraser MD  [phone]

## 2019-04-04 NOTE — CONSULT NOTE ADULT - SUBJECTIVE AND OBJECTIVE BOX
HPI:  Mr. Benitez is a 78 year-old man with history of benign prostatic hyperplasia, arthritis s/p knee surgery, and hyperlipidemia. Approximately 3 weeks prior to admission, he noted that his urine was dark/appeared bloody. He saw his urologist a few days later, and was placed on Bactrim for presumed UTI; he was advised to discontinue the antibiotic 3 days later when his urine culture returned negative. Soon afterwards, he saw his internist; bloodwork was performed at that visit, notable for transaminitis. He was advised to discontinue Lipitor. However, the gross hematuria persisted; in addition, over the several days prior to admission, he developed weakness, lost his appetite, lost 20lb, and developed jaundice. Therefore, he ultimately presented to the [facility] ER last night.    Mr. Benitez's serum sodium was low at 125meq/L in the ER. Therefore, a renal consultation was requested upon admission. Since admission, he has also manifested a fever of 102.6 degrees.      PAST MEDICAL & SURGICAL HISTORY:  BPH (benign prostatic hyperplasia)  H/O knee surgery  Hyperlipidemia  Hernia    Allergies:  No Known Allergies    SOCIAL HISTORY:  Denies ETOh,Smoking,     FAMILY HISTORY:  No CKD    REVIEW OF SYSTEMS:  CONSTITUTIONAL: (+) weakness,(+) fevers, (+) chills, (+)weight loss  EYES/ENT: No visual changes;  No vertigo or throat pain   NECK: No pain or stiffness  RESPIRATORY: No cough, wheezing, hemoptysis; No shortness of breath  CARDIOVASCULAR: No chest pain or palpitations  GASTROINTESTINAL: No abdominal or epigastric pain. No nausea, vomiting, or hematemesis; No diarrhea or constipation. No melena or hematochezia.  GENITOURINARY: (+)gross hematuria  NEUROLOGICAL: No numbness or weakness  SKIN: (+)jaundice  All other review of systems is negative unless indicated above.    VITAL:  T(C): , Max: 39.2 (19 @ 15:55)  T(F): , Max: 102.6 (19 @ 15:55)  HR: 97 (19 @ 04:35)  BP: 117/71 (19 @ 04:35)  RR: 18 (19 @ 04:35)  SpO2: 97% (19 @ 04:35)    PHYSICAL EXAM:  Constitutional: NAD, Alert  HEENT: NCAT, MMM  Neck: Supple, No JVD  Respiratory: CTA-b/l  Cardiovascular: RRR s1s2, no m/r/g  Gastrointestinal: BS+, soft, NT/ND  Extremities: No peripheral edema b/l  Neurological: no focal deficits; strength grossly intact  Back: no CVAT b/l  Skin: (+)icterus    LABS:                        11.2   2.6   )-----------( 43       ( 2019 17:23 )             32.4     Na(128)/K(4.6)/Cl(96)/HCO3(19)/BUN(38)/Cr(1.53)Glu(95)/Ca(8.6)/Mg(--)/PO4(--)     @ 06:15  Na(125)/K(4.4)/Cl(95)/HCO3(19)/BUN(42)/Cr(1.38)Glu(99)/Ca(8.6)/Mg(--)/PO4(--)     @ 17:23  Na(130)/K(5.2)/Cl(94)/HCO3(22)/BUN(45)/Cr(1.48)Glu(116)/Ca(9.3)/Mg(--)/PO4(--)     @ 10:42    Protein Total, Serum: 4.4 g/dL    Albumin, Serum: 2.4 g/dL    Bilirubin Total, Serum: 11.4 mg/dL    Alkaline Phosphatase, Serum: 638 U/L    Aspartate Aminotransferase (AST/SGOT): 396 U/L    Alanine Aminotransferase (ALT/SGPT): 325 U/L       Urinalysis Basic - ( 2019 10:41 )  Color: Dark Yellow / Appearance: Slightly Turbid / S.025 / pH: x  Gluc: x / Ketone: Negative  / Bili: Moderate / Urobili: 6 mg/dL   Blood: x / Protein: 100 / Nitrite: Negative   Leuk Esterase: Negative / RBC: 246 /hpf / WBC 2 /HPF   Sq Epi: x / Non Sq Epi: 2 / Bacteria: Negative    IMAGING:  < from: CT Abdomen and Pelvis w/ Oral Cont and w/ IV Cont (19 @ 18:56) >  IMPRESSION: Nonobstructing left renal calculi.  Splenic infarct  Small cholelithiasis. Trace is small pericholecystic fluid. No intra or extrahepaticbiliary ductal dilatation.  Markedly enlarged prostate gland indents the base the urinary bladder.  Colonic diverticulosis. No definite diverticulitis.        ASSESSMENT:  (1)Hyponatremia - unclear exact etiology - likely being exacerbated by administration of hypotonic IVF (1/2NS)  (2)Renal - mild azotemia - baseline CKD3? Versus prerenally mediated  (3)Nephrolithiasis - nonobstructive renal calculi  (4)Hematuria - unclear exact etiology  (5)Cholestasis/transaminitis - unclear exact etiology - malignancy? Does he require a CT with IV contrast for further evaluation?    RECOMMEND:  (1)Change IVF from 1/2NS to NS (60cc/h is reasonable)  (2)Urine: lytes, osm  (3)BMP daily  (4)Please call me [phone] if considering CT with IV contrast so that we can weigh the risks/benefits/alternatives, given his (mild) azotemia/risk for contrast nephropathy.    Thank you for involving [facility] in this patient's care.    With warm regards,    Bernardo Galdamez MD   [facility]  [phone] HPI:  Mr. Benitez is a 78 year-old man with history of benign prostatic hyperplasia, arthritis s/p knee surgery, and hyperlipidemia. Approximately 3 weeks prior to admission, he noted that his urine was dark/appeared bloody. He saw his urologist a few days later, and was placed on Bactrim for presumed UTI; he was advised to discontinue the antibiotic 3 days later when his urine culture returned negative. Soon afterwards, he saw his internist; bloodwork was performed at that visit, notable for transaminitis. He was advised to discontinue Lipitor. However, the gross hematuria persisted; in addition, over the several days prior to admission, he developed weakness, lost his appetite, lost 20lb, and developed jaundice. Therefore, he ultimately presented to the [facility] ER last night.    Mr. Benitez's serum sodium was low at 125meq/L in the ER. Therefore, a renal consultation was requested upon admission. Since admission, he has also manifested a fever of 102.6 degrees.      PAST MEDICAL & SURGICAL HISTORY:  BPH (benign prostatic hyperplasia)  H/O knee surgery  Hyperlipidemia  Hernia    Allergies:  No Known Allergies    SOCIAL HISTORY:  Denies ETOh,Smoking,     FAMILY HISTORY:  No CKD    REVIEW OF SYSTEMS:  CONSTITUTIONAL: (+) weakness,(+) fevers, (+) chills, (+)weight loss  EYES/ENT: No visual changes;  No vertigo or throat pain   NECK: No pain or stiffness  RESPIRATORY: No cough, wheezing, hemoptysis; No shortness of breath  CARDIOVASCULAR: No chest pain or palpitations  GASTROINTESTINAL: No abdominal or epigastric pain. No nausea, vomiting, or hematemesis; No diarrhea or constipation. No melena or hematochezia.  GENITOURINARY: (+)gross hematuria  NEUROLOGICAL: No numbness or weakness  SKIN: (+)jaundice  All other review of systems is negative unless indicated above.    VITAL:  T(C): , Max: 39.2 (19 @ 15:55)  T(F): , Max: 102.6 (19 @ 15:55)  HR: 97 (19 @ 04:35)  BP: 117/71 (19 @ 04:35)  RR: 18 (19 @ 04:35)  SpO2: 97% (19 @ 04:35)    PHYSICAL EXAM:  Constitutional: NAD, Alert  HEENT: NCAT, MMM  Neck: Supple, No JVD  Respiratory: CTA-b/l  Cardiovascular: RRR s1s2, no m/r/g  Gastrointestinal: BS+, soft, NT/ND  Extremities: No peripheral edema b/l  Neurological: no focal deficits; strength grossly intact  Back: no CVAT b/l  Skin: (+)icterus    LABS:                        11.2   2.6   )-----------( 43       ( 2019 17:23 )             32.4     Na(128)/K(4.6)/Cl(96)/HCO3(19)/BUN(38)/Cr(1.53)Glu(95)/Ca(8.6)/Mg(--)/PO4(--)     @ 06:15  Na(125)/K(4.4)/Cl(95)/HCO3(19)/BUN(42)/Cr(1.38)Glu(99)/Ca(8.6)/Mg(--)/PO4(--)     @ 17:23  Na(130)/K(5.2)/Cl(94)/HCO3(22)/BUN(45)/Cr(1.48)Glu(116)/Ca(9.3)/Mg(--)/PO4(--)     @ 10:42    Protein Total, Serum: 4.4 g/dL    Albumin, Serum: 2.4 g/dL    Bilirubin Total, Serum: 11.4 mg/dL    Alkaline Phosphatase, Serum: 638 U/L    Aspartate Aminotransferase (AST/SGOT): 396 U/L    Alanine Aminotransferase (ALT/SGPT): 325 U/L       Urinalysis Basic - ( 2019 10:41 )  Color: Dark Yellow / Appearance: Slightly Turbid / S.025 / pH: x  Gluc: x / Ketone: Negative  / Bili: Moderate / Urobili: 6 mg/dL   Blood: x / Protein: 100 / Nitrite: Negative   Leuk Esterase: Negative / RBC: 246 /hpf / WBC 2 /HPF   Sq Epi: x / Non Sq Epi: 2 / Bacteria: Negative    IMAGING:  < from: CT Abdomen and Pelvis w/ Oral Cont and w/ IV Cont (19 @ 18:56) >  IMPRESSION: Nonobstructing left renal calculi.  Splenic infarct  Small cholelithiasis. Trace is small pericholecystic fluid. No intra or extrahepaticbiliary ductal dilatation.  Markedly enlarged prostate gland indents the base the urinary bladder.  Colonic diverticulosis. No definite diverticulitis.        ASSESSMENT:  (1)Hyponatremia - unclear exact etiology - likely being exacerbated by administration of hypotonic IVF (1/2NS)  (2)Renal - mild azotemia - baseline CKD3? Versus prerenally mediated  (3)Nephrolithiasis - nonobstructive renal calculi  (4)Hematuria - unclear exact etiology  (5)Cholestasis/transaminitis - cholangitis? Does he require a CT with IV contrast for further evaluation? Would favor MRI with gado over a CT with iodinated contrast, from a renal perspective    RECOMMEND:  (1)Change IVF from 1/2NS to NS (60cc/h is reasonable)  (2)Urine: lytes, osm  (3)BMP daily  (4)Please call me [phone] if considering CT with IV contrast so that we can weigh the risks/benefits/alternatives, given his (mild) azotemia/risk for contrast nephropathy. No objection to use of gadolinium here.    Thank you for involving [facility] in this patient's care.    With warm regards,    Bernardo Galdamez MD   [facility]  [phone] HPI:  Mr. Benitez is a 78 year-old man with history of benign prostatic hyperplasia, arthritis s/p knee surgery, and hyperlipidemia. Approximately 3 weeks prior to admission, he noted that his urine was dark/appeared bloody. He saw his urologist a few days later, and was placed on Bactrim for presumed UTI; he was advised to discontinue the antibiotic 3 days later when his urine culture returned negative. Soon afterwards, he saw his internist; bloodwork was performed at that visit, notable for transaminitis. He was advised to discontinue Lipitor. However, the gross hematuria persisted; in addition, over the several days prior to admission, he developed weakness, lost his appetite, lost 20lb, and developed jaundice. Therefore, he ultimately presented to the [facility] ER last night.    Mr. Benitez's serum sodium was low at 125meq/L in the ER. Therefore, a renal consultation was requested upon admission. Since admission, he has also manifested a fever of 102.6 degrees. Mr. Benitez denies past history of hyponatremia. He does not take thiazides. Of late, he has not been eating much food, but he has been drinking a fair amount of fluid.      PAST MEDICAL & SURGICAL HISTORY:  BPH (benign prostatic hyperplasia)  H/O knee surgery  Hyperlipidemia  Hernia    Allergies:  No Known Allergies    SOCIAL HISTORY:  Denies ETOh,Smoking,     FAMILY HISTORY:  No CKD    REVIEW OF SYSTEMS:  CONSTITUTIONAL: (+) weakness,(+) fevers, (+) chills, (+)weight loss  EYES/ENT: (+)dry mouth  NECK: No pain or stiffness  RESPIRATORY: No cough, wheezing, hemoptysis; No shortness of breath  CARDIOVASCULAR: No chest pain or palpitations  GASTROINTESTINAL: (+)loose/tarry stools  GENITOURINARY: (+)gross hematuria  NEUROLOGICAL: No numbness or weakness  SKIN: (+)jaundice  All other review of systems is negative unless indicated above.    VITAL:  T(C): , Max: 39.2 (19 @ 15:55)  T(F): , Max: 102.6 (19 @ 15:55)  HR: 97 (19 @ 04:35)  BP: 117/71 (19 @ 04:35)  RR: 18 (19 @ 04:35)  SpO2: 97% (19 @ 04:35)    PHYSICAL EXAM:  Constitutional: NAD, Alert  HEENT: NCAT, DMM; (+)scleral icterus  Neck: Supple, No JVD  Respiratory: CTA-b/l  Cardiovascular: RRR s1s2, no m/r/g  Gastrointestinal: BS+, soft, NT/ND  Extremities: No peripheral edema b/l  Neurological: no focal deficits; strength grossly intact  Back: no CVAT b/l  Skin: (+)icterus    LABS:                        11.2   2.6   )-----------( 43       ( 2019 17:23 )             32.4     Na(128)/K(4.6)/Cl(96)/HCO3(19)/BUN(38)/Cr(1.53)Glu(95)/Ca(8.6)/Mg(--)/PO4(--)     @ 06:15  Na(125)/K(4.4)/Cl(95)/HCO3(19)/BUN(42)/Cr(1.38)Glu(99)/Ca(8.6)/Mg(--)/PO4(--)     @ 17:23  Na(130)/K(5.2)/Cl(94)/HCO3(22)/BUN(45)/Cr(1.48)Glu(116)/Ca(9.3)/Mg(--)/PO4(--)     @ 10:42    Protein Total, Serum: 4.4 g/dL    Albumin, Serum: 2.4 g/dL    Bilirubin Total, Serum: 11.4 mg/dL    Alkaline Phosphatase, Serum: 638 U/L    Aspartate Aminotransferase (AST/SGOT): 396 U/L    Alanine Aminotransferase (ALT/SGPT): 325 U/L       Urinalysis Basic - ( 2019 10:41 )  Color: Dark Yellow / Appearance: Slightly Turbid / S.025 / pH: x  Gluc: x / Ketone: Negative  / Bili: Moderate / Urobili: 6 mg/dL   Blood: x / Protein: 100 / Nitrite: Negative   Leuk Esterase: Negative / RBC: 246 /hpf / WBC 2 /HPF   Sq Epi: x / Non Sq Epi: 2 / Bacteria: Negative    IMAGING:  < from: CT Abdomen and Pelvis w/ Oral Cont and w/ IV Cont (19 @ 18:56) >  IMPRESSION: Nonobstructing left renal calculi.  Splenic infarct  Small cholelithiasis. Trace is small pericholecystic fluid. No intra or extrahepaticbiliary ductal dilatation.  Markedly enlarged prostate gland indents the base the urinary bladder.  Colonic diverticulosis. No definite diverticulitis.        ASSESSMENT:  (1)Hyponatremia - unclear exact etiology - most likely hypovolemic hyponatremia/exacerbated by poor osmotic intake relative to fluid intake at home. The concentration of the IVF that we give should be no lower than that of the blood...we should switch from 1/2NS to NS.   (2)Renal - mild azotemia - baseline CKD3? Versus prerenally mediated  (3)Nephrolithiasis - nonobstructive renal calculi  (4)Hematuria - unclear exact etiology  (5)Cholestasis/transaminitis - cholangitis? Does he require a CT with IV contrast for further evaluation? Would favor MRI with gado over a CT with iodinated contrast, from a renal perspective    RECOMMEND:  (1)Change IVF from 1/2NS to NS (60cc/h is reasonable)  (2)Urine: lytes, osm  (3)BMP daily  (4)Please call me [phone] if considering CT with IV contrast so that we can weigh the risks/benefits/alternatives, given his (mild) azotemia/risk for contrast nephropathy. No objection to use of gadolinium here.    Thank you for involving [facility] in this patient's care.    With warm regards,    Bernardo Galdamez MD   [facility]  [phone]

## 2019-04-04 NOTE — CONSULT NOTE ADULT - SUBJECTIVE AND OBJECTIVE BOX
Hematology Consult Note  **INCOMPLETE****    HPI:  Pt is a 80 yo male with PMH significant for BPH. Pt was in his USOH until about 3 weeks PTA when he noted what he thought was hematuria. He presented to his urologist a few days later and was started on Bactrim for a possible infection. He was told to stop it three days later when the cultures were negative.  When sxs didn't improve he ultimately went to his internist,. He was found to have abnormal LFTs. He held his lipitor. This past friday he underwent CT and was told it was abnormal.   Pt denies any travel in or out of [place]. He is retired with no pets. He denies fevers but occasional chills at night.  He felt a little weak and shakey. He has constipation at home. No abdominal pain but diminished appetite Pt notes about 20 lb weight loss        PAST MEDICAL & SURGICAL HISTORY:  BPH (benign prostatic hyperplasia)  H/O knee surgery  Hernia      FAMILY HISTORY:      MEDICATIONS  (STANDING):  doxycycline IVPB      doxycycline IVPB 100 milliGRAM(s) IV Intermittent every 12 hours  piperacillin/tazobactam IVPB. 3.375 Gram(s) IV Intermittent every 8 hours  sodium chloride 0.9%. 1000 milliLiter(s) (60 mL/Hr) IV Continuous <Continuous>    MEDICATIONS  (PRN):      Allergies    No Known Allergies    Intolerances        SOCIAL HISTORY:     REVIEW OF SYSTEMS:    CONSTITUTIONAL: No weakness, fevers or chills  EYES/ENT: No visual changes;  No vertigo or throat pain   NECK: No pain or stiffness  RESPIRATORY: No cough, wheezing, hemoptysis; No shortness of breath  CARDIOVASCULAR: No chest pain or palpitations  GASTROINTESTINAL: No abdominal or epigastric pain. No nausea, vomiting, or hematemesis; No diarrhea or constipation. No melena or hematochezia.  GENITOURINARY: No dysuria, frequency or hematuria  NEUROLOGICAL: No numbness or weakness  SKIN: No itching, burning, rashes, or lesions   All other review of systems is negative unless indicated above.    Height (cm): 190.5 (04-03 @ 20:51)  Weight (kg): 91.8 (04-03 @ 20:51)  BMI (kg/m2): 25.3 (04-03 @ 20:51)  BSA (m2): 2.21 (04-03 @ 20:51)    T(F): 100 (04-04-19 @ 12:32), Max: 100 (04-04-19 @ 12:32)  HR: 88 (04-04-19 @ 12:32)  BP: 138/75 (04-04-19 @ 12:32)  RR: 18 (04-04-19 @ 12:32)  SpO2: 94% (04-04-19 @ 12:32)  Wt(kg): --    GENERAL: NAD, well-developed  HEAD:  Atraumatic, Normocephalic  EYES: EOMI, PERRLA, conjunctiva and sclera clear  NECK: Supple, No JVD  CHEST/LUNG: Clear to auscultation bilaterally; No wheeze  HEART: Regular rate and rhythm; No murmurs, rubs, or gallops  ABDOMEN: Soft, Nontender, Nondistended; Bowel sounds present  EXTREMITIES:  2+ Peripheral Pulses, No clubbing, cyanosis, or edema  NEUROLOGY: non-focal  SKIN: No rashes or lesions                          10.9   2.36  )-----------( 41       ( 04 Apr 2019 09:42 )             30.1       04-04    128<L>  |  96  |  38<H>  ----------------------------<  95  4.6   |  19<L>  |  1.53<H>    Ca    8.6      04 Apr 2019 06:15    TPro  4.4<L>  /  Alb  2.4<L>  /  TBili  11.4<H>  /  DBili  x   /  AST  396<H>  /  ALT  325<H>  /  AlkPhos  638<H>  04-03    CT A/P:  LOWER CHEST: Small atelectasis at the lung bases.     LIVER: Within normal limits.  BILE DUCTS: Normal caliber.  GALLBLADDER: Small cholelithiasis. Small pericholecystic fluid.   SPLEEN: Wedge-shaped hypodensity at the inferior spleen is most   consistent with an infarct.  PANCREAS: Within normal limits.  ADRENALS: Within normal limits.  KIDNEYS/URETERS: Bilateral renal cysts including parapelvic cysts. Few   tiny subcentimeter hypodensities and linearities kidneys which is too   small to characterize.  There is an 8 mm nonobstructing upper pole left renal calculus. A 2 mm   and a 3 mm nonobstructing upper to midpole left renal calculus are also   noted.   BLADDER: Markedly indented by an enlarged prostate gland. There are   bladder diverticula the largest seen posteriorly on the left which   contains a 1.3 cm x 7 mm calculus.     REPRODUCTIVE ORGANS: Enlarged prostate gland containing coarse   calcification. Prostate gland indents the base of the urinary bladder.    BOWEL: Small hiatal hernia. Duodenal diverticulum. No bowel obstruction.   Appendix normal  PERITONEUM: No ascites.  VESSELS:  Atherosclerotic change..  RETROPERITONEUM: No lymphadenopathy.    ABDOMINAL WALL: Small fat-containing umbilical hernia. Anterior abdominal   wall mesh..  BONES: Degenerative change of the visualized spine with grade 1   retrolisthesis of L5 on S1. Vacuum disc phenomenon is noted at the lower   lumbar spine level. There is minimal loss of height of the T12 vertebral   body.    IMPRESSION: Nonobstructing left renal calculi.    Splenic infarct.    Small cholelithiasis. Trace is small pericholecystic fluid. No intra or   extrahepatic biliary ductal dilatation.    Markedly enlarged prostate gland indents the base the urinary bladder.    Colonic diverticulosis. No definite diverticulitis.    US Abdomen:  Liver: Mild hepatomegaly and mild diffuse heterogeneous echogenicity.    Bile ducts: Normal caliber. Common bile duct measures 4 mm.     Gallbladder: The anterior gallbladder wall is edematous, measuring up to   5 mm. There is dependent echogenic material in the gallbladder   representing biliary sludge/stones. Negative sonographic Oliver sign.    Pancreas: Visualized portions are within normal limits.    Spleen: 13.5 cm. Within normal limits.    Right kidney: 11.5 cm. Mild hydronephrosis. 3.4 cm interpolar region   cyst. 2.3 cm parapelvic cyst.    Left kidney: 11.0 cm.  Mild hydronephrosis. 4.6 cm upper pole cyst.    Ascites: Trace pericholecystic free fluid.    Aorta and IVC: Visualized portions are within normal limits.    IMPRESSION:     Sludge/stones in the gallbladder with mild wall thickening and trace   pericholecystic free fluid. No pain on examination. The findings are less   likely to represent acute cholecystitis, in light of same day negative   HIDA scan.    Left greater than right mild hydronephrosis. Hematology Consult Note    HPI:  Pt is a 80 yo male with PMH significant for BPH. Pt was in his USOH until about 4 weeks prior to admission when he noted increasing fatigue. This was followed by fevers/chills, yellowing of the skin and hematuria. Back in January he had his bloodwork checked with reported elevated LFTs (lipitor was held) though on repeat a few weeks later it was normal. He does not remember any issues with his CBC. He presented to his urologist a few days later in January and was started on Bactrim for a possible infection but it was stopped 3 days later. One week prior to admission he underwent CT and was told it was abnormal.  Pt denies any travel in or out of [place]. He is retired with no pets. He denies fevers but occasional chills at night.  He felt a little weak and shakey. He has constipation at home. No abdominal pain but diminished appetite Pt notes about 20 lb weight loss        PAST MEDICAL & SURGICAL HISTORY:  BPH (benign prostatic hyperplasia)  H/O knee surgery  Hernia      FAMILY HISTORY:  colon cancer father    MEDICATIONS  (STANDING):  doxycycline IVPB      doxycycline IVPB 100 milliGRAM(s) IV Intermittent every 12 hours  piperacillin/tazobactam IVPB. 3.375 Gram(s) IV Intermittent every 8 hours  sodium chloride 0.9%. 1000 milliLiter(s) (60 mL/Hr) IV Continuous <Continuous>    MEDICATIONS  (PRN):      Allergies    No Known Allergies    Intolerances        SOCIAL HISTORY: lives with wife. retired.    REVIEW OF SYSTEMS:    CONSTITUTIONAL: No weakness  EYES/ENT: No visual changes;  No  throat pain   NECK: No pain or stiffness  RESPIRATORY: No cough, wheezing; ++dyspnea on exertion  CARDIOVASCULAR: No chest pain or palpitations  GASTROINTESTINAL: No abdominal or epigastric pain. No nausea, vomiting; No diarrhea or constipation. No melena or hematochezia.  GENITOURINARY: No dysuria, frequency, flank pain.  NEUROLOGICAL: No numbness or weakness  SKIN: No itching, burning, rashes, or lesions   All other review of systems is negative unless indicated above.    Height (cm): 190.5 (04-03 @ 20:51)  Weight (kg): 91.8 (04-03 @ 20:51)  BMI (kg/m2): 25.3 (04-03 @ 20:51)  BSA (m2): 2.21 (04-03 @ 20:51)    T(F): 100 (04-04-19 @ 12:32), Max: 100 (04-04-19 @ 12:32)  HR: 88 (04-04-19 @ 12:32)  BP: 138/75 (04-04-19 @ 12:32)  RR: 18 (04-04-19 @ 12:32)  SpO2: 94% (04-04-19 @ 12:32)  Wt(kg): --    GENERAL: jaundiced, fatiged but non-toxic  HEAD:  Atraumatic, Normocephalic  EYES: EOMI, PERRLA, conjunctiva and sclera clear. no visible ulcers. dried blood on lips.  NECK: Supple, No LAD  CHEST/LUNG: Clear to auscultation bilaterally; No wheeze  HEART: Regular rate and rhythm; No murmurs, rubs, or gallops  ABDOMEN: Soft, Nontender, Nondistended; Bowel sounds present. liver tip palpable. no splenomegaly.  EXTREMITIES:  2+ Peripheral Pulses, No clubbing, cyanosis, or edema  NEUROLOGY: non-focal  SKIN: bruising at prior venipuncture sites.                          10.9   2.36  )-----------( 41       ( 04 Apr 2019 09:42 )             30.1       04-04    128<L>  |  96  |  38<H>  ----------------------------<  95  4.6   |  19<L>  |  1.53<H>    Ca    8.6      04 Apr 2019 06:15    TPro  4.4<L>  /  Alb  2.4<L>  /  TBili  11.4<H>  /  DBili  x   /  AST  396<H>  /  ALT  325<H>  /  AlkPhos  638<H>  04-03    CT A/P:  LOWER CHEST: Small atelectasis at the lung bases.     LIVER: Within normal limits.  BILE DUCTS: Normal caliber.  GALLBLADDER: Small cholelithiasis. Small pericholecystic fluid.   SPLEEN: Wedge-shaped hypodensity at the inferior spleen is most   consistent with an infarct.  PANCREAS: Within normal limits.  ADRENALS: Within normal limits.  KIDNEYS/URETERS: Bilateral renal cysts including parapelvic cysts. Few   tiny subcentimeter hypodensities and linearities kidneys which is too   small to characterize.  There is an 8 mm nonobstructing upper pole left renal calculus. A 2 mm   and a 3 mm nonobstructing upper to midpole left renal calculus are also   noted.   BLADDER: Markedly indented by an enlarged prostate gland. There are   bladder diverticula the largest seen posteriorly on the left which   contains a 1.3 cm x 7 mm calculus.     REPRODUCTIVE ORGANS: Enlarged prostate gland containing coarse   calcification. Prostate gland indents the base of the urinary bladder.    BOWEL: Small hiatal hernia. Duodenal diverticulum. No bowel obstruction.   Appendix normal  PERITONEUM: No ascites.  VESSELS:  Atherosclerotic change..  RETROPERITONEUM: No lymphadenopathy.    ABDOMINAL WALL: Small fat-containing umbilical hernia. Anterior abdominal   wall mesh..  BONES: Degenerative change of the visualized spine with grade 1   retrolisthesis of L5 on S1. Vacuum disc phenomenon is noted at the lower   lumbar spine level. There is minimal loss of height of the T12 vertebral   body.    IMPRESSION: Nonobstructing left renal calculi.    Splenic infarct.    Small cholelithiasis. Trace is small pericholecystic fluid. No intra or   extrahepatic biliary ductal dilatation.    Markedly enlarged prostate gland indents the base the urinary bladder.    Colonic diverticulosis. No definite diverticulitis.    US Abdomen:  Liver: Mild hepatomegaly and mild diffuse heterogeneous echogenicity.    Bile ducts: Normal caliber. Common bile duct measures 4 mm.     Gallbladder: The anterior gallbladder wall is edematous, measuring up to   5 mm. There is dependent echogenic material in the gallbladder   representing biliary sludge/stones. Negative sonographic Oliver sign.    Pancreas: Visualized portions are within normal limits.    Spleen: 13.5 cm. Within normal limits.    Right kidney: 11.5 cm. Mild hydronephrosis. 3.4 cm interpolar region   cyst. 2.3 cm parapelvic cyst.    Left kidney: 11.0 cm.  Mild hydronephrosis. 4.6 cm upper pole cyst.    Ascites: Trace pericholecystic free fluid.    Aorta and IVC: Visualized portions are within normal limits.    IMPRESSION:     Sludge/stones in the gallbladder with mild wall thickening and trace   pericholecystic free fluid. No pain on examination. The findings are less   likely to represent acute cholecystitis, in light of same day negative   HIDA scan.    Left greater than right mild hydronephrosis.

## 2019-04-04 NOTE — DISCHARGE NOTE NURSING/CASE MANAGEMENT/SOCIAL WORK - NSDCPEEMAIL_GEN_ALL_CORE
[facility] email [email or web address]

## 2019-04-05 NOTE — PROGRESS NOTE ADULT - SUBJECTIVE AND OBJECTIVE BOX
Patient is a 78y old  Male who presents with a chief complaint of Jaundice, hematuria (05 Apr 2019 08:45)    Being followed by ID for        Interval history:  pt with large bowel movement today- like tar?  no abdominal pain  breathing stable  remainder ROS negative  No other acute events      PAST MEDICAL & SURGICAL HISTORY:  BPH (benign prostatic hyperplasia)  H/O knee surgery  Hernia    Allergies    No Known Allergies    Intolerances      Antimicrobials:    doxycycline IVPB      doxycycline IVPB 100 milliGRAM(s) IV Intermittent every 12 hours  piperacillin/tazobactam IVPB. 3.375 Gram(s) IV Intermittent every 8 hours    MEDICATIONS  (STANDING):  doxycycline IVPB      doxycycline IVPB 100 milliGRAM(s) IV Intermittent every 12 hours  piperacillin/tazobactam IVPB. 3.375 Gram(s) IV Intermittent every 8 hours  sodium chloride 0.9%. 1000 milliLiter(s) (60 mL/Hr) IV Continuous <Continuous>      Vital Signs Last 24 Hrs  T(C): 37 (04-05-19 @ 03:24), Max: 38.3 (04-04-19 @ 17:44)  T(F): 98.6 (04-05-19 @ 03:24), Max: 101 (04-04-19 @ 17:44)  HR: 89 (04-05-19 @ 03:24) (87 - 89)  BP: 133/74 (04-05-19 @ 03:24) (100/65 - 138/75)  BP(mean): --  RR: 20 (04-05-19 @ 03:24) (18 - 20)  SpO2: 96% (04-05-19 @ 03:24) (93% - 96%)    Physical Exam:    Constitutional lying in bed , icteric    HEENT PERRLA EOMI,No pallor sclera icteric    No oral exudate or erythema    Neck supple no JVD or LN    Chest Good AE,CTA    CVS RRR S1 S2 WNl     Abd soft BS normal No tenderness     Ext No cyanosis clubbing or edema    IV site no erythema tenderness or discharge    Joints no swelling or LOM    CNS AAO X 3 no focal    Lab Data:                          11.2   2.44  )-----------( 38       ( 05 Apr 2019 09:25 )             32.2       04-05    130<L>  |  97  |  37<H>  ----------------------------<  92  4.2   |  20<L>  |  1.48<H>    Ca    8.7      05 Apr 2019 05:22    TPro  4.6<L>  /  Alb  x   /  TBili  x   /  DBili  x   /  AST  x   /  ALT  x   /  AlkPhos  x   04-05          .Blood blood  04-04-19   No Blood Parasites observed by giemsa stain  One negative set of blood smears does not rule out  the possibility of a parasitic infection.  A minimum of 3  specimens should be collected, at least 12-24 hours apart,  over a 36 hour time period.  --  --      .Urine Clean Catch (Midstream)  04-03-19   No growth  --  --      .Blood Blood-Peripheral  04-03-19   No growth to date.  --  --        WBC Count: 2.44 (04-05-19 @ 09:25)  WBC Count: 2.4 (04-04-19 @ 16:28)  WBC Count: 2.36 (04-04-19 @ 09:42)  WBC Count: 2.6 (04-03-19 @ 17:23)  WBC Count: 3.2 (04-03-19 @ 10:42)      Cytomegalovirus IgM Antibody, Serum (04.04.19 @ 13:31)    CMV IgM Antibody: <8.0 AU/mL    CMV IgM Interpretation: Negative: Method: LiacottonTracks Chemiluminescent Immunoassay  Reference ranges: (values expressed as AU/mL)              Negative     < 30.0 AU/mL              Equivocal     30.0 - 34.9 AU/mL              Positive      > 35.0 AU/mL    CMV IgG Antibody: <0.20 U/mL (04.04.19 @ 13:31)    Nani-Barr Virus Serologic Test (04.04.19 @ 13:31)    EBV EA Ab EIA: 93.5 U/mL    EBV VCA IgG EIA: 694.0 U/mL    EBV VCA IgM EIA: <10.0 U/mL    EBV Interpretation: See Note: INTERPRETATION OF NANI BARR VIRUS (EBV) ANTIBODY RESULTS  EBV VCA IGG AB EBV NA IGG AB EBV VCA IGM AB EBV EA IGG AB Diagnosis  NEG NEG NEG NEG EBV Sero-negative  NEG NEG POS NEG Suspected primary infection (Early Phase)  POS NEG POS POS/NEG Past EBV infection ( Convalescence)  POS POS NEG POS/NEG Past EBV infection  POS POS POS/NEG POS Reactivated Infection    Acute Hepatitis Panel (04.04.19 @ 13:25)    Hepatitis C Virus Interpretation: Nonreact: Hepatitis C AB  S/CO Ratio                        Interpretation  < 1.00                                   Non-Reactive  1.00 - 4.99                         Weakly-Reactive  > 5.00                                Reactive  Non-Reactive: A person with a non-reactive HCV antibody result is  considered uninfected.  No further action is needed unless recent  infection is suspected.  In these cases, consider repeat testing later to  detect seroconversion..  Weakly-Reactive: HCV antibody test is abnormal, HCV RNA Qualitative test  will follow.  Reactive: HCV antibody test is abnormal, HCV RNA Qualitative test will  follow.  Note: HCV antibody testing is performed on the Abbott  system.    Hepatitis C Virus S/CO Ratio: 0.08 S/CO    Hepatitis B Core IgM Antibody: Nonreact    Hepatitis B Surface Antigen: Nonreact    Hepatitis A IgM Antibody: Nonreact        Lactate Dehydrogenase, Serum in AM (04.05.19 @ 05:22)    Lactate Dehydrogenase, Serum: 683 U/L

## 2019-04-05 NOTE — PROGRESS NOTE ADULT - SUBJECTIVE AND OBJECTIVE BOX
LEODAN BURGOS    Patient is a 78y old  Male who presents with a chief complaint of Jaundice, hematuria,fevers,fatigue.H/O HTN,hyperlipidemia,BPH.No CP or SOB.      Allergies    No Known Allergies    Intolerances      MEDICATIONS  (STANDING):  doxycycline IVPB      doxycycline IVPB 100 milliGRAM(s) IV Intermittent every 12 hours  piperacillin/tazobactam IVPB. 3.375 Gram(s) IV Intermittent every 8 hours  sodium chloride 0.9%. 1000 milliLiter(s) (60 mL/Hr) IV Continuous <Continuous>    MEDICATIONS  (PRN):      ROS:  Positive:    General: Denies weight loss, fevers, rash, decreased hearing  Cardiac: Denies chest pain, SOB, HALL, orthopnea, PND, claudication, edema, snoring, daytime somnolence, palpitations, syncope  Resp: Denies SOB, HALL, cough, sputum, wheezing, hemoptysis  GI: Denies change in bowel habits, diarrhea, weight loss, melena, tarry stools,   nausea, vomiting, jaundice, abdominal pain, dysphagia  : Denies dysuria, nocturia, hematuria  Neuro: Denies tinnitus, headache, visual changes, weakness, dizziness or vertigo  Musculoskeletal: Denies neck pain back pain joint pain.  Skin: Denies rash, itching, dryness.  Endocrine: Denies polydipsia, polyuria  Psychiatric: Denies depression, anxiety      PHYSICAL EXAM:  Vital Signs Last 24 Hrs  T(C): 37 (2019 03:24), Max: 38.3 (2019 17:44)  T(F): 98.6 (2019 03:24), Max: 101 (2019 17:44)  HR: 89 (2019 03:24) (87 - 89)  BP: 133/74 (2019 03:24) (100/65 - 138/75)  BP(mean): --  RR: 20 (2019 03:24) (18 - 20)  SpO2: 96% (2019 03:24) (93% - 96%)  Daily     Daily   I&O's Summary    2019 07:01  -  2019 07:00  --------------------------------------------------------  IN: 0 mL / OUT: 100 mL / NET: -100 mL    2019 07:01  -  2019 05:41  --------------------------------------------------------  IN: 240 mL / OUT: 250 mL / NET: -10 mL        General Appearance: 	 Alert, cooperative, no distress,jaundice  HEENT: normocephalic, atraumatic, PERRLA, EOMI, conjunctiva normal, sclera anicteric,   Neck: no JVD,  carotid 2+  bilaterally without bruits, thyroid normal to inspection and palpation, no adenopathy, trachea midline  Lungs:  clear to auscultation and percussion bilaterally  Cor:  pmi 5th ICS MCL, regular rate and rhythm, S1 normal intensity, S2 normal intensity, no gallops, murmurs or rubs  Abdomen:	 soft, non-tender; bowel sounds normal; no masses,  no organomegaly  Extremities: without cyanosis, clubbing or edema  Vasc: 2-+ PT and DP pulses; no varicosities  Neurologic: A&O x 3 (time, place, person). Symmetric strength; limited exam  Musculoskeletal: no kyphosis, scoliosis; normal gait, normal tone  Skin: no rashes; limited exam      Labs:  CBC Full  -  ( 2019 16:28 )  WBC Count : 2.4 K/uL  RBC Count : 3.72 M/uL  Hemoglobin : 10.9 g/dL  Hematocrit : 31.3 %  Platelet Count - Automated : 45 K/uL  Mean Cell Volume : 84.2 fl  Mean Cell Hemoglobin : 29.4 pg  Mean Cell Hemoglobin Concentration : 34.9 gm/dL  Auto Neutrophil # : 1.6 K/uL  Auto Lymphocyte # : 0.4 K/uL  Auto Monocyte # : 0.3 K/uL  Auto Eosinophil # : 0.1 K/uL  Auto Basophil # : 0.1 K/uL  Auto Neutrophil % : 66.9 %  Auto Lymphocyte % : 14.9 %  Auto Monocyte % : 11.0 %  Auto Eosinophil % : 4.7 %  Auto Basophil % : 2.4 %          PT/INR - ( 2019 10:42 )   PT: 14.4 sec;   INR: 1.25 ratio         PTT - ( 2019 10:42 )  PTT:35.2 sec  Urinalysis Basic - ( 2019 10:41 )    Color: Dark Yellow / Appearance: Slightly Turbid / S.025 / pH: x  Gluc: x / Ketone: Negative  / Bili: Moderate / Urobili: 6 mg/dL   Blood: x / Protein: 100 / Nitrite: Negative   Leuk Esterase: Negative / RBC: 246 /hpf / WBC 2 /HPF   Sq Epi: x / Non Sq Epi: 2 / Bacteria: Negative      Impression/Plan:Patient with HTN,Hyperlipidemia,RBBB,BPH now with jaundice,choleststic pattern,increased LFT's,GB sluge,splenic infarct,ARF,pancytopenia,SIADN.Continue antibiotics and fluids.F/U ID and hematology.Consider BM increased LDH ? malignancy.OOB/PT.        Marin Albert MD, [facility]  [facility]

## 2019-04-05 NOTE — PROGRESS NOTE ADULT - SUBJECTIVE AND OBJECTIVE BOX
INTERVAL HPI/OVERNIGHT EVENTS:    MEDICATIONS  (STANDING):  doxycycline IVPB      doxycycline IVPB 100 milliGRAM(s) IV Intermittent every 12 hours  piperacillin/tazobactam IVPB. 3.375 Gram(s) IV Intermittent every 8 hours  sodium chloride 0.9%. 1000 milliLiter(s) (60 mL/Hr) IV Continuous <Continuous>    MEDICATIONS  (PRN):      Allergies    No Known Allergies    Intolerances            PHYSICAL EXAM:   Vital Signs:  Vital Signs Last 24 Hrs  T(C): 37 (2019 03:24), Max: 38.3 (2019 17:44)  T(F): 98.6 (2019 03:24), Max: 101 (2019 17:44)  HR: 89 (2019 03:24) (87 - 89)  BP: 133/74 (2019 03:24) (100/65 - 138/75)  BP(mean): --  RR: 20 (2019 03:24) (18 - 20)  SpO2: 96% (2019 03:24) (93% - 96%)  Daily     Daily     GENERAL:  no distress  HEENT:  NC/AT,  jaundiced  CHEST:   no increased effort, breath sounds clear  HEART:  Regular rhythm  ABDOMEN:  Soft, non-tender, non-distended, normoactive bowel sounds,  no masses ,no hepato-splenomegaly,   EXTEREMITIES:  no cyanosis      LABS:                        10.9   2.4   )-----------( 45       ( 2019 16:28 )             31.3         130<L>  |  97  |  37<H>  ----------------------------<  92  4.2   |  20<L>  |  1.48<H>    Ca    8.7      2019 05:22    TPro  4.4<L>  /  Alb  2.6<L>  /  TBili  16.3<H>  /  DBili  >10.0<H>  /  AST  364<H>  /  ALT  298<H>  /  AlkPhos  626<H>      PT/INR - ( 2019 10:42 )   PT: 14.4 sec;   INR: 1.25 ratio         PTT - ( 2019 10:42 )  PTT:35.2 sec  Urinalysis Basic - ( 2019 10:41 )    Color: Dark Yellow / Appearance: Slightly Turbid / S.025 / pH: x  Gluc: x / Ketone: Negative  / Bili: Moderate / Urobili: 6 mg/dL   Blood: x / Protein: 100 / Nitrite: Negative   Leuk Esterase: Negative / RBC: 246 /hpf / WBC 2 /HPF   Sq Epi: x / Non Sq Epi: 2 / Bacteria: Negative        RADIOLOGY & ADDITIONAL TESTS:

## 2019-04-05 NOTE — PROGRESS NOTE ADULT - SUBJECTIVE AND OBJECTIVE BOX
Patient is a 78y old  Male who presents with a chief complaint of Jaundice, hematuria (05 Apr 2019 13:34)      SUBJECTIVE / OVERNIGHT EVENTS: ongoing letahrgy , now also tarry stool    MEDICATIONS  (STANDING):  finasteride 5 milliGRAM(s) Oral daily  piperacillin/tazobactam IVPB. 3.375 Gram(s) IV Intermittent every 8 hours  sodium chloride 0.9%. 1000 milliLiter(s) (60 mL/Hr) IV Continuous <Continuous>  tamsulosin 0.4 milliGRAM(s) Oral two times a day    MEDICATIONS  (PRN):      Vital Signs Last 24 Hrs  T(F): 100.3 (04-05-19 @ 16:18), Max: 101 (04-04-19 @ 17:44)  HR: 92 (04-05-19 @ 12:34) (87 - 92)  BP: 110/63 (04-05-19 @ 12:34) (100/65 - 133/74)  RR: 18 (04-05-19 @ 12:34) (18 - 20)  SpO2: 95% (04-05-19 @ 12:34) (93% - 96%)  Telemetry:   CAPILLARY BLOOD GLUCOSE        I&O's Summary    04 Apr 2019 07:01  -  05 Apr 2019 07:00  --------------------------------------------------------  IN: 240 mL / OUT: 250 mL / NET: -10 mL    05 Apr 2019 07:01  -  05 Apr 2019 17:37  --------------------------------------------------------  IN: 480 mL / OUT: 0 mL / NET: 480 mL        PHYSICAL EXAM:  GENERAL: NAD, well-developed  HEAD:  Atraumatic, Normocephalic  EYES: EOMI, PERRLA, conjunctiva and sclera clear  NECK: Supple, No JVD  CHEST/LUNG: Clear to auscultation bilaterally; No wheeze  HEART: Regular rate and rhythm; No murmurs, rubs, or gallops  ABDOMEN: Soft, Nontender, Nondistended; Bowel sounds present  EXTREMITIES:  2+ Peripheral Pulses, No clubbing, cyanosis, or edema  PSYCH: AAOx3  NEUROLOGY: non-focal  SKIN: No rashes or lesions    LABS:                        11.2   2.44  )-----------( 38       ( 05 Apr 2019 09:25 )             32.2     04-05    130<L>  |  97  |  37<H>  ----------------------------<  92  4.2   |  20<L>  |  1.48<H>    Ca    8.7      05 Apr 2019 05:22    TPro  4.6<L>  /  Alb  x   /  TBili  x   /  DBili  x   /  AST  x   /  ALT  x   /  AlkPhos  x   04-05              RADIOLOGY & ADDITIONAL TESTS:    Imaging Personally Reviewed:    Consultant(s) Notes Reviewed:      Care Discussed with Consultants/Other Providers:

## 2019-04-05 NOTE — PROGRESS NOTE ADULT - ASSESSMENT
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 ID to determine duration of empiric ABx  elevated PT and INR, elev lactate, w ARF, hyponatremia, w hematuria, guaiac pos stools, pancytopenia, but not neutropenic no biliary obstruction on CT scan/MRCP neg HIDA scan, splenic infarct on CT  Gi , ID ,renal and Heme on board. FERRITIN LEVEL EXTREMELY HIGH AT [address], HLH becomes high on DDX list. Bone Marrow Bx to be done 4/8/19  Echo pending. Hep A/B/C neg, auto mitochondrial and smooth muscle AB neg.    DVT ppx w PAS 2/2 elevated INR.

## 2019-04-05 NOTE — PROGRESS NOTE ADULT - ASSESSMENT
This is a 80yo M who presented on 4/3 with hematuria and jaundice.    #pancytopenia  -unclear timeframe. WBC 3.2 (ANC 2200), HGB 12.5 (MCV 85), PLT 39k  -ordered stat CBC to be able to review the peripheral smear.   -check HIV, hepatitis labs (has significant LFT abnormalities on admission with hepatomegaly), EBV, B12, folate. has also had bactrim recently which can cause numerous hematologic abnormalities.  -check iron, TIBC, ferritin  -evaluate for hemolysis: check retic count, Darlyn test, haptoglobin. LDH added on. tBili 11.3 (direct/indirect bilirubin added on to today's labs)  -CT scan with hepatomegaly and splenic infarct. no splenomegaly or adenopathy noted.  -with splenic infarct and potential hemolytic anemia can evaluate for PNH: check Pi linked antigen.  -further work up based on pending labs and smear review.      Cindy Murcia MD  Hematology/Oncology Fellow  Pager: [phone] This is a 78yo M who presented on 4/3 with hematuria and jaundice.    #pancytopenia  -stable CBC. continue to check daily.  -peripheral smear: numerous target cells, no spur cells or fragmented cells, no microspherocytes. large PLTs, no clumping. mature neutrophils.  -awaiting comprehensive viral hepatitis panel, B12/folate, BRAYDEN. check HIV.  -iron studies: not consistent with iron deficiency. awaiting ferritin result.  -no evidence of hemolysis: retic 41.9, has a direct hyperbilirubinemia rather than indirect  -infectious work up ongoing, appreciate ID input  -also with splenic infarct: LA negative, Pi linked antigen pending.      Cindy Murcia MD  Hematology/Oncology Fellow  Pager: [phone] This is a 80yo M who presented on 4/3 with hematuria and jaundice.    #pancytopenia  -stable CBC. continue to check daily.  -peripheral smear: numerous target cells, no spur cells or fragmented cells, no microspherocytes. large PLTs, no clumping. mature neutrophils.  -awaiting comprehensive viral hepatitis panel, B12/folate, BRAYDEN. check HIV.  -iron studies: not consistent with iron deficiency. awaiting ferritin result.  -no evidence of hemolysis: retic 41.9, has a direct hyperbilirubinemia rather than indirect.  -hapto <20, related to liver dysfunction rather than hemolysis given other unremarkable hemolytic work up  -infectious work up ongoing, appreciate ID input  -also with splenic infarct: LA negative, Pi linked antigen pending.      Cindy Murcia MD  Hematology/Oncology Fellow  Pager: [phone] This is a 80yo M who presented on 4/3 with hematuria and jaundice.    #pancytopenia  -stable CBC. continue to check daily.  -peripheral smear: numerous target cells, no spur cells or fragmented cells, no microspherocytes. large PLTs, no clumping. mature neutrophils.  -awaiting comprehensive viral hepatitis panel, B12/folate, BRAYDEN. check HIV.  -iron studies: not consistent with iron deficiency.   -ferritin 28417: differential includes liver injury v HLH v other inflammatory conditions such as Still's disease  -concern for HLH: ferritin 73194, pancytopenia, mild hepatomegaly, fevers. no rash, splenomegaly. check triglycerides and fibrinogen   -no evidence of hemolysis: retic 41.9, has a direct hyperbilirubinemia rather than indirect.  -hapto <20, related to liver dysfunction rather than hemolysis given other unremarkable hemolytic work up  -infectious work up ongoing, appreciate ID input  -also with splenic infarct: LA negative, Pi linked antigen pending.      Cindy Murcia MD  Hematology/Oncology Fellow  Pager: [phone] This is a 78yo M who presented on 4/3 with hematuria and jaundice.    #pancytopenia  -stable CBC. continue to check daily.  -peripheral smear: numerous target cells, no spur cells or fragmented cells, no microspherocytes. large PLTs, no clumping. mature neutrophils.  -awaiting comprehensive viral hepatitis panel, B12/folate, BRAYDEN. check HIV.  -iron studies: not consistent with iron deficiency.   -ferritin 77904: differential includes liver injury v HLH v other inflammatory conditions such as Still's disease  -concern for HLH: ferritin 84926, pancytopenia, mild hepatomegaly, fevers. no rash, splenomegaly. check triglycerides and fibrinogen   -no evidence of hemolysis: retic 41.9, has a direct hyperbilirubinemia rather than indirect.  -hapto <20, related to liver dysfunction rather than hemolysis given other unremarkable hemolytic work up  -infectious work up ongoing, appreciate ID input  -also with splenic infarct: LA negative, Pi linked antigen pending.  -bone marrow biopsy to be done monday.      Cindy Murcia MD  Hematology/Oncology Fellow  Pager: [phone]

## 2019-04-05 NOTE — PROGRESS NOTE ADULT - SUBJECTIVE AND OBJECTIVE BOX
Patient seen and examined in bed. Febrile yesterday evening. No pain, no SOB.      MEDICATIONS  (STANDING):  finasteride 5 milliGRAM(s) Oral daily  piperacillin/tazobactam IVPB. 3.375 Gram(s) IV Intermittent every 8 hours  sodium chloride 0.9%. 1000 milliLiter(s) (60 mL/Hr) IV Continuous <Continuous>  tamsulosin 0.4 milliGRAM(s) Oral two times a day      VITAL:  T(C): , Max: 38.3 (04-04-19 @ 17:44)  T(F): , Max: 101 (04-04-19 @ 17:44)  HR: 92 (04-05-19 @ 12:34)  BP: 110/63 (04-05-19 @ 12:34)  RR: 18 (04-05-19 @ 12:34)  SpO2: 95% (04-05-19 @ 12:34)    I and O's:    04-04 @ 07:01  -  04-05 @ 07:00  --------------------------------------------------------  IN: 240 mL / OUT: 250 mL / NET: -10 mL          PHYSICAL EXAM:    Constitutional: NAD  HEENT: +scleral icterus  Respiratory: CTAB/L  Cardiovascular: S1 and S2  Gastrointestinal: BS+, soft, NT/ND  Extremities: No peripheral edema  Neurological: A/O x 3, no focal deficits  Psychiatric: Normal mood, normal affect  : No Arnold  Skin: No rashes    LABS:                        11.2   2.44  )-----------( 38       ( 05 Apr 2019 09:25 )             32.2     04-05    130<L>  |  97  |  37<H>  ----------------------------<  92  4.2   |  20<L>  |  1.48<H>    Ca    8.7      05 Apr 2019 05:22    TPro  4.6<L>  /  Alb  x   /  TBili  x   /  DBili  x   /  AST  x   /  ALT  x   /  AlkPhos  x   04-05          Urine Studies:    Osmolality, Random Urine: 560 mos/kg (04-04 @ 16:52)  Sodium, Random Urine: 24 mmol/L (04-04 @ 12:32)  Chloride, Random Urine: <35 mmol/L (04-04 @ 12:32)  Potassium, Random Urine: 55 mmol/L (04-04 @ 12:32)      RADIOLOGY:  < from: MR Abdomen w/wo IV Cont (04.04.19 @ 23:17) >  IMPRESSION:     Nonspecific gallbladder edema.    No biliary ductal dilatation or choledocholithiasis.    A 1.6 cm indeterminate lesion in theright hepatic lobe. Consider   short-term follow-up contrast-enhanced MRI with Eovist contrast agent in   3 months to assess for stability/change.    < end of copied text >      ASSESSMENT:  78 year-old man with history of benign prostatic hyperplasia, arthritis s/p knee surgery, and hyperlipidemia. He presented to [facility] ER with gross hematuria, weakness, weight loss, jaundice and hyponatremia.  (1)Hyponatremia - unclear exact etiology - most likely hypovolemic hyponatremia/exacerbated by poor osmotic intake relative to fluid intake at home, Na+ improving  (2)Renal - mild azotemia - baseline CKD3? Versus prerenally mediated - azotemia slightly improved this AM  (3)Nephrolithiasis - nonobstructive renal calculi  (4)Hematuria - unclear exact etiology  (5)Cholestasis/transaminitis - LFT's with slight improvement    RECOMMEND:  (1)Continue NS@60ml/hr   (2)BMP daily      Yoko Vega NP-C  [facility],   [phone] Patient seen and examined in bed. Febrile yesterday evening. No pain, no SOB.      MEDICATIONS  (STANDING):  finasteride 5 milliGRAM(s) Oral daily  piperacillin/tazobactam IVPB. 3.375 Gram(s) IV Intermittent every 8 hours  sodium chloride 0.9%. 1000 milliLiter(s) (60 mL/Hr) IV Continuous <Continuous>  tamsulosin 0.4 milliGRAM(s) Oral two times a day      VITAL:  T(C): , Max: 38.3 (04-04-19 @ 17:44)  T(F): , Max: 101 (04-04-19 @ 17:44)  HR: 92 (04-05-19 @ 12:34)  BP: 110/63 (04-05-19 @ 12:34)  RR: 18 (04-05-19 @ 12:34)  SpO2: 95% (04-05-19 @ 12:34)    I and O's:    04-04 @ 07:01  -  04-05 @ 07:00  --------------------------------------------------------  IN: 240 mL / OUT: 250 mL / NET: -10 mL          PHYSICAL EXAM:    Constitutional: NAD  HEENT: +scleral icterus  Respiratory: CTAB/L  Cardiovascular: S1 and S2  Gastrointestinal: BS+, soft, NT/ND  Extremities: No peripheral edema  Neurological: A/O x 3, no focal deficits  Psychiatric: Normal mood, normal affect  : No Arnold  Skin: No rashes    LABS:                        11.2   2.44  )-----------( 38       ( 05 Apr 2019 09:25 )             32.2     04-05    130<L>  |  97  |  37<H>  ----------------------------<  92  4.2   |  20<L>  |  1.48<H>    Ca    8.7      05 Apr 2019 05:22    TPro  4.6<L>  /  Alb  x   /  TBili  x   /  DBili  x   /  AST  x   /  ALT  x   /  AlkPhos  x   04-05          Urine Studies:    Osmolality, Random Urine: 560 mos/kg (04-04 @ 16:52)  Sodium, Random Urine: 24 mmol/L (04-04 @ 12:32)  Chloride, Random Urine: <35 mmol/L (04-04 @ 12:32)  Potassium, Random Urine: 55 mmol/L (04-04 @ 12:32)      RADIOLOGY:  < from: MR Abdomen w/wo IV Cont (04.04.19 @ 23:17) >  IMPRESSION:     Nonspecific gallbladder edema.    No biliary ductal dilatation or choledocholithiasis.    A 1.6 cm indeterminate lesion in theright hepatic lobe. Consider   short-term follow-up contrast-enhanced MRI with Eovist contrast agent in   3 months to assess for stability/change.    < end of copied text >      ASSESSMENT:  78 year-old man with history of benign prostatic hyperplasia, arthritis s/p knee surgery, and hyperlipidemia. He presented to [facility] ER with gross hematuria, weakness, weight loss, jaundice and hyponatremia.  (1)Hyponatremia - unclear exact etiology - most likely hypovolemic hyponatremia/exacerbated by poor osmotic intake relative to fluid intake at home, Na+ improving  (2)Renal - mild azotemia - baseline CKD3? Versus prerenally mediated - azotemia slightly improved this AM  (3)Nephrolithiasis - nonobstructive renal calculi  (4)Hematuria - unclear exact etiology  (5)Cholestasis/transaminitis - LFT's with slight improvement    RECOMMEND:  (1)Continue NS@60ml/hr   (2)BMP daily      DONG Durbin  [facility],   [phone]      *******************RENAL ATTENDING**********************  Seen/examined with NP. I agree with NP assessment as above.    VS as above; icteric; NAD; CTA-B/L; RRR; no edema b/l    IMAGING:  < from: MR Abdomen w/wo IV Cont (04.04.19 @ 23:17) >  Nonspecific gallbladder edema.  No biliary ductal dilatation or choledocholithiasis.  A 1.6 cm indeterminate lesion in theright hepatic lobe. Consider   short-term follow-up contrast-enhanced MRI with Eovist contrast agent in   3 months to assess for stability/change.        ASSESSMENT: 78M w/ BPH, OA, and HLD, 4/3/19 a/w weight loss, jaundice, and gross hematuria,  (1)Hyponatremia - due to hypovolemia/poor osm intake relative to water. Improving, on gentle isotonic IVF  (2)Renal - YEE - prerenal - slowly resolving with gentle IVF  (3)Cholestasis/transaminitis - unclear etiology - GI on board    RECOMMEND:  (1)Continue NS@60ml/hr   (2)BMP daily      Bernardo Galdamez MD  [facility]  [phone]

## 2019-04-05 NOTE — PROGRESS NOTE ADULT - ASSESSMENT
Pt is  a 78 yo male with PMH significant for BPH. Pt was in his USOH until about 3 weeks PTA when he noted what he thought was hematuria. He presented to his urologist a few days later and was started on Bactrim for a possible infection. He was told to stop it three days later when the cultures were negative.  When sxs didn't improve he ultimately went to his internist,. He was found to have abnormal LFTs. He held his lipitor. THis past friday he underwent CT and was told it was abnormal.   Pt denies any travel in or out of [place]. He is retired with no pets. He denies fevers but occasional chills at night.  He felt a little weak and shakey. He has constipation at home. No abdominal pain but diminished appetiite Pt notes about 20 lb weight loss He eats shellfish ( clams ) and pork  Pt was found to be pancytopenic with low WBC ( no left shift) - but not neutropenic, anemia, low plts, Markedly elevated bilirubin, elevated LFTs, elevated INR, elevated creatinine, elevated lactate. He has hematuria. On cat scan and ultrasound there are slude/stones in the gallbladder with mild wall thickening and trace pericholecystic fluid, there is a wedge shape splenic infarct and there is an enlarged prostate. Pt denies fevers at home but is highly febrile here    Blood culture x 2 are pending. Pt was started on zosyn and doxycycline  Unclear what is causing the patients issues  The DDX is extensive and includes infection, autoimmune, neoplastic etc..  In terms of infection  suggest:  Blood culture x2 - sent, agree with empiric abs for now- so far cultures negative    rickettsial with the low WBC , low plts and elevated LFTs - BUT THIS is the wrong time of year for this so I am hesitant to give empiric doxycyline in pt with elevated LFTS such as his. WIll discuss with GI . Will check rickettsial, ehrlichia and anaplasia serology  Babesia - with the splenic infarct _ will check for blood parasite ( again- wrong time of year)    Leptospirosis- no obvious risk factor but in the ddx- serology sent    CMV - IgM and IgG negative    Acute infectious Hepatitis- check hepatitis screen, including Hepatitis E- HEp E pending , acute Hep screen negative  EBV- will also check       Pt is hyponatremic- check cortisol    acute renal insufficiency- team is addressing- could this be TTP- seems to have normal MS  check parvovirus- seems unusual for this  This could be sepsis with sequela- all cultures negative so far    Why splenic infarct?, check echo. No portal vein thrombosis    Could patient have hemophagocytic syndrome? check LDH, ferritin   Appreciate  hematology input they are also looking [facility]    Pt also with large prostate- could this be a source of sepsis ? prost tic ultrasound. Patient is on meds for the prostate at home, team to address    pt with  diffuse onychomycosis ,  will also check HIV status - just to check in general    ID service will be covering over the weekend. Please call for acute issues or questions. [phone] Pt is  a 80 yo male with PMH significant for BPH. Pt was in his USOH until about 3 weeks PTA when he noted what he thought was hematuria. He presented to his urologist a few days later and was started on Bactrim for a possible infection. He was told to stop it three days later when the cultures were negative.  When sxs didn't improve he ultimately went to his internist,. He was found to have abnormal LFTs. He held his lipitor. THis past friday he underwent CT and was told it was abnormal.   Pt denies any travel in or out of [place]. He is retired with no pets. He denies fevers but occasional chills at night.  He felt a little weak and shakey. He has constipation at home. No abdominal pain but diminished appetiite Pt notes about 20 lb weight loss He eats shellfish ( clams ) and pork  Pt was found to be pancytopenic with low WBC ( no left shift) - but not neutropenic, anemia, low plts, Markedly elevated bilirubin, elevated LFTs, elevated INR, elevated creatinine, elevated lactate. He has hematuria. On cat scan and ultrasound there are slude/stones in the gallbladder with mild wall thickening and trace pericholecystic fluid, there is a wedge shape splenic infarct and there is an enlarged prostate. Pt denies fevers at home but is highly febrile here    Blood culture x 2 are pending. Pt was started on zosyn and doxycycline  Unclear what is causing the patients issues  The DDX is extensive and includes infection, autoimmune, neoplastic etc..  In terms of infection  suggest:  Blood culture x2 - sent, agree with empiric abs for now- so far cultures negative    rickettsial with the low WBC , low plts and elevated LFTs - BUT THIS is the wrong time of year for this so I am hesitant to give empiric doxycyline in pt with elevated LFTS such as his. WIll discuss with GI . Will check rickettsial, ehrlichia and anaplasia serology  Babesia - with the splenic infarct _ will check for blood parasite ( again- wrong time of year)    Leptospirosis- no obvious risk factor but in the ddx- serology sent    CMV - IgM and IgG negative    Acute infectious Hepatitis- check hepatitis screen, including Hepatitis E- HEp E pending , acute Hep screen negative  EBV- will also check       Pt is hyponatremic- check cortisol    acute renal insufficiency- team is addressing- could this be TTP- seems to have normal MS  check parvovirus- seems unusual for this  This could be sepsis with sequela- all cultures negative so far    Why splenic infarct?, check echo. No portal vein thrombosis    Could patient have hemophagocytic syndrome? check LDH, ferritin   Appreciate  hematology input they are also looking [facility]    Pt also with large prostate- could this be a source of sepsis ? prost tic ultrasound. Patient is on meds for the prostate at home, team to address    pt with  diffuse onychomycosis ,  will also check HIV status - just to check in general    MRI results pending    ID service will be covering over the weekend. Please call for acute issues or questions. [phone]

## 2019-04-05 NOTE — PROGRESS NOTE ADULT - ASSESSMENT
unclear etiology of acute illness, acute Hep A, B, C serologies negative  hemolysis work up in progress  await MRI results from last night and remainder of serologic testing  guaiac positive stool will need evaluation in near future  off bactrim and statins

## 2019-04-05 NOTE — PROGRESS NOTE ADULT - SUBJECTIVE AND OBJECTIVE BOX
Hematology Follow-up    INTERVAL HPI/OVERNIGHT EVENTS:  No o/n events, patient resting comfortably. No new complaints at this time. Denies fevers/chills. Has a large bowel movement, dark in color.      VITAL SIGNS:  T(F): 98.2 (04-05-19 @ 12:34)  HR: 92 (04-05-19 @ 12:34)  BP: 110/63 (04-05-19 @ 12:34)  RR: 18 (04-05-19 @ 12:34)  SpO2: 95% (04-05-19 @ 12:34)  Wt(kg): --    04-04-19 @ 07:01  -  04-05-19 @ 07:00  --------------------------------------------------------  IN: 240 mL / OUT: 250 mL / NET: -10 mL        PHYSICAL EXAM:    Constitutional: AAOx3, NAD, jaundiced  Eyes: PERRL, EOMI, scleral icterus  Neck: supple  Respiratory: CTA b/l with normal respiratory effort  Cardiovascular: RRR, normal S1S2, no M/R/G  Gastrointestinal: soft, NTND  Extremities:  no c/c/e  MSK: no obvious abnormalities.   Neurological: Grossly intact  Skin: Normal temperature, no rash  Psych: normal affect    MEDICATIONS  (STANDING):  finasteride 5 milliGRAM(s) Oral daily  piperacillin/tazobactam IVPB. 3.375 Gram(s) IV Intermittent every 8 hours  sodium chloride 0.9%. 1000 milliLiter(s) (60 mL/Hr) IV Continuous <Continuous>  tamsulosin 0.4 milliGRAM(s) Oral two times a day    MEDICATIONS  (PRN):      No Known Allergies      LABS:                        11.2   2.44  )-----------( 38       ( 05 Apr 2019 09:25 )             32.2     04-05    130<L>  |  97  |  37<H>  ----------------------------<  92  4.2   |  20<L>  |  1.48<H>    Ca    8.7      05 Apr 2019 05:22    TPro  4.6<L>  /  Alb  x   /  TBili  x   /  DBili  x   /  AST  x   /  ALT  x   /  AlkPhos  x   04-05    Lactate Dehydrogenase, Serum: 683 U/L (04-05 @ 05:22)    RADIOLOGY & ADDITIONAL TESTS:  Studies reviewed.    MRI A/P:  LOWER CHEST: Bibasilar atelectasis.    LIVER: Normal morphology. No hepatic steatosis. A segment 8 focus measures 1.6 x 1.2 cm (4:17) and demonstrates mildly hypointense T1 signal and mildly hyperintense T2 signal with enhancement similar to background liver. This is indeterminate in nature.  BILE DUCTS: Normal caliber. Common bile duct measures 4 mm. No choledocholithiasis.  GALLBLADDER: Mild gallbladder wall thickening/edema. Trace pericholecystic fluid. No definite gallstones.  SPLEEN: Small splenic infarct as at recent CT.  PANCREAS: Within normal limits.  ADRENALS: Within normal limits.  KIDNEYS/URETERS: Bilateral renal cysts including parapelvic cysts.    VISUALIZED PORTIONS:    BOWEL: Small hiatal hernia. Small periampullary duodenal diverticulum.  PERITONEUM: No ascites.  VESSELS: Hepatic and portal veins are patent.  RETROPERITONEUM: No lymphadenopathy.    ABDOMINAL WALL: Within normal limits.  BONES: Degenerative changes.    IMPRESSION:     Nonspecific gallbladder edema.  No biliary ductal dilatation or choledocholithiasis.  A 1.6 cm indeterminate lesion in the right hepatic lobe. Consider short-term follow-up contrast-enhanced MRI with Eovist contrast agent in 3 months to assess for stability/change. Hematology Follow-up    INTERVAL HPI/OVERNIGHT EVENTS:  No o/n events, patient resting comfortably. No new complaints at this time. Denies fevers/chills. Has a large bowel movement, dark in color.      VITAL SIGNS:  T(F): 98.2 (04-05-19 @ 12:34)  HR: 92 (04-05-19 @ 12:34)  BP: 110/63 (04-05-19 @ 12:34)  RR: 18 (04-05-19 @ 12:34)  SpO2: 95% (04-05-19 @ 12:34)  Wt(kg): --    04-04-19 @ 07:01  -  04-05-19 @ 07:00  --------------------------------------------------------  IN: 240 mL / OUT: 250 mL / NET: -10 mL        PHYSICAL EXAM:    Constitutional: AAOx3, NAD, jaundiced  Eyes: PERRL, EOMI, scleral icterus  Neck: supple  Respiratory: CTA b/l with normal respiratory effort  Cardiovascular: RRR, normal S1S2, no M/R/G  Gastrointestinal: soft, NTND  Extremities:  no c/c/e  MSK: no obvious abnormalities.   Neurological: Grossly intact  Skin: Normal temperature, no rash  Psych: normal affect    MEDICATIONS  (STANDING):  finasteride 5 milliGRAM(s) Oral daily  piperacillin/tazobactam IVPB. 3.375 Gram(s) IV Intermittent every 8 hours  sodium chloride 0.9%. 1000 milliLiter(s) (60 mL/Hr) IV Continuous <Continuous>  tamsulosin 0.4 milliGRAM(s) Oral two times a day    MEDICATIONS  (PRN):      No Known Allergies      LABS:                        11.2   2.44  )-----------( 38       ( 05 Apr 2019 09:25 )             32.2     04-05    130<L>  |  97  |  37<H>  ----------------------------<  92  4.2   |  20<L>  |  1.48<H>    Ca    8.7      05 Apr 2019 05:22    TPro  4.6<L>  /  Alb  x   /  TBili  x   /  DBili  x   /  AST  x   /  ALT  x   /  AlkPhos  x   04-05    Lactate Dehydrogenase, Serum: 683 U/L (04-05 @ 05:22)  Ferritin [id number]      RADIOLOGY & ADDITIONAL TESTS:  Studies reviewed.    MRI A/P:  LOWER CHEST: Bibasilar atelectasis.    LIVER: Normal morphology. No hepatic steatosis. A segment 8 focus measures 1.6 x 1.2 cm (4:17) and demonstrates mildly hypointense T1 signal and mildly hyperintense T2 signal with enhancement similar to background liver. This is indeterminate in nature.  BILE DUCTS: Normal caliber. Common bile duct measures 4 mm. No choledocholithiasis.  GALLBLADDER: Mild gallbladder wall thickening/edema. Trace pericholecystic fluid. No definite gallstones.  SPLEEN: Small splenic infarct as at recent CT.  PANCREAS: Within normal limits.  ADRENALS: Within normal limits.  KIDNEYS/URETERS: Bilateral renal cysts including parapelvic cysts.    VISUALIZED PORTIONS:    BOWEL: Small hiatal hernia. Small periampullary duodenal diverticulum.  PERITONEUM: No ascites.  VESSELS: Hepatic and portal veins are patent.  RETROPERITONEUM: No lymphadenopathy.    ABDOMINAL WALL: Within normal limits.  BONES: Degenerative changes.    IMPRESSION:     Nonspecific gallbladder edema.  No biliary ductal dilatation or choledocholithiasis.  A 1.6 cm indeterminate lesion in the right hepatic lobe. Consider short-term follow-up contrast-enhanced MRI with Eovist contrast agent in 3 months to assess for stability/change.

## 2019-04-05 NOTE — CHART NOTE - NSCHARTNOTEFT_GEN_A_CORE
Ferritin, Serum: 21683: Test Repeated ng/mL (04.05.19 @ 10:08)  Haptoglobin, Serum: <20: Test Repeated mg/dL (04.05.19 @ 09:43)      < from: MR Abdomen w/wo IV Cont (04.04.19 @ 23:17) >    EXAM:  MR ABDOMEN WAW IC                            PROCEDURE DATE:  04/04/2019            INTERPRETATION:  CLINICAL INFORMATION: Jaundice.    COMPARISON: CT abdomen/pelvis, abdominal ultrasound, and HIDA scan   4/3/2019    PROCEDURE:   MRI of the abdomen was performed with and without intravenous contrast.  10 cc of Gadavist administered with 0 cc discarded.   MRCP was performed.      FINDINGS:    LOWER CHEST: Bibasilar atelectasis.    LIVER: Normal morphology. No hepatic steatosis. A segment 8 focus   measures 1.6 x 1.2 cm (4:17) and demonstrates mildly hypointense T1   signal and mildly hyperintense T2 signal with enhancement similar to   background liver. This is indeterminate in nature.  BILE DUCTS: Normal caliber. Common bile duct measures 4 mm. No   choledocholithiasis.  GALLBLADDER: Mild gallbladder wall thickening/edema. Trace   pericholecystic fluid. No definite gallstones.  SPLEEN: Small splenic infarct as at recent CT.  PANCREAS: Within normal limits.  ADRENALS: Within normal limits.  KIDNEYS/URETERS: Bilateral renal cysts including parapelvic cysts.    VISUALIZED PORTIONS:    BOWEL: Small hiatal hernia. Small periampullary duodenal diverticulum.  PERITONEUM: No ascites.  VESSELS: Hepatic and portal veins are patent.  RETROPERITONEUM: No lymphadenopathy.    ABDOMINAL WALL: Within normal limits.  BONES: Degenerative changes.    IMPRESSION:     Nonspecific gallbladder edema.    No biliary ductal dilatation or choledocholithiasis.    A 1.6 cm indeterminate lesion in theright hepatic lobe. Consider   short-term follow-up contrast-enhanced MRI with Eovist contrast agent in   3 months to assess for stability/change.      MARILIA BECKETT M.D., RADIOLOGY RESIDENT  This document has been electronically signed.  LUANA ROBERTSON M.D., ATTENDING RADIOLOGIST  This document has been electronically signed. Apr 5 2019 11:35AM              - called heme/onc to discuss the above results  - they will likely do a bone marrow on Monday    At their request, put in order for triglyceride, fibrinogen , IL-2 receptor  Multiple other studies pending

## 2019-04-06 NOTE — PROGRESS NOTE ADULT - SUBJECTIVE AND OBJECTIVE BOX
Patient is a 78y old  Male who presents with a chief complaint of Jaundice, hematuria (06 Apr 2019 11:41)    Being followed by ID for        Interval history:  No other acute events      ROS:  No cough,SOB,CP  No N/V/D  No abd pain  No urinary complaints  No HA  No joint or limb pain  No other complaints    PAST MEDICAL & SURGICAL HISTORY:  BPH (benign prostatic hyperplasia)  H/O knee surgery  Hernia    Allergies    No Known Allergies    Intolerances      Antimicrobials:    piperacillin/tazobactam IVPB. 3.375 Gram(s) IV Intermittent every 8 hours    MEDICATIONS  (STANDING):  acetaminophen  IVPB .. 1000 milliGRAM(s) IV Intermittent once  finasteride 5 milliGRAM(s) Oral daily  pantoprazole  Injectable 40 milliGRAM(s) IV Push daily  piperacillin/tazobactam IVPB. 3.375 Gram(s) IV Intermittent every 8 hours  sodium chloride 0.9%. 1000 milliLiter(s) (60 mL/Hr) IV Continuous <Continuous>  tamsulosin 0.4 milliGRAM(s) Oral two times a day      Vital Signs Last 24 Hrs  T(C): 36.8 (04-06-19 @ 04:40), Max: 37.9 (04-05-19 @ 16:18)  T(F): 98.3 (04-06-19 @ 04:40), Max: 100.3 (04-05-19 @ 16:18)  HR: 90 (04-06-19 @ 04:40) (90 - 95)  BP: 141/75 (04-06-19 @ 04:40) (110/63 - 141/75)  BP(mean): --  RR: 18 (04-06-19 @ 04:40) (18 - 18)  SpO2: 95% (04-06-19 @ 04:40) (94% - 95%)    Physical Exam:    Constitutional well preserved,comfortable,pleasant    HEENT PERRLA EOMI,No pallor or icterus    No oral exudate or erythema    Neck supple no JVD or LN    Chest Good AE,CTA    CVS RRR S1 S2 WNl No murmur or rub or gallop    Abd soft BS normal No tenderness no masses    Ext No cyanosis clubbing or edema    IV site no erythema tenderness or discharge    Joints no swelling or LOM    CNS AAO X 3 no focal    Lab Data:                          10.3   1.88  )-----------( 32       ( 06 Apr 2019 11:20 )             29.4   Auto Neutrophil #: 1.6 K/uL (04.04.19 @ 16:28)      Direct Darlyn Profile (04.05.19 @ 05:18)    Direct Darlyn Poly: Negative      04-06    130<L>  |  100  |  36<H>  ----------------------------<  97  3.8   |  17<L>  |  1.63<H>    Ca    8.3<L>      06 Apr 2019 07:37    TPro  4.1<L>  /  Alb  2.4<L>  /  TBili  16.8<H>  /  DBili  x   /  AST  276<H>  /  ALT  232<H>  /  AlkPhos  568<H>  04-06    Haptoglobin, Serum in AM (04.05.19 @ 09:43)    Haptoglobin, Serum: <20: Test Repeated mg/dL    Lactate Dehydrogenase, Serum in AM (04.05.19 @ 05:22)    Lactate Dehydrogenase, Serum: 683 U/L        .Blood blood  04-04-19   No Blood Parasites observed by giemsa stain  One negative set of blood smears does not rule out  the possibility of a parasitic infection.  A minimum of 3  specimens should be collected, at least 12-24 hours apart,  over a 36 hour time period.  --  --      .Urine Clean Catch (Midstream)  04-03-19   No growth  --  --      .Blood Blood-Peripheral  04-03-19   No growth to date.  --  --      WBC Count: 1.88 (04-06-19 @ 11:20)  WBC Count: 2.44 (04-05-19 @ 09:25)  WBC Count: 2.4 (04-04-19 @ 16:28)  WBC Count: 2.36 (04-04-19 @ 09:42)  WBC Count: 2.6 (04-03-19 @ 17:23)  WBC Count: 3.2 (04-03-19 @ 10:42)      < from: MR Abdomen w/wo IV Cont (04.04.19 @ 23:17) >  EXAM:  MR ABDOMEN WAW IC                            PROCEDURE DATE:  04/04/2019            INTERPRETATION:  CLINICAL INFORMATION: Jaundice.    COMPARISON: CT abdomen/pelvis, abdominal ultrasound, and HIDA scan   4/3/2019    PROCEDURE:   MRI of the abdomen was performed with and without intravenous contrast.  10 cc of Gadavist administered with 0 cc discarded.   MRCP was performed.      FINDINGS:    LOWER CHEST: Bibasilar atelectasis.    LIVER: Normal morphology. No hepatic steatosis. A segment 8 focus   measures 1.6 x 1.2 cm (4:17) and demonstrates mildly hypointense T1   signal and mildly hyperintense T2 signal with enhancement similar to   background liver. This is indeterminate in nature.  BILE DUCTS: Normal caliber. Common bile duct measures 4 mm. No   choledocholithiasis.  GALLBLADDER: Mild gallbladder wall thickening/edema. Trace   pericholecystic fluid. No definite gallstones.  SPLEEN: Small splenic infarct as at recent CT.  PANCREAS: Within normal limits.  ADRENALS: Within normal limits.  KIDNEYS/URETERS: Bilateral renal cysts including parapelvic cysts.    VISUALIZED PORTIONS:    BOWEL: Small hiatal hernia. Small periampullary duodenal diverticulum.  PERITONEUM: No ascites.  VESSELS: Hepatic and portal veins are patent.  RETROPERITONEUM: No lymphadenopathy.    ABDOMINAL WALL: Within normal limits.  BONES: Degenerative changes.    IMPRESSION:     Nonspecific gallbladder edema.    No biliary ductal dilatation or choledocholithiasis.    A 1.6 cm indeterminate lesion in theright hepatic lobe. Consider   short-term follow-up contrast-enhanced MRI with Eovist contrast agent in   3 months to assess for stability/change.      MARILIA BECKETT M.D., RADIOLOGY RESIDENT  This document has been electronically signed.  LUANA ROBERTSON M.D., ATTENDING RADIOLOGIST  This document has been electronically signed. Apr 5 2019 11:35AM    < end of copied text >

## 2019-04-06 NOTE — PROGRESS NOTE ADULT - SUBJECTIVE AND OBJECTIVE BOX
Patient is a 78y old  Male who presents with a chief complaint of Jaundice, hematuria (06 Apr 2019 13:38)      SUBJECTIVE / OVERNIGHT EVENTS: no new c/o, awaiting Bone marrow Bx on 4/8. all Cx NTD, still on empiric ABx, awaiting ID to Dc Abx when all Cx are final    MEDICATIONS  (STANDING):  acetaminophen  IVPB .. 1000 milliGRAM(s) IV Intermittent once  finasteride 5 milliGRAM(s) Oral daily  pantoprazole  Injectable 40 milliGRAM(s) IV Push daily  piperacillin/tazobactam IVPB. 3.375 Gram(s) IV Intermittent every 8 hours  sodium chloride 0.9%. 1000 milliLiter(s) (60 mL/Hr) IV Continuous <Continuous>  tamsulosin 0.4 milliGRAM(s) Oral two times a day    MEDICATIONS  (PRN):      Vital Signs Last 24 Hrs  T(F): 98.1 (04-06-19 @ 14:02), Max: 99.2 (04-05-19 @ 20:52)  HR: 83 (04-06-19 @ 14:02) (83 - 95)  BP: 105/69 (04-06-19 @ 14:02) (105/69 - 141/75)  RR: 18 (04-06-19 @ 14:02) (18 - 18)  SpO2: 97% (04-06-19 @ 14:02) (94% - 97%)  Telemetry:   CAPILLARY BLOOD GLUCOSE        I&O's Summary    05 Apr 2019 07:01  -  06 Apr 2019 07:00  --------------------------------------------------------  IN: 720 mL / OUT: 100 mL / NET: 620 mL    06 Apr 2019 07:01  -  06 Apr 2019 20:29  --------------------------------------------------------  IN: 960 mL / OUT: 300 mL / NET: 660 mL        PHYSICAL EXAM:  GENERAL: NAD, well-developed  HEAD:  Atraumatic, Normocephalic  EYES: EOMI, PERRLA, conjunctiva and sclera clear  NECK: Supple, No JVD  CHEST/LUNG: Clear to auscultation bilaterally; No wheeze  HEART: Regular rate and rhythm; No murmurs, rubs, or gallops  ABDOMEN: Soft, Nontender, Nondistended; Bowel sounds present  EXTREMITIES:  2+ Peripheral Pulses, No clubbing, cyanosis, or edema  PSYCH: AAOx3  NEUROLOGY: non-focal  SKIN: No rashes or lesions    LABS:                        10.3   1.88  )-----------( 32       ( 06 Apr 2019 11:20 )             29.4     04-06    130<L>  |  100  |  36<H>  ----------------------------<  97  3.8   |  17<L>  |  1.63<H>    Ca    8.3<L>      06 Apr 2019 07:37    TPro  4.1<L>  /  Alb  2.4<L>  /  TBili  16.8<H>  /  DBili  x   /  AST  276<H>  /  ALT  232<H>  /  AlkPhos  568<H>  04-06              RADIOLOGY & ADDITIONAL TESTS:    Imaging Personally Reviewed:    Consultant(s) Notes Reviewed:      Care Discussed with Consultants/Other Providers:

## 2019-04-06 NOTE — PROGRESS NOTE ADULT - SUBJECTIVE AND OBJECTIVE BOX
S:Aside from some fatigue, pt states he feels OK..denies pain, fever, n/v , sob..states there was ? blood in stool    98.6/89/20/96    ENT:Deeply icteric, no overt OM/Sores.X    Lungs:XClear to A/P  Abd: soft NT, no overt HSM  Ext trace edema bilat    labs: No CBC avialble  CMP is unchanged w bili rage 16.LFT's essentially unchanged when compared to 4/5

## 2019-04-06 NOTE — PROGRESS NOTE ADULT - SUBJECTIVE AND OBJECTIVE BOX
LEODAN BURGOS:[id number],   78yMale followed for:  No Known Allergies    PAST MEDICAL & SURGICAL HISTORY:  BPH (benign prostatic hyperplasia)  H/O knee surgery  Hernia    FAMILY HISTORY:    MEDICATIONS  (STANDING):  acetaminophen  IVPB .. 1000 milliGRAM(s) IV Intermittent once  finasteride 5 milliGRAM(s) Oral daily  pantoprazole  Injectable 40 milliGRAM(s) IV Push daily  piperacillin/tazobactam IVPB. 3.375 Gram(s) IV Intermittent every 8 hours  sodium chloride 0.9%. 1000 milliLiter(s) (60 mL/Hr) IV Continuous <Continuous>  tamsulosin 0.4 milliGRAM(s) Oral two times a day    MEDICATIONS  (PRN):      Vital Signs Last 24 Hrs  T(C): 36.8 (06 Apr 2019 04:40), Max: 37.9 (05 Apr 2019 16:18)  T(F): 98.3 (06 Apr 2019 04:40), Max: 100.3 (05 Apr 2019 16:18)  HR: 90 (06 Apr 2019 04:40) (90 - 95)  BP: 141/75 (06 Apr 2019 04:40) (110/63 - 141/75)  BP(mean): --  RR: 18 (06 Apr 2019 04:40) (18 - 18)  SpO2: 95% (06 Apr 2019 04:40) (94% - 95%)  nc/at  s1s2  cta  soft, nt, nd no guarding or rebound  no c/c/e    CBC Full  -  ( 06 Apr 2019 11:20 )  WBC Count : 1.88 K/uL  RBC Count : 3.59 M/uL  Hemoglobin : 10.3 g/dL  Hematocrit : 29.4 %  Platelet Count - Automated : 32 K/uL  Mean Cell Volume : 81.9 fl  Mean Cell Hemoglobin : 28.7 pg  Mean Cell Hemoglobin Concentration : 35.0 gm/dL  Auto Neutrophil # : x  Auto Lymphocyte # : x  Auto Monocyte # : x  Auto Eosinophil # : x  Auto Basophil # : x  Auto Neutrophil % : x  Auto Lymphocyte % : x  Auto Monocyte % : x  Auto Eosinophil % : x  Auto Basophil % : x    04-06    130<L>  |  100  |  36<H>  ----------------------------<  97  3.8   |  17<L>  |  1.63<H>    Ca    8.3<L>      06 Apr 2019 07:37    TPro  4.1<L>  /  Alb  2.4<L>  /  TBili  16.8<H>  /  DBili  x   /  AST  276<H>  /  ALT  232<H>  /  AlkPhos  568<H>  04-06

## 2019-04-06 NOTE — PROGRESS NOTE ADULT - SUBJECTIVE AND OBJECTIVE BOX
LEODAN BURGOS    Patient is a 78y old  Male who presents with a chief complaint of Jaundice, hematuria (06 Apr 2019 11:56)    Feels OK.  No abdominal pain, nausea or vomiting.  Will start regular diet for lunch.    Allergies    No Known Allergies    Intolerances      MEDICATIONS  (STANDING):  acetaminophen  IVPB .. 1000 milliGRAM(s) IV Intermittent once  finasteride 5 milliGRAM(s) Oral daily  pantoprazole  Injectable 40 milliGRAM(s) IV Push daily  piperacillin/tazobactam IVPB. 3.375 Gram(s) IV Intermittent every 8 hours  sodium chloride 0.9%. 1000 milliLiter(s) (60 mL/Hr) IV Continuous <Continuous>  tamsulosin 0.4 milliGRAM(s) Oral two times a day    PHYSICAL EXAM:  Vital Signs Last 24 Hrs  T(C): 36.8 (06 Apr 2019 04:40), Max: 37.9 (05 Apr 2019 16:18)  T(F): 98.3 (06 Apr 2019 04:40), Max: 100.3 (05 Apr 2019 16:18)  HR: 90 (06 Apr 2019 04:40) (90 - 95)  BP: 141/75 (06 Apr 2019 04:40) (134/75 - 141/75)  BP(mean): --  RR: 18 (06 Apr 2019 04:40) (18 - 18)  SpO2: 95% (06 Apr 2019 04:40) (94% - 95%)  Daily     Daily   I&O's Summary    05 Apr 2019 07:01  -  06 Apr 2019 07:00  --------------------------------------------------------  IN: 720 mL / OUT: 100 mL / NET: 620 mL        General Appearance: 	 Alert, cooperative, no distress, Jaundiced  Neck: no JVD  Lungs:  clear to auscultation and percussion bilaterally  Cor:  pmi 5th ICS MCL, regular rate and rhythm, S1 normal intensity, S2 normal intensity  Abdomen:	 soft, non-tender; bowel sounds normal  Extremities: without cyanosis, clubbing or edema      EKG:  Telemetry:    Labs:  CBC Full  -  ( 06 Apr 2019 11:20 )  WBC Count : 1.88 K/uL  RBC Count : 3.59 M/uL  Hemoglobin : 10.3 g/dL  Hematocrit : 29.4 %  Platelet Count - Automated : 32 K/uL  Mean Cell Volume : 81.9 fl  Mean Cell Hemoglobin : 28.7 pg  Mean Cell Hemoglobin Concentration : 35.0 gm/dL  Auto Neutrophil # : 1.36 K/uL  Auto Lymphocyte # : 0.32 K/uL  Auto Monocyte # : 0.16 K/uL  Auto Eosinophil # : 0.00 K/uL  Auto Basophil # : 0.00 K/uL  Auto Neutrophil % : 72.4 %  Auto Lymphocyte % : 17.0 %  Auto Monocyte % : 8.5 %  Auto Eosinophil % : 0.0 %  Auto Basophil % : 0.0 %    Comprehensive Metabolic Panel in AM (04.06.19 @ 07:37)    Sodium, Serum: 130 mmol/L    Potassium, Serum: 3.8 mmol/L    Chloride, Serum: 100 mmol/L    Carbon Dioxide, Serum: 17 mmol/L    Anion Gap, Serum: 13 mmol/L    Blood Urea Nitrogen, Serum: 36 mg/dL    Creatinine, Serum: 1.63 mg/dL    Glucose, Serum: 97 mg/dL    Calcium, Total Serum: 8.3 mg/dL    Protein Total, Serum: 4.1 g/dL    Albumin, Serum: 2.4 g/dL    Bilirubin Total, Serum: 16.8 mg/dL    Alkaline Phosphatase, Serum: 568 U/L    Aspartate Aminotransferase (AST/SGOT): 276 U/L    Alanine Aminotransferase (ALT/SGPT): 232 U/L    eGFR if Non : 40: Interpretative comment  The units for eGFR are mL/min/1.73M2 (normalized body surface area). The  eGFR is calculated from a serum creatinine using the CKD-EPI equation.  Other variables required for calculation are race, age and sex. Among  patients with chronic kidney disease (CKD), the eGFR is useful in  determining the stage of disease according to KDOQI CKD classification.  All eGFR results are reported numerically with the following  interpretation.          GFR                    With                 Without     (ml/min/1.73 m2)    Kidney Damage       Kidney Damage        >= 90                    Stage 1                     Normal        60-89                    Stage 2                     Decreased GFR        30-59     Stage 3                     Stage 3        15-29                    Stage 4                     Stage 4        < 15                      Stage 5                     Stage 5  Each stage of CKD assumes that the associated GFR level has been in  effect for at least 3 months. Determination of stages one and two (with  eGFR > 59 ml/min/m2) requires estimation of kidney damage for at least 3  months as defined by structural or functional abnormalities.  Limitations: All estimates of GFR will be less accurate for patients at  extremes of muscle mass (including but not limited to frail elderly,  critically ill, or cancer patients), those with unusual diets, and those  with conditions associated with reduced secretion or extrarenal  elimination of creatinine. The eGFR equation is not recommended for use  in patients with unstable creatinine levels. mL/min/1.73M2    eGFR if African American: 46 mL/min/1.73M2

## 2019-04-06 NOTE — PROGRESS NOTE ADULT - ASSESSMENT
Impression:  Jaundice/Pancytopenia/Hematuria  No primary pancreatic disease or gallstones    As per hematology, HLH is top in differential    Plan:  BM biopsy monday  Hematology followup  IV antibiotics as per primary service until definitive dx is made    Darwin Marshall MD  [facility]

## 2019-04-06 NOTE — PROGRESS NOTE ADULT - ASSESSMENT
A/P As per our previous note, HLH is high on our DDx w current lab values.pt to get BMBx on monday.  Needs to be followee closely and given HD Dexamethasone shouyld his clinical condition deteriorate in any way.

## 2019-04-06 NOTE — PROGRESS NOTE ADULT - ASSESSMENT
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 awaiting ID to Dc Abx when all Cx are final  elevated PT and INR, elev lactate, w ARF, hyponatremia, w hematuria, guaiac pos stools, pancytopenia, but not neutropenic no biliary obstruction on CT scan/MRCP neg HIDA scan, splenic infarct on CT  Gi , ID ,renal and Heme on board. [place]IT[place] LEV[place] EXTREMELY [place] AT [id number], HLH becomes high on DDX list. Bone Marrow Bx to be done 4/8/19  Echo pending. Hep A/B/C neg, auto mitochondrial and smooth muscle AB neg.    DVT ppx w PAS 2/2 elevated INR.

## 2019-04-06 NOTE — PROGRESS NOTE ADULT - ASSESSMENT
Pt is  a 80 yo male with PMH significant for BPH. Pt was in his USOH until about 3 weeks PTA when he noted what he thought was hematuria. He presented to his urologist a few days later and was started on Bactrim for a possible infection. He was told to stop it three days later when the cultures were negative.  When sxs didn't improve he ultimately went to his internist,. He was found to have abnormal LFTs. He held his lipitor. THis past friday he underwent CT and was told it was abnormal.   Pt denies any travel in or out of [place]. He is retired with no pets. He denies fevers but occasional chills at night.  He felt a little weak and shakey. He has constipation at home. No abdominal pain but diminished appetiite Pt notes about 20 lb weight loss He eats shellfish ( clams ) and pork  Pt was found to be pancytopenic with low WBC ( no left shift) - but not neutropenic, anemia, low plts, Markedly elevated bilirubin, elevated LFTs, elevated INR, elevated creatinine, elevated lactate. He has hematuria. On cat scan and ultrasound there are slude/stones in the gallbladder with mild wall thickening and trace pericholecystic fluid, there is a wedge shape splenic infarct and there is an enlarged prostate. Pt denies fevers at home but is highly febrile here    Blood culture x 2 are pending. Pt was started on zosyn and doxycycline  Unclear what is causing the patients issues  The DDX is extensive and includes infection, autoimmune, neoplastic etc..  In terms of infection  suggest:  Blood culture x2 - sent, agree with empiric abs for now- so far cultures negative    rickettsial with the low WBC , low plts and elevated LFTs - BUT THIS is the wrong time of year for this so I am hesitant to give empiric doxycyline in pt with elevated LFTS such as his. WIll discuss with GI . Will check rickettsial, ehrlichia and anaplasia serology  Babesia - with the splenic infarct _ will check for blood parasite ( again- wrong time of year)    Leptospirosis- no obvious risk factor but in the ddx- serology sent    CMV - IgM and IgG negative    Acute infectious Hepatitis- check hepatitis screen, including Hepatitis E- HEp E pending , acute Hep screen negative  EBV- will also check       Pt is hyponatremic- check cortisol    acute renal insufficiency- team is addressing- could this be TTP- seems to have normal MS  check parvovirus- seems unusual for this  This could be sepsis with sequela- all cultures negative so far    Why splenic infarct?, check echo. No portal vein thrombosis    Could patient have hemophagocytic syndrome? check LDH, ferritin   Appreciate  hematology input they are also looking [facility]    Pt also with large prostate- could this be a source of sepsis ? prost tic ultrasound. Patient is on meds for the prostate at home, team to address    pt with  diffuse onychomycosis ,  will also check HIV status - just to check in general    MRI results noted    May be able to stop the abs soon if all cultures return as negative    For Bone marrow bx on monday    several studies still pending    ID service will be covering over the weekend. Please call for acute issues or questions. [phone]

## 2019-04-07 NOTE — PROGRESS NOTE ADULT - SUBJECTIVE AND OBJECTIVE BOX
Patient is a 78y old  Male who presents with a chief complaint of Jaundice, hematuria (07 Apr 2019 08:45)    Being followed by ID for help in managment      Interval history:  pt resting quietly  stool not tarry today  No other acute events      PAST MEDICAL & SURGICAL HISTORY:  BPH (benign prostatic hyperplasia)  H/O knee surgery  Hernia    Allergies    No Known Allergies    Intolerances      Antimicrobials:    piperacillin/tazobactam IVPB. 3.375 Gram(s) IV Intermittent every 8 hours    MEDICATIONS  (STANDING):  acetaminophen  IVPB .. 1000 milliGRAM(s) IV Intermittent once  finasteride 5 milliGRAM(s) Oral daily  pantoprazole  Injectable 40 milliGRAM(s) IV Push daily  piperacillin/tazobactam IVPB. 3.375 Gram(s) IV Intermittent every 8 hours  sodium chloride 0.9%. 1000 milliLiter(s) (60 mL/Hr) IV Continuous <Continuous>  tamsulosin 0.4 milliGRAM(s) Oral two times a day      Vital Signs Last 24 Hrs  T(C): 36.6 (04-07-19 @ 04:36), Max: 37 (04-06-19 @ 21:11)  T(F): 97.9 (04-07-19 @ 04:36), Max: 98.6 (04-06-19 @ 21:11)  HR: 78 (04-07-19 @ 04:36) (78 - 95)  BP: 106/63 (04-07-19 @ 04:36) (105/69 - 123/79)  BP(mean): --  RR: 17 (04-07-19 @ 04:36) (17 - 18)  SpO2: 96% (04-07-19 @ 05:48) (91% - 97%)    Physical Exam:    Constitutional well preserved, comfortable, pleasant    HEENT PERRLA EOMI,No pallor or icterus    No oral exudate or erythema    Neck supple no JVD or LN    Chest Good AE,CTA    CVS RRR S1 S2 WNl     Abd soft BS normal No tenderness     Ext No cyanosis clubbing or edema    IV site no erythema tenderness or discharge    Joints no swelling or LOM    CNS AAO X 3 no focal    Lab Data:                          9.9    1.64  )-----------( 35       ( 07 Apr 2019 09:01 )             28.2       04-07    134<L>  |  103  |  33<H>  ----------------------------<  105<H>  3.9   |  19<L>  |  1.51<H>    Ca    8.2<L>      07 Apr 2019 06:27    TPro  3.8<L>  /  Alb  2.1<L>  /  TBili  17.8<H>  /  DBili  x   /  AST  259<H>  /  ALT  224<H>  /  AlkPhos  651<H>  04-07          .Blood blood  04-04-19   No Blood Parasites observed by giemsa stain  One negative set of blood smears does not rule out  the possibility of a parasitic infection.  A minimum of 3  specimens should be collected, at least 12-24 hours apart,  over a 36 hour time period.  --  --      .Urine Clean Catch (Midstream)  04-03-19   No growth  --  --      .Blood Blood-Peripheral  04-03-19   No growth to date.  --  --      WBC Count: 1.64 (04-07-19 @ 09:01)  Auto Neutrophil #: 1.36 K/uL (04.06.19 @ 11:20)      WBC Count: 1.88 (04-06-19 @ 11:20)  WBC Count: 2.44 (04-05-19 @ 09:25)  WBC Count: 2.4 (04-04-19 @ 16:28)  WBC Count: 2.36 (04-04-19 @ 09:42)  WBC Count: 2.6 (04-03-19 @ 17:23)  WBC Count: 3.2 (04-03-19 @ 10:42) Patient is a 78y old  Male who presents with a chief complaint of Jaundice, hematuria (07 Apr 2019 08:45)    Being followed by ID for help in managment      Interval history:  pt resting quietly  stool not tarry today  No other acute events      PAST MEDICAL & SURGICAL HISTORY:  BPH (benign prostatic hyperplasia)  H/O knee surgery  Hernia    Allergies    No Known Allergies    Intolerances      Antimicrobials:    piperacillin/tazobactam IVPB. 3.375 Gram(s) IV Intermittent every 8 hours    MEDICATIONS  (STANDING):  acetaminophen  IVPB .. 1000 milliGRAM(s) IV Intermittent once  finasteride 5 milliGRAM(s) Oral daily  pantoprazole  Injectable 40 milliGRAM(s) IV Push daily  piperacillin/tazobactam IVPB. 3.375 Gram(s) IV Intermittent every 8 hours  sodium chloride 0.9%. 1000 milliLiter(s) (60 mL/Hr) IV Continuous <Continuous>  tamsulosin 0.4 milliGRAM(s) Oral two times a day      Vital Signs Last 24 Hrs  T(C): 36.6 (04-07-19 @ 04:36), Max: 37 (04-06-19 @ 21:11)  T(F): 97.9 (04-07-19 @ 04:36), Max: 98.6 (04-06-19 @ 21:11)  HR: 78 (04-07-19 @ 04:36) (78 - 95)  BP: 106/63 (04-07-19 @ 04:36) (105/69 - 123/79)  BP(mean): --  RR: 17 (04-07-19 @ 04:36) (17 - 18)  SpO2: 96% (04-07-19 @ 05:48) (91% - 97%)    Physical Exam:    Constitutional well preserved, comfortable, pleasant    HEENT PERRLA EOMI,No pallor or icterus    No oral exudate or erythema    Neck supple no JVD or LN    Chest Good AE,CTA    CVS RRR S1 S2 WNl     Abd soft BS normal No tenderness     Ext No cyanosis clubbing or edema    IV site no erythema tenderness or discharge    Joints no swelling or LOM    CNS AAO X 3 no focal    Lab Data:                          9.9    1.64  )-----------( 35       ( 07 Apr 2019 09:01 )             28.2       04-07    134<L>  |  103  |  33<H>  ----------------------------<  105<H>  3.9   |  19<L>  |  1.51<H>    Ca    8.2<L>      07 Apr 2019 06:27    TPro  3.8<L>  /  Alb  2.1<L>  /  TBili  17.8<H>  /  DBili  x   /  AST  259<H>  /  ALT  224<H>  /  AlkPhos  651<H>  04-07          .Blood blood  04-04-19   No Blood Parasites observed by giemsa stain  One negative set of blood smears does not rule out  the possibility of a parasitic infection.  A minimum of 3  specimens should be collected, at least 12-24 hours apart,  over a 36 hour time period.  --  --      .Urine Clean Catch (Midstream)  04-03-19   No growth  --  --      .Blood Blood-Peripheral  04-03-19   No growth to date.  --  --      WBC Count: 1.64 (04-07-19 @ 09:01)  Auto Neutrophil #: 1.36 K/uL (04.06.19 @ 11:20)      WBC Count: 1.88 (04-06-19 @ 11:20)  WBC Count: 2.44 (04-05-19 @ 09:25)  WBC Count: 2.4 (04-04-19 @ 16:28)  WBC Count: 2.36 (04-04-19 @ 09:42)  WBC Count: 2.6 (04-03-19 @ 17:23)  WBC Count: 3.2 (04-03-19 @ 10:42)    Ehrlichia Species Antibody (04.04.19 @ 19:44)    Anaplasma phagocytophilia IgG Antibodies: <1:64    Anaplasma phagocytophilia IgM Antibodies: <1:20    Ehrlichia chaffeensis IgG Antibodies: <1:64    Ehrlichia chaffeensis IgM Antibodies: <1:20    Ehrlichia chaffeensis IgG Interp: SEE NOTE: Antibody Not Detected    Ehrlichia chaffeensis IgG Coment: SEE NOTE: Ehrlichia chaffeenis has been identified as the  causative agent of Human Monocytic Ehrlichiosis (HME).  Infected individuals produce specific antibodies to  E. chaffeensis that can be detected by an immuno-  fluorescent antibody (IFA) test.  Single IgG IFA  titers of 1:64 or greater indicate exposure to  E. chaffeensis.  A four-fold rise in IgG titers  between acute and convalescent samples and/or the  presence of IgM antibody against E. chaffeensis  suggest recent or current infection.  This test was developed and its analytical performance  characteristics have been determined by [employer] [place]. It has  not been cleared or approved by the U.S. Food and Drug  Administration. This assay has been validatedpursuant  to the CLIA regulations and is used for clinical  purposes.    Anaplasma Phagocyto IgM Interp: SEE NOTE: Antibody Not Detected    Anaplasma phagocyto IgM Coment: SEE NOTE: Anaplasma phagocytophilum is the tick-borne agent  causing Human Granulocytic Ehrlichiosis (HGE).  HGE is distinct and separate from Human Monocytic  Ehrlichiosis (HME), caused by Ehrlichia chaffeensis.  Serologic crossreactivity between A. phagocyto-  philum and E. chaffeensis is minimal (5-15%).  This test was developed and its analytical performance  characteristics have been determined by [employer] [place]. It has  not been cleared or approved by the U.S. Food and Drug  Administration. This assay has been validated pursuant  to the CLIA regulations and is used for clinical  purposes.  Test Performed by [facility],  [facility],  [address] [place] [id number]  Phillip Carcamo M.D., Ph.D., Director of Laboratories  [phone], CLIA [id number]

## 2019-04-07 NOTE — PROGRESS NOTE ADULT - SUBJECTIVE AND OBJECTIVE BOX
Patient seen and examined in bed. No new complaints.       MEDICATIONS  (STANDING):  acetaminophen  IVPB .. 1000 milliGRAM(s) IV Intermittent once  finasteride 5 milliGRAM(s) Oral daily  pantoprazole  Injectable 40 milliGRAM(s) IV Push daily  piperacillin/tazobactam IVPB. 3.375 Gram(s) IV Intermittent every 8 hours  sodium chloride 0.9%. 1000 milliLiter(s) (60 mL/Hr) IV Continuous <Continuous>  tamsulosin 0.4 milliGRAM(s) Oral two times a day      VITAL:  T(C): , Max: 37 (04-06-19 @ 21:11)  T(F): , Max: 98.6 (04-06-19 @ 21:11)  HR: 85 (04-07-19 @ 14:34)  BP: 116/73 (04-07-19 @ 14:34)  RR: 18 (04-07-19 @ 14:34)  SpO2: 93% (04-07-19 @ 14:34)    I and O's:    04-06 @ 07:01  -  04-07 @ 07:00  --------------------------------------------------------  IN: 960 mL / OUT: 500 mL / NET: 460 mL    04-07 @ 07:01  -  04-07 @ 14:42  --------------------------------------------------------  IN: 720 mL / OUT: 0 mL / NET: 720 mL          PHYSICAL EXAM:    Constitutional: NAD  Neck:  No JVD  Respiratory: CTAB/L  Cardiovascular: S1 and S2  Gastrointestinal: BS+, soft, NT/ND  Extremities: No peripheral edema  Neurological: A/O x 3, no focal deficits  Psychiatric: Normal mood, normal affect  : No Arnold  Skin: No rashes  Access: Not applicable    LABS:                        9.9    1.64  )-----------( 35       ( 07 Apr 2019 09:01 )             28.2     04-07    134<L>  |  103  |  33<H>  ----------------------------<  105<H>  3.9   |  19<L>  |  1.51<H>    Ca    8.2<L>      07 Apr 2019 06:27    TPro  3.8<L>  /  Alb  2.1<L>  /  TBili  17.8<H>  /  DBili  x   /  AST  259<H>  /  ALT  224<H>  /  AlkPhos  651<H>  04-07      ASSESSMENT: 78M w/ BPH, OA, and HLD, 4/3/19 a/w weight loss, jaundice, and gross hematuria  (1)Hyponatremia - due to hypovolemia/poor osm intake relative to water. Improving, on gentle isotonic IVF  (2)Renal - YEE - prerenal - slowly resolving with gentle IVF  (3)Cholestasis/transaminitis - unclear etiology - GI on board    RECOMMEND:  (1)Continue NS@60ml/hr   (2)BMP daily  (3)Plan for bone marrow biopsy Monday 4/8      Yoko Vega NP-C  [facility]  [phone] Patient seen and examined in bed. No new complaints.       MEDICATIONS  (STANDING):  acetaminophen  IVPB .. 1000 milliGRAM(s) IV Intermittent once  finasteride 5 milliGRAM(s) Oral daily  pantoprazole  Injectable 40 milliGRAM(s) IV Push daily  piperacillin/tazobactam IVPB. 3.375 Gram(s) IV Intermittent every 8 hours  sodium chloride 0.9%. 1000 milliLiter(s) (60 mL/Hr) IV Continuous <Continuous>  tamsulosin 0.4 milliGRAM(s) Oral two times a day      VITAL:  T(C): , Max: 37 (04-06-19 @ 21:11)  T(F): , Max: 98.6 (04-06-19 @ 21:11)  HR: 85 (04-07-19 @ 14:34)  BP: 116/73 (04-07-19 @ 14:34)  RR: 18 (04-07-19 @ 14:34)  SpO2: 93% (04-07-19 @ 14:34)    I and O's:    04-06 @ 07:01  -  04-07 @ 07:00  --------------------------------------------------------  IN: 960 mL / OUT: 500 mL / NET: 460 mL    04-07 @ 07:01  -  04-07 @ 14:42  --------------------------------------------------------  IN: 720 mL / OUT: 0 mL / NET: 720 mL          PHYSICAL EXAM:    Constitutional: NAD, jaundice  Neck:  No JVD  Respiratory: CTAB/L  Cardiovascular: S1 and S2  Gastrointestinal: BS+, soft, NT/ND  Extremities: No peripheral edema  : No Arnold  Skin: No rashes    LABS:                        9.9    1.64  )-----------( 35       ( 07 Apr 2019 09:01 )             28.2     04-07    134<L>  |  103  |  33<H>  ----------------------------<  105<H>  3.9   |  19<L>  |  1.51<H>    Ca    8.2<L>      07 Apr 2019 06:27    TPro  3.8<L>  /  Alb  2.1<L>  /  TBili  17.8<H>  /  DBili  x   /  AST  259<H>  /  ALT  224<H>  /  AlkPhos  651<H>  04-07      ASSESSMENT: 78M w/ BPH, OA, and HLD, 4/3/19 a/w weight loss, jaundice, and gross hematuria  (1)Hyponatremia - due to hypovolemia/poor osm intake relative to water. Largely improved, on gentle isotonic IVF  (2)Renal - YEE - prerenal - slowly resolving with gentle IVF  (3)Cholestasis/transaminitis - unclear etiology - GI on board    RECOMMEND:  (1)Continue NS@60ml/hr   (2)BMP daily  (3)Plan for bone marrow biopsy Monday 4/8      Yoko Vega NP-C  [facility]  [phone]

## 2019-04-07 NOTE — PROGRESS NOTE ADULT - ASSESSMENT
This is a 80yo M who presented on 4/3 with hematuria and jaundice.    #pancytopenia  -stable CBC. continue to check daily.  -peripheral smear: numerous target cells, no spur cells or fragmented cells, no microspherocytes. large PLTs, no clumping. mature neutrophils.  -iron studies: not consistent with iron deficiency.   -ferritin 91132 concern for HLH: pancytopenia, mild hepatomegaly, fevers, splenomegaly. low fibrinogen, elevated Tg  -hapto <20, related to liver dysfunction rather than hemolysis given other unremarkable hemolytic work up  -no clear evidence of infection  -bone marrow biopsy to be done monday.  - given the high clinical suspicion for HLH and lack of infectious etiology, after discussion with ID will start Decadron 20 mg daily IV today.   - low fibrinogen: cont daily fibrinogen. Monitor for bleeding, if <100, transfuse 10 units of cryoprecipitate

## 2019-04-07 NOTE — PROGRESS NOTE ADULT - ASSESSMENT
Impression:  Jaundice/Pancytopenia/Hematuria  No primary pancreatic disease or gallstones    As per hematology, HLH is top in differential    Plan:  BM biopsy monday  Hematology followup  IV antibiotics as per primary service and ID until definitive dx is made    Darwin Marshall MD  [facility]

## 2019-04-07 NOTE — PROGRESS NOTE ADULT - SUBJECTIVE AND OBJECTIVE BOX
Patient is a 78y old  Male who presents with a chief complaint of Jaundice, hematuria (07 Apr 2019 15:31)      SUBJECTIVE / OVERNIGHT EVENTS: no new Sx, ongoing pancytopenia, ELEV transaminases, all Cx NTD, still on IV Abx for the concern of neutropenia    MEDICATIONS  (STANDING):  acetaminophen  IVPB .. 1000 milliGRAM(s) IV Intermittent once  alfuzosin 10 milliGRAM(s) Oral at bedtime  dexamethasone  IVPB 20 milliGRAM(s) IV Intermittent daily  finasteride 5 milliGRAM(s) Oral daily  pantoprazole  Injectable 40 milliGRAM(s) IV Push daily  piperacillin/tazobactam IVPB. 3.375 Gram(s) IV Intermittent every 8 hours  sodium chloride 0.9%. 1000 milliLiter(s) (60 mL/Hr) IV Continuous <Continuous>    MEDICATIONS  (PRN):      Vital Signs Last 24 Hrs  T(F): 97.5 (04-07-19 @ 14:34), Max: 98.6 (04-06-19 @ 21:11)  HR: 85 (04-07-19 @ 14:34) (78 - 95)  BP: 116/73 (04-07-19 @ 14:34) (106/63 - 123/79)  RR: 18 (04-07-19 @ 14:34) (17 - 18)  SpO2: 93% (04-07-19 @ 14:34) (91% - 97%)  Telemetry:   CAPILLARY BLOOD GLUCOSE        I&O's Summary    06 Apr 2019 07:01  -  07 Apr 2019 07:00  --------------------------------------------------------  IN: 960 mL / OUT: 500 mL / NET: 460 mL    07 Apr 2019 07:01  -  07 Apr 2019 20:52  --------------------------------------------------------  IN: 1000 mL / OUT: 0 mL / NET: 1000 mL        PHYSICAL EXAM:  GENERAL: NAD, well-developed  HEAD:  Atraumatic, Normocephalic  EYES: EOMI, PERRLA, conjunctiva and sclera clear  NECK: Supple, No JVD  CHEST/LUNG: Clear to auscultation bilaterally; No wheeze  HEART: Regular rate and rhythm; No murmurs, rubs, or gallops  ABDOMEN: Soft, Nontender, Nondistended; Bowel sounds present  EXTREMITIES:  2+ Peripheral Pulses, No clubbing, cyanosis, or edema  PSYCH: AAOx3  NEUROLOGY: non-focal  SKIN: No rashes or lesions    LABS:                        9.9    1.64  )-----------( 35       ( 07 Apr 2019 09:01 )             28.2     04-07    134<L>  |  103  |  33<H>  ----------------------------<  105<H>  3.9   |  19<L>  |  1.51<H>    Ca    8.2<L>      07 Apr 2019 06:27    TPro  3.8<L>  /  Alb  2.1<L>  /  TBili  17.8<H>  /  DBili  x   /  AST  259<H>  /  ALT  224<H>  /  AlkPhos  651<H>  04-07              RADIOLOGY & ADDITIONAL TESTS:    Imaging Personally Reviewed:    Consultant(s) Notes Reviewed:      Care Discussed with Consultants/Other Providers:

## 2019-04-07 NOTE — PROGRESS NOTE ADULT - ASSESSMENT
Pt is  a 80 yo male with PMH significant for BPH. Pt was in his USOH until about 3 weeks PTA when he noted what he thought was hematuria. He presented to his urologist a few days later and was started on Bactrim for a possible infection. He was told to stop it three days later when the cultures were negative.  When sxs didn't improve he ultimately went to his internist,. He was found to have abnormal LFTs. He held his lipitor. THis past friday he underwent CT and was told it was abnormal.   Pt denies any travel in or out of [place]. He is retired with no pets. He denies fevers but occasional chills at night.  He felt a little weak and shakey. He has constipation at home. No abdominal pain but diminished appetiite Pt notes about 20 lb weight loss He eats shellfish ( clams ) and pork  Pt was found to be pancytopenic with low WBC ( no left shift) - but not neutropenic, anemia, low plts, Markedly elevated bilirubin, elevated LFTs, elevated INR, elevated creatinine, elevated lactate. He has hematuria. On cat scan and ultrasound there are slude/stones in the gallbladder with mild wall thickening and trace pericholecystic fluid, there is a wedge shape splenic infarct and there is an enlarged prostate. Pt denies fevers at home but is highly febrile here    Blood culture x 2 are pending. Pt was started on zosyn and doxycycline  Unclear what is causing the patients issues  The DDX is extensive and includes infection, autoimmune, neoplastic etc..  In terms of infection  suggest:  Blood culture x2 - sent, agree with empiric abs for now- so far cultures negative    rickettsial with the low WBC , low plts and elevated LFTs - BUT THIS is the wrong time of year for this so I am hesitant to give empiric doxycyline in pt with elevated LFTS such as his. WIll discuss with GI . Will check rickettsial, ehrlichia and anaplasia serology  Babesia - with the splenic infarct _ will check for blood parasite ( again- wrong time of year)    Leptospirosis- no obvious risk factor but in the ddx- serology sent    CMV - IgM and IgG negative    Acute infectious Hepatitis- check hepatitis screen, including Hepatitis E- HEp E pending , acute Hep screen negative  EBV- will also check       Pt is hyponatremic- check cortisol    acute renal insufficiency- team is addressing- could this be TTP- seems to have normal MS  check parvovirus- seems unusual for this  This could be sepsis with sequela- all cultures negative so far    Why splenic infarct?, check echo. No portal vein thrombosis    Could patient have hemophagocytic syndrome? check LDH, ferritin   Appreciate  hematology input they are also [facility]    Pt also with large prostate- could this be a source of sepsis ? prost tic ultrasound. Patient is on meds for the prostate at home, team to address    pt with  diffuse onychomycosis ,  will also check HIV status - just to check in general    MRI results noted    May be able to stop the abs soon if all cultures return as negative, but worried that may  be becoming neutropenic    For Bone marrow bx on monday    several studies still pending    ID service will be covering over the weekend. Please call for acute issues or questions. [phone]

## 2019-04-07 NOTE — PROGRESS NOTE ADULT - ASSESSMENT
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  ongoing pancytopenia, ELEV transaminases, all Cx NTD, still on IV Abx for the concern of neutropenia  elevated PT and INR, elev lactate, w ARF, hyponatremia, w hematuria, guaiac pos stools, pancytopenia, but not neutropenic no biliary obstruction on CT scan/MRCP neg HIDA scan, splenic infarct on CT  Gi , ID ,renal and Heme on board. [place]IT[place]TREM[place] AT [id number], HLH becomes high on DDX list. Bone Marrow Bx to be done 4/8/19  Echo pending. Hep A/B/C neg, auto mitochondrial and smooth muscle AB neg.    DVT ppx w PAS 2/2 elevated INR.

## 2019-04-07 NOTE — PROGRESS NOTE ADULT - SUBJECTIVE AND OBJECTIVE BOX
Chief Complaint: fatigue     INTERVAL HPI/OVERNIGHT EVENTS: More fatigue today. Otherwise no c/o.     MEDICATIONS  (STANDING):  acetaminophen  IVPB .. 1000 milliGRAM(s) IV Intermittent once  dexamethasone  IVPB 20 milliGRAM(s) IV Intermittent daily  finasteride 5 milliGRAM(s) Oral daily  pantoprazole  Injectable 40 milliGRAM(s) IV Push daily  piperacillin/tazobactam IVPB. 3.375 Gram(s) IV Intermittent every 8 hours  sodium chloride 0.9%. 1000 milliLiter(s) (60 mL/Hr) IV Continuous <Continuous>  tamsulosin 0.4 milliGRAM(s) Oral two times a day    MEDICATIONS  (PRN):      Allergies    No Known Allergies    Intolerances        ROS: as above     Vital Signs Last 24 Hrs  T(C): 36.4 (07 Apr 2019 14:34), Max: 37 (06 Apr 2019 21:11)  T(F): 97.5 (07 Apr 2019 14:34), Max: 98.6 (06 Apr 2019 21:11)  HR: 85 (07 Apr 2019 14:34) (78 - 95)  BP: 116/73 (07 Apr 2019 14:34) (106/63 - 123/79)  BP(mean): --  RR: 18 (07 Apr 2019 14:34) (17 - 18)  SpO2: 93% (07 Apr 2019 14:34) (91% - 97%)    Physical Exam:   AAO x 3, NAD + jaundice  RRR S1S2  CTA b/l   soft NTNDBS+  mild edema     LABS:                        9.9    1.64  )-----------( 35       ( 07 Apr 2019 09:01 )             28.2     04-07    134<L>  |  103  |  33<H>  ----------------------------<  105<H>  3.9   |  19<L>  |  1.51<H>    Ca    8.2<L>      07 Apr 2019 06:27    TPro  3.8<L>  /  Alb  2.1<L>  /  TBili  17.8<H>  /  DBili  x   /  AST  259<H>  /  ALT  224<H>  /  AlkPhos  651<H>  04-07

## 2019-04-07 NOTE — PROGRESS NOTE ADULT - SUBJECTIVE AND OBJECTIVE BOX
LEODAN BURGOS:[id number],   78yMale followed for:  No Known Allergies    PAST MEDICAL & SURGICAL HISTORY:  BPH (benign prostatic hyperplasia)  H/O knee surgery  Hernia    FAMILY HISTORY:    MEDICATIONS  (STANDING):  acetaminophen  IVPB .. 1000 milliGRAM(s) IV Intermittent once  finasteride 5 milliGRAM(s) Oral daily  pantoprazole  Injectable 40 milliGRAM(s) IV Push daily  piperacillin/tazobactam IVPB. 3.375 Gram(s) IV Intermittent every 8 hours  sodium chloride 0.9%. 1000 milliLiter(s) (60 mL/Hr) IV Continuous <Continuous>  tamsulosin 0.4 milliGRAM(s) Oral two times a day    MEDICATIONS  (PRN):      Vital Signs Last 24 Hrs  T(C): 36.6 (07 Apr 2019 04:36), Max: 37 (06 Apr 2019 21:11)  T(F): 97.9 (07 Apr 2019 04:36), Max: 98.6 (06 Apr 2019 21:11)  HR: 78 (07 Apr 2019 04:36) (78 - 95)  BP: 106/63 (07 Apr 2019 04:36) (105/69 - 123/79)  BP(mean): --  RR: 17 (07 Apr 2019 04:36) (17 - 18)  SpO2: 96% (07 Apr 2019 05:48) (91% - 97%)  nc/at  s1s2  cta  soft, nt, nd no guarding or rebound  no c/c/e    CBC Full  -  ( 06 Apr 2019 11:20 )  WBC Count : 1.88 K/uL  RBC Count : 3.59 M/uL  Hemoglobin : 10.3 g/dL  Hematocrit : 29.4 %  Platelet Count - Automated : 32 K/uL  Mean Cell Volume : 81.9 fl  Mean Cell Hemoglobin : 28.7 pg  Mean Cell Hemoglobin Concentration : 35.0 gm/dL  Auto Neutrophil # : 1.36 K/uL  Auto Lymphocyte # : 0.32 K/uL  Auto Monocyte # : 0.16 K/uL  Auto Eosinophil # : 0.00 K/uL  Auto Basophil # : 0.00 K/uL  Auto Neutrophil % : 72.4 %  Auto Lymphocyte % : 17.0 %  Auto Monocyte % : 8.5 %  Auto Eosinophil % : 0.0 %  Auto Basophil % : 0.0 %    04-07    134<L>  |  103  |  33<H>  ----------------------------<  105<H>  3.9   |  19<L>  |  1.51<H>    Ca    8.2<L>      07 Apr 2019 06:27    TPro  3.8<L>  /  Alb  2.1<L>  /  TBili  17.8<H>  /  DBili  x   /  AST  259<H>  /  ALT  224<H>  /  AlkPhos  651<H>  04-07

## 2019-04-07 NOTE — PROGRESS NOTE ADULT - SUBJECTIVE AND OBJECTIVE BOX
LEODAN BURGOS    Patient is a 78y old  Male who presents with a chief complaint of Jaundice, hematuria (07 Apr 2019 12:45)    Feels well.  Eating regular diet.  No nausea, abdominal pain, or dyspnea.  Still jaundiced. No fevers or bleeding.    Allergies    No Known Allergies    Intolerances      MEDICATIONS  (STANDING):  acetaminophen  IVPB .. 1000 milliGRAM(s) IV Intermittent once  finasteride 5 milliGRAM(s) Oral daily  pantoprazole  Injectable 40 milliGRAM(s) IV Push daily  piperacillin/tazobactam IVPB. 3.375 Gram(s) IV Intermittent every 8 hours  sodium chloride 0.9%. 1000 milliLiter(s) (60 mL/Hr) IV Continuous <Continuous>  tamsulosin 0.4 milliGRAM(s) Oral two times a day    PHYSICAL EXAM:  Vital Signs Last 24 Hrs  T(C): 36.6 (07 Apr 2019 04:36), Max: 37 (06 Apr 2019 21:11)  T(F): 97.9 (07 Apr 2019 04:36), Max: 98.6 (06 Apr 2019 21:11)  HR: 78 (07 Apr 2019 04:36) (78 - 95)  BP: 106/63 (07 Apr 2019 04:36) (105/69 - 123/79)  BP(mean): --  RR: 17 (07 Apr 2019 04:36) (17 - 18)  SpO2: 96% (07 Apr 2019 05:48) (91% - 97%)  Daily     Daily   I&O's Summary    06 Apr 2019 07:01  -  07 Apr 2019 07:00  --------------------------------------------------------  IN: 960 mL / OUT: 500 mL / NET: 460 mL    General Appearance: 	 Alert, cooperative, no distress; jaundiced  Neck: no JVD  Lungs:  clear to auscultation and percussion bilaterally  Cor:  pmi 5th ICS MCL, regular rate and rhythm, S1 normal intensity, S2 normal intensity  Abdomen:	 soft, non-tender; bowel sounds normal  Extremities: without cyanosis, clubbing or edema      EKG:  Telemetry:    Labs:  CBC Full  -  ( 07 Apr 2019 09:01 )  WBC Count : 1.64 K/uL  RBC Count : 3.49 M/uL  Hemoglobin : 9.9 g/dL  Hematocrit : 28.2 %  Platelet Count - Automated : 35 K/uL  Mean Cell Volume : 80.8 fl  Mean Cell Hemoglobin : 28.4 pg  Mean Cell Hemoglobin Concentration : 35.1 gm/dL  Auto Neutrophil # : x  Auto Lymphocyte # : x  Auto Monocyte # : x  Auto Eosinophil # : x  Auto Basophil # : x  Auto Neutrophil % : x  Auto Lymphocyte % : x  Auto Monocyte % : x  Auto Eosinophil % : x  Auto Basophil % : x

## 2019-04-08 NOTE — PROGRESS NOTE ADULT - ASSESSMENT
Pt is  a 80 yo male with PMH significant for BPH. Pt was in his USOH until about 3 weeks PTA when he noted what he thought was hematuria. He presented to his urologist a few days later and was started on Bactrim for a possible infection. He was told to stop it three days later when the cultures were negative.  When sxs didn't improve he ultimately went to his internist,. He was found to have abnormal LFTs. He held his lipitor. THis past friday he underwent CT and was told it was abnormal.   Pt denies any travel in or out of [place]. He is retired with no pets. He denies fevers but occasional chills at night.  He felt a little weak and shakey. He has constipation at home. No abdominal pain but diminished appetiite Pt notes about 20 lb weight loss He eats shellfish ( clams ) and pork  Pt was found to be pancytopenic with low WBC ( no left shift) - but not neutropenic, anemia, low plts, Markedly elevated bilirubin, elevated LFTs, elevated INR, elevated creatinine, elevated lactate. He has hematuria. On cat scan and ultrasound there are slude/stones in the gallbladder with mild wall thickening and trace pericholecystic fluid, there is a wedge shape splenic infarct and there is an enlarged prostate. Pt denies fevers at home but is highly febrile here    Blood culture x 2 are pending. Pt was started on zosyn and doxycycline  Unclear what is causing the patients issues  The DDX is extensive and includes infection, autoimmune, neoplastic etc..  In terms of infection  suggest:  Blood culture x2 - sent, agree with empiric abs for now- so far cultures negative    rickettsial with the low WBC , low plts and elevated LFTs - BUT THIS is the wrong time of year for this so I am hesitant to give empiric doxycyline in pt with elevated LFTS such as his. WIll discuss with GI . Will check rickettsial, ehrlichia and anaplasia serology  Babesia - with the splenic infarct _ will check for blood parasite ( again- wrong time of year)    Leptospirosis- no obvious risk factor but in the ddx- serology sent    CMV - IgM and IgG negative    Acute infectious Hepatitis- check hepatitis screen, including Hepatitis E- HEp E pending , acute Hep screen negative  EBV-c/w prior disease    acute renal insufficiency- team is addressing- could this be TTP- but has normal MS  Parvovirus - neagtive  This could be sepsis with sequela- all cultures negative so far    Why splenic infarct?, No portal vein thrombosis    Could patient have hemophagocytic syndrome? Appreciate  hematology input they are also looking for PNH- for BM today    Pt also with large prostate- could this be a source of sepsis ? prost tic ultrasound. Patient is on meds for the prostate at home, team to address    HIV negative    MRI results noted    May be able to stop the abs soon if all cultures return as negative, but worried that may  be becoming neutropenic    For Bone marrow bx today    several studies still pending    I will be away  tomorrow. My associates will be covering. Please call [phone] with acute issues, questions. Pt is  a 78 yo male with PMH significant for BPH. Pt was in his USOH until about 3 weeks PTA when he noted what he thought was hematuria. He presented to his urologist a few days later and was started on Bactrim for a possible infection. He was told to stop it three days later when the cultures were negative.  When sxs didn't improve he ultimately went to his internist,. He was found to have abnormal LFTs. He held his lipitor. THis past friday he underwent CT and was told it was abnormal.   Pt denies any travel in or out of [place]. He is retired with no pets. He denies fevers but occasional chills at night.  He felt a little weak and shakey. He has constipation at home. No abdominal pain but diminished appetiite Pt notes about 20 lb weight loss He eats shellfish ( clams ) and pork  Pt was found to be pancytopenic with low WBC ( no left shift) - but not neutropenic, anemia, low plts, Markedly elevated bilirubin, elevated LFTs, elevated INR, elevated creatinine, elevated lactate. He has hematuria. On cat scan and ultrasound there are slude/stones in the gallbladder with mild wall thickening and trace pericholecystic fluid, there is a wedge shape splenic infarct and there is an enlarged prostate. Pt denies fevers at home but is highly febrile here    Blood culture x 2 are pending. Pt was started on zosyn and doxycycline  Unclear what is causing the patients issues  The DDX is extensive and includes infection, autoimmune, neoplastic etc..  In terms of infection  suggest:  Blood culture x2 - sent, agree with empiric abs for now- so far cultures negative    rickettsial with the low WBC , low plts and elevated LFTs - BUT THIS is the wrong time of year for this so I am hesitant to give empiric doxycyline in pt with elevated LFTS such as his. WIll discuss with GI . Will check rickettsial, ehrlichia and anaplasia serology  Babesia - with the splenic infarct _ will check for blood parasite ( again- wrong time of year)    Leptospirosis- no obvious risk factor but in the ddx- serology sent    CMV - IgM and IgG negative    Acute infectious Hepatitis- check hepatitis screen, including Hepatitis E- HEp E pending , acute Hep screen negative  EBV-c/w prior disease    acute renal insufficiency- team is addressing- could this be TTP- but has normal MS  Parvovirus - neagtive  This could be sepsis with sequela- all cultures negative so far    Why splenic infarct?, No portal vein thrombosis    Could patient have hemophagocytic syndrome? Appreciate  hematology input they are also looking for PNH- for BM today    Pt also with large prostate- could this be a source of sepsis ? prost tic ultrasound. Patient is on meds for the prostate at home, team to address    HIV negative    MRI results noted    May be able to stop the abs soon if all cultures return as negative, but worried that may  be becoming neutropenic and of interest, temps less since starting the abs    For Bone marrow bx today    several studies still pending    I will be away  tomorrow. My associates will be covering. Please call [phone] with acute issues, questions.

## 2019-04-08 NOTE — PROGRESS NOTE ADULT - ASSESSMENT
80 yo M who presented on 4/3 with hematuria and jaundice found to have pancytopenia and meeting criteria for HLH    1. HLH:  - pt meeting criteria: ferritin over 46k, pancytopenia, fever, elevated triglycerides. Soluble IL-2 (CD25) receptor pending  - s/p BM biopsy today (4/8)  -peripheral smear: numerous target cells, no spur cells or fragmented cells, no microspherocytes. large PLTs, no clumping. mature neutrophils.  -iron studies: not consistent with iron deficiency.   -no clear evidence of infection  - started on dexamethasone 10mg/m2 (20 mg IV daily) on 4/7/18  - given high clinical suspicion for HLH will start treatment with etoposide tomorrow.   - HLH protocol: etoposide 150mg/m2 twice weekly x 2 weeks, then weekly for weeks 3 to 8. Will dose reduce etoposide to 75 mg/m2 due to liver dysfunction and significant hyperbilirubinemia. Dexamethasone 10mg/m2 (week 1-2), 5mg/m2 (week 3-4), 2.5mg/m2 (week 5-6), 1.25 mg/m2 week 7 and then stop.   - pt also with fibrinogen of 92 today, given 10 units of cryoprecipitate. Low fibrinogen likely due to liver dysfunction  - this degree of hepatic impairment is unusual for HLH and would consider Hepatology evaluation  - monitor daily CBC with Diff, ferritin, LDH, and fibrinogen. If fibrinogen < 100, transfuse 10 units of cryoprecipitate.   - plan to transfer to [facility] when bed available    d/w primary team    Sundeep Griffith, PGY-5  Hematology-Oncology Fellow  Pager: [phone] ([facility]), [phone] ([facility])  (After 5 pm or on weekends please page the on-call fellow)

## 2019-04-08 NOTE — PROGRESS NOTE ADULT - SUBJECTIVE AND OBJECTIVE BOX
Patient is a 78y old  Male who presents with a chief complaint of Jaundice, hematuria (08 Apr 2019 12:03)      SUBJECTIVE / OVERNIGHT EVENTS: no new c/o, tolerating po intake, IV DECADRON started by HEME team, bone marrow Bx to be done today    MEDICATIONS  (STANDING):  acetaminophen  IVPB .. 1000 milliGRAM(s) IV Intermittent once  alfuzosin 10 milliGRAM(s) Oral at bedtime  dexamethasone  IVPB 20 milliGRAM(s) IV Intermittent daily  finasteride 5 milliGRAM(s) Oral daily  heparin  Injectable 5000 Unit(s) IV Push once  lidocaine 2% Injectable 20 milliLiter(s) Local Injection once  pantoprazole  Injectable 40 milliGRAM(s) IV Push daily  piperacillin/tazobactam IVPB. 3.375 Gram(s) IV Intermittent every 8 hours    MEDICATIONS  (PRN):      Vital Signs Last 24 Hrs  T(F): 97.4 (04-08-19 @ 13:51), Max: 98.3 (04-08-19 @ 03:41)  HR: 81 (04-08-19 @ 13:51) (79 - 85)  BP: 104/67 (04-08-19 @ 13:51) (95/59 - 104/67)  RR: 18 (04-08-19 @ 13:51) (18 - 18)  SpO2: 95% (04-08-19 @ 13:51) (93% - 97%)  Telemetry:   CAPILLARY BLOOD GLUCOSE        I&O's Summary    07 Apr 2019 07:01  -  08 Apr 2019 07:00  --------------------------------------------------------  IN: 1000 mL / OUT: 400 mL / NET: 600 mL    08 Apr 2019 07:01  -  08 Apr 2019 15:59  --------------------------------------------------------  IN: 340 mL / OUT: 200 mL / NET: 140 mL        PHYSICAL EXAM:  GENERAL: NAD, well-developed  HEAD:  Atraumatic, Normocephalic  EYES: EOMI, PERRLA, conjunctiva and sclera clear  NECK: Supple, No JVD  CHEST/LUNG: Clear to auscultation bilaterally; No wheeze  HEART: Regular rate and rhythm; No murmurs, rubs, or gallops  ABDOMEN: Soft, Nontender, Nondistended; Bowel sounds present  EXTREMITIES:  2+ Peripheral Pulses, No clubbing, cyanosis, or edema  PSYCH: AAOx3  NEUROLOGY: non-focal  SKIN: No rashes or lesions    LABS:                        9.5    1.95  )-----------( 35       ( 08 Apr 2019 09:09 )             27.2     04-08    134<L>  |  104  |  33<H>  ----------------------------<  103<H>  4.2   |  18<L>  |  1.69<H>    Ca    8.3<L>      08 Apr 2019 06:52    TPro  3.6<L>  /  Alb  2.2<L>  /  TBili  18.4<H>  /  DBili  x   /  AST  187<H>  /  ALT  197<H>  /  AlkPhos  637<H>  04-08              RADIOLOGY & ADDITIONAL TESTS:    Imaging Personally Reviewed:    Consultant(s) Notes Reviewed:      Care Discussed with Consultants/Other Providers:

## 2019-04-08 NOTE — PROCEDURE NOTE - SUPERVISORY STATEMENT
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 Pancytopenia and increased bilirubin/jaundice. 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 Pancytopenia and increased bilirubin/jaundice.

## 2019-04-08 NOTE — PROGRESS NOTE ADULT - SUBJECTIVE AND OBJECTIVE BOX
Chief Complaint: fatigue     INTERVAL HPI/OVERNIGHT EVENTS: No acute events overnight, currently with no complaints. Is ready for BM biopsy. Started on dexamethasone IV yesterday.     MEDICATIONS  (STANDING):  acetaminophen  IVPB .. 1000 milliGRAM(s) IV Intermittent once  alfuzosin 10 milliGRAM(s) Oral at bedtime  dexamethasone  IVPB 20 milliGRAM(s) IV Intermittent daily  finasteride 5 milliGRAM(s) Oral daily  pantoprazole  Injectable 40 milliGRAM(s) IV Push daily  piperacillin/tazobactam IVPB. 3.375 Gram(s) IV Intermittent every 8 hours    MEDICATIONS  (PRN):    Vital Signs Last 24 Hrs  T(C): 36.3 (08 Apr 2019 13:51), Max: 36.8 (08 Apr 2019 03:41)  T(F): 97.4 (08 Apr 2019 13:51), Max: 98.3 (08 Apr 2019 03:41)  HR: 81 (08 Apr 2019 13:51) (79 - 85)  BP: 104/67 (08 Apr 2019 13:51) (95/59 - 104/67)  BP(mean): --  RR: 18 (08 Apr 2019 13:51) (18 - 18)  SpO2: 95% (08 Apr 2019 13:51) (93% - 97%)    PHYSICAL EXAM:  GENERAL: resting in bed comfortably  EYES: EOMI, + scleral icterus  NECK: supple  RESP: CTAB  HEART: RRR, S1/S2  ABDOMEN: +BS, soft, NT, ND  EXTREMITIES: no LE edema  NEUROLOGIC: no focal deficits, AAO x 3  SKIN: jaundiced    LABS:                                 9.5    1.95  )-----------( 35       ( 08 Apr 2019 09:09 )             27.2   04-08    134<L>  |  104  |  33<H>  ----------------------------<  103<H>  4.2   |  18<L>  |  1.69<H>    Ca    8.3<L>      08 Apr 2019 06:52    TPro  3.6<L>  /  Alb  2.2<L>  /  TBili  18.4<H>  /  DBili  x   /  AST  187<H>  /  ALT  197<H>  /  AlkPhos  637<H>  04-0

## 2019-04-08 NOTE — PROGRESS NOTE ADULT - SUBJECTIVE AND OBJECTIVE BOX
LEODAN BURGOS    Patient is a 78y old  Male who presents with a chief complaint of Jaundice, hematuria,fatigue,pancytopenia,possible HLH.No CP or SOB,for BM BX today.      Allergies    No Known Allergies    Intolerances      MEDICATIONS  (STANDING):  acetaminophen  IVPB .. 1000 milliGRAM(s) IV Intermittent once  alfuzosin 10 milliGRAM(s) Oral at bedtime  dexamethasone  IVPB 20 milliGRAM(s) IV Intermittent daily  finasteride 5 milliGRAM(s) Oral daily  pantoprazole  Injectable 40 milliGRAM(s) IV Push daily  piperacillin/tazobactam IVPB. 3.375 Gram(s) IV Intermittent every 8 hours  sodium chloride 0.9%. 1000 milliLiter(s) (60 mL/Hr) IV Continuous <Continuous>    MEDICATIONS  (PRN):      ROS:  Positive:    General: Denies weight loss, fevers, rash, decreased hearing  Cardiac: Denies chest pain, SOB, HALL, orthopnea, PND, claudication, edema, snoring, daytime somnolence, palpitations, syncope  Resp: Denies SOB, HALL, cough, sputum, wheezing, hemoptysis  GI: Denies change in bowel habits, diarrhea, weight loss, melena, tarry stools,   nausea, vomiting, jaundice, abdominal pain, dysphagia  : Denies dysuria, nocturia, hematuria  Neuro: Denies tinnitus, headache, visual changes, weakness, dizziness or vertigo  Musculoskeletal: Denies neck pain back pain joint pain.  Skin: Denies rash, itching, dryness.  Endocrine: Denies polydipsia, polyuria  Psychiatric: Denies depression, anxiety      PHYSICAL EXAM:  Vital Signs Last 24 Hrs  T(C): 36.8 (08 Apr 2019 03:41), Max: 36.8 (08 Apr 2019 03:41)  T(F): 98.3 (08 Apr 2019 03:41), Max: 98.3 (08 Apr 2019 03:41)  HR: 79 (08 Apr 2019 03:41) (79 - 85)  BP: 95/59 (08 Apr 2019 03:41) (95/59 - 116/73)  BP(mean): --  RR: 18 (08 Apr 2019 03:41) (18 - 18)  SpO2: 93% (08 Apr 2019 03:41) (93% - 97%)  Daily     Daily   I&O's Summary    06 Apr 2019 07:01  -  07 Apr 2019 07:00  --------------------------------------------------------  IN: 960 mL / OUT: 500 mL / NET: 460 mL    07 Apr 2019 07:01  -  08 Apr 2019 05:48  --------------------------------------------------------  IN: 1000 mL / OUT: 300 mL / NET: 700 mL        General Appearance: 	 Alert, cooperative, no distress,jaundice  HEENT: normocephalic, atraumatic, PERRLA, EOMI, conjunctiva normal, sclera anicteric,   Neck: no JVD,  carotid 2+  bilaterally without bruits, thyroid normal to inspection and palpation, no adenopathy, trachea midline  Lungs:  clear to auscultation and percussion bilaterally  Cor:  pmi 5th ICS MCL, regular rate and rhythm, S1 normal intensity, S2 normal intensity, no gallops, murmurs or rubs  Abdomen:	 soft, non-tender; bowel sounds normal; no masses,  no organomegaly  Extremities: without cyanosis, clubbing or edema  Vasc: 2-+ PT and DP pulses; no varicosities  Neurologic: A&O x 3 (time, place, person). Symmetric strength; limited exam  Musculoskeletal: no kyphosis, scoliosis; normal gait, normal tone  Skin: no rashes; limited exam    EKG:  Telemetry:    Labs:  CBC Full  -  ( 07 Apr 2019 09:01 )  WBC Count : 1.64 K/uL  RBC Count : 3.49 M/uL  Hemoglobin : 9.9 g/dL  Hematocrit : 28.2 %  Platelet Count - Automated : 35 K/uL  Mean Cell Volume : 80.8 fl  Mean Cell Hemoglobin : 28.4 pg  Mean Cell Hemoglobin Concentration : 35.1 gm/dL  Auto Neutrophil # : x  Auto Lymphocyte # : x  Auto Monocyte # : x  Auto Eosinophil # : x  Auto Basophil # : x  Auto Neutrophil % : x  Auto Lymphocyte % : x  Auto Monocyte % : x  Auto Eosinophil % : x  Auto Basophil % : x          Impression/Plan:Patient with h/o HTN,hyperlipidemia,RBBB,BPH with pancytopenia,hematuria,increased BILI and LFT's,possibkle HLH.Continue antibiotics ,steroids and Protonix.F/U hematology and GI.For BM BX today.OOB ambulation.        Marin Albert MD, [facility]  [facility]

## 2019-04-08 NOTE — PROGRESS NOTE ADULT - ASSESSMENT
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  ongoing pancytopenia, ELEV transaminases, all Cx NTD, still on IV Abx for the concern of neutropenia  elevated PT and INR, elev lactate, w ARF, hyponatremia, w hematuria, guaiac pos stools, pancytopenia, but not neutropenic no biliary obstruction on CT scan/MRCP neg HIDA scan, splenic infarct on CT  Gi , ID ,renal and Heme on board. [place]ERRIT[place] LEVEL EXTREMELY HIGH AT [address], HLH becomes high on DDX list. Bone Marrow Bx to be done today. HD IV DECADRON started today  Echo pending. Hep A/B/C neg, auto mitochondrial and smooth muscle AB neg.    DVT ppx w PAS 2/2 elevated INR.

## 2019-04-08 NOTE — PROGRESS NOTE ADULT - SUBJECTIVE AND OBJECTIVE BOX
LEODAN BURGOS:[id number],   78yMale followed for:  No Known Allergies    PAST MEDICAL & SURGICAL HISTORY:  BPH (benign prostatic hyperplasia)  H/O knee surgery  Hernia    FAMILY HISTORY:    MEDICATIONS  (STANDING):  acetaminophen  IVPB .. 1000 milliGRAM(s) IV Intermittent once  alfuzosin 10 milliGRAM(s) Oral at bedtime  dexamethasone  IVPB 20 milliGRAM(s) IV Intermittent daily  finasteride 5 milliGRAM(s) Oral daily  pantoprazole  Injectable 40 milliGRAM(s) IV Push daily  piperacillin/tazobactam IVPB. 3.375 Gram(s) IV Intermittent every 8 hours  sodium chloride 0.9%. 1000 milliLiter(s) (60 mL/Hr) IV Continuous <Continuous>    MEDICATIONS  (PRN):      Vital Signs Last 24 Hrs  T(C): 36.8 (08 Apr 2019 03:41), Max: 36.8 (08 Apr 2019 03:41)  T(F): 98.3 (08 Apr 2019 03:41), Max: 98.3 (08 Apr 2019 03:41)  HR: 79 (08 Apr 2019 03:41) (79 - 85)  BP: 95/59 (08 Apr 2019 03:41) (95/59 - 116/73)  BP(mean): --  RR: 18 (08 Apr 2019 03:41) (18 - 18)  SpO2: 93% (08 Apr 2019 03:41) (93% - 97%)  nc/at  s1s2  cta  soft, nt, nd no guarding or rebound  no c/c/e    CBC Full  -  ( 08 Apr 2019 09:09 )  WBC Count : 1.95 K/uL  RBC Count : 3.31 M/uL  Hemoglobin : 9.5 g/dL  Hematocrit : 27.2 %  Platelet Count - Automated : 35 K/uL  Mean Cell Volume : 82.2 fl  Mean Cell Hemoglobin : 28.7 pg  Mean Cell Hemoglobin Concentration : 34.9 gm/dL  Auto Neutrophil # : x  Auto Lymphocyte # : x  Auto Monocyte # : x  Auto Eosinophil # : x  Auto Basophil # : x  Auto Neutrophil % : x  Auto Lymphocyte % : x  Auto Monocyte % : x  Auto Eosinophil % : x  Auto Basophil % : x    04-08    134<L>  |  104  |  33<H>  ----------------------------<  103<H>  4.2   |  18<L>  |  1.69<H>    Ca    8.3<L>      08 Apr 2019 06:52    TPro  3.6<L>  /  Alb  2.2<L>  /  TBili  18.4<H>  /  DBili  x   /  AST  187<H>  /  ALT  197<H>  /  AlkPhos  637<H>  04-08

## 2019-04-08 NOTE — PROGRESS NOTE ADULT - SUBJECTIVE AND OBJECTIVE BOX
Patient is a 78y old  Male who presents with a chief complaint of Jaundice, hematuria (08 Apr 2019 05:48)    Being followed by ID for        Interval history:  No other acute events      ROS:  No cough,SOB,CP  No N/V/D  No abd pain  No urinary complaints  No HA  No joint or limb pain  No other complaints    PAST MEDICAL & SURGICAL HISTORY:  BPH (benign prostatic hyperplasia)  H/O knee surgery  Hernia    Allergies    No Known Allergies    Intolerances      Antimicrobials:    piperacillin/tazobactam IVPB. 3.375 Gram(s) IV Intermittent every 8 hours    MEDICATIONS  (STANDING):  acetaminophen  IVPB .. 1000 milliGRAM(s) IV Intermittent once  alfuzosin 10 milliGRAM(s) Oral at bedtime  dexamethasone  IVPB 20 milliGRAM(s) IV Intermittent daily  finasteride 5 milliGRAM(s) Oral daily  pantoprazole  Injectable 40 milliGRAM(s) IV Push daily  piperacillin/tazobactam IVPB. 3.375 Gram(s) IV Intermittent every 8 hours  sodium chloride 0.9%. 1000 milliLiter(s) (60 mL/Hr) IV Continuous <Continuous>      Vital Signs Last 24 Hrs  T(C): 36.8 (04-08-19 @ 03:41), Max: 36.8 (04-08-19 @ 03:41)  T(F): 98.3 (04-08-19 @ 03:41), Max: 98.3 (04-08-19 @ 03:41)  HR: 79 (04-08-19 @ 03:41) (79 - 85)  BP: 95/59 (04-08-19 @ 03:41) (95/59 - 116/73)  BP(mean): --  RR: 18 (04-08-19 @ 03:41) (18 - 18)  SpO2: 93% (04-08-19 @ 03:41) (93% - 97%)    Physical Exam:    Constitutional well preserved,comfortable,pleasant    HEENT PERRLA EOMI,No pallor or icterus    No oral exudate or erythema    Neck supple no JVD or LN    Chest Good AE,CTA    CVS RRR S1 S2 WNl No murmur or rub or gallop    Abd soft BS normal No tenderness no masses    Ext No cyanosis clubbing or edema    IV site no erythema tenderness or discharge    Joints no swelling or LOM    CNS AAO X 3 no focal    Lab Data:                          9.5    1.95  )-----------( 35       ( 08 Apr 2019 09:09 )             27.2   Auto Neutrophil #: 1.36 K/uL (04.06.19 @ 11:20)        04-08    134<L>  |  104  |  33<H>  ----------------------------<  103<H>  4.2   |  18<L>  |  1.69<H>    Ca    8.3<L>      08 Apr 2019 06:52    TPro  3.6<L>  /  Alb  2.2<L>  /  TBili  18.4<H>  /  DBili  x   /  AST  187<H>  /  ALT  197<H>  /  AlkPhos  637<H>  04-08          .Blood blood  04-04-19   No Blood Parasites observed by giemsa stain  One negative set of blood smears does not rule out  the possibility of a parasitic infection.  A minimum of 3  specimens should be collected, at least 12-24 hours apart,  over a 36 hour time period.  --  --      .Urine Clean Catch (Midstream)  04-03-19   No growth  --  --      .Blood Blood-Peripheral  04-03-19   No growth to date.  --  --          WBC Count: 1.95 (04-08-19 @ 09:09)  WBC Count: 1.64 (04-07-19 @ 09:01)  WBC Count: 1.88 (04-06-19 @ 11:20)  WBC Count: 2.44 (04-05-19 @ 09:25)  WBC Count: 2.4 (04-04-19 @ 16:28)  WBC Count: 2.36 (04-04-19 @ 09:42)  WBC Count: 2.6 (04-03-19 @ 17:23)  WBC Count: 3.2 (04-03-19 @ 10:42) Patient is a 78y old  Male who presents with a chief complaint of Jaundice, hematuria (08 Apr 2019 05:48)    Being followed by ID for help in management        Interval history:  pt feeling well  urine seemed darker to him today  stools are normalizing  received first dose of steroids  awaiting bone marrow  No other acute events      PAST MEDICAL & SURGICAL HISTORY:  BPH (benign prostatic hyperplasia)  H/O knee surgery  Hernia    Allergies    No Known Allergies    Intolerances      Antimicrobials:    piperacillin/tazobactam IVPB. 3.375 Gram(s) IV Intermittent every 8 hours    MEDICATIONS  (STANDING):  acetaminophen  IVPB .. 1000 milliGRAM(s) IV Intermittent once  alfuzosin 10 milliGRAM(s) Oral at bedtime  dexamethasone  IVPB 20 milliGRAM(s) IV Intermittent daily  finasteride 5 milliGRAM(s) Oral daily  pantoprazole  Injectable 40 milliGRAM(s) IV Push daily  piperacillin/tazobactam IVPB. 3.375 Gram(s) IV Intermittent every 8 hours  sodium chloride 0.9%. 1000 milliLiter(s) (60 mL/Hr) IV Continuous <Continuous>      Vital Signs Last 24 Hrs  T(C): 36.8 (04-08-19 @ 03:41), Max: 36.8 (04-08-19 @ 03:41)  T(F): 98.3 (04-08-19 @ 03:41), Max: 98.3 (04-08-19 @ 03:41)  HR: 79 (04-08-19 @ 03:41) (79 - 85)  BP: 95/59 (04-08-19 @ 03:41) (95/59 - 116/73)  BP(mean): --  RR: 18 (04-08-19 @ 03:41) (18 - 18)  SpO2: 93% (04-08-19 @ 03:41) (93% - 97%)    Physical Exam:    Constitutional well preserved,comfortable,pleasant    HEENT PERRLA EOMI,No pallor  icteric sclera    No oral exudate or erythema    Neck supple no JVD or LN    Chest Good AE,CTA    CVS RRR S1 S2 WNl     Abd soft BS normal No tenderness     Ext No cyanosis clubbing or edema    IV site no erythema tenderness or discharge    Joints no swelling or LOM    CNS AAO X 3 no focal    Lab Data:                          9.5    1.95  )-----------( 35       ( 08 Apr 2019 09:09 )             27.2   Auto Neutrophil #: 1.36 K/uL (04.06.19 @ 11:20)        04-08    134<L>  |  104  |  33<H>  ----------------------------<  103<H>  4.2   |  18<L>  |  1.69<H>    Ca    8.3<L>      08 Apr 2019 06:52    TPro  3.6<L>  /  Alb  2.2<L>  /  TBili  18.4<H>  /  DBili  x   /  AST  187<H>  /  ALT  197<H>  /  AlkPhos  637<H>  04-08    Ferritin, Serum in AM (04.05.19 @ 10:08)    Ferritin, Serum: 47879: Test Repeated ng/mL    Haptoglobin, Serum in AM (04.05.19 @ 09:43)    Haptoglobin, Serum: <20: Test Repeated mg/dL    Ehrlichia Species Antibody (04.04.19 @ 19:44)    Anaplasma phagocytophilia IgG Antibodies: <1:64    Anaplasma phagocytophilia IgM Antibodies: <1:20    Ehrlichia chaffeensis IgG Antibodies: <1:64    Ehrlichia chaffeensis IgM Antibodies: <1:20    Ehrlichia chaffeensis IgG Interp: SEE NOTE: Antibody Not Detected    Ehrlichia chaffeensis IgG Coment: SEE NOTE: Ehrlichia chaffeenis has been identified as the  causative agent of Human Monocytic Ehrlichiosis (HME).  Infected individuals produce specific antibodies to  E. chaffeensis that can be detected by an immuno-  fluorescent antibody (IFA) test.  Single IgG IFA  titers of 1:64 or greater indicate exposure to  E. chaffeensis.  A four-fold rise in IgG titers  between acute and convalescent samples and/or the  presence of IgM antibody against E. chaffeensis  suggest recent or current infection.  This test was developed and its analytical performance  characteristics have been determined by [facility], [place], [place]. It has  not been cleared or approved by the U.S. Food and Drug  Administration. This assay has been validatedpursuant  to the CLIA regulations and is used for clinical  purposes.    Anaplasma Phagocyto IgM Interp: SEE NOTE: Antibody Not Detected    Anaplasma phagocyto IgM Coment: SEE NOTE: Anaplasma phagocytophilum is the tick-borne agent  causing Human Granulocytic Ehrlichiosis (HGE).  HGE is distinct and separate from Human Monocytic  Ehrlichiosis (HME), caused by Ehrlichia chaffeensis.  Serologic crossreactivity between A. phagocyto-  philum and E. chaffeensis is minimal (5-15%).  This test was developed and its analytical performance  characteristics have been determined by [facility] [place] [place]. It has  not been cleared or approved by the U.S. Food and Drug  Administration. This assay has been validated pursuant  to the CLIA regulations and is used for clinical  purposes.  Test Performed by [facility],  [facility]rend[facility],  [address] [place] [id number]  Phillip Carcamo M.D., Ph.D., Director of Laboratories  [phone], CLIA [id number]      .Blood blood  04-04-19   No Blood Parasites observed by giemsa stain  One negative set of blood smears does not rule out  the possibility of a parasitic infection.  A minimum of 3  specimens should be collected, at least 12-24 hours apart,  over a 36 hour time period.  --  --      .Urine Clean Catch (Midstream)  04-03-19   No growth  --  --      .Blood Blood-Peripheral  04-03-19   No growth to date.  --  --    Triglycerides, Serum (04.05.19 @ 16:39)    Triglycerides, Serum: 320: Severe Icteria, results may be affected mg/dL      HIV-1/2 Antigen/Antibody Screen by CMIA (04.06.19 @ 10:14)    HIV-1/2 Combo Result: Nonreact: The HIV Ag/Ab Combo test performed screens for HIV-1 p24 antigen,  antibodies to HIV-1 (group M and group O), and antibodies to HIV-2. All  specimens repeatedly reactive will reflex to an HIV 1/2 antibody  confirmation and differentiation test. This assay detects p24 antigen  which may be present prior to the development of HIV antibodies,  therefore a reactive result with a negative HIV 1/2 AB Confirmation  should be followed up with HIV-1 RNA, HIV-2 RNA and repeat testing in 4-8  weeks. A nonreactive result does not preclude previous exposure to or  infection with HIV-1 or HIV-2. [facility] prohibits disclosure of this  result to any unauthorized party.    WBC Count: 1.95 (04-08-19 @ 09:09)  WBC Count: 1.64 (04-07-19 @ 09:01)  WBC Count: 1.88 (04-06-19 @ 11:20)  WBC Count: 2.44 (04-05-19 @ 09:25)  WBC Count: 2.4 (04-04-19 @ 16:28)  WBC Count: 2.36 (04-04-19 @ 09:42)  WBC Count: 2.6 (04-03-19 @ 17:23)  WBC Count: 3.2 (04-03-19 @ 10:42)

## 2019-04-08 NOTE — PROGRESS NOTE ADULT - SUBJECTIVE AND OBJECTIVE BOX
NEPHROLOGY - NSN    Patient seen and examined.    MEDICATIONS  (STANDING):  acetaminophen  IVPB .. 1000 milliGRAM(s) IV Intermittent once  alfuzosin 10 milliGRAM(s) Oral at bedtime  dexamethasone  IVPB 20 milliGRAM(s) IV Intermittent daily  finasteride 5 milliGRAM(s) Oral daily  pantoprazole  Injectable 40 milliGRAM(s) IV Push daily  piperacillin/tazobactam IVPB. 3.375 Gram(s) IV Intermittent every 8 hours  sodium chloride 0.9%. 1000 milliLiter(s) (60 mL/Hr) IV Continuous <Continuous>    VITALS:  T(C): , Max: 36.8 (04-08-19 @ 03:41)  T(F): , Max: 98.3 (04-08-19 @ 03:41)  HR: 79 (04-08-19 @ 03:41)  BP: 95/59 (04-08-19 @ 03:41)  BP(mean): --  RR: 18 (04-08-19 @ 03:41)  SpO2: 93% (04-08-19 @ 03:41)  Wt(kg): --  I and O's:    04-07 @ 07:01  -  04-08 @ 07:00  --------------------------------------------------------  IN: 1000 mL / OUT: 400 mL / NET: 600 mL    04-08 @ 07:01  -  04-08 @ 11:17  --------------------------------------------------------  IN: 240 mL / OUT: 0 mL / NET: 240 mL          REVIEW OF SYSTEMS:  Full ROS done and were negative unless otherwise indicated in HPI/assessment.     PHYSICAL EXAM:  Constitutional: NAD, jaundice  Respiratory: CTA B/L  Cardiovascular: S1 and S2, RRR  Gastrointestinal: + BS, soft, NT  Extremities: no peripheral edema  Neurological: AAO x 3    LABS:                        9.5    1.95  )-----------( 35       ( 08 Apr 2019 09:09 )             27.2     04-08    134<L>  |  104  |  33<H>  ----------------------------<  103<H>  4.2   |  18<L>  |  1.69<H>    Ca    8.3<L>      08 Apr 2019 06:52    TPro  3.6<L>  /  Alb  2.2<L>  /  TBili  18.4<H>  /  DBili  x   /  AST  187<H>  /  ALT  197<H>  /  AlkPhos  637<H>  04-08    ASSESSMENT  78M w/ BPH, OA, and HLD, 4/3/19 a/w weight loss, jaundice, and gross hematuria  (1)Hyponatremia - due to hypovolemia/poor osm intake relative to water. Largely improved, on gentle isotonic IVF  (2)Renal - YEE - prerenal - slowly resolving with gentle IVF  (3)Cholestasis/transaminitis - unclear etiology - GI on board    RECOMMEND:  (1)Continue NS@60ml/hr   (2)BMP daily  (3)f/u BMBx     Monika Love NP  [facility]  [phone] NEPHROLOGY - NSN    Patient seen and examined.    MEDICATIONS  (STANDING):  acetaminophen  IVPB .. 1000 milliGRAM(s) IV Intermittent once  alfuzosin 10 milliGRAM(s) Oral at bedtime  dexamethasone  IVPB 20 milliGRAM(s) IV Intermittent daily  finasteride 5 milliGRAM(s) Oral daily  pantoprazole  Injectable 40 milliGRAM(s) IV Push daily  piperacillin/tazobactam IVPB. 3.375 Gram(s) IV Intermittent every 8 hours  sodium chloride 0.9%. 1000 milliLiter(s) (60 mL/Hr) IV Continuous <Continuous>    VITALS:  T(C): , Max: 36.8 (04-08-19 @ 03:41)  T(F): , Max: 98.3 (04-08-19 @ 03:41)  HR: 79 (04-08-19 @ 03:41)  BP: 95/59 (04-08-19 @ 03:41)  BP(mean): --  RR: 18 (04-08-19 @ 03:41)  SpO2: 93% (04-08-19 @ 03:41)  Wt(kg): --  I and O's:    04-07 @ 07:01  -  04-08 @ 07:00  --------------------------------------------------------  IN: 1000 mL / OUT: 400 mL / NET: 600 mL    04-08 @ 07:01  -  04-08 @ 11:17  --------------------------------------------------------  IN: 240 mL / OUT: 0 mL / NET: 240 mL          REVIEW OF SYSTEMS:  Full ROS done and were negative unless otherwise indicated in HPI/assessment.     PHYSICAL EXAM:  Constitutional: NAD, jaundice  Respiratory: CTA B/L  Cardiovascular: S1 and S2, RRR  Gastrointestinal: + BS, soft, NT  Extremities: no peripheral edema  Neurological: AAO x 3    LABS:                        9.5    1.95  )-----------( 35       ( 08 Apr 2019 09:09 )             27.2     04-08    134<L>  |  104  |  33<H>  ----------------------------<  103<H>  4.2   |  18<L>  |  1.69<H>    Ca    8.3<L>      08 Apr 2019 06:52    TPro  3.6<L>  /  Alb  2.2<L>  /  TBili  18.4<H>  /  DBili  x   /  AST  187<H>  /  ALT  197<H>  /  AlkPhos  637<H>  04-08    ASSESSMENT  78M w/ BPH, OA, and HLD, 4/3/19 a/w weight loss, jaundice, and gross hematuria  (1)Hyponatremia - due to hypovolemia/poor osm intake relative to water. Largely improved, on gentle isotonic IVF  (2)Renal - YEE - prerenal - slowly resolving with gentle IVF  (3)Cholestasis/transaminitis - unclear etiology - GI on board    RECOMMEND:  (1)Continue NS@60ml/hr   (2)BMP daily  (3)f/u BMBx     Monika Love NP  [facility]  [phone]      *******************RENAL ATTENDING**********************  Seen/examined with NP. I agree with NP assessment as above.    VS as above; NAD; (+)icteric skin and sclerae; CTA-B/L; RRR; no edema b/l    ASSESSMENT: 78M w/ BPH, OA, and HLD, 4/3/19 a/w weight loss, jaundice, and gross hematuria  (1)Hyponatremia - due to hypovolemia/poor osm intake relative to water. Resolved  (2)Renal - YEE - prerenal - slowly resolving with gentle IVF  (3)Cholestasis/transaminitis - unclear etiology - HLH-induced? For BMBx    RECOMMEND:  (1)Can d/c IVF  (2)BMP daily  (3)f/u BMBx             Bernardo Galdamez MD  [facility]  [phone]

## 2019-04-09 NOTE — PROGRESS NOTE ADULT - SUBJECTIVE AND OBJECTIVE BOX
INTERVAL HPI/OVERNIGHT EVENTS:  Patient S&E at bedside. feels ok, wife at bedside    VITAL SIGNS:  T(F): 97.6 (04-09-19 @ 15:29)  HR: 81 (04-09-19 @ 15:29)  BP: 120/72 (04-09-19 @ 15:29)  RR: 18 (04-09-19 @ 15:29)  SpO2: 97% (04-09-19 @ 15:29)  Wt(kg): --    PHYSICAL EXAM:    Constitutional: NAD  Eyes: icteric b/l  Neck: supple, no masses, no JVD  Respiratory: CTA b/l, good air entry b/l  Cardiovascular: RRR, no M/R/G  Gastrointestinal: soft, NTND, no masses palpable, + BS, no hepatosplenomegaly  Extremities: no c/c/e  Neurological: AAOx3      MEDICATIONS  (STANDING):  acetaminophen  IVPB .. 1000 milliGRAM(s) IV Intermittent once  alfuzosin 10 milliGRAM(s) Oral at bedtime  dexamethasone  IVPB 20 milliGRAM(s) IV Intermittent daily  dextrose 5%. 1000 milliLiter(s) (50 mL/Hr) IV Continuous <Continuous>  dextrose 50% Injectable 12.5 Gram(s) IV Push once  dextrose 50% Injectable 25 Gram(s) IV Push once  dextrose 50% Injectable 25 Gram(s) IV Push once  finasteride 5 milliGRAM(s) Oral daily  insulin lispro (HumaLOG) corrective regimen sliding scale   SubCutaneous three times a day before meals  insulin lispro (HumaLOG) corrective regimen sliding scale   SubCutaneous at bedtime  pantoprazole  Injectable 40 milliGRAM(s) IV Push daily  ursodiol Tablet 500 milliGRAM(s) Oral two times a day    MEDICATIONS  (PRN):  dextrose 40% Gel 15 Gram(s) Oral once PRN Blood Glucose LESS THAN 70 milliGRAM(s)/deciliter  glucagon  Injectable 1 milliGRAM(s) IntraMuscular once PRN Glucose LESS THAN 70 milligrams/deciliter      Allergies    No Known Allergies    Intolerances        LABS:                        9.2    3.10  )-----------( 37       ( 09 Apr 2019 09:14 )             25.9     04-09    137  |  106  |  36<H>  ----------------------------<  119<H>  4.2   |  19<L>  |  1.53<H>    Ca    8.4      09 Apr 2019 07:15    TPro  3.8<L>  /  Alb  2.3<L>  /  TBili  19.2<H>  /  DBili  x   /  AST  142<H>  /  ALT  180<H>  /  AlkPhos  631<H>  04-09

## 2019-04-09 NOTE — PROGRESS NOTE ADULT - ASSESSMENT
Patient seen and agree with Dr. Griffith's note. Patient is 80 yo M who presented on 4/3 with hematuria and jaundice found to have pancytopenia and meeting criteria for HLH    1. Hemophagocytic Lymphohistiocytosis:  - pt meeting criteria: ferritin over 46k, pancytopenia, fever, elevated triglycerides. Soluble IL-2 (CD25) receptor pending  - s/p BM biopsy  (4/8) follow up final results  -iron studies: not consistent with iron deficiency.   -no clear evidence of infection  - started on dexamethasone 10mg/m2 (20 mg IV daily) on 4/7/18, given high clinical suspicion for HLH, we started treatment with dose reduced etoposide 75mg/m2 on 4-9-19  given liver injury  - HLH protocol: etoposide 150mg/m2 twice weekly x 2 weeks, then weekly for weeks 3 to 8. dose reduced the etoposide to 75 mg/m2 due to liver dysfunction and significant hyperbilirubinemia. Dexamethasone 10mg/m2 daily (week 1-2), 5mg/m2 daily (week 3-4), 2.5mg/m2 daily (week 5-6), 1.25 mg/m2  daily for week 7 and then stop.   - still has low fibrinogen of 139 today, given 10 units of cryoprecipitate. Low fibrinogen likely due to liver dysfunction  -recommend Hepatology evaluation given liver injury  - monitor daily CBC with Diff, ferritin, LDH, and fibrinogen. If fibrinogen < 100, transfuse 10 units of cryoprecipitate.

## 2019-04-09 NOTE — DIETITIAN INITIAL EVALUATION ADULT. - NS AS NUTRI INTERV MEALS SNACK
Recommend c/w regular diet. Provide pt with health shakes and personal food preferences./Other (specify)/General/healthful diet

## 2019-04-09 NOTE — CHART NOTE - NSCHARTNOTEFT_GEN_A_CORE
Upon Nutritional Assessment by the Registered Dietitian your patient was determined to meet criteria / has evidence of the following diagnosis/diagnoses:          [ ]  Mild Protein Calorie Malnutrition        [ ]  Moderate Protein Calorie Malnutrition        [x ] Severe Protein Calorie Malnutrition        [ ] Unspecified Protein Calorie Malnutrition        [ ] Underweight / BMI <19        [ ] Morbid Obesity / BMI > 40      Findings as based on:  [ x] Comprehensive nutrition assessment: ~8% wt loss x 3 weeks, meeting <50% estimated energy needs x 3 weeks   [ x] Nutrition Focused Physical Exam: Mild muscle mass and body fat depletion   [ ] Other:       Nutrition Plan/Recommendations:  Will provide mighty shakes twice daily, intake improved in house while on steroids         PROVIDER Section:     By signing this assessment you are acknowledging and agree with the diagnosis/diagnoses assigned by the Registered Dietitian    Comments:

## 2019-04-09 NOTE — DIETITIAN INITIAL EVALUATION ADULT. - ADHERENCE
Pt's wife cooks for him at home, follows regular diet. Diet recall indicates generally balanced diet./good

## 2019-04-09 NOTE — DIETITIAN INITIAL EVALUATION ADULT. - PERTINENT LABORATORY DATA
(4/9) Triglycerides 252 (H), BUN 36 (H), Cre 1.53 (H), Glu 119 (H), AST//180 (H), GFR 43 (L), (4/5) ferritin 54106 (H), Vitamin B12 1932 (H)

## 2019-04-09 NOTE — DIETITIAN INITIAL EVALUATION ADULT. - SIGNS/SYMPTOMS
20 lb wt loss x 3 wks, <50% estimated energy needs x 3 wks, mild muscle wasting 8% wt loss x 3 wks, meeting <50% estimated energy needs x 3 wks, mild muscle wasting

## 2019-04-09 NOTE — ADVANCED PRACTICE NURSE CONSULT - ASSESSMENT
Cycle 1, day 1/1,Height and weight BSAverified. Lab results as per Md Dr sarahi miguel aware of same. Vital signs stable prior to chemotherapy, and  within accepectable parameters, see sunrise. Pt educated on the importance of saving urine, verbalizes good understanding. Pt education done re chemo regimen, drug effects and potential side effects, written materials provided, pt states understanding.. Pt with L lower forearm peripheral iv  line, site free from signs and symptoms of infection, good blood return obtained. etoposide 75 mg/m2=160 mg ivss infused over 1 hr w/o incident

## 2019-04-09 NOTE — PROGRESS NOTE ADULT - SUBJECTIVE AND OBJECTIVE BOX
LEODAN BURGOS    Patient is a 78y old  Male who presents with a chief complaint of Jaundice, hematuria,pancytopenia.S/P BM BX.No CP or SOB.      Allergies    No Known Allergies    Intolerances      MEDICATIONS  (STANDING):  acetaminophen  IVPB .. 1000 milliGRAM(s) IV Intermittent once  alfuzosin 10 milliGRAM(s) Oral at bedtime  dexamethasone  IVPB 20 milliGRAM(s) IV Intermittent daily  finasteride 5 milliGRAM(s) Oral daily  pantoprazole  Injectable 40 milliGRAM(s) IV Push daily  piperacillin/tazobactam IVPB. 3.375 Gram(s) IV Intermittent every 8 hours    MEDICATIONS  (PRN):      ROS:  Positive:    General: Denies weight loss, fevers, rash, decreased hearing  Cardiac: Denies chest pain, SOB, HALL, orthopnea, PND, claudication, edema, snoring, daytime somnolence, palpitations, syncope  Resp: Denies SOB, HALL, cough, sputum, wheezing, hemoptysis  GI: Denies change in bowel habits, diarrhea, weight loss, melena, tarry stools,   nausea, vomiting, jaundice, abdominal pain, dysphagia  : Denies dysuria, nocturia, hematuria  Neuro: Denies tinnitus, headache, visual changes, weakness, dizziness or vertigo  Musculoskeletal: Denies neck pain back pain joint pain.  Skin: Denies rash, itching, dryness.  Endocrine: Denies polydipsia, polyuria  Psychiatric: Denies depression, anxiety      PHYSICAL EXAM:  Vital Signs Last 24 Hrs  T(C): 36.3 (09 Apr 2019 00:17), Max: 36.6 (08 Apr 2019 20:50)  T(F): 97.4 (09 Apr 2019 00:17), Max: 97.8 (08 Apr 2019 20:50)  HR: 86 (09 Apr 2019 00:17) (81 - 86)  BP: 124/84 (09 Apr 2019 00:17) (104/67 - 124/84)  BP(mean): --  RR: 18 (09 Apr 2019 00:17) (18 - 18)  SpO2: 96% (09 Apr 2019 00:17) (95% - 96%)  Daily     Daily   I&O's Summary    07 Apr 2019 07:01  -  08 Apr 2019 07:00  --------------------------------------------------------  IN: 1000 mL / OUT: 400 mL / NET: 600 mL    08 Apr 2019 07:01  -  09 Apr 2019 05:46  --------------------------------------------------------  IN: 1490 mL / OUT: 200 mL / NET: 1290 mL        General Appearance: 	 Alert, cooperative, no distress  HEENT: normocephalic, atraumatic, PERRLA, EOMI, conjunctiva normal, sclera anicteric,   Neck: no JVD,  carotid 2+  bilaterally without bruits, thyroid normal to inspection and palpation, no adenopathy, trachea midline  Lungs:  clear to auscultation and percussion bilaterally  Cor:  pmi 5th ICS MCL, regular rate and rhythm, S1 normal intensity, S2 normal intensity, no gallops, murmurs or rubs  Abdomen:	 soft, non-tender; bowel sounds normal; no masses,  no organomegaly  Extremities: without cyanosis, clubbing or edema  Vasc: 2-+ PT and DP pulses; no varicosities  Neurologic: A&O x 3 (time, place, person). Symmetric strength; limited exam  Musculoskeletal: no kyphosis, scoliosis; normal gait, normal tone  Skin: no rashes; limited exam    EKG:  Telemetry:    Labs:  CBC Full  -  ( 08 Apr 2019 09:09 )  WBC Count : 1.95 K/uL  RBC Count : 3.31 M/uL  Hemoglobin : 9.5 g/dL  Hematocrit : 27.2 %  Platelet Count - Automated : 35 K/uL  Mean Cell Volume : 82.2 fl  Mean Cell Hemoglobin : 28.7 pg  Mean Cell Hemoglobin Concentration : 34.9 gm/dL  Auto Neutrophil # : x  Auto Lymphocyte # : x  Auto Monocyte # : x  Auto Eosinophil # : x  Auto Basophil # : x  Auto Neutrophil % : x  Auto Lymphocyte % : x  Auto Monocyte % : x  Auto Eosinophil % : x  Auto Basophil % : x            Impression/Plan:Patient with HTN,hyperlipidemia,RBBB,BPH admitted with jaundice,hematuria and pancytopenia.S/P BM BX awaiting results possible HLH.Contine hematology F/U.Continue antibiotics and steroids.OOB/PT.        Marin Albert MD, FACC  [facility]

## 2019-04-09 NOTE — PROGRESS NOTE ADULT - SUBJECTIVE AND OBJECTIVE BOX
Patient is a 78y old  Male who presents with a chief complaint of Jaundice, hematuria (09 Apr 2019 19:23)      SUBJECTIVE / OVERNIGHT EVENTS: no new c/o    MEDICATIONS  (STANDING):  acetaminophen  IVPB .. 1000 milliGRAM(s) IV Intermittent once  alfuzosin 10 milliGRAM(s) Oral at bedtime  dexamethasone  IVPB 20 milliGRAM(s) IV Intermittent daily  dextrose 5%. 1000 milliLiter(s) (50 mL/Hr) IV Continuous <Continuous>  dextrose 50% Injectable 12.5 Gram(s) IV Push once  dextrose 50% Injectable 25 Gram(s) IV Push once  dextrose 50% Injectable 25 Gram(s) IV Push once  finasteride 5 milliGRAM(s) Oral daily  insulin lispro (HumaLOG) corrective regimen sliding scale   SubCutaneous three times a day before meals  insulin lispro (HumaLOG) corrective regimen sliding scale   SubCutaneous at bedtime  pantoprazole  Injectable 40 milliGRAM(s) IV Push daily  ursodiol Tablet 500 milliGRAM(s) Oral two times a day    MEDICATIONS  (PRN):  dextrose 40% Gel 15 Gram(s) Oral once PRN Blood Glucose LESS THAN 70 milliGRAM(s)/deciliter  glucagon  Injectable 1 milliGRAM(s) IntraMuscular once PRN Glucose LESS THAN 70 milligrams/deciliter      Vital Signs Last 24 Hrs  T(F): 97.6 (04-09-19 @ 15:29), Max: 97.8 (04-08-19 @ 20:50)  HR: 81 (04-09-19 @ 15:29) (81 - 86)  BP: 120/72 (04-09-19 @ 15:29) (115/71 - 124/84)  RR: 18 (04-09-19 @ 15:29) (18 - 18)  SpO2: 97% (04-09-19 @ 15:29) (95% - 97%)  Telemetry:   CAPILLARY BLOOD GLUCOSE        I&O's Summary    08 Apr 2019 07:01  -  09 Apr 2019 07:00  --------------------------------------------------------  IN: 1490 mL / OUT: 200 mL / NET: 1290 mL    09 Apr 2019 07:01  -  09 Apr 2019 20:47  --------------------------------------------------------  IN: 900 mL / OUT: 0 mL / NET: 900 mL        PHYSICAL EXAM:  GENERAL: NAD, well-developed  HEAD:  Atraumatic, Normocephalic  EYES: EOMI, PERRLA, conjunctiva and sclera clear  NECK: Supple, No JVD  CHEST/LUNG: Clear to auscultation bilaterally; No wheeze  HEART: Regular rate and rhythm; No murmurs, rubs, or gallops  ABDOMEN: Soft, Nontender, Nondistended; Bowel sounds present  EXTREMITIES:  2+ Peripheral Pulses, No clubbing, cyanosis, or edema  PSYCH: AAOx3  NEUROLOGY: non-focal  SKIN: No rashes or lesions    LABS:                        9.2    3.10  )-----------( 37       ( 09 Apr 2019 09:14 )             25.9     04-09    137  |  106  |  36<H>  ----------------------------<  119<H>  4.2   |  19<L>  |  1.53<H>    Ca    8.4      09 Apr 2019 07:15    TPro  3.8<L>  /  Alb  2.3<L>  /  TBili  19.2<H>  /  DBili  x   /  AST  142<H>  /  ALT  180<H>  /  AlkPhos  631<H>  04-09              RADIOLOGY & ADDITIONAL TESTS:    Imaging Personally Reviewed:    Consultant(s) Notes Reviewed:      Care Discussed with Consultants/Other Providers:

## 2019-04-09 NOTE — PROGRESS NOTE ADULT - SUBJECTIVE AND OBJECTIVE BOX
infectious diseases progress note:    Patient is a 78y old  Male who presents with a chief complaint of Jaundice, hematuria (09 Apr 2019 09:22)        Jaundice        ROS:  CONSTITUTIONAL:  Negative fever or chills, feels well, good appetite  EYES:  Negative  blurry vision or double vision  CARDIOVASCULAR:  Negative for chest pain or palpitations  RESPIRATORY:  Negative for cough, wheezing, or SOB   GASTROINTESTINAL:  Negative for nausea, vomiting, diarrhea, constipation, or abdominal pain  GENITOURINARY:  Negative frequency, urgency or dysuria  NEUROLOGIC:  No headache, confusion, dizziness, lightheadedness    Allergies    No Known Allergies    Intolerances        ANTIBIOTICS/RELEVANT:  antimicrobials  piperacillin/tazobactam IVPB. 3.375 Gram(s) IV Intermittent every 8 hours    immunologic:    OTHER:  acetaminophen  IVPB .. 1000 milliGRAM(s) IV Intermittent once  alfuzosin 10 milliGRAM(s) Oral at bedtime  dexamethasone  IVPB 20 milliGRAM(s) IV Intermittent daily  finasteride 5 milliGRAM(s) Oral daily  pantoprazole  Injectable 40 milliGRAM(s) IV Push daily  ursodiol Tablet 500 milliGRAM(s) Oral two times a day      Objective:  Vital Signs Last 24 Hrs  T(C): 36.4 (09 Apr 2019 07:41), Max: 36.6 (08 Apr 2019 20:50)  T(F): 97.5 (09 Apr 2019 07:41), Max: 97.8 (08 Apr 2019 20:50)  HR: 81 (09 Apr 2019 07:41) (81 - 86)  BP: 115/71 (09 Apr 2019 07:41) (104/67 - 124/84)  BP(mean): --  RR: 18 (09 Apr 2019 07:41) (18 - 18)  SpO2: 95% (09 Apr 2019 07:41) (95% - 96%)    PHYSICAL EXAM:   -no acute distress  Eyes:HERNAN, EOMI  Ear/Nose/Throat: no oral lesion, no sinus tenderness on percussion	  Neck:no JVD, no lymphadenopathy, supple  Respiratory: CTA jorje  Cardiovascular: S1S2 RRR, no murmurs  Gastrointestinal:soft, (+) BS, no HSM  Extremities:no e/e/c        LABS:                        9.5    1.95  )-----------( 35       ( 08 Apr 2019 09:09 )             27.2     04-09    137  |  106  |  36<H>  ----------------------------<  119<H>  4.2   |  19<L>  |  1.53<H>    Ca    8.4      09 Apr 2019 07:15    TPro  3.8<L>  /  Alb  2.3<L>  /  TBili  19.2<H>  /  DBili  x   /  AST  142<H>  /  ALT  180<H>  /  AlkPhos  631<H>  04-09            MICROBIOLOGY:    RECENT CULTURES:  04-04 @ 14:04 .Blood blood                No Blood Parasites observed by giemsa stain  One negative set of blood smears does not rule out  the possibility of a parasitic infection.  A minimum of 3  specimens should be collected, at least 12-24 hours apart,  over a 36 hour time period.    04-03 @ 22:33 .Urine Clean Catch (Midstream)                No growth    04-03 @ 22:00 .Blood Blood-Peripheral                No growth at 5 days.          RESPIRATORY CULTURES:              RADIOLOGY & ADDITIONAL STUDIES:        Pager [phone]  After 5 pm/weekends or if no response :[phone]

## 2019-04-09 NOTE — PROGRESS NOTE ADULT - ASSESSMENT
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  ongoing pancytopenia, ELEV transaminases, all Cx NTD, still on IV Abx for the concern of neutropenia  elevated PT and INR, elev lactate, w ARF, hyponatremia, w hematuria, guaiac pos stools, pancytopenia, but not neutropenic no biliary obstruction on CT scan/MRCP neg HIDA scan, splenic infarct on CT  Gi , ID ,renal and Heme on board. FERRITIN LEVEL EXTREMELY HIGH AT [id number], HLH becomes high on DDX list. Bone Marrow Bx done, results pending, treatment w low dose etoposide initiated on 4/9 , dose reduced 2/2 transaminitis. HD IV DECADRON started on 4/8.   Echo pending. Hep A/B/C neg, auto mitochondrial and smooth muscle AB neg.   DVT ppx w PAS 2/2 elevated INR.

## 2019-04-09 NOTE — DIETITIAN INITIAL EVALUATION ADULT. - NS AS NUTRI INTERV ED CONTENT
Encouraged PO intake and discussed importance of meeting protein/kcal needs in setting of illness/Other (specify)

## 2019-04-09 NOTE — PROGRESS NOTE ADULT - ASSESSMENT
Pt is  a 78 yo male with PMH significant for BPH. Pt was in his USOH until about 3 weeks PTA when he noted what he thought was hematuria. He presented to his urologist a few days later and was started on Bactrim for a possible infection. He was told to stop it three days later when the cultures were negative.  When sxs didn't improve he ultimately went to his internist,. He was found to have abnormal LFTs. He held his lipitor. THis past friday he underwent CT and was told it was abnormal.   Pt denies any travel in or out of [place]. He is retired with no pets. He denies fevers but occasional chills at night.  He felt a little weak and shakey. He has constipation at home. No abdominal pain but diminished appetiite Pt notes about 20 lb weight loss He eats shellfish ( clams ) and pork  Pt was found to be pancytopenic with low WBC ( no left shift) - but not neutropenic, anemia, low plts, Markedly elevated bilirubin, elevated LFTs, elevated INR, elevated creatinine, elevated lactate. He has hematuria. On cat scan and ultrasound there are slude/stones in the gallbladder with mild wall thickening and trace pericholecystic fluid, there is a wedge shape splenic infarct and there is an enlarged prostate. Pt denies fevers at home but is highly febrile here    Blood culture x 2 are pending. Pt was started on zosyn and doxycycline  Unclear what is causing the patients issues  The DDX is extensive and includes infection, autoimmune, neoplastic etc..  In terms of infection  suggest:  Blood culture x2 - sent, agree with empiric abs for now- so far cultures negative     starting therapy for hemophagocytic syndrome  despite labs, looks well.  no ppt factor .  will dc zosyn and watch  will likely need PCP prophylaxis soon

## 2019-04-09 NOTE — DIETITIAN INITIAL EVALUATION ADULT. - OTHER INFO
Pt seen for LOS on 8MON. Pt reports appetite has improved since admission (noted pt on dexamethasone) and is consuming >75% meals. Encouraged good PO intake and obtained food preferences. Pt amenable to trying health shakes. Confirmed NKFA. Denies any N/V/D or acute GI distress at this time. Last BM 4/8 per flow sheet. RD to follow up per protocol.

## 2019-04-09 NOTE — PROGRESS NOTE ADULT - SUBJECTIVE AND OBJECTIVE BOX
INTERVAL HPI/OVERNIGHT EVENTS:    MEDICATIONS  (STANDING):  acetaminophen  IVPB .. 1000 milliGRAM(s) IV Intermittent once  alfuzosin 10 milliGRAM(s) Oral at bedtime  dexamethasone  IVPB 20 milliGRAM(s) IV Intermittent daily  finasteride 5 milliGRAM(s) Oral daily  pantoprazole  Injectable 40 milliGRAM(s) IV Push daily  piperacillin/tazobactam IVPB. 3.375 Gram(s) IV Intermittent every 8 hours    MEDICATIONS  (PRN):      Allergies    No Known Allergies    Intolerances            PHYSICAL EXAM:   Vital Signs:  Vital Signs Last 24 Hrs  T(C): 36.4 (09 Apr 2019 07:41), Max: 36.6 (08 Apr 2019 20:50)  T(F): 97.5 (09 Apr 2019 07:41), Max: 97.8 (08 Apr 2019 20:50)  HR: 81 (09 Apr 2019 07:41) (81 - 86)  BP: 115/71 (09 Apr 2019 07:41) (104/67 - 124/84)  BP(mean): --  RR: 18 (09 Apr 2019 07:41) (18 - 18)  SpO2: 95% (09 Apr 2019 07:41) (95% - 96%)  Daily Height in cm: 184 (09 Apr 2019 08:13)    Daily     GENERAL:  no distress  HEENT:  NC/AT,  jaundice  CHEST:   no increased effort, breath sounds clear  HEART:  Regular rhythm  ABDOMEN:  Soft, non-tender, non-distended, normoactive bowel sounds,  no masses ,no hepato-splenomegaly, no signs of chronic liver disease  EXTEREMITIES:  no cyanosis      LABS:                        9.5    1.95  )-----------( 35       ( 08 Apr 2019 09:09 )             27.2     04-09    137  |  106  |  36<H>  ----------------------------<  119<H>  4.2   |  19<L>  |  1.53<H>    Ca    8.4      09 Apr 2019 07:15    TPro  3.8<L>  /  Alb  2.3<L>  /  TBili  19.2<H>  /  DBili  x   /  AST  142<H>  /  ALT  180<H>  /  AlkPhos  631<H>  04-09          RADIOLOGY & ADDITIONAL TESTS:

## 2019-04-09 NOTE — DIETITIAN INITIAL EVALUATION ADULT. - ORAL INTAKE PTA
Pt reports poor PO intake (<50% meals) x 3 weeks PTA due to lack of appetite. Before this, pt reports appetite was good at baseline and consumed 100% meals. Reports taking Centrum multivitamin daily. No chewing or swallowing difficulties reported./poor

## 2019-04-09 NOTE — DIETITIAN INITIAL EVALUATION ADULT. - ENERGY NEEDS
Ht: 6'3"  Wt: 202.8 lb,  lb (+/-10%), BMI 27.2  Other pertinent information: 77 yo M with PMHx BPH, HLD, p/w hematuria, achiness x 1 month, dark urine, jaundice, poor appetite and 20 lb wt loss. Admitted for cholestatis hepatitis with pericholecystic fluid and gallbladder sludge, ARF, and pancytopenia. S/p bone marrow bx (4/8), pt meets criteria for Hemophagocytic Lymphohistiocytosis, planned for chemo treatment today.   Edema per nursing documentation: 2+, left leg, right leg   Skin per nursing documentation: no noted pressure injuries

## 2019-04-10 NOTE — PROGRESS NOTE ADULT - ASSESSMENT
Patient seen and agree with Dr. Griffith's note. Patient is 78 yo M who presented on 4/3 with hematuria and jaundice found to have pancytopenia and meeting criteria for HLH    1. Hemophagocytic Lymphohistiocytosis:  - pt meeting criteria: ferritin over 46k, pancytopenia, fever, elevated triglycerides. Soluble IL-2 (CD25) receptor pending  - s/p BM biopsy  (4/8) follow up final results  -iron studies: not consistent with iron deficiency.   -no clear evidence of infection  - started on dexamethasone 10mg/m2 (20 mg IV daily) on 4/7/18, given high clinical suspicion for HLH, we started treatment with dose reduced etoposide 75mg/m2 on 4-9-19  given liver injury. D1 etopside was 4-9-19. today is C1D2  - HLH protocol: etoposide 150mg/m2 twice weekly x 2 weeks, then weekly for weeks 3 to 8. dose reduced the etoposide to 75 mg/m2 due to liver dysfunction and significant hyperbilirubinemia. Dexamethasone 10mg/m2 daily (week 1-2), 5mg/m2 daily (week 3-4), 2.5mg/m2 daily (week 5-6), 1.25 mg/m2  daily for week 7 and then stop.   - still has low fibrinogen of 139 today, given 10 units of cryoprecipitate. Low fibrinogen likely due to liver dysfunction  -recommend Hepatology evaluation given liver injury  - monitor daily CBC with Diff, ferritin, LDH, and fibrinogen. If fibrinogen < 100, transfuse 10 units of cryoprecipitate. Patient seen and agree with Dr. Griffith's note. Patient is 80 yo M who presented on 4/3 with hematuria and jaundice found to have pancytopenia and meeting criteria for HLH    1. Hemophagocytic Lymphohistiocytosis:  - pt meeting criteria: ferritin over 46k, pancytopenia, fever, elevated triglycerides. Soluble IL-2 (CD25) receptor pending  - s/p BM biopsy  (4/8) follow up final results  -iron studies: not consistent with iron deficiency.   -no clear evidence of infection  - started on dexamethasone 10mg/m2 (20 mg IV daily) on 4/7/18, given high clinical suspicion for HLH, we started treatment with dose reduced etoposide 75mg/m2 on 4-9-19  given liver injury. D1 etopside was 4-9-19. today is C1D2. we will probably give second dose of etoposide on fri 4-12-19  - HLH protocol: etoposide 150mg/m2 twice weekly x 2 weeks, then weekly for weeks 3 to 8. dose reduced the etoposide to 75 mg/m2 due to liver dysfunction and significant hyperbilirubinemia. Dexamethasone 10mg/m2 daily (week 1-2), 5mg/m2 daily (week 3-4), 2.5mg/m2 daily (week 5-6), 1.25 mg/m2  daily for week 7 and then stop.   - still has low fibrinogen of 139 today, given 10 units of cryoprecipitate. Low fibrinogen likely due to liver dysfunction  -recommend Hepatology evaluation given liver injury  - monitor daily CBC with Diff, ferritin, LDH, and fibrinogen. If fibrinogen < 100, transfuse 10 units of cryoprecipitate.

## 2019-04-10 NOTE — PROGRESS NOTE ADULT - SUBJECTIVE AND OBJECTIVE BOX
Patient is a 78y old  Male who presents with a chief complaint of Jaundice, hematuria (10 Apr 2019 11:28)    Being followed by ID for help with mangement        Interval history:  pt now off abs  s/p BM bx  Pt is on empiric course of steroids pending biopsy  No other acute events        PAST MEDICAL & SURGICAL HISTORY:  BPH (benign prostatic hyperplasia)  H/O knee surgery  Hernia    Allergies    No Known Allergies    Intolerances      Antimicrobials:      MEDICATIONS  (STANDING):  acetaminophen  IVPB .. 1000 milliGRAM(s) IV Intermittent once  alfuzosin 10 milliGRAM(s) Oral at bedtime  dexamethasone  IVPB 20 milliGRAM(s) IV Intermittent daily  dextrose 5%. 1000 milliLiter(s) (50 mL/Hr) IV Continuous <Continuous>  dextrose 50% Injectable 12.5 Gram(s) IV Push once  dextrose 50% Injectable 25 Gram(s) IV Push once  dextrose 50% Injectable 25 Gram(s) IV Push once  finasteride 5 milliGRAM(s) Oral daily  insulin lispro (HumaLOG) corrective regimen sliding scale   SubCutaneous three times a day before meals  insulin lispro (HumaLOG) corrective regimen sliding scale   SubCutaneous at bedtime  pantoprazole  Injectable 40 milliGRAM(s) IV Push daily  ursodiol Tablet 500 milliGRAM(s) Oral two times a day      Vital Signs Last 24 Hrs  T(C): 36.4 (04-10-19 @ 08:03), Max: 36.5 (04-10-19 @ 00:11)  T(F): 97.5 (04-10-19 @ 08:03), Max: 97.7 (04-10-19 @ 00:11)  HR: 79 (04-10-19 @ 08:03) (78 - 81)  BP: 108/65 (04-10-19 @ 08:03) (97/59 - 120/72)  BP(mean): --  RR: 18 (04-10-19 @ 08:03) (18 - 18)  SpO2: 96% (04-10-19 @ 08:03) (94% - 97%)    Physical Exam:    Constitutional well preserved,comfortable,pleasant    HEENT PERRLA EOMI,No pallor or icterus    No oral exudate or erythema    Neck supple no JVD or LN    Chest Good AE,CTA    CVS RRR S1 S2 WNl No murmur or rub or gallop    Abd soft BS normal No tenderness no masses    Ext No cyanosis clubbing or edema    IV site no erythema tenderness or discharge    Joints no swelling or LOM    CNS AAO X 3 no focal    Lab Data:                          9.2    2.94  )-----------( 41       ( 10 Apr 2019 09:23 )             26.4       04-10    134<L>  |  106  |  40<H>  ----------------------------<  102<H>  4.2   |  19<L>  |  1.65<H>    Ca    8.2<L>      10 Apr 2019 07:13    TPro  3.8<L>  /  Alb  2.4<L>  /  TBili  17.6<H>  /  DBili  x   /  AST  109<H>  /  ALT  178<H>  /  AlkPhos  569<H>  04-10        .Blood blood  04-04-19   No Blood Parasites observed by giemsa stain  One negative set of blood smears does not rule out  the possibility of a parasitic infection.  A minimum of 3  specimens should be collected, at least 12-24 hours apart,  over a 36 hour time period.  --  --      .Urine Clean Catch (Midstream)  04-03-19   No growth  --  --      .Blood Blood-Peripheral  04-03-19   No growth at 5 days.  --  --          WBC Count: 2.94 (04-10-19 @ 09:23)  WBC Count: 3.10 (04-09-19 @ 09:14)  WBC Count: 1.95 (04-08-19 @ 09:09)  WBC Count: 1.64 (04-07-19 @ 09:01)  WBC Count: 1.88 (04-06-19 @ 11:20)  WBC Count: 2.44 (04-05-19 @ 09:25)  WBC Count: 2.4 (04-04-19 @ 16:28)  WBC Count: 2.36 (04-04-19 @ 09:42)  WBC Count: 2.6 (04-03-19 @ 17:23) Patient is a 78y old  Male who presents with a chief complaint of Jaundice, hematuria (10 Apr 2019 11:28)    Being followed by ID for help with mangement        Interval history:  pt now off abs  s/p BM bx  Pt is on empiric course of steroids pending biopsy  No other acute events        PAST MEDICAL & SURGICAL HISTORY:  BPH (benign prostatic hyperplasia)  H/O knee surgery  Hernia    Allergies    No Known Allergies    Intolerances      Antimicrobials:      MEDICATIONS  (STANDING):  acetaminophen  IVPB .. 1000 milliGRAM(s) IV Intermittent once  alfuzosin 10 milliGRAM(s) Oral at bedtime  dexamethasone  IVPB 20 milliGRAM(s) IV Intermittent daily  dextrose 5%. 1000 milliLiter(s) (50 mL/Hr) IV Continuous <Continuous>  dextrose 50% Injectable 12.5 Gram(s) IV Push once  dextrose 50% Injectable 25 Gram(s) IV Push once  dextrose 50% Injectable 25 Gram(s) IV Push once  finasteride 5 milliGRAM(s) Oral daily  insulin lispro (HumaLOG) corrective regimen sliding scale   SubCutaneous three times a day before meals  insulin lispro (HumaLOG) corrective regimen sliding scale   SubCutaneous at bedtime  pantoprazole  Injectable 40 milliGRAM(s) IV Push daily  ursodiol Tablet 500 milliGRAM(s) Oral two times a day      Vital Signs Last 24 Hrs  T(C): 36.4 (04-10-19 @ 08:03), Max: 36.5 (04-10-19 @ 00:11)  T(F): 97.5 (04-10-19 @ 08:03), Max: 97.7 (04-10-19 @ 00:11)  HR: 79 (04-10-19 @ 08:03) (78 - 81)  BP: 108/65 (04-10-19 @ 08:03) (97/59 - 120/72)  BP(mean): --  RR: 18 (04-10-19 @ 08:03) (18 - 18)  SpO2: 96% (04-10-19 @ 08:03) (94% - 97%)    Physical Exam:    Constitutional well preserved,comfortable,pleasant    HEENT PERRLA EOMI, icteric    No oral exudate or erythema    Neck supple no JVD or LN    Chest Good AE,CTA    CVS RRR S1 S2 WNl    Abd soft BS normal No tenderness     Ext No cyanosis clubbing or edema    IV site no erythema tenderness or discharge    Joints no swelling or LOM    CNS AAO X 3 no focal    Lab Data:                          9.2    2.94  )-----------( 41       ( 10 Apr 2019 09:23 )             26.4     Auto Neutrophil #: 1.36 K/uL (04.06.19 @ 11:20)        04-10    134<L>  |  106  |  40<H>  ----------------------------<  102<H>  4.2   |  19<L>  |  1.65<H>    Ca    8.2<L>      10 Apr 2019 07:13    TPro  3.8<L>  /  Alb  2.4<L>  /  TBili  17.6<H>  /  DBili  x   /  AST  109<H>  /  ALT  178<H>  /  AlkPhos  569<H>  04-10        .Blood blood  04-04-19   No Blood Parasites observed by giemsa stain  One negative set of blood smears does not rule out  the possibility of a parasitic infection.  A minimum of 3  specimens should be collected, at least 12-24 hours apart,  over a 36 hour time period.  --  --      .Urine Clean Catch (Midstream)  04-03-19   No growth  --  --      .Blood Blood-Peripheral  04-03-19   No growth at 5 days.  --  --      WBC Count: 2.94 (04-10-19 @ 09:23)  WBC Count: 3.10 (04-09-19 @ 09:14)  WBC Count: 1.95 (04-08-19 @ 09:09)  WBC Count: 1.64 (04-07-19 @ 09:01)  WBC Count: 1.88 (04-06-19 @ 11:20)  WBC Count: 2.44 (04-05-19 @ 09:25)  WBC Count: 2.4 (04-04-19 @ 16:28)  WBC Count: 2.36 (04-04-19 @ 09:42)  WBC Count: 2.6 (04-03-19 @ 17:23)

## 2019-04-10 NOTE — CONSULT NOTE ADULT - SUBJECTIVE AND OBJECTIVE BOX
HPI:  79 yo male w h/o BPH, HLD presented to his urologist last week w hematuria, was given 3 days BACTRIM( neg final Cx) and sent for CT scan, Scan was done last week and 4 days ago saw his PMD for blood work who noted PTN was jaundiced and sent him to the ER. Ptn states he has been lethargic for about a month w achiness, hasnt noted to be jaundiced but has had a dark urine for a few weeks, denies fevers but has had chills, no N/V, has poor appetite and lost 20 lbs. Denies abd pain, denies gross hematuria , thought "dark urine" was 2/2 blood. denies recent travel, eats shellfish and pork (03 Apr 2019 18:52)      Admit Diagnosis  Jaundice      ENDOCRINE HPI: 77 y/o Diagnosed with suspected HLH, started on steroids and etoposide noted with hyperglycemia.    Patient with no H/O Diabetes.  HbA1c this admission 5.8%, low FS at am  mg/dL before steroids started.  Denies FH of diabetes.  Patient started on Dexamethasone IVPB 20 mg daily for 5 days.  At home patient tries to decrease sugar in diet. Weight stable.       PAST MEDICAL & SURGICAL HISTORY:  BPH (benign prostatic hyperplasia)  H/O knee surgery  Hernia      FAMILY HISTORY:      Social History:    Outpatient Medications:    MEDICATIONS  (STANDING):  acetaminophen  IVPB .. 1000 milliGRAM(s) IV Intermittent once  alfuzosin 10 milliGRAM(s) Oral at bedtime  dexamethasone  IVPB 20 milliGRAM(s) IV Intermittent daily  dextrose 5%. 1000 milliLiter(s) (50 mL/Hr) IV Continuous <Continuous>  dextrose 50% Injectable 12.5 Gram(s) IV Push once  dextrose 50% Injectable 25 Gram(s) IV Push once  dextrose 50% Injectable 25 Gram(s) IV Push once  finasteride 5 milliGRAM(s) Oral daily  insulin lispro (HumaLOG) corrective regimen sliding scale   SubCutaneous three times a day before meals  insulin lispro (HumaLOG) corrective regimen sliding scale   SubCutaneous at bedtime  pantoprazole  Injectable 40 milliGRAM(s) IV Push daily  ursodiol Tablet 500 milliGRAM(s) Oral two times a day    MEDICATIONS  (PRN):  dextrose 40% Gel 15 Gram(s) Oral once PRN Blood Glucose LESS THAN 70 milliGRAM(s)/deciliter  glucagon  Injectable 1 milliGRAM(s) IntraMuscular once PRN Glucose LESS THAN 70 milligrams/deciliter      Allergies    No Known Allergies    Intolerances        Review of Systems:  Constitutional: No fever, chills, hematuria, jaundice, weights.  Eyes: blurry vision  Neuro: No tremors  HEENT: increased HA.  Cardiovascular: No chest pain, palpitations  Respiratory: SOB, no cough  GI: No nausea, anorexia, abdominal pain  : No dysuria, dark urine  Skin: no rash  Psych: no depression  Endocrine: no polyuria, polydipsia  Hem/lymph: no swelling, no edema  Osteoporosis: no fractures    ALL OTHER SYSTEMS REVIEWED AND NEGATIVE    PHYSICAL EXAM:  VITALS: T(C): 36.3 (04-10-19 @ 15:20)  T(F): 97.3 (04-10-19 @ 15:20), Max: 97.7 (04-10-19 @ 00:11)  HR: 84 (04-10-19 @ 15:20) (78 - 84)  BP: 120/70 (04-10-19 @ 15:20) (97/59 - 120/70)  RR:  (18 - 18)  SpO2:  (94% - 97%)  Wt(lb): --203  GENERAL: NAD, jaundiced.  EYES: No proptosis, no lid lag, anicteric  HEENT:  Atraumatic, Normocephalic, moist mucous membranes  THYROID: Normal size, no palpable nodules  RESPIRATORY: Clear to auscultation bilaterally; No rales, rhonchi, wheezing  CARDIOVASCULAR: Regular rate and rhythm; No murmurs; no peripheral edema  GI: Soft, nontender, non distended, normal bowel sounds  SKIN: Dry, intact, No rashes or lesions, jaundice  MUSCULOSKELETAL: Full range of motion, decreased strength  NEURO: sensation intact, extraocular movements intact, no tremor  PSYCH: Alert and oriented x 3, normal affect, normal mood    POCT Blood Glucose.: 216 mg/dL (04-10-19 @ 12:10)  POCT Blood Glucose.: 109 mg/dL (04-10-19 @ 08:20)  POCT Blood Glucose.: 138 mg/dL (04-09-19 @ 21:24)                            9.2    2.94  )-----------( 41       ( 10 Apr 2019 09:23 )             26.4       04-10    134<L>  |  106  |  40<H>  ----------------------------<  102<H>  4.2   |  19<L>  |  1.65<H>    Ca    8.2<L>      10 Apr 2019 07:13    TPro  3.8<L>  /  Alb  2.4<L>  /  TBili  17.6<H>  /  DBili  x   /  AST  109<H>  /  ALT  178<H>  /  AlkPhos  569<H>  04-10      Thyroid Function Tests:      Hemoglobin A1C, Whole Blood: 5.9 % <H> [4.0 - 5.6] (04-10-19 @ 09:23)      04-09 Chol -- LDL -- HDL -- Trig 252<H>, 04-05 Chol -- LDL -- HDL -- Trig 320<H>    Radiology:

## 2019-04-10 NOTE — PROGRESS NOTE ADULT - SUBJECTIVE AND OBJECTIVE BOX
Patient is a 78y old  Male who presents with a chief complaint of Jaundice, hematuria (10 Apr 2019 17:32)      SUBJECTIVE / OVERNIGHT EVENTS: ptn feels better overall, but c/o pedal edema    MEDICATIONS  (STANDING):  acetaminophen  IVPB .. 1000 milliGRAM(s) IV Intermittent once  alfuzosin 10 milliGRAM(s) Oral at bedtime  dexamethasone  IVPB 20 milliGRAM(s) IV Intermittent daily  dextrose 5%. 1000 milliLiter(s) (50 mL/Hr) IV Continuous <Continuous>  dextrose 50% Injectable 12.5 Gram(s) IV Push once  dextrose 50% Injectable 25 Gram(s) IV Push once  dextrose 50% Injectable 25 Gram(s) IV Push once  finasteride 5 milliGRAM(s) Oral daily  insulin lispro (HumaLOG) corrective regimen sliding scale   SubCutaneous three times a day before meals  insulin lispro (HumaLOG) corrective regimen sliding scale   SubCutaneous at bedtime  pantoprazole  Injectable 40 milliGRAM(s) IV Push daily  ursodiol Tablet 500 milliGRAM(s) Oral two times a day    MEDICATIONS  (PRN):  dextrose 40% Gel 15 Gram(s) Oral once PRN Blood Glucose LESS THAN 70 milliGRAM(s)/deciliter  glucagon  Injectable 1 milliGRAM(s) IntraMuscular once PRN Glucose LESS THAN 70 milligrams/deciliter      Vital Signs Last 24 Hrs  T(F): 97.3 (04-10-19 @ 15:20), Max: 97.7 (04-10-19 @ 00:11)  HR: 84 (04-10-19 @ 15:20) (78 - 84)  BP: 120/70 (04-10-19 @ 15:20) (97/59 - 120/70)  RR: 18 (04-10-19 @ 15:20) (18 - 18)  SpO2: 97% (04-10-19 @ 15:20) (94% - 97%)  Telemetry:   CAPILLARY BLOOD GLUCOSE      POCT Blood Glucose.: 137 mg/dL (10 Apr 2019 21:09)  POCT Blood Glucose.: 137 mg/dL (10 Apr 2019 17:16)  POCT Blood Glucose.: 216 mg/dL (10 Apr 2019 12:10)  POCT Blood Glucose.: 109 mg/dL (10 Apr 2019 08:20)    I&O's Summary    09 Apr 2019 07:01  -  10 Apr 2019 07:00  --------------------------------------------------------  IN: 900 mL / OUT: 1175 mL / NET: -275 mL    10 Apr 2019 07:01  -  10 Apr 2019 22:29  --------------------------------------------------------  IN: 300 mL / OUT: 700 mL / NET: -400 mL        PHYSICAL EXAM:  GENERAL: NAD, well-developed  HEAD:  Atraumatic, Normocephalic  EYES: EOMI, PERRLA, conjunctiva and sclera clear  NECK: Supple, No JVD  CHEST/LUNG: Clear to auscultation bilaterally; No wheeze  HEART: Regular rate and rhythm; No murmurs, rubs, or gallops  ABDOMEN: Soft, Nontender, Nondistended; Bowel sounds present  EXTREMITIES:  2+ Peripheral Pulses, No clubbing, cyanosis, or edema  PSYCH: AAOx3  NEUROLOGY: non-focal  SKIN: No rashes or lesions    LABS:                        9.2    2.94  )-----------( 41       ( 10 Apr 2019 09:23 )             26.4     04-10    134<L>  |  106  |  40<H>  ----------------------------<  102<H>  4.2   |  19<L>  |  1.65<H>    Ca    8.2<L>      10 Apr 2019 07:13    TPro  3.8<L>  /  Alb  2.4<L>  /  TBili  17.6<H>  /  DBili  x   /  AST  109<H>  /  ALT  178<H>  /  AlkPhos  569<H>  04-10    PT/INR - ( 10 Apr 2019 08:59 )   PT: 12.4 sec;   INR: 1.08 ratio                   RADIOLOGY & ADDITIONAL TESTS:    Imaging Personally Reviewed:    Consultant(s) Notes Reviewed:      Care Discussed with Consultants/Other Providers:

## 2019-04-10 NOTE — PROGRESS NOTE ADULT - SUBJECTIVE AND OBJECTIVE BOX
INTERVAL HPI/OVERNIGHT EVENTS:    MEDICATIONS  (STANDING):  acetaminophen  IVPB .. 1000 milliGRAM(s) IV Intermittent once  alfuzosin 10 milliGRAM(s) Oral at bedtime  dexamethasone  IVPB 20 milliGRAM(s) IV Intermittent daily  dextrose 5%. 1000 milliLiter(s) (50 mL/Hr) IV Continuous <Continuous>  dextrose 50% Injectable 12.5 Gram(s) IV Push once  dextrose 50% Injectable 25 Gram(s) IV Push once  dextrose 50% Injectable 25 Gram(s) IV Push once  finasteride 5 milliGRAM(s) Oral daily  insulin lispro (HumaLOG) corrective regimen sliding scale   SubCutaneous three times a day before meals  insulin lispro (HumaLOG) corrective regimen sliding scale   SubCutaneous at bedtime  pantoprazole  Injectable 40 milliGRAM(s) IV Push daily  ursodiol Tablet 500 milliGRAM(s) Oral two times a day    MEDICATIONS  (PRN):  dextrose 40% Gel 15 Gram(s) Oral once PRN Blood Glucose LESS THAN 70 milliGRAM(s)/deciliter  glucagon  Injectable 1 milliGRAM(s) IntraMuscular once PRN Glucose LESS THAN 70 milligrams/deciliter      Allergies    No Known Allergies    Intolerances            PHYSICAL EXAM:   Vital Signs:  Vital Signs Last 24 Hrs  T(C): 36.4 (10 Apr 2019 08:03), Max: 36.5 (10 Apr 2019 00:11)  T(F): 97.5 (10 Apr 2019 08:03), Max: 97.7 (10 Apr 2019 00:11)  HR: 79 (10 Apr 2019 08:03) (78 - 81)  BP: 108/65 (10 Apr 2019 08:03) (97/59 - 120/72)  BP(mean): --  RR: 18 (10 Apr 2019 08:03) (18 - 18)  SpO2: 96% (10 Apr 2019 08:03) (94% - 97%)  Daily     Daily Weight in k.2 (10 Apr 2019 08:03)    GENERAL:  no distress  HEENT:  NC/AT,  icteric, dark urine in urinal  CHEST:   no increased effort, breath sounds clear  HEART:  Regular rhythm  ABDOMEN:  Soft, non-tender, non-distended, normoactive bowel sounds,  no masses ,no hepato-splenomegaly, no signs of chronic liver disease  EXTEREMITIES:  no cyanosis      LABS:                        9.2    2.94  )-----------( 41       ( 10 Apr 2019 09:23 )             26.4     04-10    134<L>  |  106  |  40<H>  ----------------------------<  102<H>  4.2   |  19<L>  |  1.65<H>    Ca    8.2<L>      10 Apr 2019 07:13    TPro  3.8<L>  /  Alb  2.4<L>  /  TBili  17.6<H>  /  DBili  x   /  AST  109<H>  /  ALT  178<H>  /  AlkPhos  569<H>  04-10    PT/INR - ( 10 Apr 2019 08:59 )   PT: 12.4 sec;   INR: 1.08 ratio               RADIOLOGY & ADDITIONAL TESTS:

## 2019-04-10 NOTE — PROGRESS NOTE ADULT - ASSESSMENT
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  ongoing pancytopenia, ELEV transaminases, all Cx NTD, still on IV Abx for the concern of neutropenia  elevated PT and INR, elev lactate, w ARF, hyponatremia, w hematuria, guaiac pos stools, pancytopenia, but not neutropenic no biliary obstruction on CT scan/MRCP neg HIDA scan, splenic infarct on CT  Gi , ID ,renal and Heme on board. FERRITIN LEVEL EXTREMELY HIGH AT [id number] initially, post 2 days treatment for HLH level dropped to [id number],  Bone Marrow Bx done, results pending, treatment w low dose etoposide initiated on 4/9, dose reduced 2/2 transaminitis. HD IV DECADRON started on 4/8. Consider PCP ppx while on HD steroids.  Overall counts and liver function has improved  will ACE wrap feet and give a dose of lasix in am.  Renal function has plateaued   Echo is benign. Hep A/B/C neg, auto mitochondrial and smooth muscle AB neg.  INR has normalized, will start HSC for DVT ppx

## 2019-04-10 NOTE — PROGRESS NOTE ADULT - SUBJECTIVE AND OBJECTIVE BOX
LEODAN BURGOS    Patient is a 78y old  Male who presents with a chief complaint of Jaundice, hematuria,HLH,BPH.Now starting chemotherapy for HLH,pancytopenia.Mild edema,no CP or SOB.      Allergies    No Known Allergies    Intolerances      MEDICATIONS  (STANDING):  acetaminophen  IVPB .. 1000 milliGRAM(s) IV Intermittent once  alfuzosin 10 milliGRAM(s) Oral at bedtime  dexamethasone  IVPB 20 milliGRAM(s) IV Intermittent daily  dextrose 5%. 1000 milliLiter(s) (50 mL/Hr) IV Continuous <Continuous>  dextrose 50% Injectable 12.5 Gram(s) IV Push once  dextrose 50% Injectable 25 Gram(s) IV Push once  dextrose 50% Injectable 25 Gram(s) IV Push once  finasteride 5 milliGRAM(s) Oral daily  insulin lispro (HumaLOG) corrective regimen sliding scale   SubCutaneous three times a day before meals  insulin lispro (HumaLOG) corrective regimen sliding scale   SubCutaneous at bedtime  pantoprazole  Injectable 40 milliGRAM(s) IV Push daily  ursodiol Tablet 500 milliGRAM(s) Oral two times a day    MEDICATIONS  (PRN):  dextrose 40% Gel 15 Gram(s) Oral once PRN Blood Glucose LESS THAN 70 milliGRAM(s)/deciliter  glucagon  Injectable 1 milliGRAM(s) IntraMuscular once PRN Glucose LESS THAN 70 milligrams/deciliter      ROS:  Positive:    General: Denies weight loss, fevers, rash, decreased hearing  Cardiac: Denies chest pain, SOB, HALL, orthopnea, PND, claudication, edema, snoring, daytime somnolence, palpitations, syncope  Resp: Denies SOB, HALL, cough, sputum, wheezing, hemoptysis  GI: Denies change in bowel habits, diarrhea, weight loss, melena, tarry stools,   nausea, vomiting, jaundice, abdominal pain, dysphagia  : Denies dysuria, nocturia, hematuria  Neuro: Denies tinnitus, headache, visual changes, weakness, dizziness or vertigo  Musculoskeletal: Denies neck pain back pain joint pain.  Skin: Denies rash, itching, dryness.  Endocrine: Denies polydipsia, polyuria  Psychiatric: Denies depression, anxiety      PHYSICAL EXAM:  Vital Signs Last 24 Hrs  T(C): 36.5 (10 Apr 2019 00:11), Max: 36.5 (10 Apr 2019 00:11)  T(F): 97.7 (10 Apr 2019 00:11), Max: 97.7 (10 Apr 2019 00:11)  HR: 78 (10 Apr 2019 00:11) (78 - 81)  BP: 97/59 (10 Apr 2019 00:11) (97/59 - 120/72)  BP(mean): --  RR: 18 (10 Apr 2019 00:11) (18 - 18)  SpO2: 94% (10 Apr 2019 00:11) (94% - 97%)  Daily Height in cm: 184 (2019 08:13)    Daily Weight in k.9 (2019 14:41)  I&O's Summary    2019 07:01  -  2019 07:00  --------------------------------------------------------  IN: 1490 mL / OUT: 200 mL / NET: 1290 mL    2019 07:01  -  10 Apr 2019 05:44  --------------------------------------------------------  IN: 900 mL / OUT: 1175 mL / NET: -275 mL        General Appearance: 	 Alert, cooperative, no distress  HEENT: normocephalic, atraumatic, PERRLA, EOMI, conjunctiva normal, sclera anicteric,   Neck: no JVD,  carotid 2+  bilaterally without bruits, thyroid normal to inspection and palpation, no adenopathy, trachea midline  Lungs:  clear to auscultation and percussion bilaterally  Cor:  pmi 5th ICS MCL, regular rate and rhythm, S1 normal intensity, S2 normal intensity, no gallops, murmurs or rubs  Abdomen:	 soft, non-tender; bowel sounds normal; no masses,  no organomegaly  Extremities: without cyanosis, clubbing ,1+ edema  Vasc: 2-+ PT and DP pulses; no varicosities  Neurologic: A&O x 3 (time, place, person). Symmetric strength; limited exam  Musculoskeletal: no kyphosis, scoliosis; normal gait, normal tone  Skin: no rashes; limited exam    Labs:  CBC Full  -  ( 2019 09:14 )  WBC Count : 3.10 K/uL  RBC Count : 3.25 M/uL  Hemoglobin : 9.2 g/dL  Hematocrit : 25.9 %  Platelet Count - Automated : 37 K/uL  Mean Cell Volume : 79.7 fl  Mean Cell Hemoglobin : 28.3 pg  Mean Cell Hemoglobin Concentration : 35.5 gm/dL  Auto Neutrophil # : x  Auto Lymphocyte # : x  Auto Monocyte # : x  Auto Eosinophil # : x  Auto Basophil # : x  Auto Neutrophil % : x  Auto Lymphocyte % : x  Auto Monocyte % : x  Auto Eosinophil % : x  Auto Basophil % : x          Impression/Plan:Patient with pancytopenia,jaundice,hematuria,HTN,hyperlipidemia,RBBB,BPH now with probable HLH.Now with steroids and chemotherapy.F/U Hematology,cardiopulmonary stable,F/U edema.OOB,ambulation.Continue Protonix and ursodial.F/U BN BX results.        Marin Albert MD, FACC  [facility]

## 2019-04-10 NOTE — PROGRESS NOTE ADULT - SUBJECTIVE AND OBJECTIVE BOX
INTERVAL HPI/OVERNIGHT EVENTS:  Patient S&E at bedside.     he feels well, felt a little sob urinating this morning, but otherwise feels ok. still urinating dark color    VITAL SIGNS:  T(F): 97.3 (04-10-19 @ 15:20)  HR: 84 (04-10-19 @ 15:20)  BP: 120/70 (04-10-19 @ 15:20)  RR: 18 (04-10-19 @ 15:20)  SpO2: 97% (04-10-19 @ 15:20)  Wt(kg): --    PHYSICAL EXAM:    Constitutional: NAD  Eyes: icteric b/l  Neck: supple, no masses, no JVD  Respiratory: CTA b/l, good air entry b/l  Cardiovascular: RRR, no M/R/G  Gastrointestinal: soft, NTND, no masses palpable, + BS, no hepatosplenomegaly  Extremities: no c/c/e  Neurological: AAOx3      MEDICATIONS  (STANDING):  acetaminophen  IVPB .. 1000 milliGRAM(s) IV Intermittent once  alfuzosin 10 milliGRAM(s) Oral at bedtime  dexamethasone  IVPB 20 milliGRAM(s) IV Intermittent daily  dextrose 5%. 1000 milliLiter(s) (50 mL/Hr) IV Continuous <Continuous>  dextrose 50% Injectable 12.5 Gram(s) IV Push once  dextrose 50% Injectable 25 Gram(s) IV Push once  dextrose 50% Injectable 25 Gram(s) IV Push once  finasteride 5 milliGRAM(s) Oral daily  insulin lispro (HumaLOG) corrective regimen sliding scale   SubCutaneous three times a day before meals  insulin lispro (HumaLOG) corrective regimen sliding scale   SubCutaneous at bedtime  pantoprazole  Injectable 40 milliGRAM(s) IV Push daily  ursodiol Tablet 500 milliGRAM(s) Oral two times a day    MEDICATIONS  (PRN):  dextrose 40% Gel 15 Gram(s) Oral once PRN Blood Glucose LESS THAN 70 milliGRAM(s)/deciliter  glucagon  Injectable 1 milliGRAM(s) IntraMuscular once PRN Glucose LESS THAN 70 milligrams/deciliter      Allergies    No Known Allergies    Intolerances        LABS:                        9.2    2.94  )-----------( 41       ( 10 Apr 2019 09:23 )             26.4     04-10    134<L>  |  106  |  40<H>  ----------------------------<  102<H>  4.2   |  19<L>  |  1.65<H>    Ca    8.2<L>      10 Apr 2019 07:13    TPro  3.8<L>  /  Alb  2.4<L>  /  TBili  17.6<H>  /  DBili  x   /  AST  109<H>  /  ALT  178<H>  /  AlkPhos  569<H>  04-10    PT/INR - ( 10 Apr 2019 08:59 )   PT: 12.4 sec;   INR: 1.08 ratio               RADIOLOGY & ADDITIONAL TESTS:  Studies reviewed.

## 2019-04-10 NOTE — CONSULT NOTE ADULT - CONSULT REASON
Help with management
Jaundice HTN
Hyperglycemia  High dose steroids treatment.
Hyponatremia
jaundice
pancytopenia

## 2019-04-10 NOTE — PROGRESS NOTE ADULT - ASSESSMENT
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 Soluble IL-2 (CD25) receptor pending  - s/p BM biopsy  (4/8) follow up final results  -iron studies: not consistent with iron deficiency.   -no clear evidence of infection  - started on dexamethasone 10mg/m2 (20 mg IV daily) on 4/7/18, given high clinical suspicion for HLH, hematology  started treatment with dose reduced etoposide 75mg/m2 on 4-9-19  given liver injury. D1 etopside was 4-9-19. today is C1D2.   They will probably give second dose of etoposide on fri 4-12-19  - HLH protocol: etoposide 150mg/m2 twice weekly x 2 weeks, then weekly for weeks 3 to 8. dose reduced the etoposide to 75 mg/m2 due to liver dysfunction and significant hyperbilirubinemia. Dexamethasone 10mg/m2 daily (week 1-2), 5mg/m2 daily (week 3-4), 2.5mg/m2 daily (week 5-6), 1.25 mg/m2  daily for week 7 and then stop.   - still has low fibrinogen of 139 today, given 10 units of cryoprecipitate. Low fibrinogen likely due to liver dysfunction  -recommend Hepatology evaluation given liver injury  - monitor daily CBC with Diff, ferritin, LDH, and fibrinogen. If fibrinogen < 100, transfuse 10 units of cryoprecipitate.     will conitnue to monitor for signs/sxs of infection  ? PCP prophylaxis

## 2019-04-10 NOTE — PROGRESS NOTE ADULT - ASSESSMENT
HLH felt to be the cause of jaundice  now other source found  will follow on therapy  Angeli started  avoid hepatoxic meds HLH felt to be the cause of jaundice  now other source found  will follow on therapy  Angeli started  avoid hepatoxic meds   INR normal this am

## 2019-04-10 NOTE — CONSULT NOTE ADULT - ASSESSMENT
79 y/o Diagnosed with suspected HLH, started on steroids and etoposide noted with hyperglycemia.    Patient with no H/O Diabetes.  HbA1c this admission 5.8%, low FS at am  mg/dL before steroids started.  Denies FH of diabetes.  Patient started on Dexamethasone IVPB 20 mg daily for 5 days.  At home patient tries to decrease sugar in diet. Weight stable.     Steroids induced hyperglycemia.  FS expected to increase mostly post meals.    Started Diabetic diet for duration of steroids treatment.  FS QID with AC scale above 200 mg/dL.  So far, for dose of steroids only mild hyperglycemia.  Will follow.

## 2019-04-10 NOTE — PROGRESS NOTE ADULT - SUBJECTIVE AND OBJECTIVE BOX
NEPHROLOGY - NSN    Patient seen and examined.  NAD, no complaints  Urine dark    MEDICATIONS  (STANDING):  acetaminophen  IVPB .. 1000 milliGRAM(s) IV Intermittent once  alfuzosin 10 milliGRAM(s) Oral at bedtime  dexamethasone  IVPB 20 milliGRAM(s) IV Intermittent daily  finasteride 5 milliGRAM(s) Oral daily  pantoprazole  Injectable 40 milliGRAM(s) IV Push daily  ursodiol Tablet 500 milliGRAM(s) Oral two times a day    VITALS:  T(C): , Max: 36.5 (04-10-19 @ 00:11)  T(F): , Max: 97.7 (04-10-19 @ 00:11)  HR: 84 (04-10-19 @ 15:20)  BP: 120/70 (04-10-19 @ 15:20)  RR: 18 (04-10-19 @ 15:20)  SpO2: 97% (04-10-19 @ 15:20)    04-09 @ 07:01  -  04-10 @ 07:00  --------------------------------------------------------  IN: 900 mL / OUT: 1175 mL / NET: -275 mL    04-10 @ 07:01  -  04-10 @ 15:31  --------------------------------------------------------  IN: 300 mL / OUT: 700 mL / NET: -400 mL    REVIEW OF SYSTEMS:  Full ROS done and were negative unless otherwise indicated in HPI/assessment.     PHYSICAL EXAM:  Constitutional: NAD, jaundice  Respiratory: CTA B/L  Cardiovascular: S1 and S2, RRR  Gastrointestinal: + BS, soft, NT, ND  Extremities: no peripheral edema  Neurological: AAO x 3  : no casas    LABS:                        9.2    2.94  )-----------( 41       ( 10 Apr 2019 09:23 )             26.4     04-10    134<L>  |  106  |  40<H>  ----------------------------<  102<H>  4.2   |  19<L>  |  1.65<H>    Ca    8.2<L>      10 Apr 2019 07:13    TPro  3.8<L>  /  Alb  2.4<L>  /  TBili  17.6<H>  /  DBili  x   /  AST  109<H>  /  ALT  178<H>  /  AlkPhos  569<H>  04-10    ASSESSMENT  78M w/ BPH, OA, and HLD, 4/3/19 a/w weight loss, jaundice, and gross hematuria  -hyponatremia - due to hypovolemia/poor osm intake relative to water, mild  -Renal - YEE - prerenal - renal fxn appears to have plateaued   -GI - Cholestasis/transaminitis - unclear etiology though highly suggestive of HLH  -Heme/Onc - on chemotherapy for HLH; bone marrow bx pending    RECOMMEND:  -chemotherapy per heme/onc  -on IV steroids  -dose meds for a GFR ~35-45 mL/min    Monika Love NP  [facility]  [phone]

## 2019-04-10 NOTE — CONSULT NOTE ADULT - REASON FOR ADMISSION
Jaundice, hematuria
same
Jaundice, hematuria

## 2019-04-11 NOTE — PROGRESS NOTE ADULT - ASSESSMENT
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 Soluble IL-2 (CD25) receptor pending  - s/p BM biopsy  (4/8) follow up final results  -iron studies: not consistent with iron deficiency.   -no clear evidence of infection  - started on dexamethasone 10mg/m2 (20 mg IV daily) on 4/7/18, given high clinical suspicion for HLH, hematology  started treatment with dose reduced etoposide 75mg/m2 on 4-9-19  given liver injury. D1 etopside was 4-9-19. today is C1D2.   They will probably give second dose of etoposide on fri 4-12-19  - HLH protocol: etoposide 150mg/m2 twice weekly x 2 weeks, then weekly for weeks 3 to 8. dose reduced the etoposide to 75 mg/m2 due to liver dysfunction and significant hyperbilirubinemia. Dexamethasone 10mg/m2 daily (week 1-2), 5mg/m2 daily (week 3-4), 2.5mg/m2 daily (week 5-6), 1.25 mg/m2  daily for week 7 and then stop.   - still has low fibrinogen of 139 today, given 10 units of cryoprecipitate. Low fibrinogen likely due to liver dysfunction  -recommend Hepatology evaluation given liver injury  - monitor daily CBC with Diff, ferritin, LDH, and fibrinogen. If fibrinogen < 100, transfuse 10 units of cryoprecipitate.     will conitnue to monitor for signs/sxs of infection  ? PCP prophylaxis- perhaps mepron - so won' t suppress marrow  ANC is 1.3 , pt may not spike while on steroids , will need to monitor for signs or sxs of infection

## 2019-04-11 NOTE — PROGRESS NOTE ADULT - SUBJECTIVE AND OBJECTIVE BOX
LEODAN BURGOS    Patient is a 78y old  Male who presents with a chief complaint of Jaundice, hematuria,HLH.No CP or SOB undergoing chemotherapy.      Allergies    No Known Allergies    Intolerances      MEDICATIONS  (STANDING):  acetaminophen  IVPB .. 1000 milliGRAM(s) IV Intermittent once  alfuzosin 10 milliGRAM(s) Oral at bedtime  dexamethasone  IVPB 20 milliGRAM(s) IV Intermittent daily  dextrose 5%. 1000 milliLiter(s) (50 mL/Hr) IV Continuous <Continuous>  dextrose 50% Injectable 12.5 Gram(s) IV Push once  dextrose 50% Injectable 25 Gram(s) IV Push once  dextrose 50% Injectable 25 Gram(s) IV Push once  finasteride 5 milliGRAM(s) Oral daily  furosemide    Tablet 20 milliGRAM(s) Oral daily  insulin lispro (HumaLOG) corrective regimen sliding scale   SubCutaneous three times a day before meals  insulin lispro (HumaLOG) corrective regimen sliding scale   SubCutaneous at bedtime  pantoprazole  Injectable 40 milliGRAM(s) IV Push daily  ursodiol Tablet 500 milliGRAM(s) Oral two times a day    MEDICATIONS  (PRN):  dextrose 40% Gel 15 Gram(s) Oral once PRN Blood Glucose LESS THAN 70 milliGRAM(s)/deciliter  glucagon  Injectable 1 milliGRAM(s) IntraMuscular once PRN Glucose LESS THAN 70 milligrams/deciliter      ROS:  Positive:    General: Denies weight loss, fevers, rash, decreased hearing  Cardiac: Denies chest pain, SOB, HALL, orthopnea, PND, claudication, edema, snoring, daytime somnolence, palpitations, syncope  Resp: Denies SOB, HALL, cough, sputum, wheezing, hemoptysis  GI: Denies change in bowel habits, diarrhea, weight loss, melena, tarry stools,   nausea, vomiting, jaundice, abdominal pain, dysphagia  : Denies dysuria, nocturia, hematuria  Neuro: Denies tinnitus, headache, visual changes, weakness, dizziness or vertigo  Musculoskeletal: Denies neck pain back pain joint pain.  Skin: Denies rash, itching, dryness.  Endocrine: Denies polydipsia, polyuria  Psychiatric: Denies depression, anxiety      PHYSICAL EXAM:  Vital Signs Last 24 Hrs  T(C): 36.3 (2019 04:10), Max: 36.4 (10 Apr 2019 08:03)  T(F): 97.3 (2019 04:10), Max: 97.5 (10 Apr 2019 08:03)  HR: 82 (2019 04:10) (78 - 84)  BP: 124/76 (2019 04:10) (108/65 - 131/76)  BP(mean): --  RR: 18 (2019 04:10) (18 - 18)  SpO2: 96% (2019 04:10) (96% - 98%)  Daily     Daily Weight in k.2 (10 Apr 2019 08:03)  I&O's Summary    2019 07:01  -  10 Apr 2019 07:00  --------------------------------------------------------  IN: 900 mL / OUT: 1175 mL / NET: -275 mL    10 Apr 2019 07:01  -  2019 05:40  --------------------------------------------------------  IN: 300 mL / OUT: 1100 mL / NET: -800 mL        General Appearance: 	 Alert, cooperative, no distress  HEENT: normocephalic, atraumatic, PERRLA, EOMI, conjunctiva normal, sclera anicteric,   Neck: no JVD,  carotid 2+  bilaterally without bruits, thyroid normal to inspection and palpation, no adenopathy, trachea midline  Lungs:  clear to auscultation and percussion bilaterally  Cor:  pmi 5th ICS MCL, regular rate and rhythm, S1 normal intensity, S2 normal intensity, no gallops, murmurs or rubs  Abdomen:	 soft, non-tender; bowel sounds normal; no masses,  no organomegaly  Extremities: without cyanosis, clubbing 1+edema  Vasc: 2-+ PT and DP pulses; no varicosities  Neurologic: A&O x 3 (time, place, person). Symmetric strength; limited exam  Musculoskeletal: no kyphosis, scoliosis; normal gait, normal tone  Skin: no rashes; limited exam      Labs:  CBC Full  -  ( 10 Apr 2019 09:23 )  WBC Count : 2.94 K/uL  RBC Count : 3.29 M/uL  Hemoglobin : 9.2 g/dL  Hematocrit : 26.4 %  Platelet Count - Automated : 41 K/uL  Mean Cell Volume : 80.2 fl  Mean Cell Hemoglobin : 28.0 pg  Mean Cell Hemoglobin Concentration : 34.8 gm/dL  Auto Neutrophil # : 2.29 K/uL  Auto Lymphocyte # : 0.27 K/uL  Auto Monocyte # : 0.31 K/uL  Auto Eosinophil # : 0.03 K/uL  Auto Basophil # : 0.01 K/uL  Auto Neutrophil % : 78.0 %  Auto Lymphocyte % : 9.2 %  Auto Monocyte % : 10.5 %  Auto Eosinophil % : 1.0 %  Auto Basophil % : 0.3 %          PT/INR - ( 10 Apr 2019 08:59 )   PT: 12.4 sec;   INR: 1.08 ratio            Impression/Plan:Patient with HTN,hyperlipidemia,BPH,jaundice,pancytopenia,RI,hematuria now with HLH.Cardiopulmonary stable.On lasix foe edema.Continue chemotherapy and steroids.OOB/PT.        Marin Albert MD, [facility]  [facility]

## 2019-04-11 NOTE — PROGRESS NOTE ADULT - SUBJECTIVE AND OBJECTIVE BOX
LEODAN BURGOS:[id number],   78yMale followed for:  No Known Allergies    PAST MEDICAL & SURGICAL HISTORY:  BPH (benign prostatic hyperplasia)  H/O knee surgery  Hernia    FAMILY HISTORY:    MEDICATIONS  (STANDING):  acetaminophen  IVPB .. 1000 milliGRAM(s) IV Intermittent once  alfuzosin 10 milliGRAM(s) Oral at bedtime  dexamethasone  IVPB 20 milliGRAM(s) IV Intermittent daily  dextrose 5%. 1000 milliLiter(s) (50 mL/Hr) IV Continuous <Continuous>  dextrose 50% Injectable 12.5 Gram(s) IV Push once  dextrose 50% Injectable 25 Gram(s) IV Push once  dextrose 50% Injectable 25 Gram(s) IV Push once  finasteride 5 milliGRAM(s) Oral daily  furosemide    Tablet 20 milliGRAM(s) Oral daily  insulin lispro (HumaLOG) corrective regimen sliding scale   SubCutaneous three times a day before meals  insulin lispro (HumaLOG) corrective regimen sliding scale   SubCutaneous at bedtime  pantoprazole  Injectable 40 milliGRAM(s) IV Push daily  ursodiol Tablet 500 milliGRAM(s) Oral two times a day    MEDICATIONS  (PRN):  dextrose 40% Gel 15 Gram(s) Oral once PRN Blood Glucose LESS THAN 70 milliGRAM(s)/deciliter  glucagon  Injectable 1 milliGRAM(s) IntraMuscular once PRN Glucose LESS THAN 70 milligrams/deciliter      Vital Signs Last 24 Hrs  T(C): 36.3 (11 Apr 2019 04:10), Max: 36.3 (10 Apr 2019 15:20)  T(F): 97.3 (11 Apr 2019 04:10), Max: 97.4 (10 Apr 2019 23:02)  HR: 82 (11 Apr 2019 04:10) (78 - 84)  BP: 124/76 (11 Apr 2019 04:10) (120/70 - 131/76)  BP(mean): --  RR: 18 (11 Apr 2019 04:10) (18 - 18)  SpO2: 96% (11 Apr 2019 04:10) (96% - 98%)  nc/at  s1s2  cta  soft, nt, nd no guarding or rebound  no c/c/e    CBC Full  -  ( 10 Apr 2019 09:23 )  WBC Count : 2.94 K/uL  RBC Count : 3.29 M/uL  Hemoglobin : 9.2 g/dL  Hematocrit : 26.4 %  Platelet Count - Automated : 41 K/uL  Mean Cell Volume : 80.2 fl  Mean Cell Hemoglobin : 28.0 pg  Mean Cell Hemoglobin Concentration : 34.8 gm/dL  Auto Neutrophil # : 2.29 K/uL  Auto Lymphocyte # : 0.27 K/uL  Auto Monocyte # : 0.31 K/uL  Auto Eosinophil # : 0.03 K/uL  Auto Basophil # : 0.01 K/uL  Auto Neutrophil % : 78.0 %  Auto Lymphocyte % : 9.2 %  Auto Monocyte % : 10.5 %  Auto Eosinophil % : 1.0 %  Auto Basophil % : 0.3 %    04-11    137  |  107  |  41<H>  ----------------------------<  93  4.3   |  18<L>  |  1.48<H>    Ca    8.5      11 Apr 2019 07:08  Phos  3.3     04-11  Mg     2.4     04-11    TPro  3.8<L>  /  Alb  2.4<L>  /  TBili  17.6<H>  /  DBili  x   /  AST  109<H>  /  ALT  178<H>  /  AlkPhos  569<H>  04-10    PT/INR - ( 10 Apr 2019 08:59 )   PT: 12.4 sec;   INR: 1.08 ratio

## 2019-04-11 NOTE — PROGRESS NOTE ADULT - SUBJECTIVE AND OBJECTIVE BOX
No pain, no shortness of breath      VITAL:  T(C): , Max: 36.4 (04-11-19 @ 07:52)  T(F): , Max: 97.6 (04-11-19 @ 07:52)  HR: 86 (04-11-19 @ 07:52)  BP: 130/81 (04-11-19 @ 07:52)  RR: 18 (04-11-19 @ 07:52)  SpO2: 97% (04-11-19 @ 07:52)  Wt(kg): --      PHYSICAL EXAM:    Constitutional: NAD; Alert; frail  HEENT:  NCAT; DMM; (+)scleral icterus  Neck: No JVD; supple  Respiratory: CTA-b/l  Cardiac: RRR s1s2  Gastrointestinal: BS+, soft, NT/ND  Urologic: No casas  Extremities: No peripheral edema  Back: No CVAT b/l  Skin: (+)icterus    LABS:                        9.2    2.94  )-----------( 41       ( 10 Apr 2019 09:23 )             26.4     Na(137)/K(4.3)/Cl(107)/HCO3(18)/BUN(41)/Cr(1.48)Glu(93)/Ca(8.5)/Mg(2.4)/PO4(3.3)    04-11 @ 07:08  Na(134)/K(4.2)/Cl(106)/HCO3(19)/BUN(40)/Cr(1.65)Glu(102)/Ca(8.2)/Mg(--)/PO4(--)    04-10 @ 07:13  Na(137)/K(4.2)/Cl(106)/HCO3(19)/BUN(36)/Cr(1.53)Glu(119)/Ca(8.4)/Mg(--)/PO4(--)    04-09 @ 07:15    ASSESSMENT: 78M w/ BPH, OA, and HLD, 4/3/19 a/w weight loss, jaundice, and gross hematuria  (1)Renal - YEE - prerenal - numbers relatively stable over past few days - GFR now ~45-55ml/min  (2)Cholestasis/transaminitis - unclear etiology - HLH-induced? Final BMBx results pending. On Decadron IV.    RECOMMEND:  (1)Decadron IV per primary team/Heme  (2)BMP daily  (3)f/u BMBx   (4)Dose new meds for GFR 45-55ml/min        Bernardo Galdamez MD  [facility]  [phone] (+)urinary frequency; jaundice improving.      VITAL:  T(C): , Max: 36.4 (04-11-19 @ 07:52)  T(F): , Max: 97.6 (04-11-19 @ 07:52)  HR: 86 (04-11-19 @ 07:52)  BP: 130/81 (04-11-19 @ 07:52)  RR: 18 (04-11-19 @ 07:52)  SpO2: 97% (04-11-19 @ 07:52)      PHYSICAL EXAM:  Constitutional: NAD; Alert; frail  HEENT:  NCAT; DMM; (+)scleral icterus  Neck: No JVD; supple  Respiratory: CTA-b/l  Cardiac: RRR s1s2  Gastrointestinal: BS+, soft, NT/ND  Urologic: No casas  Extremities: 1-2+ b/l LE edema  Back: No CVAT b/l  Skin: resolving icterus of skin    LABS:                        9.2    2.94  )-----------( 41       ( 10 Apr 2019 09:23 )             26.4     Na(137)/K(4.3)/Cl(107)/HCO3(18)/BUN(41)/Cr(1.48)Glu(93)/Ca(8.5)/Mg(2.4)/PO4(3.3)    04-11 @ 07:08  Na(134)/K(4.2)/Cl(106)/HCO3(19)/BUN(40)/Cr(1.65)Glu(102)/Ca(8.2)/Mg(--)/PO4(--)    04-10 @ 07:13  Na(137)/K(4.2)/Cl(106)/HCO3(19)/BUN(36)/Cr(1.53)Glu(119)/Ca(8.4)/Mg(--)/PO4(--)    04-09 @ 07:15    ASSESSMENT: 78M w/ BPH, OA, and HLD, 4/3/19 a/w weight loss, jaundice, and gross hematuria  (1)Renal - YEE - prerenal - numbers relatively stable over past few days - GFR now ~45-55ml/min  (2)Cholestasis/transaminitis - unclear etiology - HLH-induced? Final BMBx results pending. On Decadron IV and clinically improving    RECOMMEND:  (1)Decadron IV per primary team/Heme  (2)BMP daily  (3)f/u BMBx   (4)Dose new meds for GFR 45-55ml/min        Bernardo Galdamez MD  [facility]  [phone]

## 2019-04-11 NOTE — ADVANCED PRACTICE NURSE CONSULT - ASSESSMENT
Cycle 2, day 1/1,Height and weightBSAerified. Lab results as per Md Dr Mauricio miguel aware of same. Vital signs stable prior to chemotherapy, and  within accepectable parameters, see sunrise. Pt educated on the importance of saving urine, verbalizes good understanding. Pt education done re chemo regimen, d, pt states understanding. Reports that he has been tolerating chemotherapy well without side effects. Pt with L hand peripheral iv  line, site free from signs and symptoms of infection, good blood return obtained. Etoposide 75 mg/m2=160 mg ivss infused over 1 hr w/o incident vss throughout

## 2019-04-11 NOTE — PROGRESS NOTE ADULT - ASSESSMENT
Patient is 80 yo M who presented on 4/3 with hematuria and jaundice found to have pancytopenia and meeting criteria for HLH    1. Hemophagocytic Lymphohistiocytosis:  - pt meeting criteria: ferritin over 46k, pancytopenia, fever, elevated triglycerides. Soluble IL-2 (CD25) receptor pending  - s/p BM biopsy  (4/8) follow up final results  -iron studies: not consistent with iron deficiency.   -no clear evidence of infection  - started on dexamethasone 10mg/m2 (20 mg IV daily) on 4/7/18, given high clinical suspicion for HLH, we started treatment with dose reduced etoposide 75mg/m2 on 4-9-19  given liver injury. D1 etopside was 4-9-19. today is C1D3. we will  give second dose of etoposide  today 4-11-19  - HLH protocol: etoposide 150mg/m2 twice weekly x 2 weeks, then weekly for weeks 3 to 8. dose reduced the etoposide to 75 mg/m2 due to liver dysfunction and significant hyperbilirubinemia. Dexamethasone 10mg/m2 daily (week 1-2), 5mg/m2 daily (week 3-4), 2.5mg/m2 daily (week 5-6), 1.25 mg/m2  daily for week 7 and then stop.   - still has low fibrinogen , given 10 units of cryoprecipitate. Low fibrinogen likely due to liver dysfunction  -recommend Hepatology evaluation given liver injury  - monitor daily CBC with Diff, ferritin, LDH, and fibrinogen. If fibrinogen < 100, transfuse 10 units of cryoprecipitate.

## 2019-04-11 NOTE — PROGRESS NOTE ADULT - SUBJECTIVE AND OBJECTIVE BOX
INTERVAL HPI/OVERNIGHT EVENTS:  Patient S&E at bedside. feels ok, still sob when he first stands up, he says his legs look a little swollen    VITAL SIGNS:  T(F): 97.3 (04-11-19 @ 15:10)  HR: 75 (04-11-19 @ 15:10)  BP: 154/67 (04-11-19 @ 15:10)  RR: 18 (04-11-19 @ 15:10)  SpO2: 97% (04-11-19 @ 15:10)  Wt(kg): --    PHYSICAL EXAM:    Constitutional: NAD  Eyes: icteric b/l   Neck: supple, no masses, no JVD  Respiratory: CTA b/l, good air entry b/l  Cardiovascular: RRR, no M/R/G  Gastrointestinal: soft, NTND, no masses palpable, + BS, no hepatosplenomegaly  Extremities: no c/c/e  Neurological: AAOx3      MEDICATIONS  (STANDING):  acetaminophen  IVPB .. 1000 milliGRAM(s) IV Intermittent once  alfuzosin 10 milliGRAM(s) Oral at bedtime  dexamethasone  IVPB 20 milliGRAM(s) IV Intermittent daily  dextrose 5%. 1000 milliLiter(s) (50 mL/Hr) IV Continuous <Continuous>  dextrose 50% Injectable 12.5 Gram(s) IV Push once  dextrose 50% Injectable 25 Gram(s) IV Push once  dextrose 50% Injectable 25 Gram(s) IV Push once  finasteride 5 milliGRAM(s) Oral daily  furosemide    Tablet 20 milliGRAM(s) Oral daily  insulin lispro (HumaLOG) corrective regimen sliding scale   SubCutaneous three times a day before meals  insulin lispro (HumaLOG) corrective regimen sliding scale   SubCutaneous at bedtime  pantoprazole  Injectable 40 milliGRAM(s) IV Push daily  ursodiol Tablet 500 milliGRAM(s) Oral two times a day    MEDICATIONS  (PRN):  dextrose 40% Gel 15 Gram(s) Oral once PRN Blood Glucose LESS THAN 70 milliGRAM(s)/deciliter  glucagon  Injectable 1 milliGRAM(s) IntraMuscular once PRN Glucose LESS THAN 70 milligrams/deciliter      Allergies    No Known Allergies    Intolerances        LABS:                        10.8   1.6   )-----------( 57       ( 11 Apr 2019 12:32 )             30.0     04-11    137  |  107  |  41<H>  ----------------------------<  93  4.3   |  18<L>  |  1.48<H>    Ca    8.5      11 Apr 2019 07:08  Phos  3.3     04-11  Mg     2.4     04-11    TPro  4.1<L>  /  Alb  2.5<L>  /  TBili  13.8<H>  /  DBili  10.0<H>  /  AST  80<H>  /  ALT  162<H>  /  AlkPhos  503<H>  04-11    PT/INR - ( 10 Apr 2019 08:59 )   PT: 12.4 sec;   INR: 1.08 ratio               RADIOLOGY & ADDITIONAL TESTS:  Studies reviewed.

## 2019-04-11 NOTE — PROGRESS NOTE ADULT - SUBJECTIVE AND OBJECTIVE BOX
Patient is a 78y old  Male who presents with a chief complaint of Jaundice, hematuria (11 Apr 2019 12:40)    Being followed by ID for help with management        Interval history:  pt feeling much improved  awaiting official BM results  urine lighter  no further tarry stools  No other acute events      PAST MEDICAL & SURGICAL HISTORY:  BPH (benign prostatic hyperplasia)  H/O knee surgery  Hernia    Allergies    No Known Allergies    Intolerances      Antimicrobials:      MEDICATIONS  (STANDING):  acetaminophen  IVPB .. 1000 milliGRAM(s) IV Intermittent once  alfuzosin 10 milliGRAM(s) Oral at bedtime  dexamethasone  IVPB 20 milliGRAM(s) IV Intermittent daily  dextrose 5%. 1000 milliLiter(s) (50 mL/Hr) IV Continuous <Continuous>  dextrose 50% Injectable 12.5 Gram(s) IV Push once  dextrose 50% Injectable 25 Gram(s) IV Push once  dextrose 50% Injectable 25 Gram(s) IV Push once  finasteride 5 milliGRAM(s) Oral daily  furosemide    Tablet 20 milliGRAM(s) Oral daily  insulin lispro (HumaLOG) corrective regimen sliding scale   SubCutaneous three times a day before meals  insulin lispro (HumaLOG) corrective regimen sliding scale   SubCutaneous at bedtime  pantoprazole  Injectable 40 milliGRAM(s) IV Push daily  ursodiol Tablet 500 milliGRAM(s) Oral two times a day      Vital Signs Last 24 Hrs  T(C): 36.4 (04-11-19 @ 07:52), Max: 36.4 (04-11-19 @ 07:52)  T(F): 97.6 (04-11-19 @ 07:52), Max: 97.6 (04-11-19 @ 07:52)  HR: 86 (04-11-19 @ 07:52) (78 - 86)  BP: 130/81 (04-11-19 @ 07:52) (120/70 - 131/76)  BP(mean): --  RR: 18 (04-11-19 @ 07:52) (18 - 18)  SpO2: 97% (04-11-19 @ 07:52) (96% - 98%)    Physical Exam:    Constitutional well preserved,comfortable,pleasant    HEENT PERRLA EOMI,No pallor     icteric    No oral exudate or erythema    Neck supple no JVD or LN    Chest Good AE,CTA    CVS RRR S1 S2 WNl No murmur or rub or gallop    Abd soft BS normal No tenderness no masses    Ext No cyanosis clubbing or edema    IV site no erythema tenderness or discharge    Joints no swelling or LOM    CNS AAO X 3 no focal    Lab Data:                          10.8   1.6   )-----------( 57       ( 11 Apr 2019 12:32 )             30.0       04-11    137  |  107  |  41<H>  ----------------------------<  93  4.3   |  18<L>  |  1.48<H>    Ca    8.5      11 Apr 2019 07:08  Phos  3.3     04-11  Mg     2.4     04-11    TPro  4.1<L>  /  Alb  2.5<L>  /  TBili  13.8<H>  /  DBili  10.0<H>  /  AST  80<H>  /  ALT  162<H>  /  AlkPhos  503<H>  04-11    Flow Cytometry Order. (04.08.19 @ 19:20)    TM Interpretation:   Flow Cytometry Final Report  ________________________________________________________________________  Specimen: Bone marrow aspirate  Collected: 04/08/2019 17:30  Received: 04/08/2019 19:20  Processed: 04/08/2019 19:50  Reported: 04/10/2019 18:44  Accession #: [id number]  FL -  ________________________________________________________________________  CLINICAL DATA: Rule out pancytopenia, HLH  _______________________________________________________________________  CD45/Side Scatter Differential  Granulocytes: 7 % ;    Lymphocytes: 2 % ;    Monocytes: 1 % ;    Dim CD45 and/or blast gate: 0 % ;    Erythroid/debris: 88 %  ________________________________________________________________________  DIAGNOSIS:  Bone marrow aspirate:       - The immunophenotypic findings show no diagnostic abnormalities.    Please see interpretation.    INTERPRETATION:  MORPHOLOGY: 21% lymphocytes.  CYTOSPIN: Increased mature neutrophils, lymphocytes, and monocytes (peripheral  blood  hemodilution)  IMMUNOPHENOTYPE: Lymphocytes (2% of cells): Heterogeneous population of T-cells (with normal CD4 to  CD8 ratio, including increased proportion of natural killer-like T-cells), natural killer cells,  and polytypic B-cells.    CD45/side scatter showsmostlt cell debris with no blast population and normal myeloid granularity.  There is no increase in CD34, CD14 or  positive cells. Myeloid antigen pattern with CD13, CD16  and CD11b shows hemodilution. No CD56 positivity of granulocytes or monocytes. Mature myeloid cells  are positive for HLA-DR.    CD38/CD45 stain shows too few plasma cells to evaluate clonality.    The immunophenotypic findings show no diagnostic abnormalities.  _____________________________________________________________________  Viability ................. 98.5%    Values reported are based on the lymphocyte gate. (Bright CD45 positive; low side scatter, low  forward scatter).  2% of cells.  CD45 .......... 100 %  CD2 ........... 91 %  CD3 ........... 74 %  CD5 ........... 74 %  CD7 ........... 85 %  CD4 ........... 85 %  CD8 ........... 20 %  CD16 .......... 22 %  CD56........... 28 %  CD57 .......... 40 %  CD3-/CD16,56+ . 18 %  CD3+/CD16,56+ . 10 %  CD10 .......... 0 %  CD19 .......... 6 %  CD20 .......... 6 %  CD23 .......... 4 %  FMC-7 ......... 6 %  CD5+/CD19+ .... 1 %  kappa ......... 3 %  lambda ........ 2 %  cyt kappa ..... Rare positive  cyt lambda .... Rare positive  HLA-DR ........ 84 %  CD38 .......... 84 %    Results reported are based on an open gate.  CD45 .......... 16 %  CD2 ........... 3 %  CD7 ........... 3 %  CD4 ........... 3 %  CD56 .......... 1 %  CD14 .......... 1 %  CD64 .......... 13 %  HLA-DR ........ 8 %  CD34 .......... 0 %   ......... 0 %  CD11b ......... 13 %  CD13 .......... 13 %  CD15 .......... 7 %  CD33 .......... 8 %    Verified By: Dustin Nelson  (Electronic Signature)This test was developed and its performance characteristics determined by the  Flow Cytometry Laboratory at [facility]. It has not been cleared or approved by the U.[place].  Food and Drug Administration.  The FDA has determined that such clearance or approval is not necessary. This test is used for  clinical purposes. It should not be regarded as investigational or for research. This laboratory is  certified under the Clinical Laboratory Improvement Amendment of 1988 ("CLIA") as qualified to  perform high complexity clinical testing.      Leptospirosis Antibodies (04.04.19 @ 20:10)    Leptospirosis Antibodies: Negative: No IgM antibodies to Leptospira detected.  Since antibodies  may not be present or may be present at undetectable levels  during early disease, repeat testing of a convalescent  sample collected in 2-3 weeks is recommended.  Test Performed by:  [facility]  [address] [place] [address]        WBC Count: 1.6 (04-11-19 @ 12:32)  WBC Count: 2.94 (04-10-19 @ 09:23)  WBC Count: 3.10 (04-09-19 @ 09:14)  WBC Count: 1.95 (04-08-19 @ 09:09)  WBC Count: 1.64 (04-07-19 @ 09:01)  WBC Count: 1.88 (04-06-19 @ 11:20)  WBC Count: 2.44 (04-05-19 @ 09:25)  WBC Count: 2.4 (04-04-19 @ 16:28)

## 2019-04-11 NOTE — PROGRESS NOTE ADULT - SUBJECTIVE AND OBJECTIVE BOX
Patient is a 78y old  Male who presents with a chief complaint of Jaundice, hematuria (11 Apr 2019 17:47)      SUBJECTIVE / OVERNIGHT EVENTS: feels much better    MEDICATIONS  (STANDING):  acetaminophen  IVPB .. 1000 milliGRAM(s) IV Intermittent once  alfuzosin 10 milliGRAM(s) Oral at bedtime  dexamethasone  IVPB 20 milliGRAM(s) IV Intermittent daily  dextrose 5%. 1000 milliLiter(s) (50 mL/Hr) IV Continuous <Continuous>  dextrose 50% Injectable 12.5 Gram(s) IV Push once  dextrose 50% Injectable 25 Gram(s) IV Push once  dextrose 50% Injectable 25 Gram(s) IV Push once  finasteride 5 milliGRAM(s) Oral daily  furosemide    Tablet 20 milliGRAM(s) Oral daily  insulin lispro (HumaLOG) corrective regimen sliding scale   SubCutaneous three times a day before meals  insulin lispro (HumaLOG) corrective regimen sliding scale   SubCutaneous at bedtime  pantoprazole  Injectable 40 milliGRAM(s) IV Push daily  ursodiol Tablet 500 milliGRAM(s) Oral two times a day    MEDICATIONS  (PRN):  dextrose 40% Gel 15 Gram(s) Oral once PRN Blood Glucose LESS THAN 70 milliGRAM(s)/deciliter  glucagon  Injectable 1 milliGRAM(s) IntraMuscular once PRN Glucose LESS THAN 70 milligrams/deciliter      Vital Signs Last 24 Hrs  T(F): 97.3 (04-11-19 @ 15:10), Max: 97.6 (04-11-19 @ 07:52)  HR: 75 (04-11-19 @ 15:10) (75 - 86)  BP: 154/67 (04-11-19 @ 15:10) (124/76 - 154/67)  RR: 18 (04-11-19 @ 15:10) (18 - 18)  SpO2: 97% (04-11-19 @ 15:10) (96% - 98%)  Telemetry:   CAPILLARY BLOOD GLUCOSE      POCT Blood Glucose.: 129 mg/dL (11 Apr 2019 21:24)  POCT Blood Glucose.: 190 mg/dL (11 Apr 2019 16:43)  POCT Blood Glucose.: 189 mg/dL (11 Apr 2019 12:10)  POCT Blood Glucose.: 109 mg/dL (11 Apr 2019 08:17)    I&O's Summary    10 Apr 2019 07:01 - 11 Apr 2019 07:00  --------------------------------------------------------  IN: 300 mL / OUT: 1100 mL / NET: -800 mL    11 Apr 2019 07:01  -  11 Apr 2019 22:59  --------------------------------------------------------  IN: 770 mL / OUT: 1300 mL / NET: -530 mL        PHYSICAL EXAM:  GENERAL: NAD, well-developed  HEAD:  Atraumatic, Normocephalic  EYES: EOMI, PERRLA, conjunctiva and sclera clear  NECK: Supple, No JVD  CHEST/LUNG: Clear to auscultation bilaterally; No wheeze  HEART: Regular rate and rhythm; No murmurs, rubs, or gallops  ABDOMEN: Soft, Nontender, Nondistended; Bowel sounds present  EXTREMITIES:  2+ Peripheral Pulses, No clubbing, cyanosis, or edema  PSYCH: AAOx3  NEUROLOGY: non-focal  SKIN: No rashes or lesions    LABS:                        10.8   1.6   )-----------( 57       ( 11 Apr 2019 12:32 )             30.0     04-11    137  |  107  |  41<H>  ----------------------------<  93  4.3   |  18<L>  |  1.48<H>    Ca    8.5      11 Apr 2019 07:08  Phos  3.3     04-11  Mg     2.4     04-11    TPro  4.1<L>  /  Alb  2.5<L>  /  TBili  13.8<H>  /  DBili  10.0<H>  /  AST  80<H>  /  ALT  162<H>  /  AlkPhos  503<H>  04-11    PT/INR - ( 10 Apr 2019 08:59 )   PT: 12.4 sec;   INR: 1.08 ratio                   RADIOLOGY & ADDITIONAL TESTS:    Imaging Personally Reviewed:    Consultant(s) Notes Reviewed:      Care Discussed with Consultants/Other Providers:

## 2019-04-11 NOTE — PROGRESS NOTE ADULT - ASSESSMENT
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  ongoing pancytopenia, ELEV transaminases, all Cx NTD, still on IV Abx for the concern of neutropenia  elevated PT and INR, elev lactate, w ARF, hyponatremia, w hematuria, guaiac pos stools, pancytopenia, but not neutropenic no biliary obstruction on CT scan/MRCP neg HIDA scan, splenic infarct on CT  Gi , ID ,renal and Heme on board. FERRITIN LEVEL EXTREMELY HIGH AT [address] initially, post 2 days treatment for HLH level dropped to [address],  Bone Marrow Bx done, results pending, treatment w low dose etoposide initiated on 4/9, dose reduced 2/2 transaminitis. HD IV DECADRON started on 4/8. Consider PCP ppx while on HD steroids.  Overall counts and liver function has improved  will ACE wrap feet and give a dose of lasix in am.  Renal function has plateaued   Echo is benign. Hep A/B/C neg, auto mitochondrial and smooth muscle AB neg.  INR has normalized, will start HSC for DVT ppx

## 2019-04-12 NOTE — PROGRESS NOTE ADULT - SUBJECTIVE AND OBJECTIVE BOX
LEODAN BURGOS    Patient is a 78y old  Male who presents with a chief complaint of Jaundice, hematuria,RI,pancytopenia,HLH.No CP or SOB.      Allergies    No Known Allergies    Intolerances      MEDICATIONS  (STANDING):  acetaminophen  IVPB .. 1000 milliGRAM(s) IV Intermittent once  alfuzosin 10 milliGRAM(s) Oral at bedtime  dextrose 5%. 1000 milliLiter(s) (50 mL/Hr) IV Continuous <Continuous>  dextrose 50% Injectable 12.5 Gram(s) IV Push once  dextrose 50% Injectable 25 Gram(s) IV Push once  dextrose 50% Injectable 25 Gram(s) IV Push once  finasteride 5 milliGRAM(s) Oral daily  insulin lispro (HumaLOG) corrective regimen sliding scale   SubCutaneous three times a day before meals  insulin lispro (HumaLOG) corrective regimen sliding scale   SubCutaneous at bedtime  pantoprazole  Injectable 40 milliGRAM(s) IV Push daily  ursodiol Tablet 500 milliGRAM(s) Oral two times a day    MEDICATIONS  (PRN):  dextrose 40% Gel 15 Gram(s) Oral once PRN Blood Glucose LESS THAN 70 milliGRAM(s)/deciliter  glucagon  Injectable 1 milliGRAM(s) IntraMuscular once PRN Glucose LESS THAN 70 milligrams/deciliter      ROS:  Positive:    General: Denies weight loss, fevers, rash, decreased hearing  Cardiac: Denies chest pain, SOB, HALL, orthopnea, PND, claudication, edema, snoring, daytime somnolence, palpitations, syncope  Resp: Denies SOB, HALL, cough, sputum, wheezing, hemoptysis  GI: Denies change in bowel habits, diarrhea, weight loss, melena, tarry stools,   nausea, vomiting, jaundice, abdominal pain, dysphagia  : Denies dysuria, nocturia, hematuria  Neuro: Denies tinnitus, headache, visual changes, weakness, dizziness or vertigo  Musculoskeletal: Denies neck pain back pain joint pain.  Skin: Denies rash, itching, dryness.  Endocrine: Denies polydipsia, polyuria  Psychiatric: Denies depression, anxiety      PHYSICAL EXAM:  Vital Signs Last 24 Hrs  T(C): 36.3 (11 Apr 2019 23:47), Max: 36.4 (11 Apr 2019 07:52)  T(F): 97.4 (11 Apr 2019 23:47), Max: 97.6 (11 Apr 2019 07:52)  HR: 85 (11 Apr 2019 23:47) (75 - 86)  BP: 154/85 (11 Apr 2019 23:47) (130/81 - 154/85)  BP(mean): --  RR: 18 (11 Apr 2019 23:47) (18 - 18)  SpO2: 97% (11 Apr 2019 23:47) (97% - 97%)  Daily     Daily   I&O's Summary    10 Apr 2019 07:01  -  11 Apr 2019 07:00  --------------------------------------------------------  IN: 300 mL / OUT: 1100 mL / NET: -800 mL    11 Apr 2019 07:01  -  12 Apr 2019 05:43  --------------------------------------------------------  IN: 770 mL / OUT: 1800 mL / NET: -1030 mL        General Appearance: 	 Alert, cooperative, no distress  HEENT: normocephalic, atraumatic, PERRLA, EOMI, conjunctiva normal, sclera anicteric,   Neck: no JVD,  carotid 2+  bilaterally without bruits, thyroid normal to inspection and palpation, no adenopathy, trachea midline  Lungs:  clear to auscultation and percussion bilaterally  Cor:  pmi 5th ICS MCL, regular rate and rhythm, S1 normal intensity, S2 normal intensity, no gallops, murmurs or rubs  Abdomen:	 soft, non-tender; bowel sounds normal; no masses,  no organomegaly  Extremities: without cyanosis, clubbing 1+ edema  Vasc: 2-+ PT and DP pulses; no varicosities  Neurologic: A&O x 3 (time, place, person). Symmetric strength; limited exam  Musculoskeletal: no kyphosis, scoliosis; normal gait, normal tone  Skin: no rashes; limited exam    EKG:  Telemetry:    Labs:  CBC Full  -  ( 11 Apr 2019 12:32 )  WBC Count : 1.6 K/uL  RBC Count : 3.60 M/uL  Hemoglobin : 10.8 g/dL  Hematocrit : 30.0 %  Platelet Count - Automated : 57 K/uL  Mean Cell Volume : 83.4 fl  Mean Cell Hemoglobin : 30.1 pg  Mean Cell Hemoglobin Concentration : 36.1 gm/dL  Auto Neutrophil # : 1.3 K/uL  Auto Lymphocyte # : 0.3 K/uL  Auto Monocyte # : 0.1 K/uL  Auto Eosinophil # : 0.0 K/uL  Auto Basophil # : 0.0 K/uL  Auto Neutrophil % : 76.4 %  Auto Lymphocyte % : 18.2 %  Auto Monocyte % : 4.4 %  Auto Eosinophil % : 0.9 %  Auto Basophil % : 0.0 %          PT/INR - ( 10 Apr 2019 08:59 )   PT: 12.4 sec;   INR: 1.08 ratio               Impression/Plan:Patient with HTN,hyperlipidemia,BPH with jaundice,hematuria,pancytopenia and HLH.Continue steroids and chemotherapy.Cardiopulmonary stable.Continue care as per hematology.OOB/PT.        Marin Albert MD, FACC  [facility]

## 2019-04-12 NOTE — PROGRESS NOTE ADULT - ASSESSMENT
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  ongoing pancytopenia, ELEV transaminases, but improving post steroids and Etoposide( for HLH)   initially elevated PT and INR, now normalized,ON admission:  elev lactate, w ARF, hyponatremia, w hematuria,  NOW ALL have subsided . no biliary obstruction on CT scan/MRCP neg HIDA scan, splenic infarct on CT  Gi , ID ,renal and Heme on board. FERRITIN LEVEL EX[place]REM[place] AT [address] initially, post 2 days treatment for HLH level dropped to [address], and on 4/11 10, 000.   Bone Marrow Bx done, results pending, treatment w low dose etoposide initiated on 4/9, 2nd dose given on 4/11,  dose reduced 2/2 transaminitis. HD IV DECADRON started on 4/8. Consider PCP ppx while on HD steroids.  Overall counts and liver function has improved  Renal function still improving   Echo is benign. Hep A/B/C neg, auto mitochondrial and smooth muscle AB neg.  INR has normalized, platelets still low, cont DVT ppx w PAS

## 2019-04-12 NOTE — PROGRESS NOTE ADULT - SUBJECTIVE AND OBJECTIVE BOX
INTERVAL HPI/OVERNIGHT EVENTS:    MEDICATIONS  (STANDING):  acetaminophen  IVPB .. 1000 milliGRAM(s) IV Intermittent once  alfuzosin 10 milliGRAM(s) Oral at bedtime  dextrose 5%. 1000 milliLiter(s) (50 mL/Hr) IV Continuous <Continuous>  dextrose 50% Injectable 12.5 Gram(s) IV Push once  dextrose 50% Injectable 25 Gram(s) IV Push once  dextrose 50% Injectable 25 Gram(s) IV Push once  finasteride 5 milliGRAM(s) Oral daily  insulin lispro (HumaLOG) corrective regimen sliding scale   SubCutaneous three times a day before meals  insulin lispro (HumaLOG) corrective regimen sliding scale   SubCutaneous at bedtime  pantoprazole  Injectable 40 milliGRAM(s) IV Push daily  ursodiol Tablet 500 milliGRAM(s) Oral two times a day    MEDICATIONS  (PRN):  dextrose 40% Gel 15 Gram(s) Oral once PRN Blood Glucose LESS THAN 70 milliGRAM(s)/deciliter  glucagon  Injectable 1 milliGRAM(s) IntraMuscular once PRN Glucose LESS THAN 70 milligrams/deciliter      Allergies    No Known Allergies    Intolerances            PHYSICAL EXAM:   Vital Signs:  Vital Signs Last 24 Hrs  T(C): 36.4 (12 Apr 2019 07:17), Max: 36.4 (12 Apr 2019 07:17)  T(F): 97.5 (12 Apr 2019 07:17), Max: 97.5 (12 Apr 2019 07:17)  HR: 76 (12 Apr 2019 07:17) (75 - 85)  BP: 138/79 (12 Apr 2019 07:17) (138/79 - 154/85)  BP(mean): --  RR: 18 (12 Apr 2019 07:17) (18 - 18)  SpO2: 98% (12 Apr 2019 07:17) (97% - 98%)  Daily     Daily     GENERAL:  no distress  HEENT:  NC/AT,  anicteric  CHEST:   no increased effort, breath sounds clear  HEART:  Regular rhythm  ABDOMEN:  Soft, non-tender, non-distended, normoactive bowel sounds,  no masses ,no hepato-splenomegaly, no signs of chronic liver disease  EXTEREMITIES:  no cyanosis      LABS:                        10.1   1.9   )-----------( 59       ( 12 Apr 2019 07:09 )             29.1     04-11    137  |  107  |  41<H>  ----------------------------<  93  4.3   |  18<L>  |  1.48<H>    Ca    8.5      11 Apr 2019 07:08  Phos  3.3     04-11  Mg     2.4     04-11    TPro  4.1<L>  /  Alb  2.5<L>  /  TBili  13.8<H>  /  DBili  10.0<H>  /  AST  80<H>  /  ALT  162<H>  /  AlkPhos  503<H>  04-11    PT/INR - ( 10 Apr 2019 08:59 )   PT: 12.4 sec;   INR: 1.08 ratio               RADIOLOGY & ADDITIONAL TESTS:

## 2019-04-12 NOTE — PROGRESS NOTE ADULT - SUBJECTIVE AND OBJECTIVE BOX
Patient is a 78y old  Male who presents with a chief complaint of Jaundice, hematuria (12 Apr 2019 16:15)      SUBJECTIVE / OVERNIGHT EVENTS: no new c/o, feeling better    MEDICATIONS  (STANDING):  acetaminophen  IVPB .. 1000 milliGRAM(s) IV Intermittent once  alfuzosin 10 milliGRAM(s) Oral at bedtime  dextrose 5%. 1000 milliLiter(s) (50 mL/Hr) IV Continuous <Continuous>  dextrose 50% Injectable 12.5 Gram(s) IV Push once  dextrose 50% Injectable 25 Gram(s) IV Push once  dextrose 50% Injectable 25 Gram(s) IV Push once  finasteride 5 milliGRAM(s) Oral daily  insulin lispro (HumaLOG) corrective regimen sliding scale   SubCutaneous three times a day before meals  insulin lispro (HumaLOG) corrective regimen sliding scale   SubCutaneous at bedtime  pantoprazole  Injectable 40 milliGRAM(s) IV Push daily  ursodiol Tablet 500 milliGRAM(s) Oral two times a day    MEDICATIONS  (PRN):  dextrose 40% Gel 15 Gram(s) Oral once PRN Blood Glucose LESS THAN 70 milliGRAM(s)/deciliter  glucagon  Injectable 1 milliGRAM(s) IntraMuscular once PRN Glucose LESS THAN 70 milligrams/deciliter      Vital Signs Last 24 Hrs  T(F): 97.5 (04-12-19 @ 07:17), Max: 97.5 (04-12-19 @ 07:17)  HR: 76 (04-12-19 @ 07:17) (76 - 85)  BP: 138/79 (04-12-19 @ 07:17) (138/79 - 154/85)  RR: 18 (04-12-19 @ 07:17) (18 - 18)  SpO2: 98% (04-12-19 @ 07:17) (97% - 98%)  Telemetry:   CAPILLARY BLOOD GLUCOSE      POCT Blood Glucose.: 163 mg/dL (12 Apr 2019 12:06)  POCT Blood Glucose.: 128 mg/dL (12 Apr 2019 08:36)  POCT Blood Glucose.: 129 mg/dL (11 Apr 2019 21:24)  POCT Blood Glucose.: 190 mg/dL (11 Apr 2019 16:43)    I&O's Summary    11 Apr 2019 07:01  -  12 Apr 2019 07:00  --------------------------------------------------------  IN: 770 mL / OUT: 2525 mL / NET: -1755 mL    12 Apr 2019 07:01  -  12 Apr 2019 16:31  --------------------------------------------------------  IN: 300 mL / OUT: 700 mL / NET: -400 mL        PHYSICAL EXAM:  GENERAL: NAD, well-developed  HEAD:  Atraumatic, Normocephalic  EYES: EOMI, PERRLA, conjunctiva and sclera clear  NECK: Supple, No JVD  CHEST/LUNG: Clear to auscultation bilaterally; No wheeze  HEART: Regular rate and rhythm; No murmurs, rubs, or gallops  ABDOMEN: Soft, Nontender, Nondistended; Bowel sounds present  EXTREMITIES:  2+ Peripheral Pulses, No clubbing, cyanosis, or edema  PSYCH: AAOx3  NEUROLOGY: non-focal  SKIN: No rashes or lesions    LABS:                        10.1   1.9   )-----------( 59       ( 12 Apr 2019 07:09 )             29.1     04-12    134<L>  |  106  |  46<H>  ----------------------------<  168<H>  4.7   |  18<L>  |  1.46<H>    Ca    8.7      12 Apr 2019 12:18  Phos  3.3     04-11  Mg     2.4     04-11    TPro  4.7<L>  /  Alb  2.8<L>  /  TBili  9.9<H>  /  DBili  x   /  AST  80<H>  /  ALT  159<H>  /  AlkPhos  461<H>  04-12              RADIOLOGY & ADDITIONAL TESTS:    Imaging Personally Reviewed:    Consultant(s) Notes Reviewed:      Care Discussed with Consultants/Other Providers:

## 2019-04-12 NOTE — PROGRESS NOTE ADULT - ASSESSMENT
77 yo male with Pancytopenia, elevated LFTs , jaundice, with likely HLH     Hemophagocytic Lymphohistiocytosis:  - pt meeting criteria: ferritin over 46k, pancytopenia, fever, elevated triglycerides. Soluble IL-2 (CD25) receptor pending  - s/p BM biopsy  (4/8) follow up final results  -iron studies: not consistent with iron deficiency.   -no clear evidence of infection  - started on dexamethasone 10mg/m2 (20 mg IV daily) on 4/7/18, given high clinical suspicion for HLH, hematology  started treatment with dose reduced etoposide 75mg/m2 on 4-9-19  given liver injury. D1 etopside was 4-9-19. today is C1D2.   They will probably give second dose of etoposide on fri 4-12-19  - HLH protocol started by [facility]: etoposide 150mg/m2 twice weekly x 2 weeks, then weekly for weeks 3 to 8. dose reduced the etoposide to 75 mg/m2 due to liver dysfunction and significant hyperbilirubinemia. Dexamethasone 10mg/m2 daily (week 1-2), 5mg/m2 daily (week 3-4), 2.5mg/m2 daily (week 5-6), 1.25 mg/m2  daily for week 7 and then stop.   - still has low fibrinogen of 139 today, given 10 units of cryoprecipitate. Low fibrinogen likely due to liver dysfunction  -recommend Hepatology evaluation given liver injury  - monitor daily CBC with Diff, ferritin, LDH, and fibrinogen.     please conitnue to monitor for signs/sxs of infection  ? PCP prophylaxis- perhaps mepron - so won' t suppress marrow  ANC is 1.2 , pt may not spike while on steroids , will need to monitor for signs or sxs of infection    ID will follow the patient PRN. Please recontact ID if we can be of further assistance . [phone]

## 2019-04-12 NOTE — PROGRESS NOTE ADULT - ASSESSMENT
Patient is 78 yo M who presented on 4/3 with hematuria and jaundice found to have pancytopenia and meeting criteria for HLH    1. Hemophagocytic Lymphohistiocytosis:  - pt meeting criteria: ferritin over 46k, pancytopenia, fever, elevated triglycerides. Soluble IL-2 (CD25) receptor >135,000  - started on dexamethasone 10mg/m2 (20 mg IV daily) on 4/7/18, given high clinical suspicion for HLH, we started treatment with dose reduced etoposide 75mg/m2 on 4-9-19  given liver injury. D1 etopside was 4-9-19. today is C1D4. got second dose of etoposide 4-11-19  - HLH protocol: etoposide 150mg/m2 twice weekly x 2 weeks, then weekly for weeks 3 to 8. dose reduced the etoposide to 75 mg/m2 due to liver dysfunction and significant hyperbilirubinemia. Dexamethasone 10mg/m2 daily (week 1-2), 5mg/m2 daily (week 3-4), 2.5mg/m2 daily (week 5-6), 1.25 mg/m2  daily for week 7 and then stop.   - ferritin trending down, down to 10,090 from > 40,000. bilirubin trending down, AST/ALT stable, continue to trend CBC with diff, CMP with LFTs daily   - s/p BM biopsy  (4/8) follow up final results  -iron studies: not consistent with iron deficiency.   -no clear evidence of infection  - still has low fibrinogen , given 10 units of cryoprecipitate. Low fibrinogen likely due to liver dysfunction  -recommend Hepatology evaluation given liver injury  - monitor daily CBC with Diff, CMP with LFTs,  ferritin, LDH, and fibrinogen. If fibrinogen < 100, transfuse 10 units of cryoprecipitate.

## 2019-04-12 NOTE — PROGRESS NOTE ADULT - SUBJECTIVE AND OBJECTIVE BOX
Patient is a 78y old  Male who presents with a chief complaint of Jaundice, hematuria (12 Apr 2019 12:56)    Being followed by ID for help with maangement        Interval history:  pt c/o LE edema  otherwise feeling better  No other acute events      PAST MEDICAL & SURGICAL HISTORY:  BPH (benign prostatic hyperplasia)  H/O knee surgery  Hernia    Allergies    No Known Allergies    Intolerances      Antimicrobials:      MEDICATIONS  (STANDING):  acetaminophen  IVPB .. 1000 milliGRAM(s) IV Intermittent once  alfuzosin 10 milliGRAM(s) Oral at bedtime  dextrose 5%. 1000 milliLiter(s) (50 mL/Hr) IV Continuous <Continuous>  dextrose 50% Injectable 12.5 Gram(s) IV Push once  dextrose 50% Injectable 25 Gram(s) IV Push once  dextrose 50% Injectable 25 Gram(s) IV Push once  finasteride 5 milliGRAM(s) Oral daily  insulin lispro (HumaLOG) corrective regimen sliding scale   SubCutaneous three times a day before meals  insulin lispro (HumaLOG) corrective regimen sliding scale   SubCutaneous at bedtime  pantoprazole  Injectable 40 milliGRAM(s) IV Push daily  ursodiol Tablet 500 milliGRAM(s) Oral two times a day      Vital Signs Last 24 Hrs  T(C): 36.4 (04-12-19 @ 07:17), Max: 36.4 (04-12-19 @ 07:17)  T(F): 97.5 (04-12-19 @ 07:17), Max: 97.5 (04-12-19 @ 07:17)  HR: 76 (04-12-19 @ 07:17) (76 - 85)  BP: 138/79 (04-12-19 @ 07:17) (138/79 - 154/85)  BP(mean): --  RR: 18 (04-12-19 @ 07:17) (18 - 18)  SpO2: 98% (04-12-19 @ 07:17) (97% - 98%)    Physical Exam:    Constitutional well preserved,comfortable,pleasant    HEENT PERRLA EOMI,No pallor or icterus    No oral exudate or erythema    Neck supple no JVD or LN    Chest Good AE,CTA    CVS RRR S1 S2 WNl N    Abd soft BS normal No tenderness no masses    Ext bilateral edema    IV site no erythema tenderness or discharge    Joints no swelling or LOM    CNS AAO X 3 no focal    Lab Data:                          10.1   1.9   )-----------( 59       ( 12 Apr 2019 07:09 )             29.1       04-12    134<L>  |  106  |  46<H>  ----------------------------<  168<H>  4.7   |  18<L>  |  1.46<H>    Ca    8.7      12 Apr 2019 12:18  Phos  3.3     04-11  Mg     2.4     04-11    TPro  4.7<L>  /  Alb  2.8<L>  /  TBili  9.9<H>  /  DBili  x   /  AST  80<H>  /  ALT  159<H>  /  AlkPhos  461<H>  04-12      Interleukin-2 Receptor (CD25), Soluble (04.06.19 @ 01:08)    Interleukin 2 Receptor CD25 Soluble: [id number]: Diluted and confirmed.  INTERPRETIVE INFORMATION: Cytokines  Results are used to understand the pathophysiology of immune,  infectious, or inflammatory disorders, or may be used for research  purposes.  Test developed and characteristics determined by [employer] [facility] [employer]. See Compliance Statement B: [employer]  Performed by [facility] [employer] [facility] [employer] [facility],  [address] [place] [id number] [phone]  [email or web address], Sourav Devi MD - Lab. Director pg/mL                      WBC Count: 1.9 (04-12-19 @ 07:09)  WBC Count: 1.6 (04-11-19 @ 12:32)  WBC Count: 2.94 (04-10-19 @ 09:23)  WBC Count: 3.10 (04-09-19 @ 09:14)  WBC Count: 1.95 (04-08-19 @ 09:09)  WBC Count: 1.64 (04-07-19 @ 09:01)  WBC Count: 1.88 (04-06-19 @ 11:20)

## 2019-04-12 NOTE — PROGRESS NOTE ADULT - SUBJECTIVE AND OBJECTIVE BOX
INTERVAL HPI/OVERNIGHT EVENTS:  Patient S&E at bedside. No fevers overnight     VITAL SIGNS:  T(F): 97.5 (04-12-19 @ 07:17)  HR: 76 (04-12-19 @ 07:17)  BP: 138/79 (04-12-19 @ 07:17)  RR: 18 (04-12-19 @ 07:17)  SpO2: 98% (04-12-19 @ 07:17)  Wt(kg): --    PHYSICAL EXAM:    Constitutional: NAD  Eyes: EOMI, sclera non-icteric  Neck: supple, no masses, no JVD  Respiratory: CTA b/l, good air entry b/l  Cardiovascular: RRR, no M/R/G  Gastrointestinal: soft, NTND, no masses palpable, + BS, no hepatosplenomegaly  Extremities: no c/c/e  Neurological: AAOx3      MEDICATIONS  (STANDING):  acetaminophen  IVPB .. 1000 milliGRAM(s) IV Intermittent once  alfuzosin 10 milliGRAM(s) Oral at bedtime  dextrose 5%. 1000 milliLiter(s) (50 mL/Hr) IV Continuous <Continuous>  dextrose 50% Injectable 12.5 Gram(s) IV Push once  dextrose 50% Injectable 25 Gram(s) IV Push once  dextrose 50% Injectable 25 Gram(s) IV Push once  finasteride 5 milliGRAM(s) Oral daily  insulin lispro (HumaLOG) corrective regimen sliding scale   SubCutaneous three times a day before meals  insulin lispro (HumaLOG) corrective regimen sliding scale   SubCutaneous at bedtime  pantoprazole  Injectable 40 milliGRAM(s) IV Push daily  ursodiol Tablet 500 milliGRAM(s) Oral two times a day    MEDICATIONS  (PRN):  dextrose 40% Gel 15 Gram(s) Oral once PRN Blood Glucose LESS THAN 70 milliGRAM(s)/deciliter  glucagon  Injectable 1 milliGRAM(s) IntraMuscular once PRN Glucose LESS THAN 70 milligrams/deciliter      Allergies    No Known Allergies    Intolerances        LABS:                        10.1   1.9   )-----------( 59       ( 12 Apr 2019 07:09 )             29.1     04-12    134<L>  |  106  |  46<H>  ----------------------------<  168<H>  4.7   |  18<L>  |  1.46<H>    Ca    8.7      12 Apr 2019 12:18  Phos  3.3     04-11  Mg     2.4     04-11    TPro  4.7<L>  /  Alb  2.8<L>  /  TBili  9.9<H>  /  DBili  x   /  AST  80<H>  /  ALT  159<H>  /  AlkPhos  461<H>  04-12          RADIOLOGY & ADDITIONAL TESTS:  Studies reviewed.

## 2019-04-12 NOTE — CHART NOTE - NSCHARTNOTEFT_GEN_A_CORE
Nutrition Follow Up Note  Patient seen for: Malnutrition Follow Up     Interim events noted. Pt c HLH, on protocol, noted pt is on steroids, Endocrine following, adjusting insulin as needed. Noted pt c YEE and hyponatremia.     Source: pt and spouse at bedside     Diet : consistent CHO c snack diet     Patient reports: he is trying to eat better, reports he has been eating at least 50% of most meals and at times eating close to the whole meal. Pt reports he has not received any health shakes since conversation c Dietetic Intern, discussed diet change prevented pt from receiving the regular health shakes, will provide no sugar added health shakes. Pt reports last BM 4/11. Discussed consistent CHO diet c pt and spouse.     Daily Weight: 4/9: 202.8->4/10: 203.2 pounds, stable wt at this time     Pertinent Medications: MEDICATIONS  (STANDING):  acetaminophen  IVPB .. 1000 milliGRAM(s) IV Intermittent once  alfuzosin 10 milliGRAM(s) Oral at bedtime  dextrose 5%. 1000 milliLiter(s) (50 mL/Hr) IV Continuous <Continuous>  dextrose 50% Injectable 12.5 Gram(s) IV Push once  dextrose 50% Injectable 25 Gram(s) IV Push once  dextrose 50% Injectable 25 Gram(s) IV Push once  finasteride 5 milliGRAM(s) Oral daily  insulin lispro (HumaLOG) corrective regimen sliding scale   SubCutaneous three times a day before meals  insulin lispro (HumaLOG) corrective regimen sliding scale   SubCutaneous at bedtime  pantoprazole  Injectable 40 milliGRAM(s) IV Push daily  ursodiol Tablet 500 milliGRAM(s) Oral two times a day    MEDICATIONS  (PRN):  dextrose 40% Gel 15 Gram(s) Oral once PRN Blood Glucose LESS THAN 70 milliGRAM(s)/deciliter  glucagon  Injectable 1 milliGRAM(s) IntraMuscular once PRN Glucose LESS THAN 70 milligrams/deciliter    Pertinent Labs:   Finger Sticks:  POCT Blood Glucose.: 128 mg/dL (04-12 @ 08:36)  POCT Blood Glucose.: 129 mg/dL (04-11 @ 21:24)  POCT Blood Glucose.: 190 mg/dL (04-11 @ 16:43)  POCT Blood Glucose.: 189 mg/dL (04-11 @ 12:10)      Skin per nursing documentation: intact  Edema: + 2 jorje. feet edema     Estimated Needs:   [x] no change since previous assessment      Previous Nutrition Diagnosis: severe malnutrition  Nutrition Diagnosis continues at this time, addressed c good PO intake, to be addressed further with supplements     New Nutrition Diagnosis: none at this time       Recommend  1) Continue c current diet.   2) Recommend Glucerna shakes x 1 daily.   3) Will provide no sugar added health shake x 1 daily.   4) Encouraged PO intake as tolerated. Discussed balanced meals, mixed meals, avoiding concentrated sweets at this time.     Monitoring and Evaluation:     Continue to monitor Nutritional intake, Tolerance to diet prescription, weights, labs, skin integrity    RD remains available upon request and will follow up per protocol  Kymberly Collins, MS, RD, , [facility] #[phone]

## 2019-04-12 NOTE — PROGRESS NOTE ADULT - SUBJECTIVE AND OBJECTIVE BOX
No pain, no shortness of breath      VITAL:  T(C): , Max: 36.4 (04-12-19 @ 07:17)  T(F): , Max: 97.5 (04-12-19 @ 07:17)  HR: 76 (04-12-19 @ 07:17)  BP: 138/79 (04-12-19 @ 07:17)  BP(mean): --  RR: 18 (04-12-19 @ 07:17)  SpO2: 98% (04-12-19 @ 07:17)      PHYSICAL EXAM:  Constitutional: NAD; Alert; frail  HEENT:  NCAT; DMM; (+)scleral icterus  Neck: No JVD; supple  Respiratory: CTA-b/l  Cardiac: RRR s1s2  Gastrointestinal: BS+, soft, NT/ND  Urologic: No casas  Extremities: 1-2+ b/l LE edema  Back: No CVAT b/l  Skin: resolving icterus of skin      LABS:                        10.1   1.9   )-----------( 59       ( 12 Apr 2019 07:09 )             29.1     Na(137)/K(4.3)/Cl(107)/HCO3(18)/BUN(41)/Cr(1.48)Glu(93)/Ca(8.5)/Mg(2.4)/PO4(3.3)    04-11 @ 07:08  Na(134)/K(4.2)/Cl(106)/HCO3(19)/BUN(40)/Cr(1.65)Glu(102)/Ca(8.2)/Mg(--)/PO4(--)    04-10 @ 07:13      ASSESSMENT: 78M w/ BPH, OA, and HLD, 4/3/19 a/w weight loss, jaundice, and gross hematuria  (1)Renal - YEE - prerenal - numbers relatively stable over past few days - GFR now ~45-55ml/min  (2)Cholestasis/transaminitis - due to HLH. On Decadron IV and clinically improving    RECOMMEND:  (1)Decadron IV per primary team/Heme  (2)BMP daily  (3)Dose new meds for GFR 45-55ml/min            Bernardo Galdamez MD  [facility]  [phone] No pain, no shortness of breath      VITAL:  T(C): , Max: 36.4 (04-12-19 @ 07:17)  T(F): , Max: 97.5 (04-12-19 @ 07:17)  HR: 76 (04-12-19 @ 07:17)  BP: 138/79 (04-12-19 @ 07:17)  RR: 18 (04-12-19 @ 07:17)  SpO2: 98% (04-12-19 @ 07:17)      PHYSICAL EXAM:  Constitutional: NAD; Alert; frail  HEENT:  NCAT; DMM; (+)scleral icterus  Neck: No JVD; supple  Respiratory: CTA-b/l  Cardiac: RRR s1s2  Gastrointestinal: BS+, soft, NT/ND  Urologic: No casas  Extremities: 1-2+ b/l LE edema  Back: No CVAT b/l  Skin: resolving icterus of skin      LABS:                        10.1   1.9   )-----------( 59       ( 12 Apr 2019 07:09 )             29.1     Na(137)/K(4.3)/Cl(107)/HCO3(18)/BUN(41)/Cr(1.48)Glu(93)/Ca(8.5)/Mg(2.4)/PO4(3.3)    04-11 @ 07:08  Na(134)/K(4.2)/Cl(106)/HCO3(19)/BUN(40)/Cr(1.65)Glu(102)/Ca(8.2)/Mg(--)/PO4(--)    04-10 @ 07:13      ASSESSMENT: 78M w/ BPH, OA, and HLD, 4/3/19 a/w weight loss, jaundice, and gross hematuria  (1)Renal - YEE - prerenal - numbers relatively stable over past few days - GFR now ~45-55ml/min  (2)Cholestasis/transaminitis - due to HLH. On Decadron IV and clinically improving    RECOMMEND:  (1)Decadron IV per primary team/Heme  (2)BMP daily  (3)Dose new meds for GFR 45-55ml/min            Bernardo Galdamez MD  [facility]  [phone]

## 2019-04-13 NOTE — PROGRESS NOTE ADULT - SUBJECTIVE AND OBJECTIVE BOX
Patient is a 78y old  Male who presents with a chief complaint of Jaundice, hematuria (13 Apr 2019 11:24)      SUBJECTIVE / OVERNIGHT EVENTS: overall improving clinically and w his counts    MEDICATIONS  (STANDING):  acetaminophen  IVPB .. 1000 milliGRAM(s) IV Intermittent once  alfuzosin 10 milliGRAM(s) Oral at bedtime  dextrose 5%. 1000 milliLiter(s) (50 mL/Hr) IV Continuous <Continuous>  dextrose 50% Injectable 12.5 Gram(s) IV Push once  dextrose 50% Injectable 25 Gram(s) IV Push once  dextrose 50% Injectable 25 Gram(s) IV Push once  finasteride 5 milliGRAM(s) Oral daily  insulin lispro (HumaLOG) corrective regimen sliding scale   SubCutaneous three times a day before meals  insulin lispro (HumaLOG) corrective regimen sliding scale   SubCutaneous at bedtime  pantoprazole  Injectable 40 milliGRAM(s) IV Push daily  ursodiol Tablet 500 milliGRAM(s) Oral two times a day    MEDICATIONS  (PRN):  dextrose 40% Gel 15 Gram(s) Oral once PRN Blood Glucose LESS THAN 70 milliGRAM(s)/deciliter  glucagon  Injectable 1 milliGRAM(s) IntraMuscular once PRN Glucose LESS THAN 70 milligrams/deciliter      Vital Signs Last 24 Hrs  T(F): 97.5 (04-13-19 @ 16:10), Max: 97.5 (04-13-19 @ 16:10)  HR: 64 (04-13-19 @ 16:10) (64 - 88)  BP: 117/63 (04-13-19 @ 16:10) (117/63 - 130/70)  RR: 18 (04-13-19 @ 16:10) (18 - 18)  SpO2: 97% (04-13-19 @ 16:10) (97% - 98%)  Telemetry:   CAPILLARY BLOOD GLUCOSE      POCT Blood Glucose.: 112 mg/dL (13 Apr 2019 17:08)  POCT Blood Glucose.: 135 mg/dL (13 Apr 2019 12:08)  POCT Blood Glucose.: 102 mg/dL (13 Apr 2019 08:56)  POCT Blood Glucose.: 120 mg/dL (12 Apr 2019 21:40)    I&O's Summary    12 Apr 2019 07:01  -  13 Apr 2019 07:00  --------------------------------------------------------  IN: 580 mL / OUT: 1500 mL / NET: -920 mL    13 Apr 2019 07:01  -  13 Apr 2019 20:56  --------------------------------------------------------  IN: 120 mL / OUT: 150 mL / NET: -30 mL        PHYSICAL EXAM:  GENERAL: NAD, well-developed  HEAD:  Atraumatic, Normocephalic  EYES: EOMI, PERRLA, conjunctiva and sclera clear  NECK: Supple, No JVD  CHEST/LUNG: Clear to auscultation bilaterally; No wheeze  HEART: Regular rate and rhythm; No murmurs, rubs, or gallops  ABDOMEN: Soft, Nontender, Nondistended; Bowel sounds present  EXTREMITIES:  2+ Peripheral Pulses, No clubbing, cyanosis, or edema  PSYCH: AAOx3  NEUROLOGY: non-focal  SKIN: No rashes or lesions    LABS:                        9.1    1.58  )-----------( 69       ( 13 Apr 2019 09:48 )             25.8     04-13    136  |  106  |  46<H>  ----------------------------<  96  4.2   |  18<L>  |  1.42<H>    Ca    8.4      13 Apr 2019 07:17    TPro  4.3<L>  /  Alb  2.5<L>  /  TBili  7.2<H>  /  DBili  x   /  AST  64<H>  /  ALT  137<H>  /  AlkPhos  395<H>  04-13              RADIOLOGY & ADDITIONAL TESTS:    Imaging Personally Reviewed:    Consultant(s) Notes Reviewed:      Care Discussed with Consultants/Other Providers:

## 2019-04-13 NOTE — PROGRESS NOTE ADULT - SUBJECTIVE AND OBJECTIVE BOX
INTERVAL HPI/OVERNIGHT EVENTS:    MEDICATIONS  (STANDING):  acetaminophen  IVPB .. 1000 milliGRAM(s) IV Intermittent once  alfuzosin 10 milliGRAM(s) Oral at bedtime  dextrose 5%. 1000 milliLiter(s) (50 mL/Hr) IV Continuous <Continuous>  dextrose 50% Injectable 12.5 Gram(s) IV Push once  dextrose 50% Injectable 25 Gram(s) IV Push once  dextrose 50% Injectable 25 Gram(s) IV Push once  finasteride 5 milliGRAM(s) Oral daily  insulin lispro (HumaLOG) corrective regimen sliding scale   SubCutaneous three times a day before meals  insulin lispro (HumaLOG) corrective regimen sliding scale   SubCutaneous at bedtime  pantoprazole  Injectable 40 milliGRAM(s) IV Push daily  ursodiol Tablet 500 milliGRAM(s) Oral two times a day    MEDICATIONS  (PRN):  dextrose 40% Gel 15 Gram(s) Oral once PRN Blood Glucose LESS THAN 70 milliGRAM(s)/deciliter  glucagon  Injectable 1 milliGRAM(s) IntraMuscular once PRN Glucose LESS THAN 70 milligrams/deciliter      Allergies    No Known Allergies    Intolerances            PHYSICAL EXAM:   Vital Signs:  Vital Signs Last 24 Hrs  T(C): 36.3 (13 Apr 2019 08:23), Max: 36.4 (12 Apr 2019 17:16)  T(F): 97.3 (13 Apr 2019 08:23), Max: 97.6 (12 Apr 2019 17:16)  HR: 88 (13 Apr 2019 08:23) (64 - 88)  BP: 119/69 (13 Apr 2019 08:23) (119/69 - 147/74)  BP(mean): --  RR: 18 (13 Apr 2019 08:23) (18 - 18)  SpO2: 98% (13 Apr 2019 08:23) (97% - 98%)  Daily     Daily     GENERAL:  no distress  HEENT:  NC/AT,  anicteric  CHEST:   no increased effort, breath sounds clear  HEART:  Regular rhythm  ABDOMEN:  Soft, non-tender, non-distended, normoactive bowel sounds,  no masses ,no hepato-splenomegaly, no signs of chronic liver disease  EXTEREMITIES:  no cyanosis      LABS:                        10.1   1.9   )-----------( 59       ( 12 Apr 2019 07:09 )             29.1     04-13    136  |  106  |  46<H>  ----------------------------<  96  4.2   |  18<L>  |  1.42<H>    Ca    8.4      13 Apr 2019 07:17    TPro  4.3<L>  /  Alb  2.5<L>  /  TBili  7.2<H>  /  DBili  x   /  AST  64<H>  /  ALT  137<H>  /  AlkPhos  395<H>  04-13          RADIOLOGY & ADDITIONAL TESTS:

## 2019-04-13 NOTE — PROGRESS NOTE ADULT - ASSESSMENT
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  ongoing pancytopenia, ELEV transaminases, but improving post steroids and Etoposide( for HLH)   initially elevated PT and INR, now normalized,ON admission:  elev lactate, w ARF, hyponatremia, w hematuria,  NOW ALL have subsided . no biliary obstruction on CT scan/MRCP neg HIDA scan, splenic infarct on CT  Gi , ID ,renal and Heme on board. FERRITIN LEVEL EXTREMELY H[place] AT [address] initially, post 2 days treatment for HLH level dropped to 7[address], and on 4/11 10, 000.   Bone Marrow Bx done, results pending, treatment w low dose etoposide initiated on 4/9, 2nd dose given on 4/11,  dose reduced 2/2 transaminitis. HD IV DECADRON started on 4/8. Consider PCP ppx while on HD steroids.  Overall counts and liver function has improved  Renal function still improving   Echo is benign. Hep A/B/C neg, auto mitochondrial and smooth muscle AB neg.  INR has normalized, platelets still low, cont DVT ppx w PAS

## 2019-04-13 NOTE — PROGRESS NOTE ADULT - SUBJECTIVE AND OBJECTIVE BOX
LEODAN BURGOS    Patient is a 78y old  Male who presents with a chief complaint of Jaundice, hematuria,pancytopenia,HLH.No CP or sob,mild edema.      Allergies    No Known Allergies    Intolerances      MEDICATIONS  (STANDING):  acetaminophen  IVPB .. 1000 milliGRAM(s) IV Intermittent once  alfuzosin 10 milliGRAM(s) Oral at bedtime  dextrose 5%. 1000 milliLiter(s) (50 mL/Hr) IV Continuous <Continuous>  dextrose 50% Injectable 12.5 Gram(s) IV Push once  dextrose 50% Injectable 25 Gram(s) IV Push once  dextrose 50% Injectable 25 Gram(s) IV Push once  finasteride 5 milliGRAM(s) Oral daily  insulin lispro (HumaLOG) corrective regimen sliding scale   SubCutaneous three times a day before meals  insulin lispro (HumaLOG) corrective regimen sliding scale   SubCutaneous at bedtime  pantoprazole  Injectable 40 milliGRAM(s) IV Push daily  ursodiol Tablet 500 milliGRAM(s) Oral two times a day    MEDICATIONS  (PRN):  dextrose 40% Gel 15 Gram(s) Oral once PRN Blood Glucose LESS THAN 70 milliGRAM(s)/deciliter  glucagon  Injectable 1 milliGRAM(s) IntraMuscular once PRN Glucose LESS THAN 70 milligrams/deciliter      ROS:  Positive:    General: Denies weight loss, fevers, rash, decreased hearing  Cardiac: Denies chest pain, SOB, HALL, orthopnea, PND, claudication, edema, snoring, daytime somnolence, palpitations, syncope  Resp: Denies SOB, HALL, cough, sputum, wheezing, hemoptysis  GI: Denies change in bowel habits, diarrhea, weight loss, melena, tarry stools,   nausea, vomiting, jaundice, abdominal pain, dysphagia  : Denies dysuria, nocturia, hematuria  Neuro: Denies tinnitus, headache, visual changes, weakness, dizziness or vertigo  Musculoskeletal: Denies neck pain back pain joint pain.  Skin: Denies rash, itching, dryness.  Endocrine: Denies polydipsia, polyuria  Psychiatric: Denies depression, anxiety      PHYSICAL EXAM:  Vital Signs Last 24 Hrs  T(C): 36.3 (13 Apr 2019 01:25), Max: 36.4 (12 Apr 2019 07:17)  T(F): 97.4 (13 Apr 2019 01:25), Max: 97.6 (12 Apr 2019 17:16)  HR: 64 (13 Apr 2019 01:25) (64 - 76)  BP: 130/70 (13 Apr 2019 01:25) (130/70 - 147/74)  BP(mean): --  RR: 18 (13 Apr 2019 01:25) (18 - 18)  SpO2: 97% (13 Apr 2019 01:25) (97% - 98%)  Daily     Daily   I&O's Summary    11 Apr 2019 07:01  -  12 Apr 2019 07:00  --------------------------------------------------------  IN: 770 mL / OUT: 2525 mL / NET: -1755 mL    12 Apr 2019 07:01  -  13 Apr 2019 05:51  --------------------------------------------------------  IN: 580 mL / OUT: 1500 mL / NET: -920 mL        General Appearance: 	 Alert, cooperative, no distress  HEENT: normocephalic, atraumatic, PERRLA, EOMI, conjunctiva normal, sclera anicteric,   Neck: no JVD,  carotid 2+  bilaterally without bruits, thyroid normal to inspection and palpation, no adenopathy, trachea midline  Lungs:  clear to auscultation and percussion bilaterally  Cor:  pmi 5th ICS MCL, regular rate and rhythm, S1 normal intensity, S2 normal intensity, no gallops, murmurs or rubs  Abdomen:	 soft, non-tender; bowel sounds normal; no masses,  no organomegaly  Extremities: without cyanosis, clubbing 1+ edema  Vasc: 2-+ PT and DP pulses; no varicosities  Neurologic: A&O x 3 (time, place, person). Symmetric strength; limited exam  Musculoskeletal: no kyphosis, scoliosis; normal gait, normal tone  Skin: no rashes; limited exam      Labs:  CBC Full  -  ( 12 Apr 2019 07:09 )  WBC Count : 1.9 K/uL  RBC Count : 3.48 M/uL  Hemoglobin : 10.1 g/dL  Hematocrit : 29.1 %  Platelet Count - Automated : 59 K/uL  Mean Cell Volume : 83.5 fl  Mean Cell Hemoglobin : 29.0 pg  Mean Cell Hemoglobin Concentration : 34.7 gm/dL  Auto Neutrophil # : 1.2 K/uL  Auto Lymphocyte # : 0.5 K/uL  Auto Monocyte # : 0.2 K/uL  Auto Eosinophil # : 0.0 K/uL  Auto Basophil # : 0.0 K/uL  Auto Neutrophil % : 62.0 %  Auto Lymphocyte % : 28.0 %  Auto Monocyte % : 8.5 %  Auto Eosinophil % : 1.2 %  Auto Basophil % : 0.3 %        Impression/Plan:Patient with HTN,hyperlipidemia,RBBB,BPH with HLH,pancytopenia,jaundice and RI.Doing better,LFT's improved.Continue steroids and chemotherapy.F/U hematology.Cardiopulmonary stable.JACQUE,PT.        Marin Albert MD, [facility]  [facility]

## 2019-04-13 NOTE — PROGRESS NOTE ADULT - SUBJECTIVE AND OBJECTIVE BOX
INTERVAL HPI/OVERNIGHT EVENTS:  Patient S&E at bedside. No o/n events, patient resting comfortably. No complaints at this time. Patient denies fever, chills, dizziness, weakness, CP, palpitations, SOB, cough, N/V/D/C, dysuria, changes in bowel movements, LE edema.    VITAL SIGNS:  T(F): 97.3 (04-13-19 @ 08:23)  HR: 88 (04-13-19 @ 08:23)  BP: 119/69 (04-13-19 @ 08:23)  RR: 18 (04-13-19 @ 08:23)  SpO2: 98% (04-13-19 @ 08:23)  Wt(kg): --    PHYSICAL EXAM:    Constitutional: WDWN, NAD,   Eyes: PERRL, EOMI, sclera non-icteric  Neck: supple, no masses, no JVD  Respiratory: CTA b/l, good air entry b/l, no wheezing, rhonchi, rales, with normal respiratory effort and no intercostal retractions  Cardiovascular: RRR, normal S1S2, no M/R/G  Gastrointestinal: soft, NTND, no masses palpable, BS normal in all four quadrants, no HSM  Extremities: WWP, no c/c/e  Neurological: AAOx3  Skin: Normal temperature    MEDICATIONS  (STANDING):  acetaminophen  IVPB .. 1000 milliGRAM(s) IV Intermittent once  alfuzosin 10 milliGRAM(s) Oral at bedtime  dextrose 5%. 1000 milliLiter(s) (50 mL/Hr) IV Continuous <Continuous>  dextrose 50% Injectable 12.5 Gram(s) IV Push once  dextrose 50% Injectable 25 Gram(s) IV Push once  dextrose 50% Injectable 25 Gram(s) IV Push once  finasteride 5 milliGRAM(s) Oral daily  insulin lispro (HumaLOG) corrective regimen sliding scale   SubCutaneous three times a day before meals  insulin lispro (HumaLOG) corrective regimen sliding scale   SubCutaneous at bedtime  pantoprazole  Injectable 40 milliGRAM(s) IV Push daily  ursodiol Tablet 500 milliGRAM(s) Oral two times a day    MEDICATIONS  (PRN):  dextrose 40% Gel 15 Gram(s) Oral once PRN Blood Glucose LESS THAN 70 milliGRAM(s)/deciliter  glucagon  Injectable 1 milliGRAM(s) IntraMuscular once PRN Glucose LESS THAN 70 milligrams/deciliter      Allergies    No Known Allergies    Intolerances        LABS:                        10.1   1.9   )-----------( 59       ( 12 Apr 2019 07:09 )             29.1     04-13    136  |  106  |  46<H>  ----------------------------<  96  4.2   |  18<L>  |  1.42<H>    Ca    8.4      13 Apr 2019 07:17    TPro  4.3<L>  /  Alb  2.5<L>  /  TBili  7.2<H>  /  DBili  x   /  AST  64<H>  /  ALT  137<H>  /  AlkPhos  395<H>  04-13          RADIOLOGY & ADDITIONAL TESTS:  Studies reviewed.

## 2019-04-14 NOTE — PROGRESS NOTE ADULT - SUBJECTIVE AND OBJECTIVE BOX
INTERVAL HPI/OVERNIGHT EVENTS:  Patient S&E at bedside. No o/n events, patient resting comfortably. No complaints at this time. Patient denies fever, chills, dizziness, weakness, CP, palpitations, SOB, cough, N/V/D/C, dysuria, changes in bowel movements, LE edema.    VITAL SIGNS:  T(F): 97.5 (04-14-19 @ 07:26)  HR: 64 (04-14-19 @ 07:26)  BP: 116/78 (04-14-19 @ 07:26)  RR: 18 (04-14-19 @ 07:26)  SpO2: 96% (04-14-19 @ 07:26)  Wt(kg): --    PHYSICAL EXAM:    Constitutional: WDWN, NAD,   Eyes: PERRL, EOMI, sclera non-icteric  Neck: supple, no masses, no JVD  Respiratory: CTA b/l, good air entry b/l, no wheezing, rhonchi, rales, with normal respiratory effort and no intercostal retractions  Cardiovascular: RRR, normal S1S2, no M/R/G  Gastrointestinal: soft, NTND, no masses palpable, BS normal in all four quadrants, no HSM  Extremities: WWP, no c/c/e  Neurological: AAOx3  Skin: Normal temperature    MEDICATIONS  (STANDING):  acetaminophen  IVPB .. 1000 milliGRAM(s) IV Intermittent once  alfuzosin 10 milliGRAM(s) Oral at bedtime  dextrose 5%. 1000 milliLiter(s) (50 mL/Hr) IV Continuous <Continuous>  dextrose 50% Injectable 12.5 Gram(s) IV Push once  dextrose 50% Injectable 25 Gram(s) IV Push once  dextrose 50% Injectable 25 Gram(s) IV Push once  finasteride 5 milliGRAM(s) Oral daily  insulin lispro (HumaLOG) corrective regimen sliding scale   SubCutaneous three times a day before meals  insulin lispro (HumaLOG) corrective regimen sliding scale   SubCutaneous at bedtime  pantoprazole  Injectable 40 milliGRAM(s) IV Push daily  ursodiol Tablet 500 milliGRAM(s) Oral two times a day    MEDICATIONS  (PRN):  dextrose 40% Gel 15 Gram(s) Oral once PRN Blood Glucose LESS THAN 70 milliGRAM(s)/deciliter  glucagon  Injectable 1 milliGRAM(s) IntraMuscular once PRN Glucose LESS THAN 70 milligrams/deciliter      Allergies    No Known Allergies    Intolerances        LABS:                        8.7    1.6   )-----------( 72       ( 14 Apr 2019 07:10 )             25.4     04-14    137  |  107  |  46<H>  ----------------------------<  100<H>  4.3   |  20<L>  |  1.39<H>    Ca    8.7      14 Apr 2019 07:10    TPro  4.5<L>  /  Alb  2.7<L>  /  TBili  6.1<H>  /  DBili  x   /  AST  49<H>  /  ALT  116<H>  /  AlkPhos  365<H>  04-14          RADIOLOGY & ADDITIONAL TESTS:  Studies reviewed.

## 2019-04-14 NOTE — PROGRESS NOTE ADULT - SUBJECTIVE AND OBJECTIVE BOX
Patient is a 78y old  Male who presents with a chief complaint of Jaundice, hematuria (14 Apr 2019 13:31)      SUBJECTIVE / OVERNIGHT EVENTS: no new developments    MEDICATIONS  (STANDING):  acetaminophen  IVPB .. 1000 milliGRAM(s) IV Intermittent once  alfuzosin 10 milliGRAM(s) Oral at bedtime  dextrose 5%. 1000 milliLiter(s) (50 mL/Hr) IV Continuous <Continuous>  dextrose 50% Injectable 12.5 Gram(s) IV Push once  dextrose 50% Injectable 25 Gram(s) IV Push once  dextrose 50% Injectable 25 Gram(s) IV Push once  finasteride 5 milliGRAM(s) Oral daily  insulin lispro (HumaLOG) corrective regimen sliding scale   SubCutaneous three times a day before meals  insulin lispro (HumaLOG) corrective regimen sliding scale   SubCutaneous at bedtime  pantoprazole  Injectable 40 milliGRAM(s) IV Push daily  ursodiol Tablet 500 milliGRAM(s) Oral two times a day    MEDICATIONS  (PRN):  dextrose 40% Gel 15 Gram(s) Oral once PRN Blood Glucose LESS THAN 70 milliGRAM(s)/deciliter  glucagon  Injectable 1 milliGRAM(s) IntraMuscular once PRN Glucose LESS THAN 70 milligrams/deciliter      Vital Signs Last 24 Hrs  T(F): 97.7 (04-14-19 @ 15:23), Max: 97.7 (04-14-19 @ 15:23)  HR: 89 (04-14-19 @ 15:23) (64 - 89)  BP: 116/63 (04-14-19 @ 15:23) (116/63 - 119/71)  RR: 18 (04-14-19 @ 15:23) (18 - 18)  SpO2: 97% (04-14-19 @ 15:23) (96% - 97%)  Telemetry:   CAPILLARY BLOOD GLUCOSE      POCT Blood Glucose.: 119 mg/dL (14 Apr 2019 17:15)  POCT Blood Glucose.: 124 mg/dL (14 Apr 2019 12:21)  POCT Blood Glucose.: 100 mg/dL (14 Apr 2019 08:29)  POCT Blood Glucose.: 121 mg/dL (13 Apr 2019 21:55)    I&O's Summary    13 Apr 2019 07:01  -  14 Apr 2019 07:00  --------------------------------------------------------  IN: 240 mL / OUT: 750 mL / NET: -510 mL    14 Apr 2019 07:01  -  14 Apr 2019 19:11  --------------------------------------------------------  IN: 0 mL / OUT: 325 mL / NET: -325 mL        PHYSICAL EXAM:  GENERAL: NAD, well-developed  HEAD:  Atraumatic, Normocephalic  EYES: EOMI, PERRLA, conjunctiva and sclera clear  NECK: Supple, No JVD  CHEST/LUNG: Clear to auscultation bilaterally; No wheeze  HEART: Regular rate and rhythm; No murmurs, rubs, or gallops  ABDOMEN: Soft, Nontender, Nondistended; Bowel sounds present  EXTREMITIES:  2+ Peripheral Pulses, No clubbing, cyanosis, or edema  PSYCH: AAOx3  NEUROLOGY: non-focal  SKIN: No rashes or lesions    LABS:                        8.7    1.6   )-----------( 72       ( 14 Apr 2019 07:10 )             25.4     04-14    137  |  107  |  46<H>  ----------------------------<  100<H>  4.3   |  20<L>  |  1.39<H>    Ca    8.7      14 Apr 2019 07:10    TPro  4.5<L>  /  Alb  2.7<L>  /  TBili  6.1<H>  /  DBili  x   /  AST  49<H>  /  ALT  116<H>  /  AlkPhos  365<H>  04-14              RADIOLOGY & ADDITIONAL TESTS:    Imaging Personally Reviewed:    Consultant(s) Notes Reviewed:      Care Discussed with Consultants/Other Providers:

## 2019-04-14 NOTE — PROGRESS NOTE ADULT - ASSESSMENT
Patient is 78 yo M who presented on 4/3 with hematuria and jaundice found to have pancytopenia and meeting criteria for HLH    1. Hemophagocytic Lymphohistiocytosis:  - pt meeting criteria: ferritin over 46k, pancytopenia, fever, elevated triglycerides. Soluble IL-2 (CD25) receptor >135,000  - started on dexamethasone 10mg/m2 (20 mg IV daily) on 4/7/18, given high clinical suspicion for HLH, we started treatment with dose reduced etoposide 75mg/m2 on 4-9-19  given liver injury. D1 etopside was 4-9-19. today is C1D4. got second dose of etoposide 4-11-19  - HLH protocol: etoposide 150mg/m2 twice weekly x 2 weeks, then weekly for weeks 3 to 8. dose reduced the etoposide to 75 mg/m2 due to liver dysfunction and significant hyperbilirubinemia. Dexamethasone 10mg/m2 daily (week 1-2), 5mg/m2 daily (week 3-4), 2.5mg/m2 daily (week 5-6), 1.25 mg/m2  daily for week 7 and then stop.   - ferritin trending down, . bilirubin trending down, AST/ALT stable, continue to trend CBC with diff, CMP with LFTs daily   - s/p BM biopsy  (4/8) follow up final results  -iron studies: not consistent with iron deficiency.   -no clear evidence of infection  - still has low fibrinogen , given 10 units of cryoprecipitate. Low fibrinogen likely due to liver dysfunction  -recommend Hepatology evaluation given liver injury  - monitor daily CBC with Diff, CMP with LFTs,  ferritin, LDH, and fibrinogen. If fibrinogen < 100, transfuse 10 units of cryoprecipitate.

## 2019-04-14 NOTE — PROGRESS NOTE ADULT - ASSESSMENT
clinically improving, LFTs continue to trend down  appreciate Dr. Armando's input, requesting Hepatology evaluation  would await BM results  if diagnosis still unclear then could consider liver bx but would need to be transjugular due to thrombocytopenia

## 2019-04-14 NOTE — PROGRESS NOTE ADULT - ASSESSMENT
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  ongoing pancytopenia, ELEV transaminases, but improving post steroids and Etoposide( for HLH)   initially elevated PT and INR, now normalized,ON admission:  elev lactate, w ARF, hyponatremia, w hematuria,  NOW ALL have subsided . no biliary obstruction on CT scan/MRCP neg HIDA scan, splenic infarct on CT  Gi , ID ,renal and Heme on board. FERRITIN LEVEL EXTREMELY HIGH AT [address] initially, post 2 days treatment for HLH level dropped to 76[address], and on 4/11 10, 000.   Bone Marrow Bx done, results pending, treatment w low dose etoposide initiated on 4/9, 2nd dose given on 4/11,  dose reduced 2/2 transaminitis. HD IV DECADRON started on 4/8. Consider PCP ppx while on HD steroids.  Overall counts and liver function has improved  Renal function still improving   Echo is benign. Hep A/B/C neg, auto mitochondrial and smooth muscle AB neg.  INR has normalized, platelets still low, cont DVT ppx w PAS

## 2019-04-14 NOTE — PROGRESS NOTE ADULT - SUBJECTIVE AND OBJECTIVE BOX
LEOADN BURGOS    Patient is a 78y old  Male who presents with a chief complaint of Jaundice, hematuria,HLH,pancytopenia,CRI.No CP or SOB.      Allergies    No Known Allergies    Intolerances      MEDICATIONS  (STANDING):  acetaminophen  IVPB .. 1000 milliGRAM(s) IV Intermittent once  alfuzosin 10 milliGRAM(s) Oral at bedtime  dextrose 5%. 1000 milliLiter(s) (50 mL/Hr) IV Continuous <Continuous>  dextrose 50% Injectable 12.5 Gram(s) IV Push once  dextrose 50% Injectable 25 Gram(s) IV Push once  dextrose 50% Injectable 25 Gram(s) IV Push once  finasteride 5 milliGRAM(s) Oral daily  insulin lispro (HumaLOG) corrective regimen sliding scale   SubCutaneous three times a day before meals  insulin lispro (HumaLOG) corrective regimen sliding scale   SubCutaneous at bedtime  pantoprazole  Injectable 40 milliGRAM(s) IV Push daily  ursodiol Tablet 500 milliGRAM(s) Oral two times a day    MEDICATIONS  (PRN):  dextrose 40% Gel 15 Gram(s) Oral once PRN Blood Glucose LESS THAN 70 milliGRAM(s)/deciliter  glucagon  Injectable 1 milliGRAM(s) IntraMuscular once PRN Glucose LESS THAN 70 milligrams/deciliter      ROS:  Positive:    General: Denies weight loss, fevers, rash, decreased hearing  Cardiac: Denies chest pain, SOB, HALL, orthopnea, PND, claudication, edema, snoring, daytime somnolence, palpitations, syncope  Resp: Denies SOB, HALL, cough, sputum, wheezing, hemoptysis  GI: Denies change in bowel habits, diarrhea, weight loss, melena, tarry stools,   nausea, vomiting, jaundice, abdominal pain, dysphagia  : Denies dysuria, nocturia, hematuria  Neuro: Denies tinnitus, headache, visual changes, weakness, dizziness or vertigo  Musculoskeletal: Denies neck pain back pain joint pain.  Skin: Denies rash, itching, dryness.  Endocrine: Denies polydipsia, polyuria  Psychiatric: Denies depression, anxiety      PHYSICAL EXAM:  Vital Signs Last 24 Hrs  T(C): 36.4 (14 Apr 2019 00:35), Max: 36.4 (13 Apr 2019 16:10)  T(F): 97.5 (14 Apr 2019 00:35), Max: 97.5 (13 Apr 2019 16:10)  HR: 89 (14 Apr 2019 00:35) (64 - 89)  BP: 119/71 (14 Apr 2019 00:35) (117/63 - 119/71)  BP(mean): --  RR: 18 (14 Apr 2019 00:35) (18 - 18)  SpO2: 96% (14 Apr 2019 00:35) (96% - 98%)  Daily     Daily   I&O's Summary    12 Apr 2019 07:01  -  13 Apr 2019 07:00  --------------------------------------------------------  IN: 580 mL / OUT: 1500 mL / NET: -920 mL    13 Apr 2019 07:01  -  14 Apr 2019 06:17  --------------------------------------------------------  IN: 240 mL / OUT: 750 mL / NET: -510 mL        General Appearance: 	 Alert, cooperative, no distress  HEENT: normocephalic, atraumatic, PERRLA, EOMI, conjunctiva normal, sclera anicteric,   Neck: no JVD,  carotid 2+  bilaterally without bruits, thyroid normal to inspection and palpation, no adenopathy, trachea midline  Lungs:  clear to auscultation and percussion bilaterally  Cor:  pmi 5th ICS MCL, regular rate and rhythm, S1 normal intensity, S2 normal intensity, no gallops, murmurs or rubs  Abdomen:	 soft, non-tender; bowel sounds normal; no masses,  no organomegaly  Extremities: without cyanosis, clubbing 1+ edema  Vasc: 2-+ PT and DP pulses; no varicosities  Neurologic: A&O x 3 (time, place, person). Symmetric strength; limited exam  Musculoskeletal: no kyphosis, scoliosis; normal gait, normal tone  Skin: no rashes; limited exam      Labs:  CBC Full  -  ( 13 Apr 2019 09:48 )  WBC Count : 1.58 K/uL  RBC Count : 3.23 M/uL  Hemoglobin : 9.1 g/dL  Hematocrit : 25.8 %  Platelet Count - Automated : 69 K/uL  Mean Cell Volume : 79.9 fl  Mean Cell Hemoglobin : 28.2 pg  Mean Cell Hemoglobin Concentration : 35.3 gm/dL  Auto Neutrophil # : x  Auto Lymphocyte # : x  Auto Monocyte # : x  Auto Eosinophil # : x  Auto Basophil # : x  Auto Neutrophil % : x  Auto Lymphocyte % : x  Auto Monocyte % : x  Auto Eosinophil % : x  Auto Basophil % : x          Impression/Plan:Patient with pancytopenia,HLH,HTN,hyperlipidemia,jaundice,RI.Doing well,cardiopulmonary stable.Continue steroids and chemotherapy.Renal fx stable,LFT's improving.OOb/PT,f/u hematology.        Marin Albert MD, [facility]  [facility]

## 2019-04-14 NOTE — PROGRESS NOTE ADULT - SUBJECTIVE AND OBJECTIVE BOX
INTERVAL HPI/OVERNIGHT EVENTS:    MEDICATIONS  (STANDING):  acetaminophen  IVPB .. 1000 milliGRAM(s) IV Intermittent once  alfuzosin 10 milliGRAM(s) Oral at bedtime  dextrose 5%. 1000 milliLiter(s) (50 mL/Hr) IV Continuous <Continuous>  dextrose 50% Injectable 12.5 Gram(s) IV Push once  dextrose 50% Injectable 25 Gram(s) IV Push once  dextrose 50% Injectable 25 Gram(s) IV Push once  finasteride 5 milliGRAM(s) Oral daily  insulin lispro (HumaLOG) corrective regimen sliding scale   SubCutaneous three times a day before meals  insulin lispro (HumaLOG) corrective regimen sliding scale   SubCutaneous at bedtime  pantoprazole  Injectable 40 milliGRAM(s) IV Push daily  ursodiol Tablet 500 milliGRAM(s) Oral two times a day    MEDICATIONS  (PRN):  dextrose 40% Gel 15 Gram(s) Oral once PRN Blood Glucose LESS THAN 70 milliGRAM(s)/deciliter  glucagon  Injectable 1 milliGRAM(s) IntraMuscular once PRN Glucose LESS THAN 70 milligrams/deciliter      Allergies    No Known Allergies    Intolerances            PHYSICAL EXAM:   Vital Signs:  Vital Signs Last 24 Hrs  T(C): 36.4 (14 Apr 2019 07:26), Max: 36.4 (13 Apr 2019 16:10)  T(F): 97.5 (14 Apr 2019 07:26), Max: 97.5 (13 Apr 2019 16:10)  HR: 64 (14 Apr 2019 07:26) (64 - 89)  BP: 116/78 (14 Apr 2019 07:26) (116/78 - 119/71)  BP(mean): --  RR: 18 (14 Apr 2019 07:26) (18 - 18)  SpO2: 96% (14 Apr 2019 07:26) (96% - 97%)  Daily     Daily     GENERAL:  no distress  HEENT:  NC/AT,  anicteric  CHEST:   no increased effort, breath sounds clear  HEART:  Regular rhythm  ABDOMEN:  Soft, non-tender, non-distended, normoactive bowel sounds,  no masses ,no hepato-splenomegaly, no signs of chronic liver disease  EXTEREMITIES:  no cyanosis      LABS:                        8.7    1.6   )-----------( 72       ( 14 Apr 2019 07:10 )             25.4     04-14    137  |  107  |  46<H>  ----------------------------<  100<H>  4.3   |  20<L>  |  1.39<H>    Ca    8.7      14 Apr 2019 07:10    TPro  4.5<L>  /  Alb  2.7<L>  /  TBili  6.1<H>  /  DBili  x   /  AST  49<H>  /  ALT  116<H>  /  AlkPhos  365<H>  04-14          RADIOLOGY & ADDITIONAL TESTS:

## 2019-04-15 NOTE — PROGRESS NOTE ADULT - ASSESSMENT
77 yo male with Pancytopenia, elevated LFTs , jaundice, with likely HLH     Hemophagocytic Lymphohistiocytosis:  - pt meeting criteria: ferritin over 46k, pancytopenia, fever, elevated triglycerides. Soluble IL-2 (CD25) receptor pending  - s/p BM biopsy  (4/8) follow up final results  -iron studies: not consistent with iron deficiency.   -no clear evidence of infection  - started on dexamethasone 10mg/m2 (20 mg IV daily) on 4/7/18, given high clinical suspicion for HLH, hematology  started treatment with dose reduced etoposide 75mg/m2 on 4-9-19  given liver injury. D1 etopside was 4-9-19. today is C1D2.   They will probably give second dose of etoposide on fri 4-12-19  - HLH protocol started by [facility]: etoposide 150mg/m2 twice weekly x 2 weeks, then weekly for weeks 3 to 8. dose reduced the etoposide to 75 mg/m2 due to liver dysfunction and significant hyperbilirubinemia. Dexamethasone 10mg/m2 daily (week 1-2), 5mg/m2 daily (week 3-4), 2.5mg/m2 daily (week 5-6), 1.25 mg/m2  daily for week 7 and then stop.   - still has low fibrinogen of 139 today, given 10 units of cryoprecipitate. Low fibrinogen likely due to liver dysfunction  -recommend Hepatology evaluation given liver injury  - monitor daily CBC with Diff, ferritin, LDH, and fibrinogen.     please conitnue to monitor for signs/sxs of infection  ? PCP prophylaxis- perhaps mepron -to receive etoposide and steroids  ANC is 1.3 , pt  now off steroids but to  start chemotheraoy - per wife    ID will follow the patient PRN. Please recontact ID if we can be of further assistance . [phone] 79 yo male with Pancytopenia, elevated LFTs , jaundice, with likely HLH     Hemophagocytic Lymphohistiocytosis:  - pt meeting criteria: ferritin over 46k, pancytopenia, fever, elevated triglycerides. Soluble IL-2 (CD25) receptor pending  - s/p BM biopsy  (4/8) follow up final results  -iron studies: not consistent with iron deficiency.   -no clear evidence of infection  - started on dexamethasone 10mg/m2 (20 mg IV daily) on 4/7/18, given high clinical suspicion for HLH, hematology  started treatment with dose reduced etoposide 75mg/m2 on 4-9-19  given liver injury. D1 etopside was 4-9-19. today is C1D2.   They will probably give second dose of etoposide on fri 4-12-19  - HLH protocol started by [facility]: etoposide 150mg/m2 twice weekly x 2 weeks, then weekly for weeks 3 to 8. dose reduced the etoposide to 75 mg/m2 due to liver dysfunction and significant hyperbilirubinemia. Dexamethasone 10mg/m2 daily (week 1-2), 5mg/m2 daily (week 3-4), 2.5mg/m2 daily (week 5-6), 1.25 mg/m2  daily for week 7 and then stop.   - still has low fibrinogen of 139 today, given 10 units of cryoprecipitate. Low fibrinogen likely due to liver dysfunction  -recommend Hepatology evaluation given liver injury  - monitor daily CBC with Diff, ferritin, LDH, and fibrinogen.     please conitnue to monitor for signs/sxs of infection  ? PCP prophylaxis- perhaps mepron -to receive etoposide and steroids, heistant to use Bactrim which can cause BM suppresion and with patients elevated LFTs  ANC is 1.3 , pt  now off steroids but to  start chemotheraoy - per wife    ID will follow the patient PRN. Please recontact ID if we can be of further assistance . [phone]

## 2019-04-15 NOTE — PROGRESS NOTE ADULT - SUBJECTIVE AND OBJECTIVE BOX
LEODAN BURGOS    Patient is a 78y old  Male who presents with a chief complaint of Jaundice, hematuria,HLH,HTN,hyperlipdemia,BPH.Doing well,no CP or SOB.      Allergies    No Known Allergies    Intolerances      MEDICATIONS  (STANDING):  acetaminophen  IVPB .. 1000 milliGRAM(s) IV Intermittent once  alfuzosin 10 milliGRAM(s) Oral at bedtime  dextrose 5%. 1000 milliLiter(s) (50 mL/Hr) IV Continuous <Continuous>  dextrose 50% Injectable 12.5 Gram(s) IV Push once  dextrose 50% Injectable 25 Gram(s) IV Push once  dextrose 50% Injectable 25 Gram(s) IV Push once  finasteride 5 milliGRAM(s) Oral daily  insulin lispro (HumaLOG) corrective regimen sliding scale   SubCutaneous three times a day before meals  insulin lispro (HumaLOG) corrective regimen sliding scale   SubCutaneous at bedtime  pantoprazole  Injectable 40 milliGRAM(s) IV Push daily  ursodiol Tablet 500 milliGRAM(s) Oral two times a day    MEDICATIONS  (PRN):  dextrose 40% Gel 15 Gram(s) Oral once PRN Blood Glucose LESS THAN 70 milliGRAM(s)/deciliter  glucagon  Injectable 1 milliGRAM(s) IntraMuscular once PRN Glucose LESS THAN 70 milligrams/deciliter      ROS:  Positive:    General: Denies weight loss, fevers, rash, decreased hearing  Cardiac: Denies chest pain, SOB, HALL, orthopnea, PND, claudication, edema, snoring, daytime somnolence, palpitations, syncope  Resp: Denies SOB, HALL, cough, sputum, wheezing, hemoptysis  GI: Denies change in bowel habits, diarrhea, weight loss, melena, tarry stools,   nausea, vomiting, jaundice, abdominal pain, dysphagia  : Denies dysuria, nocturia, hematuria  Neuro: Denies tinnitus, headache, visual changes, weakness, dizziness or vertigo  Musculoskeletal: Denies neck pain back pain joint pain.  Skin: Denies rash, itching, dryness.  Endocrine: Denies polydipsia, polyuria  Psychiatric: Denies depression, anxiety      PHYSICAL EXAM:  Vital Signs Last 24 Hrs  T(C): 36.4 (14 Apr 2019 23:47), Max: 36.5 (14 Apr 2019 15:23)  T(F): 97.6 (14 Apr 2019 23:47), Max: 97.7 (14 Apr 2019 15:23)  HR: 90 (14 Apr 2019 23:47) (64 - 90)  BP: 111/66 (14 Apr 2019 23:47) (111/66 - 116/78)  BP(mean): --  RR: 18 (14 Apr 2019 23:47) (18 - 18)  SpO2: 96% (14 Apr 2019 23:47) (96% - 97%)  Daily     Daily   I&O's Summary    13 Apr 2019 07:01  -  14 Apr 2019 07:00  --------------------------------------------------------  IN: 240 mL / OUT: 750 mL / NET: -510 mL    14 Apr 2019 07:01  -  15 Apr 2019 05:40  --------------------------------------------------------  IN: 0 mL / OUT: 925 mL / NET: -925 mL        General Appearance: 	 Alert, cooperative, no distress  HEENT: normocephalic, atraumatic, PERRLA, EOMI, conjunctiva normal, sclera anicteric,   Neck: no JVD,  carotid 2+  bilaterally without bruits, thyroid normal to inspection and palpation, no adenopathy, trachea midline  Lungs:  clear to auscultation and percussion bilaterally  Cor:  pmi 5th ICS MCL, regular rate and rhythm, S1 normal intensity, S2 normal intensity, no gallops, murmurs or rubs  Abdomen:	 soft, non-tender; bowel sounds normal; no masses,  no organomegaly  Extremities: without cyanosis, clubbing or edema  Vasc: 2-+ PT and DP pulses; no varicosities  Neurologic: A&O x 3 (time, place, person). Symmetric strength; limited exam  Musculoskeletal: no kyphosis, scoliosis; normal gait, normal tone  Skin: no rashes; limited exam    EKG:  Telemetry:    Labs:  CBC Full  -  ( 14 Apr 2019 07:10 )  WBC Count : 1.6 K/uL  RBC Count : 3.06 M/uL  Hemoglobin : 8.7 g/dL  Hematocrit : 25.4 %  Platelet Count - Automated : 72 K/uL  Mean Cell Volume : 83.0 fl  Mean Cell Hemoglobin : 28.4 pg  Mean Cell Hemoglobin Concentration : 34.3 gm/dL  Auto Neutrophil # : x  Auto Lymphocyte # : x  Auto Monocyte # : x  Auto Eosinophil # : x  Auto Basophil # : x  Auto Neutrophil % : x  Auto Lymphocyte % : x  Auto Monocyte % : x  Auto Eosinophil % : x  Auto Basophil % : x          Impression/Plan:Patient with HLH,pancytopenia,jaundice,HTN,hyperlipidemia,RBBB,and BPH.cardiopulmonary stable.Continue steroids and chemotherapy.LFT's improving,renal fx stable.OOb/PT.        Marin Albert MD, FACC  [facility]

## 2019-04-15 NOTE — PROGRESS NOTE ADULT - ASSESSMENT
78 yo M who presented on 4/3 with hematuria and jaundice found to have pancytopenia and meeting criteria for HLH    1. Hemophagocytic Lymphohistiocytosis:  - pt meeting criteria: ferritin over 46k, pancytopenia, fever, elevated triglycerides. Soluble IL-2 (CD25) receptor >135,000  - HLH protocol: [id number] mg/m2 twice weekly x 2 weeks, then weekly for weeks 3 to 8. Dose reduced the etoposide to 75 mg/m2 due to liver dysfunction and significant hyperbilirubinemia. Dexamethasone 10mg/m2 daily (week 1-2), 5mg/m2 daily (week 3-4), 2.5mg/m2 daily (week 5-6), 1.25 mg/m2  daily for week 7 and then stop.   - Week 1: etoposide 75 mg/m2 given on 4/9/19 and 4/11/19, dexamethasone 10 mg/m2 (20 mg IV daily starting 4/7/19)  - Week 2: Currently week 2 of HLH protocol. c/w Dex 20 mg IV daily. Plan for etoposide 75 mg/m2 tomorrow (4/16) and Fri (4/19)  - tolerating treatment, zofran prn nausea  - ferritin trending down, Bilirubin trending down, AST/ALT stable, continue to trend CBC with diff, CMP with LFTs daily   - s/p BM biopsy on 4/8 showing hemophagocytosis  -no clear evidence of infection  - monitor daily CBC with Diff, CMP with LFTs,  ferritin, LDH, and fibrinogen. If fibrinogen < 100, transfuse 10 units of cryoprecipitate  - CTAP with no evidence of malignancy, please obtain non-con CT chest to complete evaluation.   - agree with PCP prophylaxis while pt on high dose steroids and will be on steroids for 7-8 weeks.     d/w primary team    Sundeep Griffith, PGY-5  Hematology-Oncology Fellow  Pager: [phone] ([facility]), [phone] ([facility])  (After 5 pm or on weekends please page the on-call fellow)

## 2019-04-15 NOTE — PROGRESS NOTE ADULT - SUBJECTIVE AND OBJECTIVE BOX
Patient is a 78y old  Male who presents with a chief complaint of Jaundice, hematuria (15 Apr 2019 15:15)      SUBJECTIVE / OVERNIGHT EVENTS: feels much better    MEDICATIONS  (STANDING):  acetaminophen  IVPB .. 1000 milliGRAM(s) IV Intermittent once  alfuzosin 10 milliGRAM(s) Oral at bedtime  atovaquone Suspension 1500 milliGRAM(s) Oral daily  dextrose 5%. 1000 milliLiter(s) (50 mL/Hr) IV Continuous <Continuous>  dextrose 50% Injectable 12.5 Gram(s) IV Push once  dextrose 50% Injectable 25 Gram(s) IV Push once  dextrose 50% Injectable 25 Gram(s) IV Push once  finasteride 5 milliGRAM(s) Oral daily  insulin lispro (HumaLOG) corrective regimen sliding scale   SubCutaneous three times a day before meals  insulin lispro (HumaLOG) corrective regimen sliding scale   SubCutaneous at bedtime  pantoprazole  Injectable 40 milliGRAM(s) IV Push daily  ursodiol Tablet 500 milliGRAM(s) Oral two times a day    MEDICATIONS  (PRN):  dextrose 40% Gel 15 Gram(s) Oral once PRN Blood Glucose LESS THAN 70 milliGRAM(s)/deciliter  glucagon  Injectable 1 milliGRAM(s) IntraMuscular once PRN Glucose LESS THAN 70 milligrams/deciliter      Vital Signs Last 24 Hrs  T(F): 97.5 (04-15-19 @ 15:53), Max: 97.6 (04-14-19 @ 23:47)  HR: 87 (04-15-19 @ 15:53) (69 - 90)  BP: 132/70 (04-15-19 @ 15:53) (111/66 - 132/70)  RR: 18 (04-15-19 @ 15:53) (18 - 18)  SpO2: 98% (04-15-19 @ 15:53) (93% - 98%)  Telemetry:   CAPILLARY BLOOD GLUCOSE      POCT Blood Glucose.: 107 mg/dL (15 Apr 2019 17:08)  POCT Blood Glucose.: 97 mg/dL (15 Apr 2019 12:50)  POCT Blood Glucose.: 105 mg/dL (15 Apr 2019 08:57)  POCT Blood Glucose.: 111 mg/dL (14 Apr 2019 21:28)  POCT Blood Glucose.: 119 mg/dL (14 Apr 2019 17:15)    I&O's Summary    14 Apr 2019 07:01  -  15 Apr 2019 07:00  --------------------------------------------------------  IN: 120 mL / OUT: 1425 mL / NET: -1305 mL        PHYSICAL EXAM:  GENERAL: NAD, well-developed  HEAD:  Atraumatic, Normocephalic  EYES: EOMI, PERRLA, conjunctiva and sclera clear  NECK: Supple, No JVD  CHEST/LUNG: Clear to auscultation bilaterally; No wheeze  HEART: Regular rate and rhythm; No murmurs, rubs, or gallops  ABDOMEN: Soft, Nontender, Nondistended; Bowel sounds present  EXTREMITIES:  2+ Peripheral Pulses, No clubbing, cyanosis, or edema  PSYCH: AAOx3  NEUROLOGY: non-focal  SKIN: No rashes or lesions    LABS:                        8.5    2.2   )-----------( 72       ( 15 Apr 2019 07:22 )             23.4     04-15    137  |  106  |  40<H>  ----------------------------<  101<H>  4.1   |  22  |  1.32<H>    Ca    8.8      15 Apr 2019 07:20    TPro  4.5<L>  /  Alb  2.6<L>  /  TBili  5.2<H>  /  DBili  x   /  AST  39  /  ALT  90<H>  /  AlkPhos  334<H>  04-15              RADIOLOGY & ADDITIONAL TESTS:    Imaging Personally Reviewed:    Consultant(s) Notes Reviewed:      Care Discussed with Consultants/Other Providers:

## 2019-04-15 NOTE — PROGRESS NOTE ADULT - ASSESSMENT
lft abnormality secondary to underlying bone marrow dyscrasia/ ? infilstratio. improving with treatmnt

## 2019-04-15 NOTE — PROGRESS NOTE ADULT - SUBJECTIVE AND OBJECTIVE BOX
INTERVAL HPI/OVERNIGHT EVENTS:  Patient S&E at bedside. No o/n events, patient resting comfortably. No complaints at this time. Patient denies fever, chills, dizziness, weakness, CP, palpitations, SOB, cough, N/V/D/C, dysuria, changes in bowel movements, LE edema.    Vital Signs Last 24 Hrs  T(C): 36.4 (15 Apr 2019 15:53), Max: 36.4 (14 Apr 2019 23:47)  T(F): 97.5 (15 Apr 2019 15:53), Max: 97.6 (14 Apr 2019 23:47)  HR: 87 (15 Apr 2019 15:53) (69 - 90)  BP: 132/70 (15 Apr 2019 15:53) (111/66 - 132/70)  BP(mean): --  RR: 18 (15 Apr 2019 15:53) (18 - 18)  SpO2: 98% (15 Apr 2019 15:53) (93% - 98%)    PHYSICAL EXAM:  GENERAL: sitting in a chair comfortably  EYES: EOMI, conjunctiva and sclera clear  NECK: supple  RESP: CTAB  HEART: RRR, S1/S2  ABDOMEN: +BS, soft, NT, ND  EXTREMITIES: no LE edema  NEUROLOGIC: no focal deficits, AAO x 3  SKIN: jaundice improving    MEDICATIONS  (STANDING):  acetaminophen  IVPB .. 1000 milliGRAM(s) IV Intermittent once  alfuzosin 10 milliGRAM(s) Oral at bedtime  atovaquone Suspension 1500 milliGRAM(s) Oral daily  dextrose 5%. 1000 milliLiter(s) (50 mL/Hr) IV Continuous <Continuous>  dextrose 50% Injectable 12.5 Gram(s) IV Push once  dextrose 50% Injectable 25 Gram(s) IV Push once  dextrose 50% Injectable 25 Gram(s) IV Push once  finasteride 5 milliGRAM(s) Oral daily  insulin lispro (HumaLOG) corrective regimen sliding scale   SubCutaneous three times a day before meals  insulin lispro (HumaLOG) corrective regimen sliding scale   SubCutaneous at bedtime  pantoprazole  Injectable 40 milliGRAM(s) IV Push daily  ursodiol Tablet 500 milliGRAM(s) Oral two times a day    MEDICATIONS  (PRN):  dextrose 40% Gel 15 Gram(s) Oral once PRN Blood Glucose LESS THAN 70 milliGRAM(s)/deciliter  glucagon  Injectable 1 milliGRAM(s) IntraMuscular once PRN Glucose LESS THAN 70 milligrams/deciliter    Allergies    No Known Allergies    Intolerances    LABS:                        8.5    2.2   )-----------( 72       ( 15 Apr 2019 07:22 )             23.4   04-15    137  |  106  |  40<H>  ----------------------------<  101<H>  4.1   |  22  |  1.32<H>    Ca    8.8      15 Apr 2019 07:20    TPro  4.5<L>  /  Alb  2.6<L>  /  TBili  5.2<H>  /  DBili  x   /  AST  39  /  ALT  90<H>  /  AlkPhos  334<H>  04-15  Ferritin, Serum (04.15.19 @ 11:32)    Ferritin, Serum: 5652: Test Repeated ng/mL    RADIOLOGY & ADDITIONAL TESTS:  Studies reviewed.

## 2019-04-15 NOTE — PROGRESS NOTE ADULT - SUBJECTIVE AND OBJECTIVE BOX
Patient is a 78y old  Male who presents with a chief complaint of Jaundice, hematuria (15 Apr 2019 12:21)    Being followed by ID for help with management        Interval history:  pt feeling well  bowel mvmts normal  urine better color  breathing stable  walked around the floor  No other acute events        PAST MEDICAL & SURGICAL HISTORY:  BPH (benign prostatic hyperplasia)  H/O knee surgery  Hernia    Allergies    No Known Allergies    Intolerances      Antimicrobials:      MEDICATIONS  (STANDING):  acetaminophen  IVPB .. 1000 milliGRAM(s) IV Intermittent once  alfuzosin 10 milliGRAM(s) Oral at bedtime  dextrose 5%. 1000 milliLiter(s) (50 mL/Hr) IV Continuous <Continuous>  dextrose 50% Injectable 12.5 Gram(s) IV Push once  dextrose 50% Injectable 25 Gram(s) IV Push once  dextrose 50% Injectable 25 Gram(s) IV Push once  finasteride 5 milliGRAM(s) Oral daily  insulin lispro (HumaLOG) corrective regimen sliding scale   SubCutaneous three times a day before meals  insulin lispro (HumaLOG) corrective regimen sliding scale   SubCutaneous at bedtime  pantoprazole  Injectable 40 milliGRAM(s) IV Push daily  ursodiol Tablet 500 milliGRAM(s) Oral two times a day    decadron completed on the twelth  Vital Signs Last 24 Hrs  T(C): 36.4 (04-15-19 @ 10:36), Max: 36.5 (04-14-19 @ 15:23)  T(F): 97.5 (04-15-19 @ 10:36), Max: 97.7 (04-14-19 @ 15:23)  HR: 69 (04-15-19 @ 10:36) (69 - 90)  BP: 119/69 (04-15-19 @ 10:36) (111/66 - 119/69)  BP(mean): --  RR: 18 (04-15-19 @ 10:36) (18 - 18)  SpO2: 93% (04-15-19 @ 10:36) (93% - 97%)    Physical Exam:    Constitutional well preserved,comfortable,pleasant    HEENT PERRLA EOMI,No pallor or icterus    No oral exudate or erythema    Neck supple no JVD or LN    Chest Good AE,CTA    CVSS1 S2 WNl    Abd soft BS normal No tenderness no masses    Ext  edema    IV site no erythema tenderness or discharge    Joints no swelling or LOM    CNS AAO X 3 no focal    Lab Data:                          8.5    2.2   )-----------( 72       ( 15 Apr 2019 07:22 )             23.4       04-15    137  |  106  |  40<H>  ----------------------------<  101<H>  4.1   |  22  |  1.32<H>    Ca    8.8      15 Apr 2019 07:20    TPro  4.5<L>  /  Alb  2.6<L>  /  TBili  5.2<H>  /  DBili  x   /  AST  39  /  ALT  90<H>  /  AlkPhos  334<H>  04-15        WBC Count: 2.2 (04-15-19 @ 07:22)  WBC Count: 1.6 (04-14-19 @ 07:10)  WBC Count: 1.58 (04-13-19 @ 09:48)  WBC Count: 1.9 (04-12-19 @ 07:09)  WBC Count: 1.6 (04-11-19 @ 12:32)  WBC Count: 2.94 (04-10-19 @ 09:23)  WBC Count: 3.10 (04-09-19 @ 09:14)

## 2019-04-15 NOTE — PROGRESS NOTE ADULT - SUBJECTIVE AND OBJECTIVE BOX
LEODAN BURGOS:[id number],   78yMale followed for:  No Known Allergies    PAST MEDICAL & SURGICAL HISTORY:  BPH (benign prostatic hyperplasia)  H/O knee surgery  Hernia    FAMILY HISTORY:    MEDICATIONS  (STANDING):  acetaminophen  IVPB .. 1000 milliGRAM(s) IV Intermittent once  alfuzosin 10 milliGRAM(s) Oral at bedtime  dextrose 5%. 1000 milliLiter(s) (50 mL/Hr) IV Continuous <Continuous>  dextrose 50% Injectable 12.5 Gram(s) IV Push once  dextrose 50% Injectable 25 Gram(s) IV Push once  dextrose 50% Injectable 25 Gram(s) IV Push once  finasteride 5 milliGRAM(s) Oral daily  insulin lispro (HumaLOG) corrective regimen sliding scale   SubCutaneous three times a day before meals  insulin lispro (HumaLOG) corrective regimen sliding scale   SubCutaneous at bedtime  pantoprazole  Injectable 40 milliGRAM(s) IV Push daily  ursodiol Tablet 500 milliGRAM(s) Oral two times a day    MEDICATIONS  (PRN):  dextrose 40% Gel 15 Gram(s) Oral once PRN Blood Glucose LESS THAN 70 milliGRAM(s)/deciliter  glucagon  Injectable 1 milliGRAM(s) IntraMuscular once PRN Glucose LESS THAN 70 milligrams/deciliter      Vital Signs Last 24 Hrs  T(C): 36.4 (14 Apr 2019 23:47), Max: 36.5 (14 Apr 2019 15:23)  T(F): 97.6 (14 Apr 2019 23:47), Max: 97.7 (14 Apr 2019 15:23)  HR: 90 (14 Apr 2019 23:47) (89 - 90)  BP: 111/66 (14 Apr 2019 23:47) (111/66 - 116/63)  BP(mean): --  RR: 18 (14 Apr 2019 23:47) (18 - 18)  SpO2: 96% (14 Apr 2019 23:47) (96% - 97%)  nc/at  s1s2  cta  soft, nt, nd no guarding or rebound  no c/c/e    CBC Full  -  ( 15 Apr 2019 07:22 )  WBC Count : 2.2 K/uL  RBC Count : 2.78 M/uL  Hemoglobin : 8.5 g/dL  Hematocrit : 23.4 %  Platelet Count - Automated : 72 K/uL  Mean Cell Volume : 84.4 fl  Mean Cell Hemoglobin : 30.5 pg  Mean Cell Hemoglobin Concentration : 36.1 gm/dL  Auto Neutrophil # : 1.3 K/uL  Auto Lymphocyte # : 0.7 K/uL  Auto Monocyte # : 0.0 K/uL  Auto Eosinophil # : 0.1 K/uL  Auto Basophil # : 0.0 K/uL  Auto Neutrophil % : 61.1 %  Auto Lymphocyte % : 31.4 %  Auto Monocyte % : 1.0 %  Auto Eosinophil % : 5.0 %  Auto Basophil % : 1.6 %    04-15    137  |  106  |  40<H>  ----------------------------<  101<H>  4.1   |  22  |  1.32<H>    Ca    8.8      15 Apr 2019 07:20    TPro  4.5<L>  /  Alb  2.6<L>  /  TBili  5.2<H>  /  DBili  x   /  AST  39  /  ALT  90<H>  /  AlkPhos  334<H>  04-15

## 2019-04-15 NOTE — PROGRESS NOTE ADULT - SUBJECTIVE AND OBJECTIVE BOX
No pain, no shortness of breath      VITAL:  T(C): , Max: 36.5 (04-14-19 @ 15:23)  T(F): , Max: 97.7 (04-14-19 @ 15:23)  HR: 90 (04-14-19 @ 23:47)  BP: 111/66 (04-14-19 @ 23:47)  RR: 18 (04-14-19 @ 23:47)  SpO2: 96% (04-14-19 @ 23:47)      PHYSICAL EXAM:  Constitutional: NAD; Alert; frail  HEENT:  NCAT; DMM; no icterus  Neck: No JVD; supple  Respiratory: CTA-b/l  Cardiac: RRR s1s2  Gastrointestinal: BS+, soft, NT/ND  Urologic: No casas  Extremities: 1-2+ b/l LE edema  Back: No CVAT b/l  Skin: no icterus      LABS:                        8.5    2.2   )-----------( 72       ( 15 Apr 2019 07:22 )             23.4     Na(137)/K(4.1)/Cl(106)/HCO3(22)/BUN(40)/Cr(1.32)Glu(101)/Ca(8.8)/Mg(--)/PO4(--)    04-15 @ 07:20  Na(137)/K(4.3)/Cl(107)/HCO3(20)/BUN(46)/Cr(1.39)Glu(100)/Ca(8.7)/Mg(--)/PO4(--)    04-14 @ 07:10  Na(136)/K(4.2)/Cl(106)/HCO3(18)/BUN(46)/Cr(1.42)Glu(96)/Ca(8.4)/Mg(--)/PO4(--)    04-13 @ 07:17  Na(134)/K(4.7)/Cl(106)/HCO3(18)/BUN(46)/Cr(1.46)Glu(168)/Ca(8.7)/Mg(--)/PO4(--)    04-12 @ 12:18        ASSESSMENT: 78M w/ BPH, OA, and HLD, 4/3/19 a/w weight loss, jaundice, and gross hematuria  (1)Renal - YEE - prerenal - numbers relatively stable over past few days - GFR now ~45-55ml/min  (2)Cholestasis/transaminitis - due to HLH. Improving on a daily basis, s/p IV Decadron    RECOMMEND:  (1)Dose new meds for GFR 50-60ml/min  (2)Reconsult as needed                Bernardo Galdamez MD  [facility]  [phone] No pain, no shortness of breath      VITAL:  T(C): , Max: 36.5 (04-14-19 @ 15:23)  T(F): , Max: 97.7 (04-14-19 @ 15:23)  HR: 90 (04-14-19 @ 23:47)  BP: 111/66 (04-14-19 @ 23:47)  RR: 18 (04-14-19 @ 23:47)  SpO2: 96% (04-14-19 @ 23:47)      PHYSICAL EXAM:  Constitutional: NAD; Alert; frail  HEENT:  NCAT; DMM; (+)scleral icterus  Neck: No JVD; supple  Respiratory: CTA-b/l  Cardiac: RRR s1s2  Gastrointestinal: BS+, soft, NT/ND  Urologic: No casas  Extremities: 1-2+ b/l LE edema  Back: No CVAT b/l  Skin: no icterus      LABS:                        8.5    2.2   )-----------( 72       ( 15 Apr 2019 07:22 )             23.4     Na(137)/K(4.1)/Cl(106)/HCO3(22)/BUN(40)/Cr(1.32)Glu(101)/Ca(8.8)/Mg(--)/PO4(--)    04-15 @ 07:20  Na(137)/K(4.3)/Cl(107)/HCO3(20)/BUN(46)/Cr(1.39)Glu(100)/Ca(8.7)/Mg(--)/PO4(--)    04-14 @ 07:10  Na(136)/K(4.2)/Cl(106)/HCO3(18)/BUN(46)/Cr(1.42)Glu(96)/Ca(8.4)/Mg(--)/PO4(--)    04-13 @ 07:17  Na(134)/K(4.7)/Cl(106)/HCO3(18)/BUN(46)/Cr(1.46)Glu(168)/Ca(8.7)/Mg(--)/PO4(--)    04-12 @ 12:18        ASSESSMENT: 78M w/ BPH, OA, and HLD, 4/3/19 a/w weight loss, jaundice, and gross hematuria  (1)Renal - YEE - prerenal - numbers relatively stable over past few days - GFR now ~45-55ml/min  (2)Cholestasis/transaminitis - due to HLH. Improving on a daily basis, s/p IV Decadron    RECOMMEND:  (1)Dose new meds for GFR 50-60ml/min  (2)Reconsult as needed                Bernardo Galdamez MD  [facility]  [phone]

## 2019-04-15 NOTE — PROGRESS NOTE ADULT - ASSESSMENT
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  ongoing pancytopenia, ELEV transaminases, but improving post steroids and Etoposide( for HLH)   initially elevated PT and INR, now normalized,ON admission:  elev lactate, w ARF, hyponatremia, w hematuria,  NOW ALL have subsided . no biliary obstruction on CT scan/MRCP neg HIDA scan, splenic infarct on CT  Gi , ID ,renal and Heme on board. FERRITIN LEVEL EXTREMELY [place] AT [id number] initially, post 2 days treatment for HLH level dropped to [id number], and on 4/11 10, 000.   Bone Marrow Bx done, results pending, treatment w low dose etoposide initiated on 4/9, 2nd dose given on 4/11,  dose reduced 2/2 transaminitis. HD IV DECADRON started on 4/8. Consider PCP ppx while on HD steroids.  Overall counts and liver function has improved  Renal function still improving   Echo is benign. Hep A/B/C neg, auto mitochondrial and smooth muscle AB neg.  INR has normalized, platelets still low, cont DVT ppx w PAS

## 2019-04-16 NOTE — PROGRESS NOTE ADULT - SUBJECTIVE AND OBJECTIVE BOX
LEODAN BURGOS    Patient is a 78y old  Male who presents with a chief complaint of Jaundice, hematuria,pancytopenia,HLH.No CP or SOB.  HPI: Patient is a 78y Male with history of     Allergies    No Known Allergies    Intolerances      MEDICATIONS  (STANDING):  acetaminophen  IVPB .. 1000 milliGRAM(s) IV Intermittent once  alfuzosin 10 milliGRAM(s) Oral at bedtime  atovaquone Suspension 1500 milliGRAM(s) Oral daily  dextrose 5%. 1000 milliLiter(s) (50 mL/Hr) IV Continuous <Continuous>  dextrose 50% Injectable 12.5 Gram(s) IV Push once  dextrose 50% Injectable 25 Gram(s) IV Push once  dextrose 50% Injectable 25 Gram(s) IV Push once  finasteride 5 milliGRAM(s) Oral daily  insulin lispro (HumaLOG) corrective regimen sliding scale   SubCutaneous three times a day before meals  insulin lispro (HumaLOG) corrective regimen sliding scale   SubCutaneous at bedtime  pantoprazole  Injectable 40 milliGRAM(s) IV Push daily  ursodiol Tablet 500 milliGRAM(s) Oral two times a day    MEDICATIONS  (PRN):  dextrose 40% Gel 15 Gram(s) Oral once PRN Blood Glucose LESS THAN 70 milliGRAM(s)/deciliter  glucagon  Injectable 1 milliGRAM(s) IntraMuscular once PRN Glucose LESS THAN 70 milligrams/deciliter      ROS:  Positive:    General: Denies weight loss, fevers, rash, decreased hearing  Cardiac: Denies chest pain, SOB, HALL, orthopnea, PND, claudication, edema, snoring, daytime somnolence, palpitations, syncope  Resp: Denies SOB, HALL, cough, sputum, wheezing, hemoptysis  GI: Denies change in bowel habits, diarrhea, weight loss, melena, tarry stools,   nausea, vomiting, jaundice, abdominal pain, dysphagia  : Denies dysuria, nocturia, hematuria  Neuro: Denies tinnitus, headache, visual changes, weakness, dizziness or vertigo  Musculoskeletal: Denies neck pain back pain joint pain.  Skin: Denies rash, itching, dryness.  Endocrine: Denies polydipsia, polyuria  Psychiatric: Denies depression, anxiety      PHYSICAL EXAM:  Vital Signs Last 24 Hrs  T(C): 36.7 (16 Apr 2019 00:50), Max: 36.7 (16 Apr 2019 00:50)  T(F): 98 (16 Apr 2019 00:50), Max: 98 (16 Apr 2019 00:50)  HR: 90 (16 Apr 2019 00:50) (69 - 90)  BP: 116/69 (16 Apr 2019 00:50) (116/69 - 132/70)  BP(mean): --  RR: 18 (16 Apr 2019 00:50) (18 - 18)  SpO2: 96% (16 Apr 2019 00:50) (93% - 98%)  Daily     Daily   I&O's Summary    14 Apr 2019 07:01  -  15 Apr 2019 07:00  --------------------------------------------------------  IN: 120 mL / OUT: 1425 mL / NET: -1305 mL    15 Apr 2019 07:01  -  16 Apr 2019 05:42  --------------------------------------------------------  IN: 0 mL / OUT: 900 mL / NET: -900 mL        General Appearance: 	 Alert, cooperative, no distress  HEENT: normocephalic, atraumatic, PERRLA, EOMI, conjunctiva normal, sclera anicteric,   Neck: no JVD,  carotid 2+  bilaterally without bruits, thyroid normal to inspection and palpation, no adenopathy, trachea midline  Lungs:  clear to auscultation and percussion bilaterally  Cor:  pmi 5th ICS MCL, regular rate and rhythm, S1 normal intensity, S2 normal intensity, no gallops, murmurs or rubs  Abdomen:	 soft, non-tender; bowel sounds normal; no masses,  no organomegaly  Extremities: without cyanosis, clubbing or edema  Vasc: 2-+ PT and DP pulses; no varicosities  Neurologic: A&O x 3 (time, place, person). Symmetric strength; limited exam  Musculoskeletal: no kyphosis, scoliosis; normal gait, normal tone  Skin: no rashes; limited exam    Labs:  CBC Full  -  ( 15 Apr 2019 07:22 )  WBC Count : 2.2 K/uL  RBC Count : 2.78 M/uL  Hemoglobin : 8.5 g/dL  Hematocrit : 23.4 %  Platelet Count - Automated : 72 K/uL  Mean Cell Volume : 84.4 fl  Mean Cell Hemoglobin : 30.5 pg  Mean Cell Hemoglobin Concentration : 36.1 gm/dL  Auto Neutrophil # : 1.3 K/uL  Auto Lymphocyte # : 0.7 K/uL  Auto Monocyte # : 0.0 K/uL  Auto Eosinophil # : 0.1 K/uL  Auto Basophil # : 0.0 K/uL  Auto Neutrophil % : 61.1 %  Auto Lymphocyte % : 31.4 %  Auto Monocyte % : 1.0 %  Auto Eosinophil % : 5.0 %  Auto Basophil % : 1.6 %        Impression/Plan:patient with HLH,pancytopenia,jaundice,hematuria improving.Contine steroids and chemotherapy.Cardiopulmonary stable.F/U hematology.OOB/PT.        Marin Albert MD, [facility]  [facility]

## 2019-04-16 NOTE — PROGRESS NOTE ADULT - ASSESSMENT
80 yo M who presented on 4/3 with hematuria and jaundice found to have pancytopenia and meeting criteria for HLH    1. Hemophagocytic Lymphohistiocytosis:  - pt meeting criteria: ferritin over 46k, pancytopenia, fever, elevated triglycerides. Soluble IL-2 (CD25) receptor >135,000  - HLH protocol: [id number] mg/m2 twice weekly x 2 weeks, then weekly for weeks 3 to 8. Dose reduced the etoposide to 75 mg/m2 due to liver dysfunction and significant hyperbilirubinemia. Dexamethasone 10mg/m2 daily (week 1-2), 5mg/m2 daily (week 3-4), 2.5mg/m2 daily (week 5-6), 1.25 mg/m2  daily for week 7 and then stop.   - Week 1: etoposide 75 mg/m2 given on 4/9/19 and 4/11/19, dexamethasone 10 mg/m2 (20 mg IV daily starting 4/7/19)  - Week 2: Currently week 2 of HLH protocol. c/w Dex 20 mg IV daily. Plan for etoposide 75 mg/m2 today (4/16) and Fri (4/19)  - tolerating treatment, zofran prn nausea  - ferritin trending down, Bilirubin trending down, AST/ALT stable, continue to trend CBC with diff, CMP with LFTs daily   - s/p BM biopsy on 4/8 showing hemophagocytosis  -no clear evidence of infection  - monitor daily CBC with Diff, CMP with LFTs,  ferritin, LDH, and fibrinogen. If fibrinogen < 100, transfuse 10 units of cryoprecipitate  - CT C/A/P with no evidence of malignancy  - agree with PCP prophylaxis while pt on high dose steroids and will be on steroids for 7-8 weeks.     d/w primary team    Sundeep Griffith, PGY-5  Hematology-Oncology Fellow  Pager: [phone] ([facility]), [phone] ([facility])  (After 5 pm or on weekends please page the on-call fellow)

## 2019-04-16 NOTE — ADVANCED PRACTICE NURSE CONSULT - ASSESSMENT
Patient received in bed. alert and oriented x 3, capable of expressing all needs to staff.  day 1/1,Height and weight verified.  Consent in chart. Lab results as per Md Mauricio miguel and Nacho aware of same. Vital signs stable prior to chemotherapy and  within acceptable parameters, see sunrise. Pt educated on the importance of saving urine, verbalizes good understanding. Pt education done re chemo regimen, drug effects and potential side effects, written materials provided, pt states understanding. Reports that he has been tolerating chemotherapy well without side effects. Pt with newly placed 4/16/19 Right wrist #22 gauge PIV line, site free from signs and symptoms of infection, good blood return obtained. Pt continues on Decadron 20 mg IV as ordered. See sunrise. Pt ordered for and received Etoposide 75 mg/m2= 160 mg IV in 508 ml NS over 1 hour as ordered. Pt tolerated infusion well w/ no s/s of adverse reaction. Ongoing assessment of patient. Emotional support provided.

## 2019-04-16 NOTE — PROGRESS NOTE ADULT - SUBJECTIVE AND OBJECTIVE BOX
INTERVAL HPI/OVERNIGHT EVENTS:    MEDICATIONS  (STANDING):  acetaminophen  IVPB .. 1000 milliGRAM(s) IV Intermittent once  alfuzosin 10 milliGRAM(s) Oral at bedtime  atovaquone Suspension 1500 milliGRAM(s) Oral daily  dextrose 5%. 1000 milliLiter(s) (50 mL/Hr) IV Continuous <Continuous>  dextrose 50% Injectable 12.5 Gram(s) IV Push once  dextrose 50% Injectable 25 Gram(s) IV Push once  dextrose 50% Injectable 25 Gram(s) IV Push once  finasteride 5 milliGRAM(s) Oral daily  insulin lispro (HumaLOG) corrective regimen sliding scale   SubCutaneous three times a day before meals  insulin lispro (HumaLOG) corrective regimen sliding scale   SubCutaneous at bedtime  pantoprazole  Injectable 40 milliGRAM(s) IV Push daily  ursodiol Tablet 500 milliGRAM(s) Oral two times a day    MEDICATIONS  (PRN):  dextrose 40% Gel 15 Gram(s) Oral once PRN Blood Glucose LESS THAN 70 milliGRAM(s)/deciliter  glucagon  Injectable 1 milliGRAM(s) IntraMuscular once PRN Glucose LESS THAN 70 milligrams/deciliter      Allergies    No Known Allergies    Intolerances            PHYSICAL EXAM:   Vital Signs:  Vital Signs Last 24 Hrs  T(C): 36.4 (16 Apr 2019 07:22), Max: 36.7 (16 Apr 2019 00:50)  T(F): 97.6 (16 Apr 2019 07:22), Max: 98 (16 Apr 2019 00:50)  HR: 86 (16 Apr 2019 07:22) (69 - 90)  BP: 130/79 (16 Apr 2019 07:22) (116/69 - 132/70)  BP(mean): --  RR: 18 (16 Apr 2019 07:22) (18 - 18)  SpO2: 99% (16 Apr 2019 07:22) (93% - 99%)  Daily     Daily     GENERAL:  no distress  HEENT:  NC/AT,  anicteric  CHEST:   no increased effort, breath sounds clear  HEART:  Regular rhythm  ABDOMEN:  Soft, non-tender, non-distended, normoactive bowel sounds,  no masses ,no hepato-splenomegaly, no signs of chronic liver disease  EXTEREMITIES:  no cyanosis      LABS:                        8.5    2.2   )-----------( 72       ( 15 Apr 2019 07:22 )             23.4     04-16    138  |  105  |  35<H>  ----------------------------<  96  3.9   |  22  |  1.33<H>    Ca    9.1      16 Apr 2019 07:01    TPro  4.6<L>  /  Alb  2.8<L>  /  TBili  4.8<H>  /  DBili  x   /  AST  36  /  ALT  78<H>  /  AlkPhos  337<H>  04-16          RADIOLOGY & ADDITIONAL TESTS:

## 2019-04-16 NOTE — PROGRESS NOTE ADULT - SUBJECTIVE AND OBJECTIVE BOX
Patient is a 78y old  Male who presents with a chief complaint of Jaundice, hematuria (16 Apr 2019 12:25)      SUBJECTIVE / OVERNIGHT EVENTS: feeling better, now on Atovaquone for PCP prophylaxis while on steroids    MEDICATIONS  (STANDING):  acetaminophen  IVPB .. 1000 milliGRAM(s) IV Intermittent once  alfuzosin 10 milliGRAM(s) Oral at bedtime  atovaquone Suspension 1500 milliGRAM(s) Oral daily  dexamethasone  IVPB 20 milliGRAM(s) IV Intermittent daily  dextrose 5%. 1000 milliLiter(s) (50 mL/Hr) IV Continuous <Continuous>  dextrose 50% Injectable 12.5 Gram(s) IV Push once  dextrose 50% Injectable 25 Gram(s) IV Push once  dextrose 50% Injectable 25 Gram(s) IV Push once  etoposide IVPB (eMAR) 160 milliGRAM(s) IV Intermittent once  finasteride 5 milliGRAM(s) Oral daily  insulin lispro (HumaLOG) corrective regimen sliding scale   SubCutaneous three times a day before meals  insulin lispro (HumaLOG) corrective regimen sliding scale   SubCutaneous at bedtime  pantoprazole  Injectable 40 milliGRAM(s) IV Push daily  ursodiol Tablet 500 milliGRAM(s) Oral two times a day    MEDICATIONS  (PRN):  dextrose 40% Gel 15 Gram(s) Oral once PRN Blood Glucose LESS THAN 70 milliGRAM(s)/deciliter  glucagon  Injectable 1 milliGRAM(s) IntraMuscular once PRN Glucose LESS THAN 70 milligrams/deciliter      Vital Signs Last 24 Hrs  T(F): 97.6 (04-16-19 @ 07:22), Max: 98 (04-16-19 @ 00:50)  HR: 86 (04-16-19 @ 07:22) (86 - 90)  BP: 130/79 (04-16-19 @ 07:22) (116/69 - 132/70)  RR: 18 (04-16-19 @ 07:22) (18 - 18)  SpO2: 99% (04-16-19 @ 07:22) (96% - 99%)  Telemetry:   CAPILLARY BLOOD GLUCOSE      POCT Blood Glucose.: 111 mg/dL (16 Apr 2019 12:18)  POCT Blood Glucose.: 91 mg/dL (16 Apr 2019 08:38)  POCT Blood Glucose.: 108 mg/dL (15 Apr 2019 23:54)  POCT Blood Glucose.: 107 mg/dL (15 Apr 2019 17:08)    I&O's Summary    15 Apr 2019 07:01  -  16 Apr 2019 07:00  --------------------------------------------------------  IN: 0 mL / OUT: 1150 mL / NET: -1150 mL    16 Apr 2019 07:01  -  16 Apr 2019 14:01  --------------------------------------------------------  IN: 400 mL / OUT: 450 mL / NET: -50 mL        PHYSICAL EXAM:  GENERAL: NAD, well-developed  HEAD:  Atraumatic, Normocephalic  EYES: EOMI, PERRLA, conjunctiva and sclera clear  NECK: Supple, No JVD  CHEST/LUNG: Clear to auscultation bilaterally; No wheeze  HEART: Regular rate and rhythm; No murmurs, rubs, or gallops  ABDOMEN: Soft, Nontender, Nondistended; Bowel sounds present  EXTREMITIES:  2+ Peripheral Pulses, No clubbing, cyanosis, or edema  PSYCH: AAOx3  NEUROLOGY: non-focal  SKIN: No rashes or lesions    LABS:                        7.9    1.92  )-----------( 81       ( 16 Apr 2019 09:33 )             23.1     04-16    138  |  105  |  35<H>  ----------------------------<  96  3.9   |  22  |  1.33<H>    Ca    9.1      16 Apr 2019 07:01    TPro  4.6<L>  /  Alb  2.8<L>  /  TBili  4.8<H>  /  DBili  x   /  AST  36  /  ALT  78<H>  /  AlkPhos  337<H>  04-16              RADIOLOGY & ADDITIONAL TESTS:    Imaging Personally Reviewed:    Consultant(s) Notes Reviewed:      Care Discussed with Consultants/Other Providers:

## 2019-04-16 NOTE — PROGRESS NOTE ADULT - ASSESSMENT
had "explosion" of stool yesterday otherwise well  will check stool c. diff if has recurrent diarrhea otherwise treatment per hematology

## 2019-04-16 NOTE — PROGRESS NOTE ADULT - ASSESSMENT
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  ongoing pancytopenia, ELEV transaminases, but improving post steroids and Etoposide( for HLH)   initially elevated PT and INR, now normalized,ON admission:  elev lactate, w ARF, hyponatremia, w hematuria,  NOW ALL have subsided . no biliary obstruction on CT scan/MRCP neg HIDA scan, splenic infarct on CT  Gi , ID ,renal and Heme on board. FERRITIN LEVEL EXTREMELY HIGH AT [id number] initially, post 2 days treatment for HLH level dropped to 98692, and on 4/11 10, 000.   Bone Marrow Bx done, results c/w hemophagocytosis , treatment w low dose etoposide initiated on 4/9,and has been getting it twice per week.   dose reduced 2/2 transaminitis. HD IV DECADRON started on 4/8. ON PCP ppx w atovaquone while on HD steroids.  Overall counts and liver function have improved  Renal function still improving   Echo is benign. Hep A/B/C neg, auto mitochondrial and smooth muscle AB neg.  INR has normalized, platelets still low, cont DVT ppx w PAS

## 2019-04-17 PROBLEM — N40.0 BENIGN PROSTATIC HYPERPLASIA WITHOUT LOWER URINARY TRACT SYMPTOMS: Chronic | Status: ACTIVE | Noted: 2019-01-01

## 2019-04-17 NOTE — PROGRESS NOTE ADULT - ASSESSMENT
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  ongoing pancytopenia, ELEV transaminases, but improving post steroids and Etoposide( for HLH)   initially elevated PT and INR, now normalized,ON admission:  elev lactate, w ARF, hyponatremia, w hematuria,  NOW ALL have subsided . no biliary obstruction on CT scan/MRCP neg HIDA scan, splenic infarct on CT  Gi , ID ,renal and Heme on board. FERRITIN LEVEL EXTREMELY HIGH AT [id number] initially, post 2 days treatment for HLH level dropped to 38661, and on 4/11 10, 000.   Bone Marrow Bx done, results c/w hemophagocytosis , treatment w low dose etoposide initiated on 4/9,and has been getting it twice per week.   dose reduced 2/2 transaminitis. HD IV DECADRON started on 4/8. ON PCP ppx w atovaquone while on HD steroids.  Overall counts and liver function have improved  Renal function has normalized  Echo is benign. Hep A/B/C neg, auto mitochondrial and smooth muscle AB neg.  INR has normalized, platelets still low, cont DVT ppx w PAS   DC Planning in a couple of days on steroid taper w outptn F/U

## 2019-04-17 NOTE — PROGRESS NOTE ADULT - SUBJECTIVE AND OBJECTIVE BOX
Patient is a 78y old  Male who presents with a chief complaint of Jaundice, hematuria (17 Apr 2019 17:05)      SUBJECTIVE / OVERNIGHT EVENTS: no new developments, heme notes reviewed, DC Planning in a couple of days on steroid taper w outptn F/U  MEDICATIONS  (STANDING):  acetaminophen  IVPB .. 1000 milliGRAM(s) IV Intermittent once  alfuzosin 10 milliGRAM(s) Oral at bedtime  atovaquone Suspension 1500 milliGRAM(s) Oral daily  dexamethasone  IVPB 20 milliGRAM(s) IV Intermittent daily  dextrose 5%. 1000 milliLiter(s) (50 mL/Hr) IV Continuous <Continuous>  dextrose 50% Injectable 12.5 Gram(s) IV Push once  dextrose 50% Injectable 25 Gram(s) IV Push once  dextrose 50% Injectable 25 Gram(s) IV Push once  finasteride 5 milliGRAM(s) Oral daily  insulin lispro (HumaLOG) corrective regimen sliding scale   SubCutaneous three times a day before meals  insulin lispro (HumaLOG) corrective regimen sliding scale   SubCutaneous at bedtime  pantoprazole  Injectable 40 milliGRAM(s) IV Push daily  ursodiol Tablet 500 milliGRAM(s) Oral two times a day    MEDICATIONS  (PRN):  dextrose 40% Gel 15 Gram(s) Oral once PRN Blood Glucose LESS THAN 70 milliGRAM(s)/deciliter  glucagon  Injectable 1 milliGRAM(s) IntraMuscular once PRN Glucose LESS THAN 70 milligrams/deciliter      Vital Signs Last 24 Hrs  T(F): 97.6 (04-17-19 @ 16:24), Max: 97.6 (04-17-19 @ 16:24)  HR: 87 (04-17-19 @ 16:24) (81 - 87)  BP: 129/67 (04-17-19 @ 16:24) (114/71 - 129/67)  RR: 18 (04-17-19 @ 16:24) (18 - 18)  SpO2: 96% (04-17-19 @ 16:24) (96% - 99%)  Telemetry:   CAPILLARY BLOOD GLUCOSE      POCT Blood Glucose.: 126 mg/dL (17 Apr 2019 17:04)  POCT Blood Glucose.: 121 mg/dL (17 Apr 2019 12:25)  POCT Blood Glucose.: 110 mg/dL (17 Apr 2019 08:44)  POCT Blood Glucose.: 161 mg/dL (16 Apr 2019 21:56)    I&O's Summary    16 Apr 2019 07:01  -  17 Apr 2019 07:00  --------------------------------------------------------  IN: 1253 mL / OUT: 1550 mL / NET: -297 mL        PHYSICAL EXAM:  GENERAL: NAD, well-developed  HEAD:  Atraumatic, Normocephalic  EYES: EOMI, PERRLA, conjunctiva and sclera clear  NECK: Supple, No JVD  CHEST/LUNG: Clear to auscultation bilaterally; No wheeze  HEART: Regular rate and rhythm; No murmurs, rubs, or gallops  ABDOMEN: Soft, Nontender, Nondistended; Bowel sounds present  EXTREMITIES:  2+ Peripheral Pulses, No clubbing, cyanosis, or edema  PSYCH: AAOx3  NEUROLOGY: non-focal  SKIN: No rashes or lesions    LABS:                        7.3    2.36  )-----------( 77       ( 17 Apr 2019 10:33 )             21.8     04-17    137  |  103  |  35<H>  ----------------------------<  115<H>  4.5   |  23  |  1.28    Ca    8.5      17 Apr 2019 07:15    TPro  4.7<L>  /  Alb  2.8<L>  /  TBili  4.7<H>  /  DBili  x   /  AST  34  /  ALT  72<H>  /  AlkPhos  311<H>  04-17              RADIOLOGY & ADDITIONAL TESTS:    Imaging Personally Reviewed:    Consultant(s) Notes Reviewed:      Care Discussed with Consultants/Other Providers:

## 2019-04-17 NOTE — PROGRESS NOTE ADULT - SUBJECTIVE AND OBJECTIVE BOX
Chief Complaint: 77 y/o Diagnosed with suspected HLH, started on steroids and etoposide noted with hyperglycemia.    Patient back on Decadron 20 mg IV daily.   PO intake good, compliant with diabetic diet.  FS last 24 hours up to 160 mg/dL.      MEDICATIONS  (STANDING):  acetaminophen  IVPB .. 1000 milliGRAM(s) IV Intermittent once  alfuzosin 10 milliGRAM(s) Oral at bedtime  atovaquone Suspension 1500 milliGRAM(s) Oral daily  dexamethasone  IVPB 20 milliGRAM(s) IV Intermittent daily  dextrose 5%. 1000 milliLiter(s) (50 mL/Hr) IV Continuous <Continuous>  dextrose 50% Injectable 12.5 Gram(s) IV Push once  dextrose 50% Injectable 25 Gram(s) IV Push once  dextrose 50% Injectable 25 Gram(s) IV Push once  finasteride 5 milliGRAM(s) Oral daily  insulin lispro (HumaLOG) corrective regimen sliding scale   SubCutaneous three times a day before meals  insulin lispro (HumaLOG) corrective regimen sliding scale   SubCutaneous at bedtime  pantoprazole  Injectable 40 milliGRAM(s) IV Push daily  ursodiol Tablet 500 milliGRAM(s) Oral two times a day    MEDICATIONS  (PRN):  dextrose 40% Gel 15 Gram(s) Oral once PRN Blood Glucose LESS THAN 70 milliGRAM(s)/deciliter  glucagon  Injectable 1 milliGRAM(s) IntraMuscular once PRN Glucose LESS THAN 70 milligrams/deciliter      Allergies    No Known Allergies    Intolerances        PHYSICAL EXAM:  VITALS: T(C): 36.4 (04-17-19 @ 16:24)  T(F): 97.6 (04-17-19 @ 16:24), Max: 97.6 (04-17-19 @ 16:24)  HR: 87 (04-17-19 @ 16:24) (81 - 87)  BP: 129/67 (04-17-19 @ 16:24) (114/71 - 129/67)  RR:  (18 - 18)  SpO2:  (96% - 99%)  GENERAL: NAD, jaundiced.  EYES: No proptosis, no lid lag, anicteric  HEENT:  Atraumatic, Normocephalic, moist mucous membranes  THYROID: Normal size, no palpable nodules  RESPIRATORY: Clear to auscultation bilaterally; No rales, rhonchi, wheezing  CARDIOVASCULAR: Regular rate and rhythm; No murmurs; no peripheral edema  GI: Soft, nontender, non distended, normal bowel sounds  SKIN: Dry, intact, No rashes or lesions, jaundice  MUSCULOSKELETAL: Full range of motion, decreased strength  NEURO: sensation intact, extraocular movements intact, no tremor  PSYCH: Alert and oriented x 3, normal affect, normal mood    POCT Blood Glucose.: 121 mg/dL (04-17-19 @ 12:25)  POCT Blood Glucose.: 110 mg/dL (04-17-19 @ 08:44)  POCT Blood Glucose.: 161 mg/dL (04-16-19 @ 21:56)  POCT Blood Glucose.: 120 mg/dL (04-16-19 @ 17:18)  POCT Blood Glucose.: 111 mg/dL (04-16-19 @ 12:18)  POCT Blood Glucose.: 91 mg/dL (04-16-19 @ 08:38)  POCT Blood Glucose.: 108 mg/dL (04-15-19 @ 23:54)  POCT Blood Glucose.: 107 mg/dL (04-15-19 @ 17:08)  POCT Blood Glucose.: 97 mg/dL (04-15-19 @ 12:50)  POCT Blood Glucose.: 105 mg/dL (04-15-19 @ 08:57)  POCT Blood Glucose.: 111 mg/dL (04-14-19 @ 21:28)  POCT Blood Glucose.: 119 mg/dL (04-14-19 @ 17:15)                            7.3    2.36  )-----------( 77       ( 17 Apr 2019 10:33 )             21.8       04-17    137  |  103  |  35<H>  ----------------------------<  115<H>  4.5   |  23  |  1.28    EGFR if : 62  EGFR if non : 53<L>    Ca    8.5      04-17    TPro  4.7<L>  /  Alb  2.8<L>  /  TBili  4.7<H>  /  DBili  x   /  AST  34  /  ALT  72<H>  /  AlkPhos  311<H>  04-17      Thyroid Function Tests:      Hemoglobin A1C, Whole Blood: 5.9 % <H> [4.0 - 5.6] (04-10-19 @ 09:23)

## 2019-04-17 NOTE — PROGRESS NOTE ADULT - ASSESSMENT
80 yo M who presented on 4/3 with hematuria and jaundice found to have pancytopenia and meeting criteria for HLH    1. Hemophagocytic Lymphohistiocytosis:  - pt meeting criteria: ferritin over 46k, pancytopenia, fever, elevated triglycerides. Soluble IL-2 (CD25) receptor >135,000  - HLH protocol: [id number] mg/m2 twice weekly x 2 weeks, then weekly for weeks 3 to 8. Dose reduced the etoposide to 75 mg/m2 due to liver dysfunction and significant hyperbilirubinemia. Dexamethasone 10mg/m2 daily (week 1-2), 5mg/m2 daily (week 3-4), 2.5mg/m2 daily (week 5-6), 1.25 mg/m2  daily for week 7 and then stop.   - Week 1: etoposide 75 mg/m2 given on 4/9/19 and 4/11/19, dexamethasone 10 mg/m2 (20 mg IV daily starting 4/7/19)  - Week 2: Currently week 2 of HLH protocol. c/w Dex 20 mg IV daily. Plan for etoposide 75 mg/m2 today (4/16) and Fri (4/19)  - tolerating treatment, zofran prn nausea  - ferritin trending down, Bilirubin trending down, AST/ALT stable, continue to trend CBC with diff, CMP with LFTs daily   - s/p BM biopsy on 4/8 showing hemophagocytosis  - no clear evidence of infection  - monitor daily CBC with Diff, CMP with LFTs,  ferritin, LDH, and fibrinogen. If fibrinogen < 100, transfuse 10 units of cryoprecipitate  - CT C/A/P with no evidence of malignancy  - agree with PCP prophylaxis while pt on high dose steroids and will be on steroids for 7-8 weeks.   - Will plan for next dose of etoposide on Fri 4/19, then anticipate d/c over the weekend. Pt is scheduled to see Dr. Clay at [facility] on Monday 4/22 at 1:40 pm.     d/w primary team    Sundeep Griffith, PGY-5  Hematology-Oncology Fellow  Pager: [phone] ([facility]), [phone] ([facility])  (After 5 pm or on weekends please page the on-call fellow)

## 2019-04-17 NOTE — PROGRESS NOTE ADULT - SUBJECTIVE AND OBJECTIVE BOX
LEODAN BURGOS:[id number],   78yMale followed for:  No Known Allergies    PAST MEDICAL & SURGICAL HISTORY:  BPH (benign prostatic hyperplasia)  H/O knee surgery  Hernia    FAMILY HISTORY:    MEDICATIONS  (STANDING):  acetaminophen  IVPB .. 1000 milliGRAM(s) IV Intermittent once  alfuzosin 10 milliGRAM(s) Oral at bedtime  atovaquone Suspension 1500 milliGRAM(s) Oral daily  dexamethasone  IVPB 20 milliGRAM(s) IV Intermittent daily  dextrose 5%. 1000 milliLiter(s) (50 mL/Hr) IV Continuous <Continuous>  dextrose 50% Injectable 12.5 Gram(s) IV Push once  dextrose 50% Injectable 25 Gram(s) IV Push once  dextrose 50% Injectable 25 Gram(s) IV Push once  finasteride 5 milliGRAM(s) Oral daily  insulin lispro (HumaLOG) corrective regimen sliding scale   SubCutaneous three times a day before meals  insulin lispro (HumaLOG) corrective regimen sliding scale   SubCutaneous at bedtime  pantoprazole  Injectable 40 milliGRAM(s) IV Push daily  ursodiol Tablet 500 milliGRAM(s) Oral two times a day    MEDICATIONS  (PRN):  dextrose 40% Gel 15 Gram(s) Oral once PRN Blood Glucose LESS THAN 70 milliGRAM(s)/deciliter  glucagon  Injectable 1 milliGRAM(s) IntraMuscular once PRN Glucose LESS THAN 70 milligrams/deciliter      Vital Signs Last 24 Hrs  T(C): 36.4 (16 Apr 2019 23:57), Max: 36.7 (16 Apr 2019 16:16)  T(F): 97.5 (16 Apr 2019 23:57), Max: 98.1 (16 Apr 2019 16:16)  HR: 82 (16 Apr 2019 23:57) (82 - 94)  BP: 114/71 (16 Apr 2019 23:57) (114/71 - 133/76)  BP(mean): --  RR: 18 (16 Apr 2019 23:57) (18 - 18)  SpO2: 96% (16 Apr 2019 23:57) (96% - 99%)  nc/at  s1s2  cta  soft, nt, nd no guarding or rebound  no c/c/e    CBC Full  -  ( 16 Apr 2019 09:33 )  WBC Count : 1.92 K/uL  RBC Count : 2.79 M/uL  Hemoglobin : 7.9 g/dL  Hematocrit : 23.1 %  Platelet Count - Automated : 81 K/uL  Mean Cell Volume : 82.8 fl  Mean Cell Hemoglobin : 28.3 pg  Mean Cell Hemoglobin Concentration : 34.2 gm/dL  Auto Neutrophil # : 1.42 K/uL  Auto Lymphocyte # : 0.32 K/uL  Auto Monocyte # : 0.06 K/uL  Auto Eosinophil # : 0.06 K/uL  Auto Basophil # : 0.01 K/uL  Auto Neutrophil % : 74.0 %  Auto Lymphocyte % : 16.7 %  Auto Monocyte % : 3.1 %  Auto Eosinophil % : 3.1 %  Auto Basophil % : 0.5 %    04-16    138  |  105  |  35<H>  ----------------------------<  96  3.9   |  22  |  1.33<H>    Ca    9.1      16 Apr 2019 07:01    TPro  4.6<L>  /  Alb  2.8<L>  /  TBili  4.8<H>  /  DBili  x   /  AST  36  /  ALT  78<H>  /  AlkPhos  337<H>  04-16

## 2019-04-17 NOTE — PROGRESS NOTE ADULT - SUBJECTIVE AND OBJECTIVE BOX
INTERVAL HPI/OVERNIGHT EVENTS:  Patient S&E at bedside. No o/n events, patient resting comfortably. No complaints at this time. Patient denies fever, chills, dizziness, weakness, CP, palpitations, SOB, cough, N/V/D/C, dysuria, changes in bowel movements, LE edema.    Vital Signs Last 24 Hrs  T(C): 36.3 (17 Apr 2019 08:06), Max: 36.7 (16 Apr 2019 16:16)  T(F): 97.4 (17 Apr 2019 08:06), Max: 98.1 (16 Apr 2019 16:16)  HR: 81 (17 Apr 2019 08:06) (81 - 94)  BP: 122/65 (17 Apr 2019 08:06) (114/71 - 133/76)  BP(mean): --  RR: 18 (17 Apr 2019 08:06) (18 - 18)  SpO2: 99% (17 Apr 2019 08:06) (96% - 99%)    PHYSICAL EXAM:  GENERAL: sitting in a chair comfortably  EYES: EOMI, conjunctiva and sclera clear  NECK: supple  RESP: CTAB  HEART: RRR, S1/S2  ABDOMEN: +BS, soft, NT, ND  EXTREMITIES: no LE edema  NEUROLOGIC: no focal deficits, AAO x 3  SKIN: jaundice improving    MEDICATIONS  (STANDING):  acetaminophen  IVPB .. 1000 milliGRAM(s) IV Intermittent once  alfuzosin 10 milliGRAM(s) Oral at bedtime  atovaquone Suspension 1500 milliGRAM(s) Oral daily  dexamethasone  IVPB 20 milliGRAM(s) IV Intermittent daily  dexamethasone  IVPB 20 milliGRAM(s) IV Intermittent once  dextrose 5%. 1000 milliLiter(s) (50 mL/Hr) IV Continuous <Continuous>  dextrose 50% Injectable 12.5 Gram(s) IV Push once  dextrose 50% Injectable 25 Gram(s) IV Push once  dextrose 50% Injectable 25 Gram(s) IV Push once  finasteride 5 milliGRAM(s) Oral daily  insulin lispro (HumaLOG) corrective regimen sliding scale   SubCutaneous three times a day before meals  insulin lispro (HumaLOG) corrective regimen sliding scale   SubCutaneous at bedtime  pantoprazole  Injectable 40 milliGRAM(s) IV Push daily  ursodiol Tablet 500 milliGRAM(s) Oral two times a day    MEDICATIONS  (PRN):  dextrose 40% Gel 15 Gram(s) Oral once PRN Blood Glucose LESS THAN 70 milliGRAM(s)/deciliter  glucagon  Injectable 1 milliGRAM(s) IntraMuscular once PRN Glucose LESS THAN 70 milligrams/deciliter      Allergies    No Known Allergies    Intolerances    LABS:                                   7.9    1.92  )-----------( 81       ( 16 Apr 2019 09:33 )             23.1   04-17    137  |  103  |  35<H>  ----------------------------<  115<H>  4.5   |  23  |  1.28    Ca    8.5      17 Apr 2019 07:15    TPro  4.7<L>  /  Alb  2.8<L>  /  TBili  4.7<H>  /  DBili  x   /  AST  34  /  ALT  72<H>  /  AlkPhos  311<H>  04-17  Ferritin, Serum in AM (04.17.19 @ 10:04)    Ferritin, Serum: 4893: Test Repeated ng/mL  RADIOLOGY & ADDITIONAL TESTS:  Studies reviewed.

## 2019-04-17 NOTE — PROGRESS NOTE ADULT - ASSESSMENT
77 y/o Diagnosed with suspected HLH, started on steroids and etoposide noted with hyperglycemia.    Patient with no H/O Diabetes.  HbA1c this admission 5.8%, low FS at am  mg/dL before steroids started.  Denies FH of diabetes.  Patient started on Dexamethasone IVPB 20 mg daily for 5 days.  At home patient tries to decrease sugar in diet. Weight stable.     Steroids induced hyperglycemia.  FS expected to increase mostly post meals.  Patient back on Decadron 20 mg IV daily.   PO intake good, compliant with diabetic diet.  FS last 24 hours up to 160 mg/dL.    On Diabetic diet during steroid treatment with acceptable control with diet alone, no need for diabetic medications.  Here: FS QID with AC scale above 200 mg/dL.  For D/C- diabetic diet only.

## 2019-04-17 NOTE — PROGRESS NOTE ADULT - SUBJECTIVE AND OBJECTIVE BOX
LEODAN BURGOS    Patient is a 78y old  Male who presents with a chief complaint of Jaundice, hematuria,HLH.Doing well,no CP or SOB.    HPI: Patient is a 78y Male with history of     Allergies    No Known Allergies    Intolerances      MEDICATIONS  (STANDING):  acetaminophen  IVPB .. 1000 milliGRAM(s) IV Intermittent once  alfuzosin 10 milliGRAM(s) Oral at bedtime  atovaquone Suspension 1500 milliGRAM(s) Oral daily  dexamethasone  IVPB 20 milliGRAM(s) IV Intermittent daily  dextrose 5%. 1000 milliLiter(s) (50 mL/Hr) IV Continuous <Continuous>  dextrose 50% Injectable 12.5 Gram(s) IV Push once  dextrose 50% Injectable 25 Gram(s) IV Push once  dextrose 50% Injectable 25 Gram(s) IV Push once  finasteride 5 milliGRAM(s) Oral daily  insulin lispro (HumaLOG) corrective regimen sliding scale   SubCutaneous three times a day before meals  insulin lispro (HumaLOG) corrective regimen sliding scale   SubCutaneous at bedtime  pantoprazole  Injectable 40 milliGRAM(s) IV Push daily  ursodiol Tablet 500 milliGRAM(s) Oral two times a day    MEDICATIONS  (PRN):  dextrose 40% Gel 15 Gram(s) Oral once PRN Blood Glucose LESS THAN 70 milliGRAM(s)/deciliter  glucagon  Injectable 1 milliGRAM(s) IntraMuscular once PRN Glucose LESS THAN 70 milligrams/deciliter      ROS:  Positive:    General: Denies weight loss, fevers, rash, decreased hearing  Cardiac: Denies chest pain, SOB, HALL, orthopnea, PND, claudication, edema, snoring, daytime somnolence, palpitations, syncope  Resp: Denies SOB, HALL, cough, sputum, wheezing, hemoptysis  GI: Denies change in bowel habits, diarrhea, weight loss, melena, tarry stools,   nausea, vomiting, jaundice, abdominal pain, dysphagia  : Denies dysuria, nocturia, hematuria  Neuro: Denies tinnitus, headache, visual changes, weakness, dizziness or vertigo  Musculoskeletal: Denies neck pain back pain joint pain.  Skin: Denies rash, itching, dryness.  Endocrine: Denies polydipsia, polyuria  Psychiatric: Denies depression, anxiety      PHYSICAL EXAM:  Vital Signs Last 24 Hrs  T(C): 36.4 (16 Apr 2019 23:57), Max: 36.7 (16 Apr 2019 16:16)  T(F): 97.5 (16 Apr 2019 23:57), Max: 98.1 (16 Apr 2019 16:16)  HR: 82 (16 Apr 2019 23:57) (82 - 94)  BP: 114/71 (16 Apr 2019 23:57) (114/71 - 133/76)  BP(mean): --  RR: 18 (16 Apr 2019 23:57) (18 - 18)  SpO2: 96% (16 Apr 2019 23:57) (96% - 99%)  Daily     Daily   I&O's Summary    15 Apr 2019 07:01  -  16 Apr 2019 07:00  --------------------------------------------------------  IN: 0 mL / OUT: 1150 mL / NET: -1150 mL    16 Apr 2019 07:01  -  17 Apr 2019 05:50  --------------------------------------------------------  IN: 1203 mL / OUT: 1550 mL / NET: -347 mL        General Appearance: 	 Alert, cooperative, no distress  HEENT: normocephalic, atraumatic, PERRLA, EOMI, conjunctiva normal, sclera anicteric,   Neck: no JVD,  carotid 2+  bilaterally without bruits, thyroid normal to inspection and palpation, no adenopathy, trachea midline  Lungs:  clear to auscultation and percussion bilaterally  Cor:  pmi 5th ICS MCL, regular rate and rhythm, S1 normal intensity, S2 normal intensity, no gallops, murmurs or rubs  Abdomen:	 soft, non-tender; bowel sounds normal; no masses,  no organomegaly  Extremities: without cyanosis, clubbing or edema  Vasc: 2-+ PT and DP pulses; no varicosities  Neurologic: A&O x 3 (time, place, person). Symmetric strength; limited exam  Musculoskeletal: no kyphosis, scoliosis; normal gait, normal tone  Skin: no rashes; limited exam    EKG:  Telemetry:    Labs:  CBC Full  -  ( 16 Apr 2019 09:33 )  WBC Count : 1.92 K/uL  RBC Count : 2.79 M/uL  Hemoglobin : 7.9 g/dL  Hematocrit : 23.1 %  Platelet Count - Automated : 81 K/uL  Mean Cell Volume : 82.8 fl  Mean Cell Hemoglobin : 28.3 pg  Mean Cell Hemoglobin Concentration : 34.2 gm/dL  Auto Neutrophil # : 1.42 K/uL  Auto Lymphocyte # : 0.32 K/uL  Auto Monocyte # : 0.06 K/uL  Auto Eosinophil # : 0.06 K/uL  Auto Basophil # : 0.01 K/uL  Auto Neutrophil % : 74.0 %  Auto Lymphocyte % : 16.7 %  Auto Monocyte % : 3.1 %  Auto Eosinophil % : 3.1 %  Auto Basophil % : 0.5 %            Impression/Plan:Patient with pancytopenia,HLH,HTN,jaundice.Doing well.Continue chemotherapy and steroids.CT of chest negative.F/U hematology.OOB/ambulation.        Marin Albert MD, FACC  [facility]

## 2019-04-18 NOTE — PROGRESS NOTE ADULT - SUBJECTIVE AND OBJECTIVE BOX
Patient is a 78y old  Male who presents with a chief complaint of Jaundice, hematuria (18 Apr 2019 10:17)      SUBJECTIVE / OVERNIGHT EVENTS: feels much better, looking forward to being DCed possibly tomorrow after chemo, will go home on po prednisone and Atovaquone    MEDICATIONS  (STANDING):  acetaminophen  IVPB .. 1000 milliGRAM(s) IV Intermittent once  alfuzosin 10 milliGRAM(s) Oral at bedtime  atovaquone Suspension 1500 milliGRAM(s) Oral daily  dexamethasone  IVPB 20 milliGRAM(s) IV Intermittent daily  dextrose 5%. 1000 milliLiter(s) (50 mL/Hr) IV Continuous <Continuous>  dextrose 50% Injectable 12.5 Gram(s) IV Push once  dextrose 50% Injectable 25 Gram(s) IV Push once  dextrose 50% Injectable 25 Gram(s) IV Push once  finasteride 5 milliGRAM(s) Oral daily  insulin lispro (HumaLOG) corrective regimen sliding scale   SubCutaneous three times a day before meals  insulin lispro (HumaLOG) corrective regimen sliding scale   SubCutaneous at bedtime  pantoprazole  Injectable 40 milliGRAM(s) IV Push daily  ursodiol Tablet 500 milliGRAM(s) Oral two times a day    MEDICATIONS  (PRN):  dextrose 40% Gel 15 Gram(s) Oral once PRN Blood Glucose LESS THAN 70 milliGRAM(s)/deciliter  glucagon  Injectable 1 milliGRAM(s) IntraMuscular once PRN Glucose LESS THAN 70 milligrams/deciliter      Vital Signs Last 24 Hrs  T(F): 97.5 (04-18-19 @ 07:36), Max: 97.6 (04-17-19 @ 16:24)  HR: 78 (04-18-19 @ 07:36) (78 - 90)  BP: 119/69 (04-18-19 @ 07:36) (119/69 - 129/67)  RR: 18 (04-18-19 @ 07:36) (18 - 18)  SpO2: 98% (04-18-19 @ 07:36) (96% - 98%)  Telemetry:   CAPILLARY BLOOD GLUCOSE      POCT Blood Glucose.: 129 mg/dL (18 Apr 2019 12:24)  POCT Blood Glucose.: 108 mg/dL (18 Apr 2019 08:35)  POCT Blood Glucose.: 116 mg/dL (17 Apr 2019 21:41)  POCT Blood Glucose.: 126 mg/dL (17 Apr 2019 17:04)    I&O's Summary    17 Apr 2019 07:01  -  18 Apr 2019 07:00  --------------------------------------------------------  IN: 320 mL / OUT: 600 mL / NET: -280 mL        PHYSICAL EXAM:  GENERAL: NAD, well-developed  HEAD:  Atraumatic, Normocephalic  EYES: EOMI, PERRLA, conjunctiva and sclera clear  NECK: Supple, No JVD  CHEST/LUNG: Clear to auscultation bilaterally; No wheeze  HEART: Regular rate and rhythm; No murmurs, rubs, or gallops  ABDOMEN: Soft, Nontender, Nondistended; Bowel sounds present  EXTREMITIES:  2+ Peripheral Pulses, No clubbing, cyanosis, or edema  PSYCH: AAOx3  NEUROLOGY: non-focal  SKIN: No rashes or lesions    LABS:                        7.6    1.6   )-----------( 66       ( 18 Apr 2019 07:19 )             22.3     04-18    135  |  103  |  37<H>  ----------------------------<  90  3.7   |  23  |  1.23    Ca    9.0      18 Apr 2019 07:16    TPro  4.7<L>  /  Alb  2.9<L>  /  TBili  4.2<H>  /  DBili  x   /  AST  28  /  ALT  62<H>  /  AlkPhos  274<H>  04-18              RADIOLOGY & ADDITIONAL TESTS:    Imaging Personally Reviewed:    Consultant(s) Notes Reviewed:      Care Discussed with Consultants/Other Providers:

## 2019-04-18 NOTE — PROGRESS NOTE ADULT - SUBJECTIVE AND OBJECTIVE BOX
INTERVAL HPI/OVERNIGHT EVENTS:  Patient S&E at bedside. feels good, walked around yesterday      VITAL SIGNS:  T(F): 97.8 (04-18-19 @ 17:40)  HR: 76 (04-18-19 @ 17:40)  BP: 122/68 (04-18-19 @ 17:40)  RR: 18 (04-18-19 @ 17:40)  SpO2: 98% (04-18-19 @ 17:40)  Wt(kg): --    PHYSICAL EXAM:    Constitutional: NAD  Eyes: icteric b/l   Neck: supple, no masses, no JVD  Respiratory: CTA b/l, good air entry b/l  Cardiovascular: RRR, no M/R/G  Gastrointestinal: soft, NTND, no masses palpable, + BS, no hepatosplenomegaly  Extremities: no c/c/e  Neurological: AAOx3      MEDICATIONS  (STANDING):  acetaminophen  IVPB .. 1000 milliGRAM(s) IV Intermittent once  alfuzosin 10 milliGRAM(s) Oral at bedtime  atovaquone Suspension 1500 milliGRAM(s) Oral daily  dexamethasone  IVPB 20 milliGRAM(s) IV Intermittent daily  dextrose 5%. 1000 milliLiter(s) (50 mL/Hr) IV Continuous <Continuous>  dextrose 50% Injectable 12.5 Gram(s) IV Push once  dextrose 50% Injectable 25 Gram(s) IV Push once  dextrose 50% Injectable 25 Gram(s) IV Push once  finasteride 5 milliGRAM(s) Oral daily  insulin lispro (HumaLOG) corrective regimen sliding scale   SubCutaneous three times a day before meals  insulin lispro (HumaLOG) corrective regimen sliding scale   SubCutaneous at bedtime  pantoprazole  Injectable 40 milliGRAM(s) IV Push daily  ursodiol Tablet 500 milliGRAM(s) Oral two times a day    MEDICATIONS  (PRN):  dextrose 40% Gel 15 Gram(s) Oral once PRN Blood Glucose LESS THAN 70 milliGRAM(s)/deciliter  glucagon  Injectable 1 milliGRAM(s) IntraMuscular once PRN Glucose LESS THAN 70 milligrams/deciliter      Allergies    No Known Allergies    Intolerances        LABS:                        7.6    1.6   )-----------( 66       ( 18 Apr 2019 07:19 )             22.3     04-18    135  |  103  |  37<H>  ----------------------------<  90  3.7   |  23  |  1.23    Ca    9.0      18 Apr 2019 07:16    TPro  4.7<L>  /  Alb  2.9<L>  /  TBili  4.2<H>  /  DBili  x   /  AST  28  /  ALT  62<H>  /  AlkPhos  274<H>  04-18          RADIOLOGY & ADDITIONAL TESTS:  Studies reviewed.

## 2019-04-18 NOTE — PROGRESS NOTE ADULT - ASSESSMENT
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  ongoing pancytopenia, ELEV transaminases, but improving post steroids and Etoposide( for HLH)   initially elevated PT and INR, now normalized,ON admission:  elev lactate, w ARF, hyponatremia, w hematuria,  NOW ALL have subsided . no biliary obstruction on CT scan/MRCP neg HIDA scan, splenic infarct on CT  Gi , ID ,renal and Heme on board. FERRITIN LEVEL EXTREMELY HIGH AT [address] initially, post 2 days treatment for HLH level dropped to 07261, and on 4/11 10, 000.   Bone Marrow Bx done, results c/w hemophagocytosis , treatment w low dose etoposide initiated on 4/9,and has been getting it twice per week.   dose reduced 2/2 transaminitis. HD IV DECADRON started on 4/8. ON PCP ppx w atovaquone while on HD steroids.  Overall counts and liver function have improved  Renal function has normalized  Echo is benign. Hep A/B/C neg, auto mitochondrial and smooth muscle AB neg.  INR has normalized, platelets still low, cont DVT ppx w PAS   DC Planning  on steroid taper w outptn F/U after chemo on 4/19

## 2019-04-18 NOTE — PROGRESS NOTE ADULT - SUBJECTIVE AND OBJECTIVE BOX
LEODAN BURGOS:[id number],   78yMale followed for:  No Known Allergies    PAST MEDICAL & SURGICAL HISTORY:  BPH (benign prostatic hyperplasia)  H/O knee surgery  Hernia    FAMILY HISTORY:    MEDICATIONS  (STANDING):  acetaminophen  IVPB .. 1000 milliGRAM(s) IV Intermittent once  alfuzosin 10 milliGRAM(s) Oral at bedtime  atovaquone Suspension 1500 milliGRAM(s) Oral daily  dexamethasone  IVPB 20 milliGRAM(s) IV Intermittent daily  dextrose 5%. 1000 milliLiter(s) (50 mL/Hr) IV Continuous <Continuous>  dextrose 50% Injectable 12.5 Gram(s) IV Push once  dextrose 50% Injectable 25 Gram(s) IV Push once  dextrose 50% Injectable 25 Gram(s) IV Push once  finasteride 5 milliGRAM(s) Oral daily  insulin lispro (HumaLOG) corrective regimen sliding scale   SubCutaneous three times a day before meals  insulin lispro (HumaLOG) corrective regimen sliding scale   SubCutaneous at bedtime  pantoprazole  Injectable 40 milliGRAM(s) IV Push daily  ursodiol Tablet 500 milliGRAM(s) Oral two times a day    MEDICATIONS  (PRN):  dextrose 40% Gel 15 Gram(s) Oral once PRN Blood Glucose LESS THAN 70 milliGRAM(s)/deciliter  glucagon  Injectable 1 milliGRAM(s) IntraMuscular once PRN Glucose LESS THAN 70 milligrams/deciliter      Vital Signs Last 24 Hrs  T(C): 36.4 (18 Apr 2019 07:36), Max: 36.4 (17 Apr 2019 16:24)  T(F): 97.5 (18 Apr 2019 07:36), Max: 97.6 (17 Apr 2019 16:24)  HR: 78 (18 Apr 2019 07:36) (78 - 90)  BP: 119/69 (18 Apr 2019 07:36) (119/69 - 129/67)  BP(mean): --  RR: 18 (18 Apr 2019 07:36) (18 - 18)  SpO2: 98% (18 Apr 2019 07:36) (96% - 98%)  nc/at  s1s2  cta  soft, nt, nd no guarding or rebound  no c/c/e    CBC Full  -  ( 18 Apr 2019 07:19 )  WBC Count : 1.6 K/uL  RBC Count : 2.60 M/uL  Hemoglobin : 7.6 g/dL  Hematocrit : 22.3 %  Platelet Count - Automated : 66 K/uL  Mean Cell Volume : 85.9 fl  Mean Cell Hemoglobin : 29.3 pg  Mean Cell Hemoglobin Concentration : 34.2 gm/dL  Auto Neutrophil # : 1.0 K/uL  Auto Lymphocyte # : 0.5 K/uL  Auto Monocyte # : 0.1 K/uL  Auto Eosinophil # : 0.0 K/uL  Auto Basophil # : 0.0 K/uL  Auto Neutrophil % : 62.9 %  Auto Lymphocyte % : 29.0 %  Auto Monocyte % : 6.4 %  Auto Eosinophil % : 1.7 %  Auto Basophil % : 0.1 %    04-18    135  |  103  |  37<H>  ----------------------------<  90  3.7   |  23  |  1.23    Ca    9.0      18 Apr 2019 07:16    TPro  4.7<L>  /  Alb  2.9<L>  /  TBili  4.2<H>  /  DBili  x   /  AST  28  /  ALT  62<H>  /  AlkPhos  274<H>  04-18

## 2019-04-18 NOTE — PROGRESS NOTE ADULT - SUBJECTIVE AND OBJECTIVE BOX
LEODAN BURGOS    Patient is a 78y old  Male who presents with a chief complaint of Jaundice, hematuria,pancytopenia,HLH.No CP or SOB.      Allergies    No Known Allergies    Intolerances      MEDICATIONS  (STANDING):  acetaminophen  IVPB .. 1000 milliGRAM(s) IV Intermittent once  alfuzosin 10 milliGRAM(s) Oral at bedtime  atovaquone Suspension 1500 milliGRAM(s) Oral daily  dexamethasone  IVPB 20 milliGRAM(s) IV Intermittent daily  dextrose 5%. 1000 milliLiter(s) (50 mL/Hr) IV Continuous <Continuous>  dextrose 50% Injectable 12.5 Gram(s) IV Push once  dextrose 50% Injectable 25 Gram(s) IV Push once  dextrose 50% Injectable 25 Gram(s) IV Push once  finasteride 5 milliGRAM(s) Oral daily  insulin lispro (HumaLOG) corrective regimen sliding scale   SubCutaneous three times a day before meals  insulin lispro (HumaLOG) corrective regimen sliding scale   SubCutaneous at bedtime  pantoprazole  Injectable 40 milliGRAM(s) IV Push daily  ursodiol Tablet 500 milliGRAM(s) Oral two times a day    MEDICATIONS  (PRN):  dextrose 40% Gel 15 Gram(s) Oral once PRN Blood Glucose LESS THAN 70 milliGRAM(s)/deciliter  glucagon  Injectable 1 milliGRAM(s) IntraMuscular once PRN Glucose LESS THAN 70 milligrams/deciliter      ROS:  Positive:    General: Denies weight loss, fevers, rash, decreased hearing  Cardiac: Denies chest pain, SOB, HALL, orthopnea, PND, claudication, edema, snoring, daytime somnolence, palpitations, syncope  Resp: Denies SOB, AHLL, cough, sputum, wheezing, hemoptysis  GI: Denies change in bowel habits, diarrhea, weight loss, melena, tarry stools,   nausea, vomiting, jaundice, abdominal pain, dysphagia  : Denies dysuria, nocturia, hematuria  Neuro: Denies tinnitus, headache, visual changes, weakness, dizziness or vertigo  Musculoskeletal: Denies neck pain back pain joint pain.  Skin: Denies rash, itching, dryness.  Endocrine: Denies polydipsia, polyuria  Psychiatric: Denies depression, anxiety      PHYSICAL EXAM:  Vital Signs Last 24 Hrs  T(C): 36.4 (2019 00:08), Max: 36.4 (2019 16:24)  T(F): 97.6 (2019 00:08), Max: 97.6 (2019 16:24)  HR: 90 (2019 00:08) (81 - 90)  BP: 121/76 (2019 00:08) (121/76 - 129/67)  BP(mean): --  RR: 18 (2019 00:08) (18 - 18)  SpO2: 98% (2019 00:08) (96% - 99%)  Daily     Daily Weight in k.8 (2019 08:06)  I&O's Summary    2019 07:  -  2019 07:00  --------------------------------------------------------  IN: 1253 mL / OUT: 1550 mL / NET: -297 mL    2019 07:01  -  2019 05:52  --------------------------------------------------------  IN: 320 mL / OUT: 600 mL / NET: -280 mL        General Appearance: 	 Alert, cooperative, no distress  HEENT: normocephalic, atraumatic, PERRLA, EOMI, conjunctiva normal, sclera anicteric,   Neck: no JVD,  carotid 2+  bilaterally without bruits, thyroid normal to inspection and palpation, no adenopathy, trachea midline  Lungs:  clear to auscultation and percussion bilaterally  Cor:  pmi 5th ICS MCL, regular rate and rhythm, S1 normal intensity, S2 normal intensity, no gallops, murmurs or rubs  Abdomen:	 soft, non-tender; bowel sounds normal; no masses,  no organomegaly  Extremities: without cyanosis, clubbing or edema  Vasc: 2-+ PT and DP pulses; no varicosities  Neurologic: A&O x 3 (time, place, person). Symmetric strength; limited exam  Musculoskeletal: no kyphosis, scoliosis; normal gait, normal tone  Skin: no rashes; limited exam      Labs:  CBC Full  -  ( 2019 10:33 )  WBC Count : 2.36 K/uL  RBC Count : 2.63 M/uL  Hemoglobin : 7.3 g/dL  Hematocrit : 21.8 %  Platelet Count - Automated : 77 K/uL  Mean Cell Volume : 82.9 fl  Mean Cell Hemoglobin : 27.8 pg  Mean Cell Hemoglobin Concentration : 33.5 gm/dL  Auto Neutrophil # : 1.91 K/uL  Auto Lymphocyte # : 0.29 K/uL  Auto Monocyte # : 0.10 K/uL  Auto Eosinophil # : 0.02 K/uL  Auto Basophil # : 0.00 K/uL  Auto Neutrophil % : 81.0 %  Auto Lymphocyte % : 12.3 %  Auto Monocyte % : 4.2 %  Auto Eosinophil % : 0.8 %  Auto Basophil % : 0.0 %          Impression/Plan:Patient with HLH,pancytopenia,HTN,hyperlipidemia,RBBB, and BPH.Doing well with chemotherapy and steroids.Transfuse PRBC's to HG >9.Cardiopulmonary stable.OOB,ambulation.        Marin Albert MD, [facility]  [facility]

## 2019-04-18 NOTE — PROGRESS NOTE ADULT - ASSESSMENT
78 yo M who presented on 4/3 with hematuria and jaundice found to have pancytopenia and meeting criteria for HLH    1. Hemophagocytic Lymphohistiocytosis:  - pt meeting criteria: ferritin over 46k, pancytopenia, fever, elevated triglycerides. Soluble IL-2 (CD25) receptor >135,000  - HLH protocol: [id number] mg/m2 twice weekly x 2 weeks, then weekly for weeks 3 to 8. Dose reduced the etoposide to 75 mg/m2 due to liver dysfunction and significant hyperbilirubinemia. Dexamethasone 10mg/m2 daily (week 1-2), 5mg/m2 daily (week 3-4), 2.5mg/m2 daily (week 5-6), 1.25 mg/m2  daily for week 7 and then stop.   - Week 1: etoposide 75 mg/m2 given on 4/9/19 and 4/11/19, dexamethasone 10 mg/m2 (20 mg IV daily starting 4/7/19)  - Week 2: Currently week 2 of HLH protocol. c/w Dex 20 mg IV daily. got etoposide 4-16-19 and will get etoposide 75 mg/m2 Fri (4/19)  - tolerating treatment, zofran prn nausea  - ferritin trending down, Bilirubin trending down, AST/ALT stable, continue to trend CBC with diff, CMP with LFTs daily   - s/p BM biopsy on 4/8 showing hemophagocytosis  - no clear evidence of infection  - monitor daily CBC with Diff, CMP with LFTs,  ferritin, LDH, and fibrinogen. If fibrinogen < 100, transfuse 10 units of cryoprecipitate  - CT C/A/P with no evidence of malignancy  - agree with PCP prophylaxis while pt on high dose steroids and will be on steroids for 7-8 weeks.   -anticipate d/c over the weekend. Pt is scheduled to see Dr. Clay at [facility] on Monday 4/22 at 1:40 pm. please make sure patient will get antibiotic ppx, steroids on discharge

## 2019-04-19 NOTE — PROGRESS NOTE ADULT - ASSESSMENT
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  ongoing pancytopenia, ELEV transaminases, but improving post steroids and Etoposide( for HLH)   initially elevated PT and INR, now normalized,ON admission:  elev lactate, w ARF, hyponatremia, w hematuria,  NOW ALL have subsided . no biliary obstruction on CT scan/MRCP neg HIDA scan, splenic infarct on CT  Gi , ID ,renal and Heme on board. FERRITIN L[place]REM[place] [address] initially, post 2 days treatment for HLH level dropped to [address], and on 4/11 10, 000.   Bone Marrow Bx done, results c/w hemophagocytosis , treatment w low dose etoposide initiated on 4/9,and has been getting it twice per week.   dose reduced 2/2 transaminitis. HD IV DECADRON started on 4/8. ON PCP ppx w atovaquone while on HD steroids.  Overall counts and liver function have improved  Renal function has normalized  Echo is benign. Hep A/B/C neg, auto mitochondrial and smooth muscle AB neg.  INR has normalized, platelets still low, cont DVT ppx w PAS   DC Planning  on steroid taper w outptn F/U after chemo on 4/19

## 2019-04-19 NOTE — DISCHARGE NOTE PROVIDER - NSDCCPCAREPLAN_GEN_ALL_CORE_FT
PRINCIPAL DISCHARGE DIAGNOSIS  Diagnosis: Jaundice  Assessment and Plan of Treatment: follow up with PMD  Follow up with Hepa      SECONDARY DISCHARGE DIAGNOSES  Diagnosis: Abnormal liver function tests  Assessment and Plan of Treatment: PRINCIPAL DISCHARGE DIAGNOSIS  Diagnosis: Jaundice  Assessment and Plan of Treatment: with acute infectious cholestatic hepatitis, now resolved, no biliary obstruction on CT, negative HIDA scan, seen by ID,  follow up with PCP in 1-2 weeks and Hm/onc--Dr Clay -- this Monday --04/22/19        SECONDARY DISCHARGE DIAGNOSES  Diagnosis: Hemophagocytic lymphohistiocytosis  Assessment and Plan of Treatment: cont chemo, decadron tapering, mepron, follow up with Hm/Onc Monday    Diagnosis: Pancytopenia  Assessment and Plan of Treatment: stable, bone marrow Bx done and shows HLH, cont etoposide, atovaquone, follow up with Hm/Onc Monday    Diagnosis: Infectious systemic inflammatory response syndrome (SIRS)  Assessment and Plan of Treatment: resolved, no need any more AB but will cont PCP prophylaxis mepron    Diagnosis: Hyponatremia  Assessment and Plan of Treatment: resolved    Diagnosis: Abnormal liver function tests  Assessment and Plan of Treatment:

## 2019-04-19 NOTE — DISCHARGE NOTE PROVIDER - CARE PROVIDERS DIRECT ADDRESSES
,[email or web address]

## 2019-04-19 NOTE — PROGRESS NOTE ADULT - REASON FOR ADMISSION
Jaundice, hematuria

## 2019-04-19 NOTE — PHYSICAL THERAPY INITIAL EVALUATION ADULT - ADDITIONAL COMMENTS
pt lives in a house with his wife with 1 flight iwthin the home. prior to admission he was amb Independently without AD

## 2019-04-19 NOTE — DISCHARGE NOTE PROVIDER - HOSPITAL COURSE
DCP with med rec discussed with Dr Judge PT medically cleared for dc     Follow up with Dr Obed Sauceda DCP with med rec discussed with Dr Judge PT medically cleared for dc     Follow up with Dr Clay Monday     79 yo male w cholestatic hepatitis w pericholecystic fluid and GB sludge,     high fevers, all Cx NTD, s/p empiric ZOSYN, off DOXY, now off as per ID.  ongoing pancytopenia, ELEV transaminases, but improving post steroids and Etoposide( for HLH)     initially elevated PT and INR, now normalized,ON admission:  elev lactate, w ARF, hyponatremia, w hematuria,  NOW ALL have subsided . no biliary obstruction on CT scan/MRCP neg HIDA scan, splenic infarct on CT    He was seen by ID, Hm/Onc, card, Renal and he was treated initially with empiric ABs (Doxy, Zosyn) and iv steroid and prophylactic Etoposide and Atovaquone. Bone marrow Bx was done for pancytopenia and shows hemophagocytic lymphohistiocytosis. He will be discharged with tapering po steroid. DCP with med rec discussed with Dr Judge PT medically cleared for dc     Follow up with Dr Clay Monday     77 yo male w cholestatic hepatitis w pericholecystic fluid and GB sludge,     high fevers, all Cx NTD, s/p empiric ZOSYN, off DOXY, now off as per ID.  ongoing pancytopenia, ELEV transaminases, but improving post steroids and Etoposide( for HLH)     initially elevated PT and INR, now normalized,ON admission:  elev lactate, w ARF, hyponatremia, w hematuria,  NOW ALL have subsided . no biliary obstruction on CT scan/MRCP neg HIDA scan, splenic infarct on CT    He was seen by ID, Hm/Onc, card, Renal and he was treated initially with empiric ABs (Doxy, Zosyn) and iv steroid and prophylactic Etoposide and Atovaquone. Bone marrow Bx was done for pancytopenia and shows hemophagocytic lymphohistiocytosis. He will be discharged with tapering po steroid--dexamethasone per Hm/Onc recommended dose. DCP with med rec discussed with Dr Judge PT medically cleared for dc     Follow up with Dr Clay Monday     79 yo male w cholestatic hepatitis w pericholecystic fluid and GB sludge,     high fevers, all Cx NTD, s/p empiric ZOSYN, off DOXY, now off as per ID.  ongoing pancytopenia, ELEV transaminases, but improving post steroids and Etoposide( for HLH)     initially elevated PT and INR, now normalized,ON admission:  elev lactate, w ARF, hyponatremia, w hematuria,  NOW ALL have subsided . no biliary obstruction on CT scan/MRCP neg HIDA scan, splenic infarct on CT    He was seen by ID, Hm/Onc, card, Renal and he was treated initially with empiric ABs (Doxy, Zosyn) and iv steroid and prophylactic Etoposide and Atovaquone. Bone marrow Bx was done for pancytopenia and shows hemophagocytic lymphohistiocytosis. He will be discharged with tapering po steroid--dexamethasone per Hm/Onc recommended dose--10 mg daily for 14 days, then 5 mg daily for 14 days. DCP with med rec discussed with Dr Judge PT medically cleared for dc     Follow up with Dr Clay Monday     79 yo male w cholestatic hepatitis w pericholecystic fluid and GB sludge,     high fevers, all Cx NTD, s/p empiric ZOSYN, off DOXY, now off as per ID.  ongoing pancytopenia, ELEV transaminases, but improving post steroids and Etoposide( for HLH)     initially elevated PT and INR, now normalized,ON admission:  elev lactate, w ARF, hyponatremia, w hematuria,  NOW ALL have subsided . no biliary obstruction on CT scan/MRCP neg HIDA scan, splenic infarct on CT    He was seen by ID, Hm/Onc, card, Renal and he was treated initially with empiric ABs (Doxy, Zosyn) and iv steroid and prophylactic Etoposide and Atovaquone. Bone marrow Bx was done for pancytopenia and shows hemophagocytic lymphohistiocytosis. He will be discharged with tapering po steroid--dexamethasone per Hm/Onc recommended dose--10 mg daily for 14 days, then 5 mg daily for 14 days.    There was a problem in the Pharmacy to send it simply like this but there were no 10 mg or 5 mg tab. Any way I called the Pharmacy and spoke to Pharmacist--they have it now.

## 2019-04-19 NOTE — ADVANCED PRACTICE NURSE CONSULT - ASSESSMENT
day 1/1,Height and weightBSAverified. Lab results as per sunrise, MdDr Mauricio aware of same. Vital signs stable prior to chemotherapy, and  within accepectable parameters, see sunrise. Pt educated on the importance of saving urine, verbalizes good understanding. Pt education done re chemo regimen, drug effects and potential side effects,  pt states understanding. Reports that hehas been tolerating chemotherapy well without side effects. Pt with R hand peripheral iv  line, site free from signs and symptoms of infection, good blood return obtained. etoposide 75 mg/f8=754 mg ivss infused over 1 hour w/o incident vs remained stable throughout

## 2019-04-19 NOTE — PROGRESS NOTE ADULT - NSHPATTENDINGPLANDISCUSS_GEN_ALL_CORE
NP
heme/onc
pt and wife at bedside
ptn, wife, GI, ID, heme
Heme/onc fellow , Dr Judge
team

## 2019-04-19 NOTE — PHARMACOTHERAPY INTERVENTION NOTE - COMMENTS
Medication reconciliation completed. Please refer to outpatient medication review for the updated list. Recommendation made to start finasteride 5mg by mouth daily and tamsulosin 0.4 mg by mouth twice daily (in the meanwhile until family brings alfuzosin from home).     Willian Balbuena, PharmD  [phone]
Pharmacy intern discussed discharge medication name, dose, frequency, and side effects.

## 2019-04-19 NOTE — PROGRESS NOTE ADULT - SUBJECTIVE AND OBJECTIVE BOX
INTERVAL HPI/OVERNIGHT EVENTS:  Patient S&E at bedside. Feels well, no complaints.     Vital Signs Last 24 Hrs  T(C): 36.4 (19 Apr 2019 07:22), Max: 36.7 (18 Apr 2019 14:03)  T(F): 97.6 (19 Apr 2019 07:22), Max: 98.1 (18 Apr 2019 14:03)  HR: 71 (19 Apr 2019 07:22) (69 - 88)  BP: 121/81 (19 Apr 2019 07:22) (93/54 - 132/82)  BP(mean): --  RR: 18 (19 Apr 2019 07:22) (16 - 18)  SpO2: 99% (19 Apr 2019 07:22) (97% - 99%)    PHYSICAL EXAM:    Constitutional: NAD  Eyes: icteric b/l   Neck: supple  Respiratory: CTA b/l, good air entry b/l  Cardiovascular: RRR, no M/R/G  Gastrointestinal: soft, NTND, no masses palpable, + BS  Extremities: no c/c/e  Neurological: AAOx3    MEDICATIONS  (STANDING):  acetaminophen  IVPB .. 1000 milliGRAM(s) IV Intermittent once  alfuzosin 10 milliGRAM(s) Oral at bedtime  atovaquone Suspension 1500 milliGRAM(s) Oral daily  dexamethasone  IVPB 20 milliGRAM(s) IV Intermittent daily  dextrose 5%. 1000 milliLiter(s) (50 mL/Hr) IV Continuous <Continuous>  dextrose 50% Injectable 12.5 Gram(s) IV Push once  dextrose 50% Injectable 25 Gram(s) IV Push once  dextrose 50% Injectable 25 Gram(s) IV Push once  finasteride 5 milliGRAM(s) Oral daily  insulin lispro (HumaLOG) corrective regimen sliding scale   SubCutaneous three times a day before meals  insulin lispro (HumaLOG) corrective regimen sliding scale   SubCutaneous at bedtime  pantoprazole  Injectable 40 milliGRAM(s) IV Push daily  ursodiol Tablet 500 milliGRAM(s) Oral two times a day    MEDICATIONS  (PRN):  dextrose 40% Gel 15 Gram(s) Oral once PRN Blood Glucose LESS THAN 70 milliGRAM(s)/deciliter  glucagon  Injectable 1 milliGRAM(s) IntraMuscular once PRN Glucose LESS THAN 70 milligrams/deciliter    Allergies    No Known Allergies    Intolerances    LABS:                                   8.6    1.8   )-----------( 64       ( 19 Apr 2019 07:09 )             25.2   04-19    136  |  104  |  35<H>  ----------------------------<  80  3.9   |  22  |  1.12    Ca    9.0      19 Apr 2019 06:58    TPro  4.6<L>  /  Alb  2.9<L>  /  TBili  4.0<H>  /  DBili  x   /  AST  28  /  ALT  55<H>  /  AlkPhos  251<H>  04-19  Ferritin, Serum (04.19.19 @ 10:42)    Ferritin, Serum: 4778 ng/mL    RADIOLOGY & ADDITIONAL TESTS:  Studies reviewed.

## 2019-04-19 NOTE — PROGRESS NOTE ADULT - SUBJECTIVE AND OBJECTIVE BOX
LEODAN BURGOS    Patient is a 78y old  Male who presents with a chief complaint of Jaundice, hematuria,pancytopenia.No CP or SOB.      Allergies    No Known Allergies    Intolerances      MEDICATIONS  (STANDING):  acetaminophen  IVPB .. 1000 milliGRAM(s) IV Intermittent once  alfuzosin 10 milliGRAM(s) Oral at bedtime  atovaquone Suspension 1500 milliGRAM(s) Oral daily  dexamethasone  IVPB 20 milliGRAM(s) IV Intermittent daily  dextrose 5%. 1000 milliLiter(s) (50 mL/Hr) IV Continuous <Continuous>  dextrose 50% Injectable 12.5 Gram(s) IV Push once  dextrose 50% Injectable 25 Gram(s) IV Push once  dextrose 50% Injectable 25 Gram(s) IV Push once  finasteride 5 milliGRAM(s) Oral daily  insulin lispro (HumaLOG) corrective regimen sliding scale   SubCutaneous three times a day before meals  insulin lispro (HumaLOG) corrective regimen sliding scale   SubCutaneous at bedtime  pantoprazole  Injectable 40 milliGRAM(s) IV Push daily  ursodiol Tablet 500 milliGRAM(s) Oral two times a day    MEDICATIONS  (PRN):  dextrose 40% Gel 15 Gram(s) Oral once PRN Blood Glucose LESS THAN 70 milliGRAM(s)/deciliter  glucagon  Injectable 1 milliGRAM(s) IntraMuscular once PRN Glucose LESS THAN 70 milligrams/deciliter      ROS:  Positive:    General: Denies weight loss, fevers, rash, decreased hearing  Cardiac: Denies chest pain, SOB, HALL, orthopnea, PND, claudication, edema, snoring, daytime somnolence, palpitations, syncope  Resp: Denies SOB, HALL, cough, sputum, wheezing, hemoptysis  GI: Denies change in bowel habits, diarrhea, weight loss, melena, tarry stools,   nausea, vomiting, jaundice, abdominal pain, dysphagia  : Denies dysuria, nocturia, hematuria  Neuro: Denies tinnitus, headache, visual changes, weakness, dizziness or vertigo  Musculoskeletal: Denies neck pain back pain joint pain.  Skin: Denies rash, itching, dryness.  Endocrine: Denies polydipsia, polyuria  Psychiatric: Denies depression, anxiety      PHYSICAL EXAM:  Vital Signs Last 24 Hrs  T(C): 36.6 (19 Apr 2019 01:14), Max: 36.7 (18 Apr 2019 14:03)  T(F): 97.9 (19 Apr 2019 01:14), Max: 98.1 (18 Apr 2019 14:03)  HR: 83 (19 Apr 2019 01:14) (76 - 88)  BP: 132/82 (19 Apr 2019 01:14) (93/54 - 132/82)  BP(mean): --  RR: 18 (19 Apr 2019 01:14) (16 - 18)  SpO2: 97% (19 Apr 2019 01:14) (97% - 99%)  Daily     Daily   I&O's Summary    17 Apr 2019 07:01  -  18 Apr 2019 07:00  --------------------------------------------------------  IN: 320 mL / OUT: 600 mL / NET: -280 mL    18 Apr 2019 07:01  -  19 Apr 2019 05:34  --------------------------------------------------------  IN: 320 mL / OUT: 1100 mL / NET: -780 mL        General Appearance: 	 Alert, cooperative, no distress  HEENT: normocephalic, atraumatic, PERRLA, EOMI, conjunctiva normal, sclera anicteric,   Neck: no JVD,  carotid 2+  bilaterally without bruits, thyroid normal to inspection and palpation, no adenopathy, trachea midline  Lungs:  clear to auscultation and percussion bilaterally  Cor:  pmi 5th ICS MCL, regular rate and rhythm, S1 normal intensity, S2 normal intensity, no gallops, murmurs or rubs  Abdomen:	 soft, non-tender; bowel sounds normal; no masses,  no organomegaly  Extremities: without cyanosis, clubbing or edema  Vasc: 2-+ PT and DP pulses; no varicosities  Neurologic: A&O x 3 (time, place, person). Symmetric strength; limited exam  Musculoskeletal: no kyphosis, scoliosis; normal gait, normal tone  Skin: no rashes; limited exam      Labs:  CBC Full  -  ( 18 Apr 2019 19:44 )  WBC Count : 2.0 K/uL  RBC Count : 3.04 M/uL  Hemoglobin : 8.8 g/dL  Hematocrit : 25.0 %  Platelet Count - Automated : 68 K/uL  Mean Cell Volume : 82.3 fl  Mean Cell Hemoglobin : 28.9 pg  Mean Cell Hemoglobin Concentration : 35.1 gm/dL  Auto Neutrophil # : x  Auto Lymphocyte # : x  Auto Monocyte # : x  Auto Eosinophil # : x  Auto Basophil # : x  Auto Neutrophil % : x  Auto Lymphocyte % : x  Auto Monocyte % : x  Auto Eosinophil % : x  Auto Basophil % : x        Impression/Plan:Patient with HTN,hyperlipidemia,RBBB,BPH with pancytopenia and HLH.Doing well with chemotherapy and steroids.LFT's and renal Fx improved.D/C planning as per hematology.OOB,ambulation.        Marin Albert MD, [facility]  Call Cardiology

## 2019-04-19 NOTE — PHYSICAL THERAPY INITIAL EVALUATION ADULT - PERTINENT HX OF CURRENT PROBLEM, REHAB EVAL
79yo M w h/o BPH, HLD presented to his urologist last week w hematuria, was given 3 days BACTRIM and sent for CT scan, Scan was done, saw his PMD for blood work who noted Pt was jaundiced and sent him to the ER. Pt states he has been lethargic for about a month w achiness, has poor appetite and lost 20 lbs. no biliary obstruction on CT scan/MRCP neg HIDA scan, splenic infarct on CT.

## 2019-04-19 NOTE — PROGRESS NOTE ADULT - SUBJECTIVE AND OBJECTIVE BOX
Patient is a 78y old  Male who presents with a chief complaint of Jaundice, hematuria (19 Apr 2019 05:34)      SUBJECTIVE / OVERNIGHT EVENTS: no new events, awaiting CHemo today    MEDICATIONS  (STANDING):  acetaminophen  IVPB .. 1000 milliGRAM(s) IV Intermittent once  alfuzosin 10 milliGRAM(s) Oral at bedtime  atovaquone Suspension 1500 milliGRAM(s) Oral daily  dexamethasone  IVPB 20 milliGRAM(s) IV Intermittent daily  dextrose 5%. 1000 milliLiter(s) (50 mL/Hr) IV Continuous <Continuous>  dextrose 50% Injectable 12.5 Gram(s) IV Push once  dextrose 50% Injectable 25 Gram(s) IV Push once  dextrose 50% Injectable 25 Gram(s) IV Push once  finasteride 5 milliGRAM(s) Oral daily  insulin lispro (HumaLOG) corrective regimen sliding scale   SubCutaneous three times a day before meals  insulin lispro (HumaLOG) corrective regimen sliding scale   SubCutaneous at bedtime  pantoprazole  Injectable 40 milliGRAM(s) IV Push daily  ursodiol Tablet 500 milliGRAM(s) Oral two times a day    MEDICATIONS  (PRN):  dextrose 40% Gel 15 Gram(s) Oral once PRN Blood Glucose LESS THAN 70 milliGRAM(s)/deciliter  glucagon  Injectable 1 milliGRAM(s) IntraMuscular once PRN Glucose LESS THAN 70 milligrams/deciliter      Vital Signs Last 24 Hrs  T(F): 97.9 (04-19-19 @ 01:14), Max: 98.1 (04-18-19 @ 14:03)  HR: 83 (04-19-19 @ 01:14) (76 - 88)  BP: 132/82 (04-19-19 @ 01:14) (93/54 - 132/82)  RR: 18 (04-19-19 @ 01:14) (16 - 18)  SpO2: 97% (04-19-19 @ 01:14) (97% - 99%)  Telemetry:   CAPILLARY BLOOD GLUCOSE      POCT Blood Glucose.: 113 mg/dL (18 Apr 2019 21:22)  POCT Blood Glucose.: 122 mg/dL (18 Apr 2019 17:10)  POCT Blood Glucose.: 129 mg/dL (18 Apr 2019 12:24)  POCT Blood Glucose.: 108 mg/dL (18 Apr 2019 08:35)    I&O's Summary    17 Apr 2019 07:01  -  18 Apr 2019 07:00  --------------------------------------------------------  IN: 320 mL / OUT: 600 mL / NET: -280 mL    18 Apr 2019 07:01  -  19 Apr 2019 06:00  --------------------------------------------------------  IN: 320 mL / OUT: 1100 mL / NET: -780 mL        PHYSICAL EXAM:  GENERAL: NAD, well-developed  HEAD:  Atraumatic, Normocephalic  EYES: EOMI, PERRLA, conjunctiva and sclera clear  NECK: Supple, No JVD  CHEST/LUNG: Clear to auscultation bilaterally; No wheeze  HEART: Regular rate and rhythm; No murmurs, rubs, or gallops  ABDOMEN: Soft, Nontender, Nondistended; Bowel sounds present  EXTREMITIES:  2+ Peripheral Pulses, No clubbing, cyanosis, or edema  PSYCH: AAOx3  NEUROLOGY: non-focal  SKIN: No rashes or lesions    LABS:                        8.8    2.0   )-----------( 68       ( 18 Apr 2019 19:44 )             25.0     04-18    135  |  103  |  37<H>  ----------------------------<  90  3.7   |  23  |  1.23    Ca    9.0      18 Apr 2019 07:16    TPro  4.7<L>  /  Alb  2.9<L>  /  TBili  4.2<H>  /  DBili  x   /  AST  28  /  ALT  62<H>  /  AlkPhos  274<H>  04-18              RADIOLOGY & ADDITIONAL TESTS:    Imaging Personally Reviewed:    Consultant(s) Notes Reviewed:      Care Discussed with Consultants/Other Providers:

## 2019-04-19 NOTE — DISCHARGE NOTE PROVIDER - NSDCCAREPROVSEEN_GEN_ALL_CORE_FT
[facility] Medicine, Advance PracticeTeam  [facility] Hematology, Team  [facility] Hepatology, Team

## 2019-04-19 NOTE — PROGRESS NOTE ADULT - PROVIDER SPECIALTY LIST ADULT
Cardiology
Endocrinology
Gastroenterology
Heme/Onc
Infectious Disease
Internal Medicine
Nephrology
Infectious Disease
Infectious Disease
Gastroenterology
Gastroenterology
Internal Medicine

## 2019-04-19 NOTE — PROGRESS NOTE ADULT - SUBJECTIVE AND OBJECTIVE BOX
INTERVAL HPI/OVERNIGHT EVENTS:    MEDICATIONS  (STANDING):  acetaminophen  IVPB .. 1000 milliGRAM(s) IV Intermittent once  alfuzosin 10 milliGRAM(s) Oral at bedtime  atovaquone Suspension 1500 milliGRAM(s) Oral daily  dexamethasone  IVPB 20 milliGRAM(s) IV Intermittent daily  dextrose 5%. 1000 milliLiter(s) (50 mL/Hr) IV Continuous <Continuous>  dextrose 50% Injectable 12.5 Gram(s) IV Push once  dextrose 50% Injectable 25 Gram(s) IV Push once  dextrose 50% Injectable 25 Gram(s) IV Push once  finasteride 5 milliGRAM(s) Oral daily  insulin lispro (HumaLOG) corrective regimen sliding scale   SubCutaneous three times a day before meals  insulin lispro (HumaLOG) corrective regimen sliding scale   SubCutaneous at bedtime  pantoprazole  Injectable 40 milliGRAM(s) IV Push daily  ursodiol Tablet 500 milliGRAM(s) Oral two times a day    MEDICATIONS  (PRN):  dextrose 40% Gel 15 Gram(s) Oral once PRN Blood Glucose LESS THAN 70 milliGRAM(s)/deciliter  glucagon  Injectable 1 milliGRAM(s) IntraMuscular once PRN Glucose LESS THAN 70 milligrams/deciliter      Allergies    No Known Allergies    Intolerances            PHYSICAL EXAM:   Vital Signs:  Vital Signs Last 24 Hrs  T(C): 36.4 (19 Apr 2019 07:22), Max: 36.7 (18 Apr 2019 14:03)  T(F): 97.6 (19 Apr 2019 07:22), Max: 98.1 (18 Apr 2019 14:03)  HR: 71 (19 Apr 2019 07:22) (69 - 88)  BP: 121/81 (19 Apr 2019 07:22) (93/54 - 132/82)  BP(mean): --  RR: 18 (19 Apr 2019 07:22) (16 - 18)  SpO2: 99% (19 Apr 2019 07:22) (97% - 99%)  Daily     Daily     GENERAL:  no distress  HEENT:  NC/AT,  anicteric  CHEST:   no increased effort, breath sounds clear  HEART:  Regular rhythm  ABDOMEN:  Soft, non-tender, non-distended, normoactive bowel sounds,  no masses ,no hepato-splenomegaly, no signs of chronic liver disease  EXTEREMITIES:  no cyanosis      LABS:                        8.6    1.8   )-----------( 64       ( 19 Apr 2019 07:09 )             25.2     04-19    136  |  104  |  35<H>  ----------------------------<  80  3.9   |  22  |  1.12    Ca    9.0      19 Apr 2019 06:58    TPro  4.6<L>  /  Alb  2.9<L>  /  TBili  4.0<H>  /  DBili  x   /  AST  28  /  ALT  55<H>  /  AlkPhos  251<H>  04-19          RADIOLOGY & ADDITIONAL TESTS:

## 2019-04-19 NOTE — DISCHARGE NOTE PROVIDER - CARE PROVIDER_API CALL
Sergo Clay)  Hematology; Internal Medicine; Oncology  [facility] [place] [address]  Phone: [phone]  Fax: [phone]  Follow Up Time: Sergo Clay)  Hematology; Internal Medicine; Oncology  [address], [address]  [place] [address]  Phone: [phone]  Fax: [phone]  Follow Up Time:     Marin Albert)  Cardiovascular Disease; Internal Medicine  [address], [address]  [place] [address]  Phone: [phone]  Fax: [phone]  Follow Up Time:

## 2019-04-19 NOTE — PROGRESS NOTE ADULT - ASSESSMENT
80 yo M who presented on 4/3 with hematuria and jaundice found to have pancytopenia and meeting criteria for HLH    1. Hemophagocytic Lymphohistiocytosis:  - pt meeting criteria: ferritin over 46k, pancytopenia, fever, elevated triglycerides. Soluble IL-2 (CD25) receptor >135,000  - HLH protocol: [id number] mg/m2 twice weekly x 2 weeks, then weekly for weeks 3 to 8. Dose reduced the etoposide to 75 mg/m2 due to liver dysfunction and significant hyperbilirubinemia. Dexamethasone 10mg/m2 daily (week 1-2), 5mg/m2 daily (week 3-4), 2.5mg/m2 daily (week 5-6), 1.25 mg/m2  daily for week 7 and then stop.   - Week 1: etoposide 75 mg/m2 given on 4/9/19 and 4/11/19, dexamethasone 10 mg/m2 (20 mg IV daily starting 4/7/19)  - Week 2: Currently week 2 of HLH protocol. c/w Dex 20 mg IV daily. got etoposide 4-16-19 and will get etoposide 75 mg/m2 today (4/19)  - tolerating treatment, zofran prn nausea  - ferritin trending down, Bilirubin trending down, AST/ALT stable, continue to trend CBC with diff, CMP with LFTs daily   - s/p BM biopsy on 4/8 showing hemophagocytosis  - no clear evidence of infection  - monitor daily CBC with Diff, CMP with LFTs,  ferritin, LDH, and fibrinogen. If fibrinogen < 100, transfuse 10 units of cryoprecipitate  - CT C/A/P with no evidence of malignancy  - agree with PCP prophylaxis while pt on high dose steroids and will be on steroids for 7-8 weeks.   -anticipate d/c over the weekend. Pt is scheduled to see Dr. Clay at [facility] on Monday 4/22 at 1:40 pm. please make sure patient will get antibiotic ppx, steroids, and anti nausea medications on discharge    d/w primary team    Sundeep Griffith, PGY-5  Hematology-Oncology Fellow  Pager: [phone] ([facility]), [phone] ([facility])  (After 5 pm or on weekends please page the on-call fellow) 78 yo M who presented on 4/3 with hematuria and jaundice found to have pancytopenia and meeting criteria for HLH    1. Hemophagocytic Lymphohistiocytosis:  - pt meeting criteria: ferritin over 46k, pancytopenia, fever, elevated triglycerides. Soluble IL-2 (CD25) receptor >135,000  - HLH protocol: [id number] mg/m2 twice weekly x 2 weeks, then weekly for weeks 3 to 8. Dose reduced the etoposide to 75 mg/m2 due to liver dysfunction and significant hyperbilirubinemia. Dexamethasone 10mg/m2 daily (week 1-2), 5mg/m2 daily (week 3-4), 2.5mg/m2 daily (week 5-6), 1.25 mg/m2  daily for week 7 and then stop.   - Week 1: etoposide 75 mg/m2 given on 4/9/19 and 4/11/19, dexamethasone 10 mg/m2 (20 mg IV daily starting 4/7/19)  - Week 2: Currently week 2 of HLH protocol. c/w Dex 20 mg IV daily. got etoposide 4-16-19 and will get etoposide 75 mg/m2 today (4/19)  - tolerating treatment, zofran prn nausea  - ferritin trending down, Bilirubin trending down, AST/ALT stable, continue to trend CBC with diff, CMP with LFTs daily   - s/p BM biopsy on 4/8 showing hemophagocytosis  - no clear evidence of infection  - monitor daily CBC with Diff, CMP with LFTs,  ferritin, LDH, and fibrinogen. If fibrinogen < 100, transfuse 10 units of cryoprecipitate  - CT C/A/P with no evidence of malignancy  - agree with PCP prophylaxis while pt on high dose steroids and will be on steroids for 7-8 weeks.   - Plan for d/c today. Pt is scheduled to see Dr. Clay at [facility] on Monday 4/22 at 1:40 pm. Please make sure patient will get antibiotic ppx, steroids, and anti nausea medications on discharge    d/w primary team    Sundeep Griffith, PGY-5  Hematology-Oncology Fellow  Pager: [phone] ([facility]), [phone] ([facility])  (After 5 pm or on weekends please page the on-call fellow)

## 2019-04-19 NOTE — DISCHARGE NOTE PROVIDER - PROVIDER TOKENS
PROVIDER:[TOKEN:[[id number]]] PROVIDER:[TOKEN:[[id number]]],PROVIDER:[TOKEN:[[id number]]]

## 2019-04-22 PROBLEM — Z80.0 FAMILY HISTORY OF COLON CANCER: Status: ACTIVE | Noted: 2019-01-01

## 2019-04-22 PROBLEM — Z80.41 FAMILY HISTORY OF MALIGNANT NEOPLASM OF OVARY: Status: ACTIVE | Noted: 2019-01-01

## 2019-04-23 PROBLEM — Z00.00 ENCOUNTER FOR PREVENTIVE HEALTH EXAMINATION: Status: ACTIVE | Noted: 2019-01-01

## 2019-04-23 PROBLEM — N42.9 BENIGN PROSTATIC DISEASE: Status: ACTIVE | Noted: 2019-01-01

## 2019-04-23 NOTE — ASSESSMENT
[FreeTextEntry1] : BPH history, explained to patient that it is fine to take the finasteride 5mg and alfuzosin of  16 therapy.\par \par Explained to patient and his wife that after the 8 week induction therapy there is also an option for the maintenance therapy which includes cyclosporin A we will monitor the response during the 8 week course and  make a determination in 6 weeks. NExt dose of  16 on 4/26/19 will get this ordered and prepared.  \par \par Patient was on ursodiol  500mg BID  due to the greatly elevated bilirubins, however Pointed out here is no more hyperbilirubinemia probably does not need this anymore. Check bilirubin today of normal can stop.  \par \par Explained to Patient that during this 3rd week the dose of the dexamethasone should be 5mg/m2 or 10mg then 5mg for 2 weeks then 2.5 mg for the final 2 weeks \par he does still need the atovaquone given the leukopenia in setting of therapy.  \par Patient consented and is being scanned in chart.  Did review the side effects of the etoposide including but not limited to nausea , vomiting, low blood counts and infections, possibility of secondary cancers and leukemias.  Also explained that the HLH diagnosis itself  is also associated with progression to a malignancy.  \par \par Spent > 75 minutes in direct patient care and and addressed all questions and concerns.  >50% of this time was in direct face to face contact with patient during exam and counseling.

## 2019-04-23 NOTE — REASON FOR VISIT
[Initial Consultation] : an initial consultation for [Spouse] : spouse [FreeTextEntry2] : Recent diagnosis of hemophagocytosis.

## 2019-04-23 NOTE — PHYSICAL EXAM
[Fully active, able to carry on all pre-disease performance without restriction] : Status 0 - Fully active, able to carry on all pre-disease performance without restriction [Thin] : thin [Normal] : affect appropriate [de-identified] : no hee  [de-identified] : no hepatosplenomegaly.

## 2019-04-23 NOTE — HISTORY OF PRESENT ILLNESS
[de-identified] : This is a pleasant 78 year old man who has been newly diagnosed with a hemophagocytic lymphohistiocytoses.  This history started about 4 weeks ago had no appetite, then saw hematuria. Went for a CBC 1/21 Dr. Zuñigan no problems. This was repeated by Dr. Albert showed elevated liver enzymes.   on repeat it was again normal.  Then the hematuria started 3/26/19  3/26/19 blood in urine.  CT scan was performed and was unremarkable.   on 4/2/19 CBC showed pancytopenia and marked hemolysis Bilirubin was high as 20.  \par Patient feeling sluggish now.  Breathing labored for a bit.  Did not feel ill until the chemo and feels more affected by that than the anemia itself.  at the hospital, patient was treated with Dr. Pieter Delgado and received first 2 weeks of biweekly -16 at a dose of 75mg/m2 (hepatic dosing) was also on Decadron.  Patient was charged with dexamehtasone. Next cycle due on 4/26.\par 189 lbs in hospital.  Lost 13 lbs.  Total weight loss of  25 lbs over past few months.  \par Prior to this distant hx of [facility] surgery, hernia operation.  Still gets chills.  \par \par Current Medications:\par Atorvastatin on hold due to LFT's.  \par Alfazulsin 10mg\par  finesteride 5mg \par Holding centrum\par Atovaquone 750mg daily\par Pantoprazole 40\par Ursodiol 500mg 2 bid.

## 2019-06-01 NOTE — HISTORY OF PRESENT ILLNESS
[de-identified] : This is a pleasant 78 year old man who has been newly diagnosed with a hemophagocytic lymphohistiocytoses.  This history started about 4 weeks ago had no appetite, then saw hematuria. Went for a CBC 1/21 Dr. Zuñigan no problems. This was repeated by Dr. Albert showed elevated liver enzymes.   on repeat it was again normal.  Then the hematuria started 3/26/19  3/26/19 blood in urine.  CT scan was performed and was unremarkable.    Since then patient has received 5 more weeks of therapy with Etoposide. Week 3-6 was done with the dose reduction for the high bilirubin which was Etoposide 75mg/m2, on the 7th week bilirubin returned to near normal at 1.4 and the dose rwas increased to the full dose of 150mg/m2.  Patient was able to tolerate this ok. Hg today 10.4g/dl down from 10.5 alst week.  He is on the correct dose for the Decadron taper and prednisone at home. Ran out of the atovaquone but is no longer neutropenic.  Still complains of some fatigue

## 2019-06-01 NOTE — ASSESSMENT
[FreeTextEntry1] : This is a 79 year old man with a history of  hemophagocytic lymphohistiocytoses.  no evidence f hemolysis any longer, neutropenia and hemolytic anemia seemed to have resolved. CHeck ferritin which could be a good indication of hemolysis along with haptoglobin and LDH.  Hg down slightly to 10.4g/dl down from 10.5g/dl  Not concerned about this suspect its te effect of the increased dose of the etoposide rather than the hemolysis gain.  Haptoglobin is still rising now 101.  Ferritin is down to 677 where it was in  the 4900 range a the hospital.  Recommend patient return in 3 months for repeat bloodwork and monitoring Given how well he has done will not start the maintain phase with cyclosporin.  That is associated with increased risk of side effects.  Explained to him that there is a fair risk that this could relapse.

## 2019-07-31 PROBLEM — D69.3 CHRONIC ITP (IDIOPATHIC THROMBOCYTOPENIA): Status: ACTIVE | Noted: 2019-01-01

## 2019-08-03 NOTE — ASSESSMENT
[FreeTextEntry1] : This is a 79 year old man with a history of  hemophagocytic lymphohistiocytoses.  no evidence  of hemolysis any longer, neutropenia and hemolytic anemia seemed to have resolved post etoposide treatment.  Hg stable 13.3-12.8 minor decrease July 25th.  Ferritin increasing again though haptoglobin is still detectable at 54.  LDY came down from 267 to 255.  Patient actively ill with what appears to be a viral illness for past 2 weeks. This may be the result of said infection. Recommend repeating Bloodwork next week after the viral illness clears. If symptoms progress or continue, or if Hg starts trending down significantly, will need to reinitiate therapy.  \par \par Patient also has a moderate thrombocytopenia.  Plts in the 300 then 128 then 73 on the 25th.  An antiplatelet antibody has been detected.  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  Will continue to trend if falls below 50 or bleeding will need therapy.

## 2019-08-03 NOTE — PHYSICAL EXAM
[Normal] : normal appearance, no rash, nodules, vesicles, ulcers, erythema [de-identified] : warm 99.8 at time of exam.   [de-identified] : geographic tongue.  There is an area of denuded taste buds on the right side.

## 2019-08-13 PROBLEM — D76.1 HEMOPHAGOCYTIC LYMPHOHISTIOCYTOSIS: Status: ACTIVE | Noted: 2019-01-01

## 2019-08-13 NOTE — REASON FOR VISIT
[Bone Marrow Biopsy] : bone marrow biopsy [Spouse] : spouse [Bone Marrow Aspiration] : bone marrow aspiration

## 2019-08-13 NOTE — END OF VISIT
[FreeTextEntry3] : Paitent with worsening chills and fatigue, does not appear to be anemia as Hg remained preserved at 10.3g/dl.  Suspect the sympoms are coming form cytokine storm in setting of HLH rather than the anemia as that’s mild right now.  Reccomended he start the cyclosporin 100mg PO BID, will check  the cyclosporin levels on Monday along with repeat CBC, CMP.  If levels do not appear to improve we will get ready to restart a dose of etoposide next Friday at a dose of 75mg/m2 due to an elevated bilirubin. \par \par Informed patient that the treatment of the HLH from this point on after relapse would not be curative. We have the option of starting Gamifant should the etoposide and cyclosporin be unhelpful. We discussed side effects of this briefly, it is a newer medication and can be cost prohibitive, we can look into what the actual costs can be while we start the cyclosporin and possibly the etoposide again the week after.  \par \par Recommended patient start prophylactic antibiotics, Bactrim DS daily for PCP prophylaxis as well as acyclovir 400mg PO BID for

## 2019-08-13 NOTE — PROCEDURE
[Bone Marrow Aspiration] : bone marrow aspiration  [Bone Marrow Biopsy] : bone marrow biopsy [Verbal Consent Obtained] : verbal consent was obtained prior to the procedure [Patient] : the patient [Procedure verified and consent obtained] : procedure verified and consent obtained [Patient identification verified] : patient identification verified [Correct positioning] : correct positioning [Laterality verified and correct site marked] : laterality verified and correct site marked [Correct implant and/ or special equipment obtained] : correct impact and/ or special equipment obtained [Superior iliac spine was identified] : the superior iliac spine was identified. [Prone] : prone [The left posterior iliac crest was prepped with betadine and draped, using sterile technique.] : The left posterior iliac crest was prepped with betadine and draped, using sterile technique. [Lidocaine was injected and into the periosteum overlying the site.] : Lidocaine was injected and into the periosteum overlying the site. [PCR] : PCR [Aspirate] : aspirate [Flow Cytometry] : flow cytometry [Biopsy] : biopsy [] : The patient was instructed to remove the bandage the following AM. The patient may bathe. Acetaminophen may be taken for discomfort, as per package directions.If there are any other problems, the patient was instructed to call the office. The patient verbalized understanding, and is aware of the office contact numbers. [FreeTextEntry2] : Left posterior iliac crest was identified, cleansed with iodine swabs X 3.  Anesthesia obtained with 10ml 2% lidocaine and an additional 3 ml 1% lidocaine, Mild pain was described. Patient bone marrow biopsy and aspirate was obtained in one pass.  Crushed core noted, roughly a 5-6 mm round conglomerate.

## 2019-08-16 NOTE — HISTORY OF PRESENT ILLNESS
[de-identified] : No fever.  Lost 5 lbs. Weak cant stand after 2 minutes.  \par \par Mild nausea but complete loss of appetite.

## 2019-08-19 NOTE — PHYSICAL EXAM
[Normal] : normal appearance, no rash, nodules, vesicles, ulcers, erythema [de-identified] : thin cachectic appearing.

## 2019-08-19 NOTE — HISTORY OF PRESENT ILLNESS
[de-identified] : This is a pleasant 78 year old man who has been newly diagnosed with a hemophagocytic lymphohistiocytoses. This history started about 4 weeks ago had no appetite, then saw hematuria. Went for a CBC 1/21 Dr. Irby no problems. This was repeated by Dr. Albert showed elevated liver enzymes. on repeat it was again normal. Then the hematuria started 3/26/19 3/26/19 blood in urine. CT scan was performed and was unremarkable. Since then patient has received 5 more weeks of therapy with Etoposide. Week 3-6 was done with the dose reduction for the high bilirubin which was Etoposide 75mg/m2, on the 7th week bilirubin returned to near normal at 1.4 and the dose rwas increased to the full dose of 150mg/m2.  Patient was doing well for a couple of months but then had a fever 100.4 and some chills, thought to be a viral illness, hwover patient continued to have this for a total of 4 weeks. Hg dropped to 10.4 Today 9.9g/dl.  Patient suffering from severe chills weakness anorexia, similar to his initial symptoms in May.  \par No fever.  Lost 5 lbs. Weak can not stand for very long, gets fatigued to the point he has to lay back down after 2 minutes.  \par \par Mild nausea but complete loss of appetite.

## 2019-08-19 NOTE — ASSESSMENT
[FreeTextEntry1] : This is a 79 year old man with history of HLH responded very well to etoposide and Decadron taper as per HLH 94 protocol. However after just 2 months, the symptoms recurred. Patient suffering more form cytokine storm, chills weakness ,anorexia rather than symptoms of the anemia from hemophagocytic hemolysis.  Hg 9.9g/dl is not particularly bad.  Recommended he increase the Decadron to 20mg PO BID.  Will move his appointment for the etoposide forward to Monday.  \par \par Check cyclosporin level, though patient took a more recent dose this morning about 3 hours prior to the draw.  May be valuable if significantly underdosed.  \par \par On Tuesday we performed a bone marrow to again r/o underlying lymphoma results still pending.

## 2019-09-03 NOTE — HISTORY OF PRESENT ILLNESS
[de-identified] : This is a pleasant 78 year old man who has been newly diagnosed with a hemophagocytic lymphohistiocytoses. This history started about 4 weeks ago had no appetite, then saw hematuria. Went for a CBC 1/21 Dr. Zuñigan no problems. This was repeated by Dr. Albert showed elevated liver enzymes. on repeat it was again normal. Then the hematuria started 3/26/19 3/26/19 blood in urine. CT scan was performed and was unremarkable. Since then patient has received 5 more weeks of therapy with Etoposide. Week 3-6 was done with the dose reduction for the high bilirubin which was Etoposide 75mg/m2, on the 7th week bilirubin returned to near normal at 1.4 and the dose rwas increased to the full dose of 150mg/m2 for week 8. Patient had a very good response, hemolysis resolved, ferritin down to <1000.     2 months following the final doses of etoposide patient had worsening fatigue, weakness, anorexia and weight loss .Ferritin increased to 11,000 again, and Hg down to 8g/dl,   Patient was restarted on etoposide and started on cyclosporin 500mg BID advanced to TID.  THis did very little for symptomatology of primary HLH.  \par Patient was just transfused on 8/26/19 Hg today fair at 8.7g/dl, however does not feel much better.  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  Ther e is also a CD 30 positive lymphoma present ,  large neoplastic cell positive for CD 30, CD 25 CD45 (bright) TIA-1, Granzyme B, perforin, , negative for CD2, CD3, CD5, CD7, CD8 CD56, CD57 Alk-1, CD15, CD20, Pax5 ]\par Karyotype \par  HLX CG (CONST) Final Report             Final\par \par   Test   Result   Flag Reference Goal \par   Chromosome Analysis Final Report Chromosome Analysis (ONC) Report      \par   _______________________________________________________________\par Accession Number: [id number]\par Indication: Relapsed HLH, rule out underlying lymphoma\par Date Collected: 08/13/2019 7:38\par Date Received: 08/13/2019 15:02\par Date Reported: 08/29/2019 16:37\par Specimen Type: Bone Marrow\par Test Requested: Chromosome Analysis\par ________________________________________________________________\par Metaphases Counted:           20\par Culture Duration:                   24 and 72 hours\par Metaphases Analyzed:          20\par Number of Cultures:              2\par Metaphases Karyotyped:      17\par Banding Technique:             GTG\par Band Resolution:                  350 - 400\par Preparation Quality:             Adequate\par ________________________________________________________________\par Result: Abnormal male karyotype\par \par Karyotype: 79-82,<4n>,XXYY,-1,bryson(1)dup(1)(p13p22)bryson(1)t(1;?)(p36;?),bryson(1)t(1;10)(p36.1;q11.2),-2,bryson(2)t(2;?)(q23;?),-3,del(3)(q11.2),-4,-5,del(6)(q13)x2,-10,-10,-10,-11,del(12)(p13),-13,bryson(13)t(13;15)(q32;q11.2)x2,bryson(14)t(14;?)(q32;?),-15,-15,-15,-17,bryson(17)t(17;?)(p13;?)x2,-18,bryson(19)t(19;?)(p13.3;?)x2,+20,+21,i(21)(q10),-22,del(22)(q13),bryson(22)t(22;?)(q13;?),+1-6mar[cp13]/45,X,-Y[3]/46,XY[4]\par ________________________________________________________________\par Findings and Interpretation:\par Thirteen of the twenty metaphases examined revealed multiple and complex karyotypic abnormalities as described above.  Only consistent clonal changes are listed and the karyotype is described as a composite. Three metaphases also showed a loss of the Y chromosome. The remaining metaphases had an apparently normal karyotype.\par \par No other consistent numerical or structural chromosome abnormalities were observed within the limits of the technology used in this analysis.\par \par Hyperdiploidy has been reported in various lymphomas.  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  This abnormality is seen in various hematological disorders and lymphomas and is generally associated with an unfavorable prognosis.\par \par Clonal loss of the Y chromosome is usually an age-related phenomenon of no clinical significance, however, a disease related association cannot be ruled out.\par \par Correlation with clinical and hematopathological findings is recommended.\par \par Please see the concurrent fluorescence in situ hybridization (FISH) report on this specimen, as well as, previous cytogenetic and FISH reports on this patient for additional information.\par \par Disclaimer:\par Routine chromosome analysis may not detect subtle structural rearrangements within the limits of cytogenetic technology utilized in this study.\par \par Preliminary Report: No\par \par Pathologist: Toribio Dickerson MD\par \par Verified By: Cytogeneticist: Ann Lomax, PhD, [facility]\par (Electronic Signature)\par \par  \par \par  Ordered by: VARSHA NAVARRO       Collected/Examined: [id number] 03:02PM       \par Verified by: VARSHA NAVARRO [id number] 06:49PM       \par  Result Communication: No patient communication needed at this time;\par Stage: Final       \par  Performed at: [facility] (Med Director: Swapna Mason M.D)       Resulted: [id number] 04:37PM       Last Updated: [id number] 06:49PM       Accession: [id number]       \par

## 2019-09-03 NOTE — PHYSICAL EXAM
[Thin] : thin [Normal] : clear to auscultation bilaterally, no dullness, no wheezing [de-identified] : Anicteric  [de-identified] : Cachectic elderly male.  Pale

## 2019-09-03 NOTE — ASSESSMENT
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 Had elected against the maintenance phase. Patient relapsed 2-3 months after end of 8 weeks of etoposide. Hemolysis, elevated ferritin recurred. was treated with reinitiation of decadron and etoposide along with cyclosporin, this did very little to improve his symptoms. Blood transfusion 8/26 also did not help, suspect his weakness and anorexia weight loss from progression of underlying lymphoma which was found on BM biopsy 8/13/19.  Canceled etoposide therapy, will have him scheduled this week for intended Brentuximab -CHOP therapy Given poor functional status now, and his frailty, anemia, and anorexia, will hold the doxorubicin component and administer as Brentuximab-CVP for first cycle can consider starting the doxorubicin in cycle 2 or 3.  Given pre-existing neutropenia will perform with GCSF support with Neulasta. Will have patient check a CBC for possible transfusion Day 2 and again Day 10-14.  \par Overall dose Brentuximab 1.2mg/kg, Cyclophosphamide 750mg/m2, vincristine 1.4mg/m2 cap at 2mg, and Decadron 20mg instead of prednisone 100mg for first cycle since he is already taking this.  \par Doxorubicin 50mg/m2 can be added in subsequent cycles.  Patient signed consent for this.  \par \par I was ellen about patient's prognosis, while primary treatment does have a fair response rate, 57% in some studies in the primary treatment of anaplastic large cell lymphoma.  the duration of the response is unpredictable, Also patient had already received treatment before the lymphoma was known about which may have increased resistance.  Treatment outcome may be unpredictable. At the given moment given advanced symptomatology will focus on improving his symptoms for the near future.  \par \par PET scan was done 8/26/19 report not available, however there is FDG avid areas in the spleen, the colon and most bones in the body save for the head of the humerus.  \par

## 2019-09-03 NOTE — REVIEW OF SYSTEMS
[Fatigue] : fatigue [Shortness Of Breath] : shortness of breath [Joint Pain] : joint pain [Joint Stiffness] : joint stiffness [Negative] : Neurological

## 2019-09-18 NOTE — HISTORY OF PRESENT ILLNESS
[de-identified] : This is a pleasant 78 year old man who has been newly diagnosed with a hemophagocytic lymphohistiocytoses. This history started about 4 weeks ago had no appetite, then saw hematuria. Went for a CBC 1/21 Dr. Zuñigan no problems. This was repeated by Dr. Albert showed elevated liver enzymes. on repeat it was again normal. Then the hematuria started 3/26/19 3/26/19 blood in urine. CT scan was performed and was unremarkable. Since then patient has received 5 more weeks of therapy with Etoposide. Week 3-6 was done with the dose reduction for the high bilirubin which was Etoposide 75mg/m2, on the 7th week bilirubin returned to near normal at 1.4 and the dose rwas increased to the full dose of 150mg/m2 for week 8. Patient had a very good response, hemolysis resolved, ferritin down to <1000.     2 months following the final doses of etoposide patient had worsening fatigue, weakness, anorexia and weight loss .Ferritin increased to 11,000 again, and Hg down to 8g/dl,   Patient was restarted on etoposide and started on cyclosporin 500mg BID advanced to TID. 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  \par \par Bone marrow biopsy 8/13/19  ALK-1 negative \par demonstrated a hypercellular marrow 70-80% with erythroid predominant maturing trilineage hematopoieses, dyserythropoiesis, erythrophagocytosis, and hemophagocytosis.  Ther e is also a CD 30 positive lymphoma present ,  large neoplastic cell positive for CD 30, CD 25 CD45 (bright) TIA-1, Granzyme B, perforin, , negative for CD2, CD3, CD5, CD7, CD8 CD56, CD57 Alk-1, CD15, CD20, Pax5 ]\par Karyotype \par  HLX CG (CONST) Final Report             Final\par \par   Test   Result   Flag Reference Goal \par   Chromosome Analysis Final Report Chromosome Analysis (ONC) Report      \par   _______________________________________________________________\par Accession Number: [id number]\par Indication: Relapsed HLH, rule out underlying lymphoma\par Date Collected: 08/13/2019 7:38\par Date Received: 08/13/2019 15:02\par Date Reported: 08/29/2019 16:37\par Specimen Type: Bone Marrow\par Test Requested: Chromosome Analysis\par ________________________________________________________________\par Metaphases Counted:           20\par Culture Duration:                   24 and 72 hours\par Metaphases Analyzed:          20\par Number of Cultures:              2\par Metaphases Karyotyped:      17\par Banding Technique:             GTG\par Band Resolution:                  350 - 400\par Preparation Quality:             Adequate\par ________________________________________________________________\par Result: Abnormal male karyotype\par \par Karyotype: 79-82,<4n>,XXYY,-1,bryson(1)dup(1)(p13p22)bryson(1)t(1;?)(p36;?),bryson(1)t(1;10)(p36.1;q11.2),-2,bryson(2)t(2;?)(q23;?),-3,del(3)(q11.2),-4,-5,del(6)(q13)x2,-10,-10,-10,-11,del(12)(p13),-13,bryson(13)t(13;15)(q32;q11.2)x2,bryson(14)t(14;?)(q32;?),-15,-15,-15,-17,bryson(17)t(17;?)(p13;?)x2,-18,bryson(19)t(19;?)(p13.3;?)x2,+20,+21,i(21)(q10),-22,del(22)(q13),bryson(22)t(22;?)(q13;?),+1-6mar[cp13]/45,X,-Y[3]/46,XY[4]\par ________________________________________________________________\par Findings and Interpretation:\par Thirteen of the twenty metaphases examined revealed multiple and complex karyotypic abnormalities as described above.  Only consistent clonal changes are listed and the karyotype is described as a composite. Three metaphases also showed a loss of the Y chromosome. The remaining metaphases had an apparently normal karyotype.\par \par No other consistent numerical or structural chromosome abnormalities were observed within the limits of the technology used in this analysis.\par \par Hyperdiploidy has been reported in various lymphomas.  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  This abnormality is seen in various hematological disorders and lymphomas and is generally associated with an unfavorable prognosis.\par \par Clonal loss of the Y chromosome is usually an age-related phenomenon of no clinical significance, however, a disease related association cannot be ruled out.\par \par Correlation with clinical and hematopathological findings is recommended.\par \par Please see the concurrent fluorescence in situ hybridization (FISH) report on this specimen, as well as, previous cytogenetic and FISH reports on this patient for additional information.\par \par Disclaimer:\par Routine chromosome analysis may not detect subtle structural rearrangements within the limits of cytogenetic technology utilized in this study.\par \par Preliminary Report: No\par \par Pathologist: Toribio Dickerson MD\par \par Verified By: Cytogeneticist: Ann Lomax, PhD, [facility]\par (Electronic Signature)\par \par  \par \par  Ordered by: VARSHA NAVARRO       Collected/Examined: [id number] 03:02PM       \par Verified by: VARSHA NAVARRO [id number] 06:49PM       \par  Result Communication: No patient communication needed at this time;\par Stage: Final       \par  Performed at: [facility] (Med Director: Swapna Mason M.D)       Resulted: [id number] 04:37PM       Last Updated: [id number] 06:49PM       Accession: [id number]       \par  [de-identified] : Patient was found to have a lumbar compression fracture. On Friday I had prescribed oxycodone 5/325 given that Tylenol not working, however I did not want him to take NSAIDs due to thrombocytopenia, chronic steroids high risk of GI bleeding.  Fell 3 times over the weekend, head hit some furniture but not hard. Had a red rash on his abdomen.  \par HE states that this too did very little for his pain.  HAd not felt dizzy or fallen prior to starting the oxycodone. Does realize it made him somewhat drowsy.  \par WBC 20 Hg 8.6g/dl Plts 111.  Also complains of gassy burping not burning feels like its coming up. Severes constipation has not gone for 4 days.  \par \par

## 2019-09-18 NOTE — REVIEW OF SYSTEMS
[Fatigue] : fatigue [Constipation] : constipation [Negative] : Psychiatric [FreeTextEntry8] : Dark flecks in Urinal.   [FreeTextEntry7] : Gassiness bloating  [FreeTextEntry9] : Back pain

## 2019-09-18 NOTE — PHYSICAL EXAM
[Thin] : thin [Normal] : RRR, normal S1S2, no murmurs, rubs, gallops [de-identified] : Cachectic elderly male.  Pale [de-identified] : Tenderness over the lumbar spine at level of the known compression fracture.   [de-identified] : Anicteric  [de-identified] : Red rash on abdomen not petichae.

## 2019-09-18 NOTE — ASSESSMENT
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 Had elected against the maintenance phase. Patient relapsed 2-3 months after end of 8 weeks of etoposide. Hemolysis, elevated ferritin recurred. was treated with reinitiation of decadron and etoposide along with cyclosporin, this did very little to improve his symptoms. Blood transfusion 8/26 also did not help, suspect his weakness and anorexia weight loss from progression of underlying lymphoma which was found on BM biopsy 8/13/19.  Canceled etoposide therapy, will have him scheduled this week for intended Brentuximab -CHOP therapy Given poor functional status now, and his frailty, anemia, and anorexia, will hold the doxorubicin component and administer as Brentuximab-CVP for first cycle can consider starting the doxorubicin in cycle 2 or 3.  Given pre-existing neutropenia will perform with GCSF support with Neulasta. Will have patient check a CBC for possible transfusion Day 2 and again Day 10-14.  \par Overall dose Brentuximab 1.2mg/kg, Cyclophosphamide 750mg/m2, vincristine 1.4mg/m2 cap at 2mg, and Decadron 20mg instead of prednisone 100mg for first cycle since he is already taking this.  \par Doxorubicin 50mg/m2 can be added in subsequent cycles. Will start Cycle 2 with 50% dose reduction despite normalization of the bilirubin today.  \par \par patient returns after having a very bad weekend, was dizzy and fell 3 times, drowsy, started after the oxycodone\par Constipation could have been due to vincristine plus oxycodone over the weekend, d/c oxycodone, start MiraLAX 17 G BID for constipation change to daily for maintain once normal BM noted.   \par \par Non petechial red rash on abdomen, probably due to prolonged course of steroids.  Not thrombocytopenia, not Petechia.  \par \par The episodes of falling probably related to oxycodone, had not fallen before this was started.  Recommend discontinuation, it does not seem to be helping his pain at all so no benefit would not push to higher doses. Recommend Tylenol 1000mg (2 extra strength) Q 6hrs around the clock for pain, still do not recommend NSAIDs due to bleeding risk and creatinine 1.7.  Can use lidocaine patch as an adjunct.  Pain due to compression fracture in conjunction with Neulasta GCSF support.  \par \par Hg 8.6g/dl  WBC 20.9 no longer neutropenic, and platelet count 111 despite no transfusion in a month, this is a good sign.  Would still have him come in Thursday for 2 unit PRBC transfusion during next cycle we are planning initiation of dose reduced Adriamycin 50% and want the Hg to be good and safe enough to initiate that treatment, could also help him with his fatigue. \par \par SOB improved significantly, no more labored breath this weekend despite the fatigue.  \par Counts appear stable taper Decadron to 8mg X 4 days, then 4mg X 4 days then 2mg X 4 days.  \par Follow up next week for Cycle 2 of B-CHO + Decadron, adding back in the Adriamycin with a 50% dose reduction due to patient's more recent frailty.

## 2019-09-19 PROBLEM — C84.71: Status: ACTIVE | Noted: 2019-01-01

## 2019-09-26 PROBLEM — S73.109A THIGH SPRAIN: Status: ACTIVE | Noted: 2019-01-01

## 2019-09-26 NOTE — HISTORY OF PRESENT ILLNESS
[de-identified] : This is a pleasant 78 year old man who has been newly diagnosed with a hemophagocytic lymphohistiocytoses. This history started about 4 weeks ago had no appetite, then saw hematuria. Went for a CBC 1/21 Dr. Zuñigan no problems. This was repeated by Dr. Albert showed elevated liver enzymes. on repeat it was again normal. Then the hematuria started 3/26/19 3/26/19 blood in urine. CT scan was performed and was unremarkable. Since then patient has received 5 more weeks of therapy with Etoposide. Week 3-6 was done with the dose reduction for the high bilirubin which was Etoposide 75mg/m2, on the 7th week bilirubin returned to near normal at 1.4 and the dose rwas increased to the full dose of 150mg/m2 for week 8. Patient had a very good response, hemolysis resolved, ferritin down to <1000.     2 months following the final doses of etoposide patient had worsening fatigue, weakness, anorexia and weight loss .Ferritin increased to 11,000 again, and Hg down to 8g/dl,   Patient was restarted on etoposide and started on cyclosporin 500mg BID advanced to TID. This did not seem to have helped with symptomatology, Ferritin remained elevated.  Bone marrow biospy\par \par PET CT scan performed demonstrated FDG activity in the bone, liver and spleen.  No pb sites. 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  \par 9/03/19 C1 Brentuximab-CVD given with Neulasta GCSF support.  \par 9/26/19 C2 Brentuximab-CHOD with 50% reduction in adriamycin started today.  GCSF support neulasta C2.  \par \par \par Bone marrow biopsy 8/13/19  ALK-1 negative \par demonstrated a hypercellular marrow 70-80% with erythroid predominant maturing trilineage hematopoieses, dyserythropoiesis, erythrophagocytosis, and hemophagocytosis.  Ther e is also a CD 30 positive lymphoma present ,  large neoplastic cell positive for CD 30, CD 25 CD45 (bright) TIA-1, Granzyme B, perforin, , negative for CD2, CD3, CD5, CD7, CD8 CD56, CD57 Alk-1, CD15, CD20, Pax5 ]\par Karyotype \par  HLX CG (CONST) Final Report             Final\par \par   Test   Result   Flag Reference Goal \par   Chromosome Analysis Final Report Chromosome Analysis (ONC) Report      \par   _______________________________________________________________\par Accession Number: [id number]\par Indication: Relapsed HLH, rule out underlying lymphoma\par Date Collected: 08/13/2019 7:38\par Date Received: 08/13/2019 15:02\par Date Reported: 08/29/2019 16:37\par Specimen Type: Bone Marrow\par Test Requested: Chromosome Analysis\par ________________________________________________________________\par Metaphases Counted:           20\par Culture Duration:                   24 and 72 hours\par Metaphases Analyzed:          20\par Number of Cultures:              2\par Metaphases Karyotyped:      17\par Banding Technique:             GTG\par Band Resolution:                  350 - 400\par Preparation Quality:             Adequate\par ________________________________________________________________\par Result: Abnormal male karyotype\par \par Karyotype: 79-82,<4n>,XXYY,-1,bryson(1)dup(1)(p13p22)bryson(1)t(1;?)(p36;?),bryson(1)t(1;10)(p36.1;q11.2),-2,bryson(2)t(2;?)(q23;?),-3,del(3)(q11.2),-4,-5,del(6)(q13)x2,-10,-10,-10,-11,del(12)(p13),-13,bryson(13)t(13;15)(q32;q11.2)x2,bryson(14)t(14;?)(q32;?),-15,-15,-15,-17,bryson(17)t(17;?)(p13;?)x2,-18,bryson(19)t(19;?)(p13.3;?)x2,+20,+21,i(21)(q10),-22,del(22)(q13),bryson(22)t(22;?)(q13;?),+1-6mar[cp13]/45,X,-Y[3]/46,XY[4]\par ________________________________________________________________\par Findings and Interpretation:\par Thirteen of the twenty metaphases examined revealed multiple and complex karyotypic abnormalities as described above.  Only consistent clonal changes are listed and the karyotype is described as a composite. Three metaphases also showed a loss of the Y chromosome. The remaining metaphases had an apparently normal karyotype.\par \par No other consistent numerical or structural chromosome abnormalities were observed within the limits of the technology used in this analysis.\par \par Hyperdiploidy has been reported in various lymphomas.  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  This abnormality is seen in various hematological disorders and lymphomas and is generally associated with an unfavorable prognosis.\par \par Clonal loss of the Y chromosome is usually an age-related phenomenon of no clinical significance, however, a disease related association cannot be ruled out.\par \par  [de-identified] : Patient returns for Cycle 2, as discussed we will be introducing the Adriamycin at a 50% dose reduction. Patient had done very well post  C1 B-CVD Hg 9.7g/dl today, platelets 101.  Suspect he will not be requiring transfusions now, will still have him come in mid cycle for CBC recheck.  \par \par Patient states that he does not feel as weak now, has not fallen since stopping the percocet.  Has had some pain relief with tramadol.  Still experiences pain in the lower back as well as right thigh pain.

## 2019-09-26 NOTE — ASSESSMENT
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 Had elected against the maintenance phase. Patient relapsed 2-3 months after end of 8 weeks of etoposide. Hemolysis, elevated ferritin recurred. was treated with reinitiation of decadron and etoposide along with cyclosporin, this did very little to improve his symptoms. Blood transfusion 8/26 also did not help, suspect his weakness and anorexia weight loss from progression of underlying lymphoma which was found on BM biopsy 8/13/19.\par \par 9/3/19 tolerated C1 Brentuximab -CVD + GCSF support Neulasta\par 9/26/19 C2 Brentuximab -CHOD with 50% dose reduction of Adriamycin  + GCSF support During this cycle will taper Decadron to off, and restart patient with the more standard 100mg of prednisone for 5 days for CHOP.  \par Bilirubin is normal again at 0.8\par \par Also noted, prior to the diagnosis for the HLH and T cell anaplastic lymphoma, patient had some urological issues he was supposed to have a cystoscopy for.  If all goes well, not cytopenic, can plan to do this after Cycle 3.  \par \par If creatinine improves considering starting Zometa 4mg with Calcium for bone pain.  Also opens up possibility of using NSAIDS for his back pain as well but would not do both at the same time.  \par \par Lumbar MRI results reviewed. Enhanced bone marrow signal consistent with the presence of lymphoma and HLH in marrow.  Also possible L3 trabecular fracture, right sided mild spinal stenosis.  This explain much of his pain however there is also a muscle strain in  right thigh, he is going for physical therapy for the back pain, recommended he get physical therapy for ROM exercise for right thigh too.  \par \par Spent > 25 minutes in direct patient care and and addressed all questions and concerns.  >50% of this time was in direct face to face contact with patient during exam and counseling.

## 2019-09-26 NOTE — PHYSICAL EXAM
[Normal] : affect appropriate [de-identified] : Straight leg raise negative, Right thigh cramping.

## 2019-10-16 PROBLEM — L03.039 INFECTED NAILBED OF TOE: Status: ACTIVE | Noted: 2019-01-01

## 2019-10-17 NOTE — HISTORY OF PRESENT ILLNESS
[de-identified] : This is a pleasant 78 year old man who has been newly diagnosed with a hemophagocytic lymphohistiocytoses. This history started about 4 weeks ago had no appetite, then saw hematuria. Went for a CBC 1/21 Dr. Zuñigan no problems. This was repeated by Dr. Albert showed elevated liver enzymes. on repeat it was again normal. Then the hematuria started 3/26/19 3/26/19 blood in urine. CT scan was performed and was unremarkable. Since then patient has received 5 more weeks of therapy with Etoposide. Week 3-6 was done with the dose reduction for the high bilirubin which was Etoposide 75mg/m2, on the 7th week bilirubin returned to near normal at 1.4 and the dose rwas increased to the full dose of 150mg/m2 for week 8. Patient had a very good response, hemolysis resolved, ferritin down to <1000.     2 months following the final doses of etoposide patient had worsening fatigue, weakness, anorexia and weight loss .Ferritin increased to 11,000 again, and Hg down to 8g/dl,   Patient was restarted on etoposide and started on cyclosporin 500mg BID advanced to TID. This did not seem to have helped with symptomatology, Ferritin remained elevated.  Bone marrow biospy\par \par PET CT scan performed demonstrated FDG activity in the bone, liver and spleen.  No pb sites. 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  \par 9/03/19 C1 Brentuximab-CVD given with Neulasta GCSF support.  \par 9/26/19 C2 Brentuximab-CHOD with 50% reduction in adriamycin started today.  GCSF support neulasta C2.  \par \par \par Bone marrow biopsy 8/13/19  ALK-1 negative \par demonstrated a hypercellular marrow 70-80% with erythroid predominant maturing trilineage hematopoieses, dyserythropoiesis, erythrophagocytosis, and hemophagocytosis.  Ther e is also a CD 30 positive lymphoma present ,  large neoplastic cell positive for CD 30, CD 25 CD45 (bright) TIA-1, Granzyme B, perforin, , negative for CD2, CD3, CD5, CD7, CD8 CD56, CD57 Alk-1, CD15, CD20, Pax5 ]\par Karyotype \par  HLX CG (CONST) Final Report             Final\par \par   Test   Result   Flag Reference Goal \par   Chromosome Analysis Final Report Chromosome Analysis (ONC) Report      \par   _______________________________________________________________\par Accession Number: [id number]\par Indication: Relapsed HLH, rule out underlying lymphoma\par Date Collected: 08/13/2019 7:38\par Date Received: 08/13/2019 15:02\par Date Reported: 08/29/2019 16:37\par Specimen Type: Bone Marrow\par Test Requested: Chromosome Analysis\par ________________________________________________________________\par Metaphases Counted:           20\par Culture Duration:                   24 and 72 hours\par Metaphases Analyzed:          20\par Number of Cultures:              2\par Metaphases Karyotyped:      17\par Banding Technique:             GTG\par Band Resolution:                  350 - 400\par Preparation Quality:             Adequate\par ________________________________________________________________\par Result: Abnormal male karyotype\par \par Karyotype: 79-82,<4n>,XXYY,-1,bryson(1)dup(1)(p13p22)bryson(1)t(1;?)(p36;?),bryson(1)t(1;10)(p36.1;q11.2),-2,bryson(2)t(2;?)(q23;?),-3,del(3)(q11.2),-4,-5,del(6)(q13)x2,-10,-10,-10,-11,del(12)(p13),-13,bryson(13)t(13;15)(q32;q11.2)x2,bryson(14)t(14;?)(q32;?),-15,-15,-15,-17,bryson(17)t(17;?)(p13;?)x2,-18,bryson(19)t(19;?)(p13.3;?)x2,+20,+21,i(21)(q10),-22,del(22)(q13),bryson(22)t(22;?)(q13;?),+1-6mar[cp13]/45,X,-Y[3]/46,XY[4]\par ________________________________________________________________\par Findings and Interpretation:\par Thirteen of the twenty metaphases examined revealed multiple and complex karyotypic abnormalities as described above.  Only consistent clonal changes are listed and the karyotype is described as a composite. Three metaphases also showed a loss of the Y chromosome. The remaining metaphases had an apparently normal karyotype.\par \par No other consistent numerical or structural chromosome abnormalities were observed within the limits of the technology used in this analysis.\par \par Hyperdiploidy has been reported in various lymphomas.  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  This abnormality is seen in various hematological disorders and lymphomas and is generally associated with an unfavorable prognosis.\par \par Clonal loss of the Y chromosome is usually an age-related phenomenon of no clinical significance, however, a disease related association cannot be ruled out.\par \par  [de-identified] : Patient has been feeling ill since C2 B-CHOD where we added in the Adriamycin that was subtracted from Cycle 1 due to poor performance status.  He did well with the first cycle, ferritin declined to 7600 down from 11,000 after that. After the initiation of the Adriamycin with a 50% dose reduction.  Patient initially felt ok on this, however over the course of the week, Hg dropped to 7g/dl and he was transfused which did not make a difference to his energy.  No fevers or chills.  Some nausea and constipation but no nausea or vomiting.

## 2019-10-17 NOTE — ASSESSMENT
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 Had elected against the maintenance phase. Patient relapsed 2-3 months after end of 8 weeks of etoposide. Hemolysis, elevated ferritin recurred. was treated with reinitiation of decadron and etoposide along with cyclosporin, this did very little to improve his symptoms. Blood transfusion 8/26 also did not help, suspect his weakness and anorexia weight loss from progression of underlying lymphoma which was found on BM biopsy 8/13/19.\par \par 9/3/19 tolerated C1 Brentuximab -CVD + GCSF support Neulasta\par 9/26/19 C2 Brentuximab -CHOD with 50% dose reduction of Adriamycin  + GCSF support During this cycle will taper Decadron to off, and restart patient with the more standard 100mg of prednisone for 5 days for CHOP.  \par Bilirubin is normal again at 0.8\par 10/15 received full dose of brentuximab 1.8mg/kg, Prednisone 100mg daily for 5 days, vincristine 2mg, cyclophosphamide 750mg/m2 and a 50% dose reduction on Adriamycin (25mg/m2 after dose reduction) with GCSF support.  \par \par Patient having difficulty tolerating even the 50% dose reduction on Adriamycin, severe weakness, chills \par Plan for transfusion to masters though Hg 9.9g/dl.\par Pain relieved somewhat after treatment with Zometa 4mg, however the pain had recurred yesterday night, better this morning.  \par \par recommended he get physical therapy for ROM exercise for right thigh too.  \par \par Spent > 25 minutes in direct patient care and and addressed all questions and concerns.  >50% of this time was in direct face to face contact with patient during exam and counseling.

## 2019-10-17 NOTE — PHYSICAL EXAM
[Normal] : normal appearance, no rash, nodules, vesicles, ulcers, erythema [de-identified] : Straight leg raise negative, Right thigh cramping.

## 2019-10-17 NOTE — REVIEW OF SYSTEMS
[Fatigue] : fatigue [Constipation] : constipation [Muscle Pain] : muscle pain [Negative] : Psychiatric

## 2019-10-22 NOTE — DISCUSSION/SUMMARY
[FreeTextEntry1] : During course of bone marrow biopsy visit we also discussed my conversation with Dr. Garber at [facility].  Discussed continuing therapy to try to stabilize the disease before heading over to the main [facility] site at in [place] for 2nd opinion and bone masters biopsy, we have staff sending clinicals today.  If patient poorly tolerates the current dose reduced CHOP therapy  with Brentuximab or not responding, can add etoposide to the regimen for the HLH portion of the disease, or replace the CHOP with gemcitabine in combination with etoposide.  He only recently got the 3rd cycle of CHOP this past week so will wait a bit for the PET scan and the Bone marrow biopsy to determine response.  Patient does seem to believe that he is feeling better this cycle though still week. Hg 8.7g/dl today.  Platelets 22,000 He is neutropenic but did get the neulasta.

## 2019-10-22 NOTE — PROCEDURE
[Bone Marrow Biopsy] : bone marrow biopsy [Bone Marrow Aspiration] : bone marrow aspiration  [Patient] : the patient [Patient identification verified] : patient identification verified [Procedure verified and consent obtained] : procedure verified and consent obtained [Laterality verified and correct site marked] : laterality verified and correct site marked [Left] : site: left [Correct positioning] : correct positioning [Right lateral decubitus position] : right lateral decubitus position [Superior iliac spine was identified] : the superior iliac spine was identified. [The left posterior iliac crest was prepped with betadine and draped, using sterile technique.] : The left posterior iliac crest was prepped with betadine and draped, using sterile technique. [Lidocaine was injected and into the periosteum overlying the site.] : Lidocaine was injected and into the periosteum overlying the site. [Aspirate] : aspirate [Cytogenetics] : cytogenetics [FISH] : FISH [PCR] : PCR [Biopsy] : biopsy [Flow Cytometry] : flow cytometry [] : The patient was instructed to remove the bandage the following AM. The patient may bathe. Acetaminophen may be taken for discomfort, as per package directions.If there are any other problems, the patient was instructed to call the office. The patient verbalized understanding, and is aware of the office contact numbers. [FreeTextEntry2] : Patient as positioned in right lateral decubitus position left superior posterior iliac crest was identified.  Area identified and prepped and draped in usual sterile fashion.  10ml of 2% lidocaine used for anesthesia.  Aspirate obtained without difficulty.  Core biopsy was compromised by crushed sample. 3 passes made, first and third were crushed in setting of severely osteoporotic bone.  2nd sample had slipped out entirely.  Material from first and third were submitted with attached clot sample was tissue.  \par \par patoient reported a moderate amount of pain during procedure. Hemostasis achieved.

## 2019-10-22 NOTE — DISCUSSION/SUMMARY
[FreeTextEntry1] : During course of bone marrow biopsy visit we also discussed my conversation with Dr. Garber at [facility].  Discussed continuing therapy to try to stabilize the disease before heading over to the main [facility] site at in [place] for 2nd opinion and bone masters biopsy, we have staff sending clinicals today.  If patient poorly tolerates the current dose reduced CHOP therapy  with Brentuximab or not responding, can add etoposide to the regimen for the HLH portion of the disease, or replace the CHOP with gemcitabine in combination with etoposide.  He only recently got the 3rd cycle of CHOP this past week so will wait a bit for the PET scan and the Bone marrow biopsy to determine response.  Patient does seem to believe that he is feeling better this cycle though still week. Hg 8.7g/dl today.  Platelets 22,000 He is neutropenic but did get the neulasta.

## 2019-10-31 NOTE — HISTORY OF PRESENT ILLNESS
[de-identified] : This is a pleasant 78 year old man who has been newly diagnosed with a hemophagocytic lymphohistiocytoses. This history started about 4 weeks ago had no appetite, then saw hematuria. Went for a CBC 1/21 Dr. Zuñigan no problems. This was repeated by Dr. Albert showed elevated liver enzymes. on repeat it was again normal. Then the hematuria started 3/26/19 3/26/19 blood in urine. CT scan was performed and was unremarkable. Since then patient has received 5 more weeks of therapy with Etoposide. Week 3-6 was done with the dose reduction for the high bilirubin which was Etoposide 75mg/m2, on the 7th week bilirubin returned to near normal at 1.4 and the dose rwas increased to the full dose of 150mg/m2 for week 8. Patient had a very good response, hemolysis resolved, ferritin down to <1000.     2 months following the final doses of etoposide patient had worsening fatigue, weakness, anorexia and weight loss .Ferritin increased to 11,000 again, and Hg down to 8g/dl,   Patient was restarted on etoposide and started on cyclosporin 500mg BID advanced to TID. This did not seem to have helped with symptomatology, Ferritin remained elevated.  Bone marrow biospy\par \par PET CT scan performed demonstrated FDG activity in the bone, liver and spleen.  No pb sites. 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  \par 9/03/19 C1 Brentuximab-CVD given with Neulasta GCSF support.  \par 9/26/19 C2 Brentuximab-CHOD with 50% reduction in adriamycin started today.  GCSF support neulasta C2.  \par \par \par Bone marrow biopsy 8/13/19  ALK-1 negative \par demonstrated a hypercellular marrow 70-80% with erythroid predominant maturing trilineage hematopoieses, dyserythropoiesis, erythrophagocytosis, and hemophagocytosis.  Ther e is also a CD 30 positive lymphoma present ,  large neoplastic cell positive for CD 30, CD 25 CD45 (bright) TIA-1, Granzyme B, perforin, , negative for CD2, CD3, CD5, CD7, CD8 CD56, CD57 Alk-1, CD15, CD20, Pax5 ]\par Karyotype \par  HLX CG (CONST) Final Report             Final\par \par   Test   Result   Flag Reference Goal \par   Chromosome Analysis Final Report Chromosome Analysis (ONC) Report      \par   _______________________________________________________________\par Accession Number: [id number]\par Indication: Relapsed HLH, rule out underlying lymphoma\par Date Collected: 08/13/2019 7:38\par Date Received: 08/13/2019 15:02\par Date Reported: 08/29/2019 16:37\par Specimen Type: Bone Marrow\par Test Requested: Chromosome Analysis\par ________________________________________________________________\par Metaphases Counted:           20\par Culture Duration:                   24 and 72 hours\par Metaphases Analyzed:          20\par Number of Cultures:              2\par Metaphases Karyotyped:      17\par Banding Technique:             GTG\par Band Resolution:                  350 - 400\par Preparation Quality:             Adequate\par ________________________________________________________________\par Result: Abnormal male karyotype\par \par Karyotype: 79-82,<4n>,XXYY,-1,bryson(1)dup(1)(p13p22)bryson(1)t(1;?)(p36;?),bryson(1)t(1;10)(p36.1;q11.2),-2,bryson(2)t(2;?)(q23;?),-3,del(3)(q11.2),-4,-5,del(6)(q13)x2,-10,-10,-10,-11,del(12)(p13),-13,bryson(13)t(13;15)(q32;q11.2)x2,bryson(14)t(14;?)(q32;?),-15,-15,-15,-17,bryson(17)t(17;?)(p13;?)x2,-18,bryson(19)t(19;?)(p13.3;?)x2,+20,+21,i(21)(q10),-22,del(22)(q13),bryson(22)t(22;?)(q13;?),+1-6mar[cp13]/45,X,-Y[3]/46,XY[4]\par ________________________________________________________________\par Findings and Interpretation:\par Thirteen of the twenty metaphases examined revealed multiple and complex karyotypic abnormalities as described above.  Only consistent clonal changes are listed and the karyotype is described as a composite. Three metaphases also showed a loss of the Y chromosome. The remaining metaphases had an apparently normal karyotype.\par \par No other consistent numerical or structural chromosome abnormalities were observed within the limits of the technology used in this analysis.\par \par Hyperdiploidy has been reported in various lymphomas.  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  This abnormality is seen in various hematological disorders and lymphomas and is generally associated with an unfavorable prognosis.\par \par Clonal loss of the Y chromosome is usually an age-related phenomenon of no clinical significance, however, a disease related association cannot be ruled out.\par \par  [de-identified] : Since the last visit with Cycle 3 of Brentuximab CHOP therapy, patient feels slightly better than prior, however the time just before and after Cycle 2 was a particularly bad time  marked with severe weakness and chills.Since then patient has been feeling better, chills much less pronounced, patient is taking Ibuprofen which has been helping.  He does describe more shortness of breath without fevers chills or cough lately.  \lan  Patient is in contact with his family friend in [place] (a law partner of cousin of his wife)   That person had contacted his own physician that had helped him with a similar diagnosis and asked me to reach out to them for advice.  \lan Also has contacts in [place].

## 2019-10-31 NOTE — ASSESSMENT
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 Had elected against the maintenance phase. Patient relapsed 2-3 months after end of 8 weeks of etoposide. Hemolysis, elevated ferritin recurred. was treated with reinitiation of decadron and etoposide along with cyclosporin, this did very little to improve his symptoms. Blood transfusion 8/26 also did not help, suspect his weakness and anorexia weight loss from progression of underlying lymphoma which was found on BM biopsy 8/13/19.\par \par 9/3/19 tolerated C1 Brentuximab -CVD + GCSF support Neulasta\par 9/26/19 C2 Brentuximab -CHOD with 50% dose reduction of Adriamycin  + GCSF support During this cycle will taper Decadron to off, and restart patient with the more standard 100mg of prednisone for 5 days for CHOP.  \par Bilirubin is normal again at 0.8\par 10/15 received full dose of brentuximab 1.8mg/kg, Prednisone 100mg daily for 5 days, vincristine 2mg, cyclophosphamide 750mg/m2 and a 50% dose reduction on Adriamycin (25mg/m2 after dose reduction) with GCSF support.  \par 10/22 bone marrow biopsy performed, demonstrating sheets of macrophages with hemophagocytosis.  There is still a large number of large abnormal lymphocytes still visualized.  \par \par Given the persistent hemophagocytic lymphohistiocytosis and the backdrop of a ALK negative CD30+ anaplastic T pastora lymphoma, does not appear to be responsive to the current Brentuximab CHOP therapy (noted this was dose reduced).  Discussed changing therapy to Gemcitabine 1000mg/m2 Day 1 and 8 of 21 day cycle and back to the original Etoposide though this is dosed at 100mg/m2 for the 21 day cycle.  Will continue the Brentuximab Q 21 days. \par \par Discussed that this tends to be less myelosuppressive compared to what he has been experiencing, any not need the neulasta for GCSF support. Can determine this later based on counts.  \par \par Will continue to administer zometa Q 4 weeks for the back pain, next dose falls on the same 11/6 day as the first cycle of B-GE.  \par \par Flu vaccine given today, as he is between cycles and he is not neutropenic.  \par Spent > 25 minutes in direct patient care and and addressed all questions and concerns.  >50% of this time was in direct face to face contact with patient during exam and counseling.

## 2019-10-31 NOTE — PHYSICAL EXAM
[Normal] : normal appearance, no rash, nodules, vesicles, ulcers, erythema [de-identified] : Straight leg raise negative, Right thigh cramping.

## 2019-11-04 NOTE — ED PROVIDER NOTE - NS ED ROS FT
Gen: +generalized weakness  Eyes: No eye irritation or discharge  ENT: No earpain, congestion, sore throat  Resp: No cough or trouble breathing  Cardiovascular: No chest pain or palpitation  Gastroenteric: +diarrhea, No nausea/vomiting, constipation  : No dysuria  MS: No joint or muscle pain  Skin: No rashes  Neuro: No headache  Remainder negative, except as per the HPI

## 2019-11-04 NOTE — H&P ADULT - NSICDXPASTMEDICALHX_GEN_ALL_CORE_FT
PAST MEDICAL HISTORY:  Acute on chronic anemia     BPH (benign prostatic hyperplasia)     HLH (hemophagocytic lymphohistiocytosis)

## 2019-11-04 NOTE — CHART NOTE - NSCHARTNOTEFT_GEN_A_CORE
Interval events    80yo M h/o HLH diagnosed in 4/19, has lost 40 pounds since and in September was told it converted to NHL, has had 2 cycles of "new" chemo since, has lost 6 pounds since. Has had Diarrhea for the past month: describes it as explosive, about 5x/day,  occasionally watery, states it has not been tested.   tonight pt presents w generalized weakness, was unable to get off the toilet 2/2 generalized weakness and lightheadedness.  denies fever, chills, nausea, vomiting, cough, cp, sob, dark stools, states appetite is poor at baseline.   Patient currently receiving PRBC transfusion  Noted that patient had delayed transfusion reaction in september  Evaluated the pt at bedside. Patient states that he had SOB, and "feeling lousy" after receiving transfusion in sep 2019.   Spo to BB  TRANSFUSION w/u results     DELAYED SEROLOGICAL CONVERSION   Final Review Dr. Burks: Delayed Serologic Transfusion Reaction.  Anti-K identified. This is sero-conversion likely due to transfusion of K  (+) PRBC unit on 9/6/19. MISSAEL is negative. Antigen negative blood & full  cross-match required.  Final Review cont.: Dr. Sergo Clay informed on 9/30/19 for patient  follow-up.    Currently pt with 1st unit of PRBC IN PROGRESS WITH NO TRANSFUSION reaction  Will premedicate the patient with tylenol and benadryl po prior to second unit of transfusion  Discussed with Hem onc fellow, and Attending  Will follow    Eric Fuentes [facility] [place]  [id number] Interval events    78yo M h/o HLH diagnosed in 4/19, has lost 40 pounds since and in September was told it converted to NHL, has had 2 cycles of "new" chemo since, has lost 6 pounds since. Has had Diarrhea for the past month: describes it as explosive, about 5x/day,  occasionally watery, states it has not been tested.   tonight pt presents w generalized weakness, was unable to get off the toilet 2/2 generalized weakness and lightheadedness.  denies fever, chills, nausea, vomiting, cough, cp, sob, dark stools, states appetite is poor at baseline.   Patient currently receiving PRBC transfusion  Noted that patient had delayed transfusion reaction in september  Evaluated the pt at bedside. Patient states that he had SOB, and "feeling lousy" after receiving transfusion in sep 2019.   Spo to BB  TRANSFUSION w/u results     DELAYED SEROLOGICAL CONVERSION   Final Review Dr. Burks: Delayed Serologic Transfusion Reaction.  Anti-K identified. This is sero-conversion likely due to transfusion of K  (+) PRBC unit on 9/6/19. MISSAEL is negative. Antigen negative blood & full  cross-match required.  Final Review cont.: Dr. Sergo Clay informed on 9/30/19 for patient  follow-up.    Currently pt with 1st unit of PRBC IN PROGRESS WITH NO TRANSFUSION reaction  Will premedicate the patient with tylenol and benadryl po prior to second unit of transfusion  Discussed with Blood bank, Hem onc fellow, and Attending  Will follow    Eric Fuentes [facility] [place]  [id number]

## 2019-11-04 NOTE — ED ADULT NURSE REASSESSMENT NOTE - NS ED NURSE REASSESS COMMENT FT1
Report received from Radha SIDHU. AOx3, speaking in complete sentences. Hypotensive 80s/50s, MAP of 65, MD Teresa aware, second liter of NS started per MD. Neurology at bedside for evaluation.

## 2019-11-04 NOTE — ED CLERICAL - NS ED CLERK NOTE PRE-ARRIVAL INFORMATION; ADDITIONAL PRE-ARRIVAL INFORMATION
CC/Reason For referral: c/o weakness PMH hemolytic anemia, T cell lymphoma  Preferred Consultant(if applicable):  Who admits for you (if needed):  hospitalist  Do you have documents you would like to fax over?  NO  Would you still like to speak to an ED attending? YES

## 2019-11-04 NOTE — ED PROVIDER NOTE - PHYSICAL EXAMINATION
Vitals: hypotensive, hypothermic  Gen: sitting uncomfortably in NAD, pale appearing  Head: NCAT,   ENT: sclerae white, anicterus, moist mucous membranes. No exudates. Neck supple, no LAD,  no carotid bruits, no JVD  CV: RRR. Audible S1 and S2. No murmurs, rubs, gallops, S3, nor S4, 2+ radial and DP pulses   Pulm: Clear to auscultation bilaterally. No wheezes, rales, or rhonchi  Abd: soft, normoactive BS x4, NTND, no rebound, no guarding, no rashes  Musculoskeletal:  No peripheral edema  Skin: no lesions or scars noted  Neurologic: AAOx3  : no CVA tenderness  Psych: no SI/HI

## 2019-11-04 NOTE — ED ADULT NURSE NOTE - NSSEPSISCRITERIAMET_ED_A_ED
Reason for Call:  Request for results:    Name of test or procedure: biopsy    Date of test of procedure: 8/17    Location of the test or procedure: [place]    OK to leave the result message on voice mail or with a family member? YES    Phone number Patient can be reached at:  Mobile:  [phone]    Additional comments: pt calling for results from biopsy     Call taken on 8/23/2017 at 8:25 AM by Sandra Ulloa      Abnormal Lactate

## 2019-11-04 NOTE — H&P ADULT - HISTORY OF PRESENT ILLNESS
80yo M h/o HLH diagnosed in 4/19, p/w generalized weakness, was unable to get off the toilet 2/2 generalized weakness and lightheadedness, has diarrhea 2-3x/day at baseline which is now worsened to 5x/day over the last week. denies fever, chills, nausea, vomiting, cough, cp, sob, dark stools, states appetite is poor at baseline. 78yo M h/o HLH diagnosed in 4/19, has lost 40 pounds since and in September was told it converted to NHL, has had 2 cycles of "new" chemo since, has lost 6 pounds since. Has had Diarrhea for the past month: describes it as explosive, about 5x/day,  occasionally watery, states it has not been tested.   tonight ptn presents w generalized weakness, was unable to get off the toilet 2/2 generalized weakness and lightheadedness.  denies fever, chills, nausea, vomiting, cough, cp, sob, dark stools, states appetite is poor at baseline.   List of meds not available at this time, will check jo hamilton's  wife in am

## 2019-11-04 NOTE — H&P ADULT - ASSESSMENT
78yo M h/o HLH diagnosed in 4/19, has lost 40 pounds since and in September was told it converted to NHL, has had 2 cycles of "new" chemo since, has lost 6 pounds since. Has had Diarrhea for the past month: describes it as explosive, about 5x/day,  occasionally watery, states it has not been tested.   tonight ptn presents w generalized weakness, was unable to get off the toilet 2/2 generalized weakness and lightheadedness.  denies fever, chills, nausea, vomiting, cough, cp, sob, dark stools, states appetite is poor at baseline.   List of meds not available at this time, will check w ptn's  wife in am.  Ptn is noted to be anemic, hyponatremic, w YEE  will check stool for c diff, check stool guiac, place on IV PPI, check urine osm/lytes, prob hypovolemic based on Sx, will hydrate w D5NS at 75/hr, will transfuse w 2U PRBC, check ct C/A/P, renal called, check bladder scan, DVT ppx w PAS until stool guiac is avail. HEME, GI and renal consults called

## 2019-11-04 NOTE — H&P ADULT - NSHPPHYSICALEXAM_GEN_ALL_CORE
T(F): 97.6 (11-04-19 @ 19:09), Max: 98.1 (11-04-19 @ 17:49)  HR: 90 (11-04-19 @ 20:46) (82 - 105)  BP: 107/87 (11-04-19 @ 20:46) (71/38 - 107/87)  RR: 21 (11-04-19 @ 20:46) (16 - 21)  SpO2: 100% (11-04-19 @ 20:46) (97% - 100%)    GENERAL: NAD, well-developed  HEAD:  Atraumatic, Normocephalic  EYES: EOMI, PERRLA, conjunctiva and sclera clear  NECK: Supple, No JVD  CHEST/LUNG: Clear to auscultation bilaterally; No wheeze  HEART: Regular rate and rhythm; No murmurs, rubs, or gallops  ABDOMEN: Soft, Nontender, Nondistended; Bowel sounds present  EXTREMITIES:  2+ Peripheral Pulses, No clubbing, cyanosis, or edema  PSYCH: AAOx3  NEUROLOGY: non-focal  SKIN: No rashes or lesions

## 2019-11-04 NOTE — ED ADULT NURSE REASSESSMENT NOTE - NS ED NURSE REASSESS COMMENT FT1
Informed consent for blood products placed in chart and signed by pt, pt educated to reactions by RN. Blood transfusion started at 21:35, pt denies any s/s of reaction including fever/chills and flank pain. Unlabored, spontaneous respirations, NAD. Awaiting transport upstairs at this time.

## 2019-11-04 NOTE — ED ADULT NURSE NOTE - NSIMPLEMENTINTERV_GEN_ALL_ED
Implemented All Fall with Harm Risk Interventions:  [place] to call system. Call bell, personal items and telephone within reach. Instruct patient to call for assistance. Room bathroom lighting operational. Non-slip footwear when patient is off stretcher. Physically safe environment: no spills, clutter or unnecessary equipment. Stretcher in lowest position, wheels locked, appropriate side rails in place. Provide visual cue, wrist band, yellow gown, etc. Monitor gait and stability. Monitor for mental status changes and reorient to person, place, and time. Review medications for side effects contributing to fall risk. Reinforce activity limits and safety measures with patient and family. Provide visual clues: red socks.

## 2019-11-04 NOTE — ED PROVIDER NOTE - ATTENDING CONTRIBUTION TO CARE
79 YOM PMH HLH getting active treatment here with generalized weakness for many weeks. Today was unable to stand up from toilet after having diarrhea 4-5 times a day now. Per wife patient has been getting multiple transfusions lately as his cell lines have been down. Denies any fever, headache, n/v, cp, sob, abd pain, leg swelling. Decreased appetite lately. Today called oncologist Dr. Clay who recommneded he come to hospital.  AP - hypotensive/hypothyermic and mentating well here. dry appearing. will fluid hydrate. sepsis workup, empiric abx given immunocompromised. possible anemic, consider blood tx. admit

## 2019-11-04 NOTE — ED PROVIDER NOTE - OBJECTIVE STATEMENT
78yo M h/o HLH p/w generalized weakness, was uanbel to get off the toilet 2/2 generalized weakness and lightehadedness. + diarrhea 2-3x/day at baseline which is now worsened to 5x/day over the last week. denies f/c, n/v, cp, sob, abd pain, dark stools. decreased appetite but has been staying hydrated.

## 2019-11-04 NOTE — ED ADULT NURSE NOTE - OBJECTIVE STATEMENT
pt 80 yo male lymphoma patient presents hypotensive pt with worsening weakness last chemo 10/16 pt is sob with any activity skin pale dry no nausea no vomiting pt states poor appetite pt denies chest pain pt accompanied by wife to er sinus rythem no abd pain pt last transfused on 10/11 pt states some diarrhea exam by md with 2 iv access obtained labs sent with iv antibiotics infusing via pump pt pending xray and results of labs

## 2019-11-05 NOTE — DIETITIAN INITIAL EVALUATION ADULT. - REASON INDICATOR FOR ASSESSMENT
Nutrition consult received for decrease in oral intake >5 days PTA.  Source: pt's wife at bedside, EMR.   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 Has had 2 cycles of "new" chemo since September. Pt also with hyponatremia w/ YEE. Contact precautions to r/o c.diff.  PMH: HLH, BPH, anemia Nutrition consult received for decrease in oral intake >5 days PTA.  Source: pt's wife at bedside, EMR.   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 Has had 2 cycles of "new" chemo since September. Pt also with hyponatremia + YEE. Contact precautions to r/o c.diff.  PMH: HLH, BPH, anemia

## 2019-11-05 NOTE — DIETITIAN INITIAL EVALUATION ADULT. - PHYSICAL APPEARANCE
Unable to perform Nutrition-focused physical exam at this time. Unable to perform Nutrition-focused physical exam at this time. Visually observed moderate-severe muscle wasting of temples, overall thin appearance.

## 2019-11-05 NOTE — CHART NOTE - NSCHARTNOTEFT_GEN_A_CORE
Interval events    Notified by RN that patient's pre transfusion vitals (patient completed 1st unit of transfusion, started in the ED), with temperature 100.1F, repeat temp 99.1 F.   Seen and examined the patient at bedside. Patient's rectal temp 100.9F. Received Tylenol 30 minutes earlier as premedication as patient reported hx of previous post transfusion reaction. (patient's chart reviewed. no details of transfusion reaction documents found, noted from patient's lab results that patient had a delayed transfusion work up on 08/2019, please see previous note). Patient c/o having chills 2 hours earlier which is not new per patient. Per patient he gets chills occasionally at home.   Patient became febrile after 1 hour of 1st of PRBC transfusion. Patient denies HA, dizziness, visual changes, cough, running nose, SOB, CP,facial flushing, pruritis, urticaria, back apin,  abdominal pain, N/V, hematochezia, melena, dark urine.                      CBC:        7.2  5.19  )-----------( 43       ( 04 Nov 2019 18:31 )             21.6       < from: Xray Chest 1 View-PORTABLE IMMEDIATE (11.04.19 @ 18:28) >    INTERPRETATION:  Clear lungs    PHYSICAL EXAM    GENERAL: NAD, well-developed  HEAD:  Atraumatic, Normocephalic  EYES: EOMI, PERRLA, conjunctiva and sclera clear  NECK: Supple, No JVD  CHEST/LUNG: Clear to auscultation bilaterally; No wheeze/rales/stridor  HEART: Regular rate and rhythm; No murmurs, rubs, or gallops  ABDOMEN: Soft, Nontender, Nondistended; Bowel sounds present  EXTREMITIES:  2+ Peripheral Pulses, No clubbing, cyanosis, or edema  PSYCH: AAOx3  NEUROLOGY: non-focal  Skin: old bruises noted on b/l upper extremities    78yo M h/o HLH diagnosed in 4/19, has lost 40 pounds since and in September was told it converted to NHL, has had 2 cycles of "new" chemo since, has lost 6 pounds since. Has had Diarrhea for the past month: describes it as explosive, about 5x/day,  occasionally watery, states it has not been tested.   tonight pt presents w generalized weakness, was unable to get off the toilet 2/2 generalized weakness and lightheadedness.   Patient's hb/hct 7.2/21.6 for which he received 1 unit PRBC.  Currently patient febrile with temp 100.9F after 1 hour of PRBC transfusion, had c/o chills 2 hours earlier, which is not a new symptom per patient. HR baseline 80's, post transfusion Hr 90's.   No anaphylactic, allergic. TRALi symptoms noted,   Patient has no s/s of bacterial contamination r/t PRBc transfusion    1. Suspected Transfusion reaction  post 1 unit PRBC transfusion.  Patient HD stable  Mentating at baseline  Transfusion reaction protocol followed  T&S, UA, CBC, BMP, LDH, haptoglobin, pt/ptt, Bili ordered.  Vitals q30  minutes for 1 hour, then vitals q4 hourly  Will not proceed with 2nd unit of PRBC transfusion now  in setting of suspected transfusion reaction  Notified Blood bank and labs ordered per transfusion reaction protocol  Will discuss with Attending and Hem onc Fellow    Eric Fuentes [facility]  [id number] Interval events    Notified by RN that patient's pre transfusion vitals (patient completed 1st unit of transfusion, started in the ED), with temperature 100.1F, repeat temp 99.1 F.   Seen and examined the patient at bedside. Patient's rectal temp 100.9F. Received Tylenol 30 minutes earlier as premedication as patient reported hx of previous post transfusion reaction. (patient's chart reviewed. no details of transfusion reaction documents found, noted from patient's lab results that patient had a delayed transfusion work up on 08/2019, please see previous note). Patient c/o having chills 2 hours earlier which is not new per patient. Per patient he gets chills occasionally at home.   Patient became febrile after 1 hour of 1st of PRBC transfusion. Patient denies HA, dizziness, visual changes, cough, running nose, SOB, CP,facial flushing, pruritis, urticaria, back pain,  abdominal pain, N/V, hematochezia, melena, dark urine.     Vital Signs Last 24 Hrs  T(C): 37.4 (05 Nov 2019 03:34), Max: 38.3 (05 Nov 2019 02:10)  T(F): 99.3 (05 Nov 2019 03:34), Max: 100.9 (05 Nov 2019 02:10)  HR: 88 (05 Nov 2019 03:34) (79 - 105)  BP: 94/57 (05 Nov 2019 03:34) (71/38 - 107/87)  BP(mean): 70 (04 Nov 2019 22:22) (65 - 71)  RR: 18 (05 Nov 2019 03:34) (16 - 21)  SpO2: 99% (05 Nov 2019 03:34) (97% - 100%)                     CBC:        7.2  5.19  )-----------( 43       ( 04 Nov 2019 18:31 )             21.6       < from: Xray Chest 1 View-PORTABLE IMMEDIATE (11.04.19 @ 18:28) >    INTERPRETATION:  Clear lungs    PHYSICAL EXAM    GENERAL: NAD, well-developed  HEAD:  Atraumatic, Normocephalic  EYES: EOMI, PERRLA, conjunctiva and sclera clear  NECK: Supple, No JVD  CHEST/LUNG: Clear to auscultation bilaterally; No wheeze/rales/stridor  HEART: Regular rate and rhythm; No murmurs, rubs, or gallops  ABDOMEN: Soft, Nontender, Nondistended; Bowel sounds present  EXTREMITIES:  2+ Peripheral Pulses, No clubbing, cyanosis, or edema  PSYCH: AAOx3  NEUROLOGY: non-focal  Skin: old bruises noted on b/l upper extremities    78yo M h/o HLH diagnosed in 4/19, has lost 40 pounds since and in September was told it converted to NHL, has had 2 cycles of "new" chemo since, has lost 6 pounds since. Has had Diarrhea for the past month: describes it as explosive, about 5x/day,  occasionally watery, states it has not been tested.   tonight pt presents w generalized weakness, was unable to get off the toilet 2/2 generalized weakness and lightheadedness.   Patient's hb/hct 7.2/21.6 for which he received 1 unit PRBC.  Currently patient febrile with temp 100.9F after 1 hour of PRBC transfusion, had c/o chills 2 hours earlier, which is not a new symptom per patient. HR baseline 80's, post transfusion Hr 90's.   No anaphylactic, allergic. TRALi symptoms noted,   Patient has no s/s of bacterial contamination r/t PRBc transfusion    1. Suspected Transfusion reaction  post 1 unit PRBC transfusion.  Patient HD stable  Mentating at baseline  Transfusion reaction protocol followed  T&S, UA, CBC, BMP, LDH, haptoglobin, pt/ptt, Bili ordered.  Vitals q30  minutes for 1 hour, then vitals q4 hourly  Will not proceed with 2nd unit of PRBC transfusion now  in setting of suspected transfusion reaction  Notified Blood bank and labs ordered per transfusion reaction protocol  Will order cultures if fever not coming down, in setting that apteint s/p chemo  Will discuss with Attending and Hem onc Fellow    Eric Fuentes [facility]  [id number]

## 2019-11-05 NOTE — DIETITIAN INITIAL EVALUATION ADULT. - ADD RECOMMEND
1) Continue low-fiber diet to aid in diarrhea relief as c diff is being ruled out. 2) Recommend Nocarb prosource x2 daily, Ensure Enlive x2 daily for pt to trial 3) RD added high-protein jello for pt to try/ peanut butter + cracker snack 4) Montior PO intake 5) Pt/ pt's family is aware RD remains available for any concerns, questions or diet preferences 6) Malnutrition alert placed in chart.

## 2019-11-05 NOTE — CONSULT NOTE ADULT - SUBJECTIVE AND OBJECTIVE BOX
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     Pt was diagnosed with HLH in April 2019 after he present to hospital with recurrent fever. He was treated with etoposide, dex for 8 weeks (dose reduction due to elevated bili wk3-6). Pt had good response with resolution of hemolysis and ferritin down to <1k from initial >46k. 2 months following tx, pt had worsening fatigue, weakness, weight loss, developed anemia Hgb 8, ferritin increased to 11k. Etoposide and cyclosporin was started. The response was poor. PET CT scan performed demonstrated FDG activity in the bone, liver and spleen. No pb sites. Bone marrow biopsy was done which showed a CD 30+ Anaplasic large T cell lymphoma. He was started on Brentuximab-CVD on 9/23/19 with GCSF support. 10/22 bone marrow biopsy performed, demonstrating sheets of macrophages with hemophagocytosis. There is still a large number of large abnormal lymphocytes still visualized.     Given the persistent hemophagocytic lymphohistiocytosis and the backdrop of a ALK negative CD30+ anaplastic T pastora lymphoma, does not appear to be responsive to the current Brentuximab CHOP therapy (noted this was dose reduced). Discussed changing therapy to Gemcitabine 1000mg/m2 Day 1 and 8 of 21 day cycle and back to the original Etoposide though this is dosed at 100mg/m2 for the 21 day cycle. Will continue the Brentuximab Q 21 days.       Allergies  No Known Allergies      PAST MEDICAL & SURGICAL HISTORY:  Acute on chronic anemia  HLH (hemophagocytic lymphohistiocytosis)  BPH (benign prostatic hyperplasia)  H/O knee surgery  Hernia      FAMILY HISTORY:  None contributory    Social History:  marries, leads an active retired lifestyle, was a  for [employer]    REVIEW OF SYSTEMS:    CONSTITUTIONAL: + fatigue, weakness  EYES/ENT: No visual changes, no throat pain   RESPIRATORY: No cough, wheezing, hemoptysis; No shortness of breath  CARDIOVASCULAR: No chest pain or palpitations  GASTROINTESTINAL: No abdominal pain, nausea, vomiting, or hematemesis; No diarrhea or constipation. No melena or hematochezia.  GENITOURINARY: No dysuria, frequency or hematuria  MUSCULOSKELETAL: No joint pain.  NEUROLOGICAL: No dizziness, numbness, or weakness  SKIN: No itching, burning, rashes, or lesions   All other review of systems is negative unless indicated above.    VITAL SIGNS:  Vital Signs Last 24 Hrs  T(C): 37.8 (05 Nov 2019 06:39), Max: 38.3 (05 Nov 2019 02:10)  T(F): 100 (05 Nov 2019 06:39), Max: 100.9 (05 Nov 2019 02:10)  HR: 97 (05 Nov 2019 06:39) (79 - 105)  BP: 136/77 (05 Nov 2019 06:39) (71/38 - 136/77)  BP(mean): 70 (04 Nov 2019 22:22) (65 - 71)  RR: 18 (05 Nov 2019 06:39) (16 - 21)  SpO2: 99% (05 Nov 2019 06:39) (97% - 100%)      PHYSICAL EXAM:   GENERAL: appears fatigued, no acute distress  HEENT: EOMI, neck supple  RESPIRATORY: LCTAB/L, no rhonchi, rales, or wheezing  CARDIOVASCULAR: RRR, no murmurs, gallops, rubs  ABDOMINAL: soft, non-tender, non-distended, positive bowel sounds   EXTREMITIES: no clubbing, cyanosis, or edema  NEUROLOGICAL: alert and oriented x 3, non-focal  SKIN: no rashes or lesions   MUSCULOSKELETAL: no gross joint deformity                          7.8    4.89  )-----------( 50       ( 05 Nov 2019 06:41 )             23.7     11-05    132<L>  |  103  |  43<H>  ----------------------------<  76  4.2   |  18<L>  |  1.76<H>    Ca    6.9<L>      05 Nov 2019 03:28    TPro  3.9<L>  /  Alb  2.1<L>  /  TBili  1.7<H>  /  DBili  1.2<H>  /  AST  71<H>  /  ALT  49<H>  /  AlkPhos  370<H>  11-05      MEDICATIONS  (STANDING):  atovaquone Suspension 1500 milliGRAM(s) Oral daily  dextrose 5% + sodium chloride 0.9%. 1000 milliLiter(s) (75 mL/Hr) IV Continuous <Continuous>  finasteride 5 milliGRAM(s) Oral daily  pantoprazole  Injectable 40 milliGRAM(s) IV Push every 12 hours  tamsulosin 0.8 milliGRAM(s) Oral at bedtime

## 2019-11-05 NOTE — PROGRESS NOTE ADULT - ASSESSMENT
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 Treated with brentuximab and CHOP x 3, relapsed. Recent PET without much improvement.. Recent fracture of lumbar vertebra, diagnosis made in ortho office, no films available. No MRI. Has notable pains with movements. Has very minimal LE power proximally and decreased to absent KJ reflexes. He was unable to get off of the toilet due to weakness. Had CT abd/pelvis today. Needs MRI (preferable, but will not tolerate) or CT scan of the lumbar spine. Concerns of leptomeningeal involvement a possibility. Dr Clay had suggested additional chemo. Michael Diaz today felt ptn is too frail to get more chemo w his performance level at 4 ( needs assistance w most ADLs)  Pt with soft, loose stool and incontinence  Pt with weakness and fevers, though fever started post PRBC transfusion overnight   ID input appreciated  PLAN:   BC x 2 sets  stool- GI PCR and C diff ordered but ptn states hasn't had BM since admission  check CMV PCR, cryptococcal ag, giardia and  amoebic serology as per ID recommendation  NEURO consult  CT entire spine, may need LP to R/O Leptomeningeal spread  empiric zosyn after obtain cultures  prelim colitis on CT but official read: NO COLITIS  IVF, still hypovolemic,   Hyponatremia improving  Hypoalbuminemia  YEE probably pre-renal , improving  am cortisol  check w ptn's pharmacy the exact list of outptn meds  GOC d/w ptn/wife, he is full code  will cont discussion of realistic GOC

## 2019-11-05 NOTE — CHART NOTE - NSCHARTNOTEFT_GEN_A_CORE
Hematology/Oncology fellow     peripheral smear reviewed by myself from 11-4-19 6PM lab draw, no schistocytes seen, giant platelets seen.    heme team to follow in the AM    Ly Martínez DO  Hematology/Oncology Fellow  Pager [phone]  Weekdays after 5PM or weekends page hematology/oncology fellow on call

## 2019-11-05 NOTE — CONSULT NOTE ADULT - SUBJECTIVE AND OBJECTIVE BOX
Patient is a 79y Male     Patient is a 79y old  Male who presents with a chief complaint of     HPI:  80yo M h/o HLH diagnosed in 4/19, has lost 40 pounds since and in September was told it converted to NHL, has had 2 cycles of "new" chemo since, has lost 6 pounds since. Has had Diarrhea for the past month: describes it as explosive, about 5x/day,  occasionally watery, states it has not been tested.   tonight ptn presents w generalized weakness, was unable to get off the toilet 2/2 generalized weakness and lightheadedness.  denies fever, chills, nausea, vomiting, cough, cp, sob, dark stools, states appetite is poor at baseline.   List of meds not available at this time, will check w joy's  wife in am (04 Nov 2019 21:48)      PAST MEDICAL & SURGICAL HISTORY:  Acute on chronic anemia  HLH (hemophagocytic lymphohistiocytosis)  BPH (benign prostatic hyperplasia)  H/O knee surgery  Hernia      MEDICATIONS  (STANDING):  atovaquone Suspension 1500 milliGRAM(s) Oral daily  dextrose 5% + sodium chloride 0.9%. 1000 milliLiter(s) (75 mL/Hr) IV Continuous <Continuous>  finasteride 5 milliGRAM(s) Oral daily  pantoprazole  Injectable 40 milliGRAM(s) IV Push every 12 hours  tamsulosin 0.8 milliGRAM(s) Oral at bedtime      Allergies    No Known Allergies    Intolerances        SOCIAL HISTORY:  Denies ETOh,Smoking,     FAMILY HISTORY:      REVIEW OF SYSTEMS:    CONSTITUTIONAL: No weakness, fevers or chills  EYES/ENT: No visual changes;  No vertigo or throat pain   NECK: No pain or stiffness  RESPIRATORY: No cough, wheezing, hemoptysis; No shortness of breath  CARDIOVASCULAR: No chest pain or palpitations  GASTROINTESTINAL: No abdominal or epigastric pain. No nausea, vomiting, or hematemesis; No diarrhea or constipation. No melena or hematochezia.  GENITOURINARY: No dysuria, frequency or hematuria  NEUROLOGICAL: No numbness or weakness  SKIN: No itching, burning, rashes, or lesions   All other review of systems is negative unless indicated above.    VITAL:  T(C): , Max: 38.3 (11-05-19 @ 02:10)  T(F): , Max: 100.9 (11-05-19 @ 02:10)  HR: 97 (11-05-19 @ 06:39)  BP: 136/77 (11-05-19 @ 06:39)  BP(mean): 70 (11-04-19 @ 22:22)  RR: 18 (11-05-19 @ 06:39)  SpO2: 99% (11-05-19 @ 06:39)  Wt(kg): --    I and O's:    11-04 @ 07:01  -  11-05 @ 07:00  --------------------------------------------------------  IN: 0 mL / OUT: 350 mL / NET: -350 mL      Height (cm): 190.5 (11-04 @ 15:40)  Weight (kg): 83.2 (11-04 @ 23:21)  BMI (kg/m2): 22.9 (11-04 @ 23:21)  BSA (m2): 2.12 (11-04 @ 23:21)    PHYSICAL EXAM:    Constitutional: NAD  HEENT: PERRLA,   Neck: No JVD  Respiratory: CTA B/L  Cardiovascular: S1 and S2  Gastrointestinal: BS+, soft, NT/ND  Extremities: No peripheral edema  Neurological: A/O x 3, no focal deficits  Psychiatric: Normal mood, normal affect  : No Arnold  Skin: No rashes  Access: Not applicable  Back: No CVA tenderness    LABS:                        7.8    4.89  )-----------( x        ( 05 Nov 2019 06:41 )             23.7     11-05    132<L>  |  103  |  43<H>  ----------------------------<  76  4.2   |  18<L>  |  1.76<H>    Ca    6.9<L>      05 Nov 2019 03:28    TPro  3.9<L>  /  Alb  2.1<L>  /  TBili  1.7<H>  /  DBili  1.2<H>  /  AST  71<H>  /  ALT  49<H>  /  AlkPhos  370<H>  11-05          RADIOLOGY & ADDITIONAL STUDIES:

## 2019-11-05 NOTE — CONSULT NOTE ADULT - ASSESSMENT
diarrhea past three weeks, ? infecious such as cdad vs chemo induced.  mild cholestatic hepatitis.    recommend stools tudies, would start lomotil emperically if negative

## 2019-11-05 NOTE — CHART NOTE - NSCHARTNOTEFT_GEN_A_CORE
Upon Nutritional Assessment by the Registered Dietitian your patient was determined to meet criteria / has evidence of the following diagnosis/diagnoses:          [ ]  Mild Protein Calorie Malnutrition        [ ]  Moderate Protein Calorie Malnutrition        [x] Severe Protein Calorie Malnutrition        [ ] Unspecified Protein Calorie Malnutrition        [ ] Underweight / BMI <19        [ ] Morbid Obesity / BMI > 40      Findings as based on:  [x] Comprehensive nutrition assessment   [ ] Nutrition Focused Physical Exam  [x] Other:   Etiology: inability to meet increased demand for nutrients in setting of catabolic illness and altered GI function.   Signs/Symptoms: <75% energy needs >1 month, severe muscle wasting, 7.6% wt loss x3 months.       Nutrition Plan/Recommendations:    1) Continue low-fiber diet to aid in diarrhea relief as c diff is being ruled out.   2) Recommend Nocarb prosource x2 daily, Ensure Enlive x2 daily for pt to trial   3) RD added high-protein jello for pt to try/ peanut butter + cracker snack   4) Montior PO intake   5) Pt/ pt's family is aware RD remains available for any concerns, questions or diet preferences     Kellee Ross RD. Pager: [phone]     PROVIDER Section:     By signing this assessment you are acknowledging and agree with the diagnosis/diagnoses assigned by the Registered Dietitian    Comments:

## 2019-11-05 NOTE — PROGRESS NOTE ADULT - SUBJECTIVE AND OBJECTIVE BOX
Patient is a 79y old  Male who presents with a chief complaint of same (2019 07:15)      SUBJECTIVE / OVERNIGHT EVENTS:    MEDICATIONS  (STANDING):  atovaquone Suspension 1500 milliGRAM(s) Oral daily  dextrose 5% + sodium chloride 0.9%. 1000 milliLiter(s) (75 mL/Hr) IV Continuous <Continuous>  finasteride 5 milliGRAM(s) Oral daily  pantoprazole  Injectable 40 milliGRAM(s) IV Push every 12 hours  piperacillin/tazobactam IVPB.. 3.375 Gram(s) IV Intermittent every 8 hours  tamsulosin 0.8 milliGRAM(s) Oral at bedtime    MEDICATIONS  (PRN):  acetaminophen   Tablet .. 650 milliGRAM(s) Oral every 6 hours PRN Temp greater or equal to 38C (100.4F), Mild Pain (1 - 3)  loperamide 2 milliGRAM(s) Oral four times a day PRN Diarrhea  ondansetron Injectable 4 milliGRAM(s) IV Push every 6 hours PRN Nausea and/or Vomiting      Vital Signs Last 24 Hrs  T(F): 99.7 (19 @ 17:35), Max: 100.9 (19 @ 02:10)  HR: 109 (19 @ 17:35) (79 - 109)  BP: 123/55 (19 @ 17:35) (86/55 - 136/77)  RR: 20 (19 @ 17:35) (16 - 21)  SpO2: 95% (19 @ 17:35) (88% - 100%)  Telemetry:   CAPILLARY BLOOD GLUCOSE        I&O's Summary    2019 07:  -  2019 07:00  --------------------------------------------------------  IN: 825 mL / OUT: 350 mL / NET: 475 mL    2019 07:  -  2019 18:56  --------------------------------------------------------  IN: 1080 mL / OUT: 0 mL / NET: 1080 mL        PHYSICAL EXAM:  GENERAL: NAD, well-developed  HEAD:  Atraumatic, Normocephalic  EYES: EOMI, PERRLA, conjunctiva and sclera clear  NECK: Supple, No JVD  CHEST/LUNG: Clear to auscultation bilaterally; No wheeze  HEART: Regular rate and rhythm; No murmurs, rubs, or gallops  ABDOMEN: Soft, Nontender, Nondistended; Bowel sounds present  EXTREMITIES:  2+ Peripheral Pulses, No clubbing, cyanosis, or edema  PSYCH: AAOx3  NEUROLOGY: non-focal  SKIN: No rashes or lesions    LABS:                        7.8    4.89  )-----------( 50       ( 2019 06:41 )             23.7     11-    132<L>  |  103  |  43<H>  ----------------------------<  76  4.2   |  18<L>  |  1.76<H>    Ca    6.9<L>      2019 03:28    TPro  3.9<L>  /  Alb  2.1<L>  /  TBili  1.7<H>  /  DBili  1.2<H>  /  AST  71<H>  /  ALT  49<H>  /  AlkPhos  370<H>  1105    PT/INR - ( 2019 03:28 )   PT: 15.2 sec;   INR: 1.31 ratio         PTT - ( 2019 03:28 )  PTT:37.1 sec      Urinalysis Basic - ( 2019 03:38 )    Color: Yellow / Appearance: Clear / S.019 / pH: x  Gluc: x / Ketone: Negative  / Bili: Negative / Urobili: Negative   Blood: x / Protein: 30 mg/dL / Nitrite: Negative   Leuk Esterase: Negative / RBC: 14 /hpf / WBC 5 /HPF   Sq Epi: x / Non Sq Epi: 1 /hpf / Bacteria: Negative        RADIOLOGY & ADDITIONAL TESTS:    Imaging Personally Reviewed:    Consultant(s) Notes Reviewed:      Care Discussed with Consultants/Other Providers:

## 2019-11-05 NOTE — DIETITIAN INITIAL EVALUATION ADULT. - ETIOLOGY
inability to meet increased demand for nutrients in setting of catabolic illness and altered GI function

## 2019-11-05 NOTE — DIETITIAN INITIAL EVALUATION ADULT. - OTHER INFO
Spoke with pt's wife outside of pt's room. Pt's wife stated pt is not able to speak to RD at this moment due to going in and out of sleep and she stated she is familiar with protein recommendations from RD at [facility]. Wife became upset after phone call with MD- limited information obtained at this time    Intake PTA: Per chart, pt with poor PO intake and chronic diarrhea for past month. Pt's wife states pt has lost taste from chemo treatment and takes supplements such as Ensure or Orgain at home variably.     Per chart, pt has NKFA, pt with ongoing diarrhea, no micronutrient supplementations PTA, no hx of dysphagia.     Diet: Low fiber diet. Unable to obtain information about pt's current PO intake of meals. Per pt's wife, pt takes a few sips of nutrition supplements at one time.    Education: Discussed nutrition supplements available at [facility] for pt to pt's wife such as Nocarb prosource as a low-fiber protein supplement option, as pt has been noted with gas. Also discussed Ensure vs Orgain, notifying her of availability/ ingredients of each supplement (milk protein vs plant protein). Pt's wife states pt enjoys Jello, so pt will receive high-protein gelatin supplement as well.     Weight Hx: Per [facility] and [facility] HIE records: 203 pounds (4/3), 189 pounds (4/17), 184 lbs (4/23), 190 pounds (5/31), 198 pounds (7/31), 182 lbs (9/26), 183 lbs (11/4). Overall, pt with 15 lbs wt loss since July, ~3 months Pt's wife stated pt is not able to speak to RD at this moment due to going in and out of sleep and she stated she is familiar with protein recommendations from RD at [facility]. Wife became upset after phone call with MD- limited information obtained at this time    Intake PTA: Per chart, pt with poor PO intake and chronic diarrhea for past month. Pt's wife states pt has lost taste from chemo treatment and takes supplements such as Ensure or Orgain at home variably.     Per chart, pt has NKFA, pt with ongoing diarrhea, no micronutrient supplementations PTA, no hx of dysphagia.     Diet: Low fiber diet. Unable to obtain information about pt's current PO intake of meals. Per pt's wife, pt takes a few sips of nutrition supplements at one time.    Education: Discussed nutrition supplements available at [facility] for pt to pt's wife such as Nocarb prosource as a low-fiber protein supplement option, as pt has been noted with gas. Also discussed Ensure vs Orgain, notifying her of availability/ ingredients of each supplement (milk protein vs plant protein). Pt's wife states pt enjoys Jello, so pt will receive high-protein gelatin supplement as well.     Weight Hx: Per [facility] and [facility] HIE records: 203 pounds (4/3), 189 pounds (4/17), 184 lbs (4/23), 190 pounds (5/31), 198 pounds (7/31), 182 lbs (9/26), 183 lbs (11/4). Overall, pt with 15 lbs wt loss since July, ~3 months

## 2019-11-05 NOTE — CONSULT NOTE ADULT - SUBJECTIVE AND OBJECTIVE BOX
HPI:  Mr. Benitez is a 79 year-old man with history of multiple medical issues including hemophagocytic lymphohistiocytosis/non-hodgkin's lymphoma, and benign prostatic hyperplasia. He presented 19 to the [facility] ER with diarrhea for one month, as well as weight loss and poor appetite. He was noted on admission to have YEE with a creatinine of 2.08mg/dL; therefore, a renal consultation was requested.      PAST MEDICAL & SURGICAL HISTORY:  Acute on chronic anemia  HLH (hemophagocytic lymphohistiocytosis)  BPH (benign prostatic hyperplasia)  H/O knee surgery  Hernia    Allergies  No Known Allergies    SOCIAL HISTORY:  Denies ETOh,Smoking,     FAMILY HISTORY:  No CKD    REVIEW OF SYSTEMS:  CONSTITUTIONAL: (+)weakness, (+)fever, (+)weight loss  EYES/ENT: No visual changes;  No vertigo or throat pain   NECK: No pain or stiffness  RESPIRATORY: No cough, wheezing, hemoptysis; No shortness of breath  CARDIOVASCULAR: No chest pain or palpitations  GASTROINTESTINAL: (+)diarrhea  GENITOURINARY: No dysuria, frequency or hematuria  NEUROLOGICAL: No numbness or weakness  SKIN: No itching, burning, rashes, or lesions   All other review of systems is negative unless indicated above.    VITAL:  T(C): , Max: 38.3 (19 @ 02:10)  T(F): , Max: 100.9 (19 @ 02:10)  HR: 97 (19 @ 06:39)  BP: 136/77 (19 @ 06:39)  BP(mean): 70 (19 @ 22:22)  RR: 18 (19 @ 06:39)  SpO2: 99% (19 @ 06:39)    PHYSICAL EXAM:  Constitutional: NAD, Alert  HEENT: NCAT, MMM  Neck: Supple, No JVD  Respiratory: CTA-b/l  Cardiovascular: RRR s1s2, no m/r/g  Gastrointestinal: BS+, soft, NT/ND  Extremities: No peripheral edema b/l  Neurological: no focal deficits; strength grossly intact  Back: no CVAT b/l  Skin: No rashes, no nevi    LABS:                        7.8    4.89  )-----------( 50       ( 2019 06:41 )             23.7     Na(132)/K(4.2)/Cl(103)/HCO3(18)/BUN(43)/Cr(1.76)Glu(76)/Ca(6.9)/Mg(--)/PO4(--)     @ 03:28  Na(129)/K(4.7)/Cl(99)/HCO3(17)/BUN(48)/Cr(2.08)Glu(91)/Ca(7.8)/Mg(--)/PO4(--)     @ 18:31    (2019) - BUN/creatinine: 35/1.12    Urinalysis Basic - ( 2019 03:38 )  Color: Yellow / Appearance: Clear / S.019 / pH: x  Gluc: x / Ketone: Negative  / Bili: Negative / Urobili: Negative   Blood: x / Protein: 30 mg/dL / Nitrite: Negative   Leuk Esterase: Negative / RBC: 14 /hpf / WBC 5 /HPF   Sq Epi: x / Non Sq Epi: 1 /hpf / Bacteria: Negative  Osmolality, Random Urine: 399 mos/kg ( @ 01:30)  Sodium, Random Urine: 46 mmol/L ( @ 01:30)  Creatinine, Random Urine: 64 mg/dL ( @ 01:30)  FENa 0.9%    IMAGING:  < from: Xray Chest 1 View-PORTABLE IMMEDIATE (19 @ 18:28) >  Clear lungs    ASSESSMENT:  (1)Renal - YEE - likely prerenally mediated in association with poor intake, insensible losses (fever), and GI losses from diarrhea. Improving, on IVF. GFR now ~30-40ml/min  (2)CV - hypovolemic - on isotonic IVF at 75cc/h  (3)Lytes - acceptable for now. Low calcium level is due to hypoalbuminemia and therefore not clinically relevant  (4)GI - diarrhea    RECOMMEND:  (1)Continue IVF as ordered  (2)High-protein diet  (3)Dose new meds for GFR 30-40ml/min (present dosing is acceptable)  (4)BMP daily  (5)Workup of diarrhea per primary team/GI    Thank you for involving [facility] in this patient's care.    With warm regards,    Bernardo Galdamez MD   [facility]  [phone] HPI:  Mr. Benitez is a 79 year-old man with history of multiple medical issues including hemophagocytic lymphohistiocytosis/non-hodgkin's lymphoma, and benign prostatic hyperplasia. He presented 19 to the [facility] ER with diarrhea for one month, as well as weight loss and poor appetite. He was noted on admission to have YEE with a creatinine of 2.08mg/dL; therefore, a renal consultation was requested.    Mr. Benitez states that he has been taking protein shakes at home, but that they cause a fair amount of gas. He tries to keep well-hydrated. He attests to reasonable urine output; he has been wearing diapers for urination given his limited ability to ambulate. He denies NSAID use.      PAST MEDICAL & SURGICAL HISTORY:  Acute on chronic anemia  HLH (hemophagocytic lymphohistiocytosis)  BPH (benign prostatic hyperplasia)  H/O knee surgery  Hernia    Allergies  No Known Allergies    SOCIAL HISTORY:  Denies ETOh,Smoking,     FAMILY HISTORY:  No CKD    REVIEW OF SYSTEMS:  CONSTITUTIONAL: (+)weakness, (+)fever, (+)weight loss  EYES/ENT: No visual changes;  No vertigo or throat pain   NECK: No pain or stiffness  RESPIRATORY: No cough, wheezing, hemoptysis; No shortness of breath  CARDIOVASCULAR: No chest pain or palpitations  GASTROINTESTINAL: (+)diarrhea, (+)gas  GENITOURINARY: No dysuria, frequency or hematuria  NEUROLOGICAL: No numbness or weakness  SKIN: No itching, burning, rashes, or lesions   All other review of systems is negative unless indicated above.    VITAL:  T(C): , Max: 38.3 (19 @ 02:10)  T(F): , Max: 100.9 (19 @ 02:10)  HR: 97 (19 @ 06:39)  BP: 136/77 (19 @ 06:39)  BP(mean): 70 (19 @ 22:22)  RR: 18 (19 @ 06:39)  SpO2: 99% (19 @ 06:39)    PHYSICAL EXAM:  Constitutional: NAD, Alert; thin to cachectic  HEENT: NCAT, MMM  Neck: Supple, No JVD  Respiratory: CTA-b/l  Cardiovascular: RRR s1s2, no m/r/g  Gastrointestinal: hyperactive, NTND  Extremities: 2+ b/l LE edema  Neurological: reduced generalized strength  Back: no CVAT b/l  Skin: No rashes, no nevi    LABS:                        7.8    4.89  )-----------( 50       ( 2019 06:41 )             23.7     Na(132)/K(4.2)/Cl(103)/HCO3(18)/BUN(43)/Cr(1.76)Glu(76)/Ca(6.9)/Mg(--)/PO4(--)     @ 03:28  Na(129)/K(4.7)/Cl(99)/HCO3(17)/BUN(48)/Cr(2.08)Glu(91)/Ca(7.8)/Mg(--)/PO4(--)     @ 18:31    (2019) - BUN/creatinine: 35/1.12    Urinalysis Basic - ( 2019 03:38 )  Color: Yellow / Appearance: Clear / S.019 / pH: x  Gluc: x / Ketone: Negative  / Bili: Negative / Urobili: Negative   Blood: x / Protein: 30 mg/dL / Nitrite: Negative   Leuk Esterase: Negative / RBC: 14 /hpf / WBC 5 /HPF   Sq Epi: x / Non Sq Epi: 1 /hpf / Bacteria: Negative  Osmolality, Random Urine: 399 mos/kg ( @ 01:30)  Sodium, Random Urine: 46 mmol/L ( @ 01:30)  Creatinine, Random Urine: 64 mg/dL ( @ 01:30)  FENa 0.9%    IMAGING:  < from: Xray Chest 1 View-PORTABLE IMMEDIATE (19 @ 18:28) >  Clear lungs    ASSESSMENT:  (1)Renal - YEE - likely prerenally mediated in association with poor intake, insensible losses (fever), and GI losses from diarrhea. Improving, on IVF. GFR now ~30-40ml/min  (2)CV - hypovolemic - on isotonic IVF at 75cc/h  (3)Lytes - acceptable for now. Low calcium level is due to hypoalbuminemia and therefore not clinically relevant  (4)GI - diarrhea    RECOMMEND:  (1)Continue IVF as ordered  (2)High-protein diet  (3)Dose new meds for GFR 30-40ml/min (present dosing is acceptable)  (4)BMP daily  (5)Dietician evaluation - is there a protein supplement that is unlikely to cause gas/unlikely to exacerbate his diarrhea?  (5)Workup of diarrhea per primary team/GI    Thank you for involving [facility] in this patient's care.    With warm regards,    Bernardo Galdamez MD   [facility]  [phone]

## 2019-11-05 NOTE — CONSULT NOTE ADULT - SUBJECTIVE AND OBJECTIVE BOX
Patient is a 79y old  Male who presents with a chief complaint of same (2019 07:15)      HPI:  78yo M h/o HLH diagnosed in , has lost 40 pounds since and in September was told it converted to NHL, has had 2 cycles of "new" chemo since, has lost 6 pounds since. Has had Diarrhea for the past month: describes it as explosive, about 5x/day,  occasionally watery, states it has not been tested.   tonight ptn presents w generalized weakness, was unable to get off the toilet 2/2 generalized weakness and lightheadedness.  denies fever, chills, nausea, vomiting, cough, cp, sob, dark stools, states appetite is poor at baseline.   Pt notes he has been on chronic Bactrim. Pt only eats home cooked food. No fresh vegetables. 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 He was treated with etoposide, dex for 8 weeks (dose reduction due to elevated bili wk3-6). Pt had good response with resolution of hemolysis and ferritin down to <1k from initial >46k. 2 months following tx, pt had worsening fatigue, weakness, weight loss, developed anemia Hgb 8, ferritin increased to 11k. Etoposide and cyclosporin was started. The response was poor. PET CT scan performed demonstrated FDG activity in the bone, liver and spleen. No pb sites. Bone marrow biopsy was done which showed a CD 30+ Anaplasic large T cell lymphoma. He was started on Brentuximab-CVD on 19 with GCSF support. 10/22 bone marrow biopsy performed, demonstrating sheets of macrophages with hemophagocytosis. There is still a large number of large abnormal lymphocytes still visualized.     Given the persistent hemophagocytic lymphohistiocytosis and the backdrop of a ALK negative CD30+ anaplastic T pastora lymphoma, does not appear to be responsive to the current Brentuximab CHOP therapy (noted this was dose reduced). Discussed changing therapy to Gemcitabine 1000mg/m2 Day 1 and 8 of 21 day cycle and back to the original Etoposide though this is dosed at 100mg/m2 for the 21 day cycle. Heme /onc  continue the Brentuximab Q 21 days.     Social history:   Marital Status:   Occupation: retired  used to work in [employer]  Lives with: wife    Substance Use : denies  Tobacco Usage:  (  x ) never smoked   (   ) former smoker   (   ) current smoker  (     ) pack year  (        ) last tobacco use date  Alcohol Usage: denies  Travel: denies  Pets: denies          FAMILY HISTORY:  mother-  - cancer -colon in her 90s  father-   metastatic renal cancer to lungs    REVIEW OF SYSTEMS  General:	fatigue    Skin:No rash  	  Ophthalmologic: had blurry vision after one of chemo- now leena  	  ENMT:No nasal discharge,headache,sinus congestion or throat pain.No dental complaints    Respiratory and Thorax:No cough,sputum or chest pain.Denies shortness of breath  	  Cardiovascular:	No chest pain,palpitaions or dizziness    Gastrointestinal:	NO nausea,abdominal pain .  diarrhea.  occasional stool incontinence    Genitourinary:	No dysuria,frequency. No flank pain    Musculoskeletal:	No joint swelling or pain.No weakness    Neurological:No confusion,diziness.No extremity weakness.    Psychiatric:No delusions or hallucinations	    Hematology/Lymphatics:	No LN swelling.No gum bleeding     Endocrine:	No recent weight gain or loss.No abnormal heat/cold intolerance    Allergic/Immunologic:	No hives or rash     Allergies    No Known Allergies    Intolerances        Antimicrobials:       MEDICATIONS  (prior antimicrobials ):    cefepime   IVPB   100 mL/Hr IV Intermittent (19 @ 18:04)    vancomycin  IVPB   250 mL/Hr IV Intermittent (19 @ 18:31)             atovaquone Suspension 1500 milliGRAM(s) Oral daily  piperacillin/tazobactam IVPB.. 3.375 Gram(s) IV Intermittent every 8 hours      MEDICATIONS  (STANDING):  atovaquone Suspension 1500 milliGRAM(s) Oral daily  dextrose 5% + sodium chloride 0.9%. 1000 milliLiter(s) (75 mL/Hr) IV Continuous <Continuous>  finasteride 5 milliGRAM(s) Oral daily  pantoprazole  Injectable 40 milliGRAM(s) IV Push every 12 hours  piperacillin/tazobactam IVPB.. 3.375 Gram(s) IV Intermittent every 8 hours  tamsulosin 0.8 milliGRAM(s) Oral at bedtime    MEDICATIONS  (PRN):  acetaminophen   Tablet .. 650 milliGRAM(s) Oral every 6 hours PRN Temp greater or equal to 38C (100.4F), Mild Pain (1 - 3)  loperamide 2 milliGRAM(s) Oral four times a day PRN Diarrhea  ondansetron Injectable 4 milliGRAM(s) IV Push every 6 hours PRN Nausea and/or Vomiting        Vital Signs Last 24 Hrs  T(C): 37.3 (2019 13:45), Max: 38.3 (2019 02:10)  T(F): 99.2 (2019 13:45), Max: 100.9 (2019 02:10)  HR: 98 (2019 13:45) (79 - 107)  BP: 97/62 (2019 13:45) (71/38 - 136/77)  BP(mean): 70 (2019 22:22) (65 - 71)  RR: 18 (2019 13:45) (16 - 21)  SpO2: 94% (2019 13:45) (94% - 100%)    PHYSICAL EXAM:Pleasant patient in no acute distress. weak      Constitutional: weak, closes eyes when talking  No cachexia     Eyes:PERRL EOMI.NO discharge or conjunctival injection    ENMT:No sinus tenderness.No thrush.No pharyngeal exudate or erythema.Fair dental hygiene    Neck:Supple,No LN,no JVD      Respiratory:Good air entry bilaterally,CTA    Cardiovascular:S1 S2 wnl,     Gastrointestinal:Soft BS(+) no tenderness no masses     Extremitie edema.  elbow scabs   heel denuded     Neurological:AAO X 3,No grossly focal deficits    Skin: few scratches ,scabs  left heel denuded  nails falling off     Lymph Nodes:No palpable LNs    Musculoskeletal:No joint swelling or LOM    Psychiatric:Affect normal.                            7.8    4.89  )-----------( 50       ( 2019 06:41 )             23.7     LIVER FUNCTIONS - ( 2019 03:28 )  Alb: 2.1 g/dL / Pro: 3.9 g/dL / ALK PHOS: 370 U/L / ALT: 49 U/L / AST: 71 U/L / GGT: x                 132<L>  |  103  |  43<H>  ----------------------------<  76  4.2   |  18<L>  |  1.76<H>    Ca    6.9<L>      2019 03:28    TPro  3.9<L>  /  Alb  2.1<L>  /  TBili  1.7<H>  /  DBili  1.2<H>  /  AST  71<H>  /  ALT  49<H>  /  AlkPhos  370<H>            Urinalysis Basic - ( 2019 03:38 )    Color: Yellow / Appearance: Clear / S.019 / pH: x  Gluc: x / Ketone: Negative  / Bili: Negative / Urobili: Negative   Blood: x / Protein: 30 mg/dL / Nitrite: Negative   Leuk Esterase: Negative / RBC: 14 /hpf / WBC 5 /HPF   Sq Epi: x / Non Sq Epi: 1 /hpf / Bacteria: Negative        RECENT CULTURES:      MICROBIOLOGY:          Radiology:      < from: CT Abdomen and Pelvis w/ Oral Cont (19 @ 13:58) >    EXAM:  CT ABDOMEN AND PELVIS OC                          EXAM:  CT CHEST                            PROCEDURE DATE:  2019            INTERPRETATION:  CLINICAL INFORMATION: Diarrhea. Hematuria. History of   anaplastic T-cell ALK(-) lymphoma.    COMPARISON: PET/CT 10/29/2019 and 2019. MR abdomen 2019.    PROCEDURE:   CT of the Chest, Abdomen and Pelvis was performed without intravenous   contrast.   Intravenous contrast: None.  Oral contrast: Positive contrast was administered.  Sagittal and coronal reformats were performed.    FINDINGS:    Evaluation of the solid visceral organs and vasculature is limited   without the administration of intravenous contrast.     CHEST:     LUNGS AND LARGE AIRWAYS: Patent central airways. Nopulmonary nodules.   Bibasilar compressive atelectasis.  PLEURA: New small bilateral pleural effusions.  VESSELS: Thoracic aorta and pulmonary artery are normal caliber. Coronary   artery atherosclerotic calcifications.  HEART: Heart size is normal. No pericardial effusion. Aortic valve and   coronary artery atherosclerotic calcifications.  MEDIASTINUM AND PIETER: No lymphadenopathy.  CHEST WALL AND LOWER NECK: Mild bilateral gynecomastia.    ABDOMEN AND PELVIS:    LIVER: Normal morphology. Ill-defined 2.3 cm hypoattenuating region in   the central right hepatic lobe (series 3, image 149), corresponding to   the region of FDG avidity on prior PET/CTs  BILE DUCTS: Normal caliber.  GALLBLADDER: Within normal limits.  SPLEEN: Splenomegaly measuring 15.8 cm, stable from 10/29/2019.  PANCREAS: Within normal limits.  ADRENALS: Within normal limits.  KIDNEYS/URETERS: Bilateral renal cysts.Two nonobstructing left renal   stones measuring up to 0.7 cm at the upper pole.    BLADDER: Thick-walled and trabeculated urinary bladder with a couple   large bladder diverticula measuring up to 3.6 cm at the left   posterolateral wall and containing a 0.9 cm bladder stone with moderate   perivesicular inflammatory change, similar appearance to PET/CT   10/29/2019. Perivesicular inflammatory change is new since 2019.  REPRODUCTIVE ORGANS: Prostatomegaly measuring 5 cm in transverse   dimension.    BOWEL: Colonic diverticulosis, concentrated in the sigmoid, without acute   diverticulitis. No bowel wall thickening or obstruction. Appendix is not   visualized.  PERITONEUM: Trace perihepatic and dependent ascites.  VESSELS: Atherosclerotic change.  RETROPERITONEUM/LYMPH NODES: No lymphadenopathy.    ABDOMINAL WALL: Anasarca.  BONES: Osteopenia. Multilevel degenerative change with a chronic   compression deformity of L3.    IMPRESSION:     New small bilateral pleural effusions.    No bowel wall thickening or obstruction.    Thick-walled and trabeculated urinary bladder with diverticula and   moderate perivesicular inflammatory change, similar in appearance to   recent PET/CT 10/29/2019. Bladder wall trabeculation may be seen in the   setting of chronic bladder outlet obstruction. However, inflammatory   change is new since 2019. Correlate with urinalysis.    Ill-defined 2.3 cm hypoattenuating region in the right hepatic lobe   corresponding to region of FDG avidity on prior PET/CTs, indeterminate.    Splenomegaly.        ANGELES HARDING M.D., ATTENDING RADIOLOGIST  This document has been electronically signed. 2019  2:47PM        < end of copied text >

## 2019-11-05 NOTE — CONSULT NOTE ADULT - ASSESSMENT
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 Treated with brentuximab and CHOP x 3, relapsed. Images reviewed. Recent fracture of lumbar vertebra, diagnosis made in ortho office, no films available. No MRI. Has notable pains with movements. Has very minimal LE power proximally and decreased to absent KJ reflexes. He was unable to get off of the toilet due to weakness. Had CT abd/pelvis today. Needs MRI (preferable, but will not tolerate) or CT scan of the lumbar spine. Concerns of leptomeningeal involvement a possibility. Dr Clay had suggested additional chemo  Pt with soft, loose stool and incontinence  Pt with weakness and fevers  Suggest:  BC x 2 sets  stool- GI PCR and C diff  check CMV PCR   check amoebic serology  check giardia     work up of lumbar fracture- MRI when able  could stool incontinence be related to this?    Fevers  check blood cultures  agree with zosyn after obtain cultures  no colitis on CT  peripheral IV Pt with Dx HLH, treated with etoposide  and steroids, relapsed, and diagnosed with T cell CD 30+ lymphoma as underlying cause. Treated with brentuximab and CHOP x 3, relapsed. Images reviewed. Recent fracture of lumbar vertebra, diagnosis made in ortho office, no films available. No MRI. Has notable pains with movements. Has very minimal LE power proximally and decreased to absent KJ reflexes. He was unable to get off of the toilet due to weakness. Had CT abd/pelvis today. Needs MRI (preferable, but will not tolerate) or CT scan of the lumbar spine. Concerns of leptomeningeal involvement a possibility. Dr Clay had suggested additional chemo  Pt with soft, loose stool and incontinence  Pt with weakness and fevers  Suggest:  BC x 2 sets  stool- GI PCR and C diff  check CMV PCR   check amoebic serology  check giardia     work up of lumbar fracture- MRI when able  could stool incontinence be related to this?    Fevers  check blood cultures  agree with zosyn after obtain cultures  no colitis on CT  peripheral IV  check CMV status and cryptococcal ag  check AM cortisol

## 2019-11-05 NOTE — DIETITIAN INITIAL EVALUATION ADULT. - ENERGY NEEDS
Ht: 75 inches Wt: 183 pounds BMI: 22.9 kg/m2 IBW:  pounds(+/-10%) %IBW  edema. pressure ulcers documented. Ht: 75 inches Wt: 183 pounds BMI: 22.9 kg/m2 IBW: 196 pounds(+/-10%) 94%IBW  3+ L/R foot edema. no pressure ulcers documented.

## 2019-11-05 NOTE — CONSULT NOTE ADULT - ASSESSMENT
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     # Anaplastic large T cell lymphoa  -s/p brintuximab, minCHOP x 3 cycles, last 10/17  -poor response per BM bx on 10/22 bone marrow biopsy performed with  sheets of macrophages with hemophagocytosis. There is still a large number of large abnormal lymphocytes still visualized.   -previous discussion with Dr. Clay (primary hematologist) and family was trial of GEM/etoposide  -Given pt's poor performance status, he may not tolerate the regimen  -will discuss GOC    # HLH   -2/2 to underling lymphoma  -initially responded to tx with etoposide   -now worsening with increasing ferritin in the setting of persistent T cell lymphoma not responsive to treatment  -r/o infectious causes  -continue supportive care  -keep hgb > 7, plt > 10 or 15 if febrile        Bernadro Schmitt MD. 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     # Anaplastic large T cell lymphoa  -s/p brintuximab, minCHOP x 3 cycles, last 10/17  -poor response per BM bx on 10/22 bone marrow biopsy performed with  sheets of macrophages with hemophagocytosis. There is still a large number of large abnormal lymphocytes still visualized.   -previous discussion with Dr. Clay (primary hematologist) and family was trial of GEM/etoposide  -Given pt's poor performance status, ECOG-3-4 (mostly sedentary, needs assistance with ADLS), he may not tolerate the regimen. Chemotherapy may do more harm.  -Had GoC discussion with the patient and wife at bedside. Pt understands the his condition is incurable and chemotherapy at this point may harm him rather than help him. Would like to stay home rather than being in the hospital. But appears still interested in treatment. Will discuss with Primary hematologist Dr. Clay.     # HLH   -2/2 to underling lymphoma  -initially responded to tx with etoposide   -now worsening with increasing ferritin in the setting of persistent T cell lymphoma not responsive to treatment  -r/o infectious causes  -continue supportive care  -keep hgb > 7, plt > 10 or 15 if febrile    # Lower back pain/LE weakness  -pt reports having herniated disc and seen ortho  -currently more weakness of his b/L LE 1-2/5 on exam. Please check CT or whole spine to r/o leptomeningeal involvement of lymphoma and to better assess the back pain.  -pt may also need an LP        Bernardo Schmitt MD. PGY 5  Heme-Onc fellow  [phone]; [id number]

## 2019-11-06 NOTE — PROVIDER CONTACT NOTE (OTHER) - ACTION/TREATMENT ORDERED:
PA notified. Give tylenol as ordered, blood cultures were already done, pt currently on IV zosyn & continue to monitor as per PA.

## 2019-11-06 NOTE — PROGRESS NOTE ADULT - SUBJECTIVE AND OBJECTIVE BOX
LEODAN BURGOS:[id number],   79yMale followed for:  No Known Allergies    PAST MEDICAL & SURGICAL HISTORY:  Acute on chronic anemia  HLH (hemophagocytic lymphohistiocytosis)  BPH (benign prostatic hyperplasia)  H/O knee surgery  Hernia    FAMILY HISTORY:    MEDICATIONS  (STANDING):  atovaquone Suspension 1500 milliGRAM(s) Oral daily  dextrose 5% + sodium chloride 0.9%. 1000 milliLiter(s) (75 mL/Hr) IV Continuous <Continuous>  finasteride 5 milliGRAM(s) Oral daily  pantoprazole  Injectable 40 milliGRAM(s) IV Push every 12 hours  piperacillin/tazobactam IVPB.. 3.375 Gram(s) IV Intermittent every 8 hours  tamsulosin 0.8 milliGRAM(s) Oral at bedtime    MEDICATIONS  (PRN):  acetaminophen   Tablet .. 650 milliGRAM(s) Oral every 6 hours PRN Temp greater or equal to 38C (100.4F), Mild Pain (1 - 3)  loperamide 2 milliGRAM(s) Oral four times a day PRN Diarrhea  ondansetron Injectable 4 milliGRAM(s) IV Push every 6 hours PRN Nausea and/or Vomiting      Vital Signs Last 24 Hrs  T(C): 36.3 (06 Nov 2019 06:31), Max: 38.2 (05 Nov 2019 12:05)  T(F): 97.4 (06 Nov 2019 06:31), Max: 100.8 (05 Nov 2019 12:05)  HR: 73 (06 Nov 2019 06:31) (73 - 119)  BP: 100/63 (06 Nov 2019 06:31) (97/62 - 135/65)  BP(mean): --  RR: 18 (06 Nov 2019 06:31) (18 - 20)  SpO2: 97% (06 Nov 2019 06:31) (88% - 97%)  nc/at  s1s2  cta  soft, nt, nd no guarding or rebound  no c/c/e    CBC Full  -  ( 05 Nov 2019 06:41 )  WBC Count : 4.89 K/uL  RBC Count : 2.52 M/uL  Hemoglobin : 7.8 g/dL  Hematocrit : 23.7 %  Platelet Count - Automated : 50 K/uL  Mean Cell Volume : 94.0 fl  Mean Cell Hemoglobin : 31.0 pg  Mean Cell Hemoglobin Concentration : 32.9 gm/dL  Auto Neutrophil # : x  Auto Lymphocyte # : x  Auto Monocyte # : x  Auto Eosinophil # : x  Auto Basophil # : x  Auto Neutrophil % : x  Auto Lymphocyte % : x  Auto Monocyte % : x  Auto Eosinophil % : x  Auto Basophil % : x    11-05    132<L>  |  103  |  43<H>  ----------------------------<  76  4.2   |  18<L>  |  1.76<H>    Ca    6.9<L>      05 Nov 2019 03:28    TPro  3.9<L>  /  Alb  2.1<L>  /  TBili  1.7<H>  /  DBili  1.2<H>  /  AST  71<H>  /  ALT  49<H>  /  AlkPhos  370<H>  11-05    PT/INR - ( 05 Nov 2019 03:28 )   PT: 15.2 sec;   INR: 1.31 ratio         PTT - ( 05 Nov 2019 03:28 )  PTT:37.1 sec

## 2019-11-06 NOTE — PHYSICAL THERAPY INITIAL EVALUATION ADULT - ADDITIONAL COMMENTS
patient lives with a spouse in a coop, had fracture of lower spine about 5 months back as per spouse and patient , has brace at home , has some diffculty with ambulation. uses RW

## 2019-11-06 NOTE — PROGRESS NOTE ADULT - SUBJECTIVE AND OBJECTIVE BOX
Patient is a 79y old  Male who presents with a chief complaint of diarrea (2019 07:04)      SUBJECTIVE / OVERNIGHT EVENTS: ptn feels stronger post transfusion and drinking ensure shakes, no further diarrhea, significantly low HH without any evidence of bleeding.     MEDICATIONS  (STANDING):  atovaquone Suspension 1500 milliGRAM(s) Oral daily  dextrose 5% + sodium chloride 0.9%. 1000 milliLiter(s) (75 mL/Hr) IV Continuous <Continuous>  finasteride 5 milliGRAM(s) Oral daily  pantoprazole  Injectable 40 milliGRAM(s) IV Push every 12 hours  piperacillin/tazobactam IVPB.. 3.375 Gram(s) IV Intermittent every 8 hours  tamsulosin 0.8 milliGRAM(s) Oral at bedtime    MEDICATIONS  (PRN):  acetaminophen   Tablet .. 650 milliGRAM(s) Oral every 6 hours PRN Temp greater or equal to 38C (100.4F), Mild Pain (1 - 3)  loperamide 2 milliGRAM(s) Oral four times a day PRN Diarrhea  ondansetron Injectable 4 milliGRAM(s) IV Push every 6 hours PRN Nausea and/or Vomiting      Vital Signs Last 24 Hrs  T(F): 99.7 (19 @ 13:18), Max: 100.4 (19 @ 01:21)  HR: 93 (19 @ 13:18) (73 - 119)  BP: 98/60 (19 @ 13:18) (98/60 - 135/65)  RR: 20 (19 @ 13:18) (18 - 20)  SpO2: 95% (19 @ 13:18) (88% - 97%)  Telemetry:   CAPILLARY BLOOD GLUCOSE        I&O's Summary    2019 07:  -  2019 07:00  --------------------------------------------------------  IN: 2350 mL / OUT: 0 mL / NET: 2350 mL    2019 07:01  -  2019 14:24  --------------------------------------------------------  IN: 480 mL / OUT: 0 mL / NET: 480 mL        PHYSICAL EXAM:  GENERAL: NAD, well-developed  HEAD:  Atraumatic, Normocephalic  EYES: EOMI, PERRLA, conjunctiva and sclera clear  NECK: Supple, No JVD  CHEST/LUNG: Clear to auscultation bilaterally; No wheeze  HEART: Regular rate and rhythm; No murmurs, rubs, or gallops  ABDOMEN: Soft, Nontender, Nondistended; Bowel sounds present  EXTREMITIES:  2+ Peripheral Pulses, No clubbing, cyanosis, or edema  PSYCH: AAOx3  NEUROLOGY: non-focal  SKIN: No rashes or lesions    LABS:                        6.4    2.70  )-----------( 34       ( 2019 09:52 )             19.7     11-06    133<L>  |  103  |  38<H>  ----------------------------<  94  3.7   |  18<L>  |  1.48<H>    Ca    6.9<L>      2019 07:09    TPro  3.4<L>  /  Alb  1.9<L>  /  TBili  1.8<H>  /  DBili  x   /  AST  89<H>  /  ALT  46<H>  /  AlkPhos  622<H>  11-06    PT/INR - ( 2019 03:28 )   PT: 15.2 sec;   INR: 1.31 ratio         PTT - ( 2019 03:28 )  PTT:37.1 sec      Urinalysis Basic - ( 2019 03:38 )    Color: Yellow / Appearance: Clear / S.019 / pH: x  Gluc: x / Ketone: Negative  / Bili: Negative / Urobili: Negative   Blood: x / Protein: 30 mg/dL / Nitrite: Negative   Leuk Esterase: Negative / RBC: 14 /hpf / WBC 5 /HPF   Sq Epi: x / Non Sq Epi: 1 /hpf / Bacteria: Negative        RADIOLOGY & ADDITIONAL TESTS:    Imaging Personally Reviewed:    Consultant(s) Notes Reviewed:      Care Discussed with Consultants/Other Providers:

## 2019-11-06 NOTE — PHYSICAL THERAPY INITIAL EVALUATION ADULT - PERTINENT HX OF CURRENT PROBLEM, REHAB EVAL
78yo M h/o HLH diagnosed in 4/19, has lost 40 pounds since and in September was told it converted to NHL, has had 2 cycles of "new" chemo since, has lost 6 pounds since. Has had Diarrhea for the past month: describes it as explosive, about 5x/day,  occasionally watery, states it has not been tested. tonight ptn presents w generalized weakness, was unable to get off the toilet 2/2 generalized weakness and lightheadedness.

## 2019-11-06 NOTE — PHYSICAL THERAPY INITIAL EVALUATION ADULT - RANGE OF MOTION EXAMINATION, REHAB EVAL
bilateral upper extremity ROM was WNL (within normal limits)/left hip and knee flexion 0-25 decrease secondary to pain,  right hip and knee flexion 0-45

## 2019-11-06 NOTE — PHYSICAL THERAPY INITIAL EVALUATION ADULT - GENERAL OBSERVATIONS, REHAB EVAL
patient supine in bed, mild swelling b/l ankle , spouse and son at bedside, iv , supplemental oxygen

## 2019-11-06 NOTE — PROGRESS NOTE ADULT - SUBJECTIVE AND OBJECTIVE BOX
No pain, no shortness of breath      VITAL:  T(C): , Max: 38.2 (19 @ 12:05)  T(F): , Max: 100.8 (19 @ 12:05)  HR: 73 (19 @ 06:31)  BP: 100/63 (19 @ 06:31)  RR: 18 (19 @ 06:31)  SpO2: 97% (19 @ 06:31)    PHYSICAL EXAM:  Constitutional: NAD, Alert; thin to cachectic  HEENT: NCAT, MMM  Neck: Supple, No JVD  Respiratory: CTA-b/l  Cardiovascular: RRR s1s2, no m/r/g  Gastrointestinal: hyperactive, NTND  Extremities: 2+ b/l LE edema  Neurological: reduced generalized strength  Back: no CVAT b/l  Skin: No rashes, no nevi    LABS:                        7.8    4.89  )-----------( 50       ( 2019 06:41 )             23.7     Na(133)/K(3.7)/Cl(103)/HCO3(18)/BUN(38)/Cr(1.48)Glu(94)/Ca(6.9)/Mg(--)/PO4(--)     @ 07:09  Na(132)/K(4.2)/Cl(103)/HCO3(18)/BUN(43)/Cr(1.76)Glu(76)/Ca(6.9)/Mg(--)/PO4(--)     @ 03:28  Na(129)/K(4.7)/Cl(99)/HCO3(17)/BUN(48)/Cr(2.08)Glu(91)/Ca(7.8)/Mg(--)/PO4(--)     @ 18:31    Urinalysis Basic - ( 2019 03:38 )    Color: Yellow / Appearance: Clear / S.019 / pH: x  Gluc: x / Ketone: Negative  / Bili: Negative / Urobili: Negative   Blood: x / Protein: 30 mg/dL / Nitrite: Negative   Leuk Esterase: Negative / RBC: 14 /hpf / WBC 5 /HPF   Sq Epi: x / Non Sq Epi: 1 /hpf / Bacteria: Negative      Osmolality, Random Urine: 399 mos/kg ( @ 01:30)  Sodium, Random Urine: 46 mmol/L ( @ 01:30)  Creatinine, Random Urine: 64 mg/dL ( @ 01:30)      IMPRESSION: 79M w/ HLH/NHL, and BPH, 19 a/w diarrhea, c/b YEE    (1)Renal - YEE - likely prerenally mediated in association with poor intake, insensible losses (fever), and GI losses from diarrhea. Improving, on IVF. I would favor continuing the IVF for now, given that he is still suffering from the diarrhea  (2)Metabolic acidosis - due to GI losses, primarily. Mild/stable for now.  (3)GI - diarrhea    RECOMMEND:  (1)Continue IVF as ordered  (2)Dose new meds for GFR 30-40ml/min (present dosing is acceptable)  (3)BMP daily  (4)Dietician evaluation - is there a protein supplement that is unlikely to cause gas/unlikely to exacerbate his diarrhea?  (5)Workup of diarrhea per primary team/GI      Bernardo Galdamez MD  [facility]  [phone] No pain, no shortness of breath      VITAL:  T(C): , Max: 38.2 (19 @ 12:05)  T(F): , Max: 100.8 (19 @ 12:05)  HR: 73 (19 @ 06:31)  BP: 100/63 (19 @ 06:31)  RR: 18 (19 @ 06:31)  SpO2: 97% (19 @ 06:31)    PHYSICAL EXAM:  Constitutional: NAD, Alert; thin to cachectic  HEENT: NCAT, MMM  Neck: Supple, No JVD  Respiratory: CTA-b/l  Cardiovascular: RRR s1s2, no m/r/g  Gastrointestinal: hyperactive, NTND  Extremities: 2+ b/l LE edema  Neurological: reduced generalized strength  Back: no CVAT b/l  Skin: No rashes, no nevi    LABS:                        7.8    4.89  )-----------( 50       ( 2019 06:41 )             23.7     Na(133)/K(3.7)/Cl(103)/HCO3(18)/BUN(38)/Cr(1.48)Glu(94)/Ca(6.9)/Mg(--)/PO4(--)     @ 07:09  Na(132)/K(4.2)/Cl(103)/HCO3(18)/BUN(43)/Cr(1.76)Glu(76)/Ca(6.9)/Mg(--)/PO4(--)     @ 03:28  Na(129)/K(4.7)/Cl(99)/HCO3(17)/BUN(48)/Cr(2.08)Glu(91)/Ca(7.8)/Mg(--)/PO4(--)     @ 18:31    Urinalysis Basic - ( 2019 03:38 )  Color: Yellow / Appearance: Clear / S.019 / pH: x  Gluc: x / Ketone: Negative  / Bili: Negative / Urobili: Negative   Blood: x / Protein: 30 mg/dL / Nitrite: Negative   Leuk Esterase: Negative / RBC: 14 /hpf / WBC 5 /HPF   Sq Epi: x / Non Sq Epi: 1 /hpf / Bacteria: Negative  Osmolality, Random Urine: 399 mos/kg ( @ 01:30)  Sodium, Random Urine: 46 mmol/L ( @ 01:30)  Creatinine, Random Urine: 64 mg/dL ( @ 01:30)      IMPRESSION: 79M w/ HLH/NHL, and BPH, 19 a/w diarrhea, c/b YEE  (1)Renal - YEE - likely prerenally mediated in association with poor intake, insensible losses (fever), and GI losses from diarrhea. Improving, on IVF. I would favor continuing the IVF for now, given that he is still suffering from the diarrhea  (2)Metabolic acidosis - due to GI losses, primarily. Mild/stable for now.  (3)GI - diarrhea    RECOMMEND:  (1)Continue IVF as ordered  (2)Dose new meds for GFR 30-40ml/min (present dosing is acceptable)  (3)BMP daily  (4)Dietician evaluation   (5)Workup of diarrhea per primary team/GI      Bernardo Galdamez MD  [facility]  [phone]

## 2019-11-06 NOTE — CONSULT NOTE ADULT - ASSESSMENT
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 Pt had multiple falls over the past few months, feels like the "leg is giving out on him". + head trauma, no LOC.  He walks with the walker at home. Pt also endorsing worsening left back and hip pain for past 3 weeks, worsening with movement and walking, describing the pain as burning and radiating to his left leg. Prior to this, he has pain on his right hip but has shifted to the left side 3 weeks ago. He was seen by orthopedics about 3 months ago, had xray of the lumbar spine which showed compression fracture. No MRI at that time.     Exam notable for 2/5 strength on LLE, and 3/5 on RLL. + left straight leg test with focal TTP on left hip. No focal neurologic deficit. 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 Pt had multiple falls over the past few months, feels like the "leg is giving out on him". + head trauma, no LOC.  He walks with the walker at home. Pt also endorsing worsening left back and hip pain for past 3 weeks, worsening with movement and walking, describing the pain as burning and radiating to his left leg. Prior to this, he has pain on his right hip but has shifted to the left side 3 weeks ago. He was seen by orthopedics about 3 months ago, had xray of the lumbar spine which showed compression fracture. No MRI at that time.     Exam notable for 2/5 strength on LLE, and 3/5 on right hip, 2/5 on right leg. + left straight leg test with focal TTP on left hip. No focal neurologic deficit.   Pain appeared radicular in nature. No saddle anesthesia, no urine/bowel incontinence. Likely sciatica from nerve root compression from herniation vs metastatic disease to bone, given recent PET scan finding.     Recommendations:  -MRI of lumbar spine w/ and w/o contrast, if no contraindication  -Trial of gabapentin - need to dose renally  -Pain control w/o tylenol. Avoid NSAIDs  -Fall precaution, PT eval   -Will follow closely  -Discussed with Dr. Yudy Acosta M.D. - PGY3  Internal Medicine Resident  Pager: [phone](NS) / [phone] (RAUL)

## 2019-11-06 NOTE — PHYSICAL THERAPY INITIAL EVALUATION ADULT - CRITERIA FOR SKILLED THERAPEUTIC INTERVENTIONS
therapy frequency/anticipated discharge recommendation/predicted duration of therapy intervention/risk reduction/prevention/rehab potential/impairments found/functional limitations in following categories

## 2019-11-06 NOTE — PROGRESS NOTE ADULT - ASSESSMENT
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 Treated with brentuximab and CHOP x 3, relapsed. Recent PET without much improvement.. Recent fracture of lumbar vertebra, diagnosis made in ortho office, no films available. No MRI. Has notable pains with movements. Has very minimal LE power proximally and decreased to absent KJ reflexes. He was unable to get off of the toilet due to weakness. Had CT C/abd/pelvis on 11/5. Needs MRI (preferable, but will not be able to tolerate) will need  CT scan of the C/T/LS spine. Concerns of leptomeningeal involvement a possibility. Dr Clay had suggested additional chemo. Michael Diaz on 11/5 felt ptn is too frail to get more chemo w his performance level at 4 ( needs assistance w most ADLs)  Ptn with soft, loose stool and incontinence, GI PCR is negative  Afebrile after the fever after a transfusion on 11/5  ID input appreciated  PLAN:   BCx NTD  Anemia ongoing and worsening despite transfusions, prob w aspect of hemolysis, this was d/w Heme/Onc, will get hemolysis testing  supportive care  stool- GI PCR neg, and C diff ordered but stool is not watery  awaiting CMV PCR, cryptococcal ag, giardia and  amoebic serology as per ID recommendation  NEURO consult called  CT entire spine, may need LP to R/O Leptomeningeal spread  CX NTD, off ABx   prelim colitis on CT but official read: NO COLITIS  IVF, still hypovolemic, though volume status improving  Hyponatremia improving  Hypoalbuminemia  YEE probably pre-renal , improving  am cortisol  check w ptn's pharmacy the exact list of outptn meds  GOC d/w ptn/wife, he is full code  will cont discussion of realistic [facility]  OOB to chair w assistance today

## 2019-11-06 NOTE — PHYSICAL THERAPY INITIAL EVALUATION ADULT - TRANSFER SAFETY CONCERNS NOTED: SIT/STAND, REHAB EVAL
decreased step length/stepping too close to front of assistive device/decreased weight-shifting ability/losing balance

## 2019-11-06 NOTE — PROGRESS NOTE ADULT - SUBJECTIVE AND OBJECTIVE BOX
Patient is a 79y old  Male who presents with a chief complaint of diarrea (2019 07:04)    Being followed by ID for        Interval history:  No other acute events      ROS:  No cough,SOB,CP  No N/V/D  No abd pain  No urinary complaints  No HA  No joint or limb pain  No other complaints    PAST MEDICAL & SURGICAL HISTORY:  Acute on chronic anemia  HLH (hemophagocytic lymphohistiocytosis)  BPH (benign prostatic hyperplasia)  H/O knee surgery  Hernia    Allergies    No Known Allergies    Intolerances      Antimicrobials:    atovaquone Suspension 1500 milliGRAM(s) Oral daily  piperacillin/tazobactam IVPB.. 3.375 Gram(s) IV Intermittent every 8 hours    MEDICATIONS  (STANDING):  alfuzosin 10 milliGRAM(s) Oral at bedtime  atovaquone Suspension 1500 milliGRAM(s) Oral daily  dextrose 5% + sodium chloride 0.9%. 1000 milliLiter(s) (75 mL/Hr) IV Continuous <Continuous>  finasteride 5 milliGRAM(s) Oral daily  pantoprazole  Injectable 40 milliGRAM(s) IV Push every 12 hours  piperacillin/tazobactam IVPB.. 3.375 Gram(s) IV Intermittent every 8 hours      Vital Signs Last 24 Hrs  T(C): 37.6 (19 @ 13:18), Max: 38 (19 @ 01:21)  T(F): 99.7 (19 @ 13:18), Max: 100.4 (19 @ 01:21)  HR: 93 (19 @ 13:18) (73 - 119)  BP: 98/60 (19 @ 13:18) (98/60 - 135/65)  BP(mean): --  RR: 20 (19 @ 13:18) (18 - 20)  SpO2: 95% (19 @ 13:18) (88% - 97%)    Physical Exam:    Constitutional well preserved,comfortable,pleasant    HEENT PERRLA EOMI,No pallor or icterus    No oral exudate or erythema    Neck supple no JVD or LN    Chest Good AE,CTA    CVS RRR S1 S2 WNl No murmur or rub or gallop    Abd soft BS normal No tenderness no masses    Ext No cyanosis clubbing or edema    IV site no erythema tenderness or discharge    Joints no swelling or LOM    CNS AAO X 3 no focal    Lab Data:                          6.9    3.33  )-----------( 33       ( 2019 14:24 )             21.0           133<L>  |  103  |  38<H>  ----------------------------<  94  3.7   |  18<L>  |  1.48<H>    Ca    6.9<L>      2019 07:09    TPro  3.4<L>  /  Alb  1.9<L>  /  TBili  1.8<H>  /  DBili  x   /  AST  89<H>  /  ALT  46<H>  /  AlkPhos  622<H>        Urinalysis Basic - ( 2019 03:38 )    Color: Yellow / Appearance: Clear / S.019 / pH: x  Gluc: x / Ketone: Negative  / Bili: Negative / Urobili: Negative   Blood: x / Protein: 30 mg/dL / Nitrite: Negative   Leuk Esterase: Negative / RBC: 14 /hpf / WBC 5 /HPF   Sq Epi: x / Non Sq Epi: 1 /hpf / Bacteria: Negative        .Stool Feces  19   GI PCR Results: NOT detected  *******Please Note:*******  GI panel PCR evaluates for:  Campylobacter, Plesiomonas shigelloides, Salmonella,  Vibrio, Yersinia enterocolitica, Enteroaggregative  Escherichia coli (EAEC), Enteropathogenic E.coli (EPEC),  Enterotoxigenic E. coli (ETEC) lt/st, Shiga-like  toxin-producing E. coli (STEC) stx1/stx2,  Shigella/ Enteroinvasive E. coli (EIEC), Cryptosporidium,  Cyclospora cayetanensis, Entamoeba histolytica,  Giardia lamblia, Adenovirus F 40/41, Astrovirus,  Norovirus GI/GII, Rotavirus A, Sapovirus  --  --      .Blood Blood  19   No growth to date.  --  --      .Urine Clean Catch (Midstream)  19   No growth  --  --      WBC Count: 3.33 (19 @ 14:24)  WBC Count: 2.70 (19 @ 09:52)  WBC Count: 4.89 (19 @ 06:41)  WBC Count: 5.87 (19 @ 03:28)  WBC Count: 5.19 (19 @ 18:31) Patient is a 79y old  Male who presents with a chief complaint of diarrea (2019 07:04)    Being followed by ID for        Interval history:  pt more alert today  soft stool today  still with back pain  No other acute events      PAST MEDICAL & SURGICAL HISTORY:  Acute on chronic anemia  HLH (hemophagocytic lymphohistiocytosis)  BPH (benign prostatic hyperplasia)  H/O knee surgery  Hernia    Allergies    No Known Allergies    Intolerances      Antimicrobials:    atovaquone Suspension 1500 milliGRAM(s) Oral daily  piperacillin/tazobactam IVPB.. 3.375 Gram(s) IV Intermittent every 8 hours    MEDICATIONS  (STANDING):  alfuzosin 10 milliGRAM(s) Oral at bedtime  atovaquone Suspension 1500 milliGRAM(s) Oral daily  dextrose 5% + sodium chloride 0.9%. 1000 milliLiter(s) (75 mL/Hr) IV Continuous <Continuous>  finasteride 5 milliGRAM(s) Oral daily  pantoprazole  Injectable 40 milliGRAM(s) IV Push every 12 hours  piperacillin/tazobactam IVPB.. 3.375 Gram(s) IV Intermittent every 8 hours      Vital Signs Last 24 Hrs  T(C): 37.6 (19 @ 13:18), Max: 38 (19 @ 01:21)  T(F): 99.7 (19 @ 13:18), Max: 100.4 (19 @ 01:21)  HR: 93 (19 @ 13:18) (73 - 119)  BP: 98/60 (19 @ 13:18) (98/60 - 135/65)  BP(mean): --  RR: 20 (19 @ 13:18) (18 - 20)  SpO2: 95% (19 @ 13:18) (88% - 97%)    Physical Exam:    Constitutional well preserved,comfortable,pleasant    HEENT PERRLA EOMI,No pallor or icterus    No oral exudate or erythema    Neck supple no JVD or LN    Chest Good AE,CTA    CVS RRR S1 S2 WNl     Abd soft BS normal No tenderness     Ext No cyanosis clubbing or edema    IV site no erythema tenderness or discharge    Joints no swelling or LOM    CNS AAO X 3 no focal    Lab Data:                          6.9    3.33  )-----------( 33       ( 2019 14:24 )             21.0           133<L>  |  103  |  38<H>  ----------------------------<  94  3.7   |  18<L>  |  1.48<H>    Ca    6.9<L>      2019 07:09    TPro  3.4<L>  /  Alb  1.9<L>  /  TBili  1.8<H>  /  DBili  x   /  AST  89<H>  /  ALT  46<H>  /  AlkPhos  622<H>      Ferritin, Serum in AM (19 @ 08:31)    Ferritin, Serum: 60069: Test Repeated ng/mL      Urinalysis Basic - ( 2019 03:38 )    Color: Yellow / Appearance: Clear / S.019 / pH: x  Gluc: x / Ketone: Negative  / Bili: Negative / Urobili: Negative   Blood: x / Protein: 30 mg/dL / Nitrite: Negative   Leuk Esterase: Negative / RBC: 14 /hpf / WBC 5 /HPF   Sq Epi: x / Non Sq Epi: 1 /hpf / Bacteria: Negative        .Stool Feces  19   GI PCR Results: NOT detected  *******Please Note:*******  GI panel PCR evaluates for:  Campylobacter, Plesiomonas shigelloides, Salmonella,  Vibrio, Yersinia enterocolitica, Enteroaggregative  Escherichia coli (EAEC), Enteropathogenic E.coli (EPEC),  Enterotoxigenic E. coli (ETEC) lt/st, Shiga-like  toxin-producing E. coli (STEC) stx1/stx2,  Shigella/ Enteroinvasive E. coli (EIEC), Cryptosporidium,  Cyclospora cayetanensis, Entamoeba histolytica,  Giardia lamblia, Adenovirus F 40/41, Astrovirus,  Norovirus GI/GII, Rotavirus A, Sapovirus  --  --      .Blood Blood  19   No growth to date.  --  --      .Urine Clean Catch (Midstream)  19   No growth  --  --      WBC Count: 3.33 (19 @ 14:24)  WBC Count: 2.70 (19 @ 09:52)  WBC Count: 4.89 (19 @ 06:41)  WBC Count: 5.87 (19 @ 03:28)  WBC Count: 5.19 (19 @ 18:31)

## 2019-11-06 NOTE — PROGRESS NOTE ADULT - ASSESSMENT
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 Treated with brentuximab and CHOP x 3, relapsed. Images reviewed. Recent fracture of lumbar vertebra, diagnosis made in ortho office, no films available. No MRI. Has notable pains with movements. Has very minimal LE power proximally and decreased to absent KJ reflexes. He was unable to get off of the toilet due to weakness. Had CT abd/pelvis today. Needs MRI (preferable, but will not tolerate) or CT scan of the lumbar spine. Concerns of leptomeningeal involvement a possibility. Dr Clay had suggested additional chemo  Pt with soft, loose stool and incontinence  Pt with weakness and fevers  Suggest:  BC x 2 sets  stool- GI PCR- negative   and unable to test C diff as stool is not watery  check CMV PCR   check amoebic serology  check giardia     work up of lumbar fracture- MRI when able  could stool incontinence be related to this?    Fevers  check blood cultures  agree with zosyn after obtain cultures  no colitis on CT  peripheral IV  check CMV status and cryptococcal ag    NOw with increased ferritin, LFTS , decreased HCT  Heme is to follow up. Pt with known HLH     check AM cortisol

## 2019-11-06 NOTE — CHART NOTE - NSCHARTNOTEFT_GEN_A_CORE
CC:    HPI:  Notified by RN patient with temperature of 100.4 F. Patient seen and examined by me at bedside.   Patient is alert, NAD.  Patient denied headache, dizziness, chest pain, shortness of breath, cough, rhinorrhea, N/V/D, urinary symptoms, or abdominal pain.            PAST MEDICAL & SURGICAL HISTORY:          VITAL SIGNS:  T(C): 38 (19 @ 01:21), Max: 38.3 (19 @ 02:10)  HR: 119 (19 @ 01:21) (88 - 119)  BP: 112/63 (19 @ 01:21) (94/57 - 136/77)  RR: 20 (19 @ 01:21) (18 - 20)  SpO2: 96% (19 @ 01:21) (88% - 99%)  Wt(kg): --      Physical Exam:  General: WN/WD NAD, AOx3, nontoxic appearing  Head:  NC/AT  CV: RRR, S1S2   Respiratory: CTA B/L, nonlabored. No rales/rhonchi/wheezes.  Abdominal: (+) bowel sounds x4. Soft, NT, ND, no palpable mass, no guarding, or rebound tenderness  MSK: + BLLE edema, + peripheral pulses, FROM all 4 extremity  Skin: (+) warm, dry   Psych: Appropriate affect       LABORATORY:                        7.8    4.89  )-----------( 50       ( 2019 06:41 )             23.7           132<L>  |  103  |  43<H>  ----------------------------<  76  4.2   |  18<L>  |  1.76<H>    Ca    6.9<L>      2019 03:28    TPro  3.9<L>  /  Alb  2.1<L>  /  TBili  1.7<H>  /  DBili  1.2<H>  /  AST  71<H>  /  ALT  49<H>  /  AlkPhos  370<H>        Urinalysis Basic - ( 2019 03:38 )    Color: Yellow / Appearance: Clear / S.019 / pH: x  Gluc: x / Ketone: Negative  / Bili: Negative / Urobili: Negative   Blood: x / Protein: 30 mg/dL / Nitrite: Negative   Leuk Esterase: Negative / RBC: 14 /hpf / WBC 5 /HPF   Sq Epi: x / Non Sq Epi: 1 /hpf / Bacteria: Negative          GI PCR Panel, Stool (collected 19 @ 22:26)  Source: .Stool Feces  Final Report (19 @ 00:01):    GI PCR Results: NOT detected    *******Please Note:*******    GI panel PCR evaluates for:    Campylobacter, Plesiomonas shigelloides, Salmonella,    Vibrio, Yersinia enterocolitica, Enteroaggregative    Escherichia coli (EAEC), Enteropathogenic E.coli (EPEC),    Enterotoxigenic E. coli (ETEC) lt/st, Shiga-like    toxin-producing E. coli (STEC) stx1/stx2,    Shigella/ Enteroinvasive E. coli (EIEC), Cryptosporidium,    Cyclospora cayetanensis, Entamoeba histolytica,    Giardia lamblia, Adenovirus F 40/41, Astrovirus,    Norovirus GI/GII, Rotavirus A, Sapovirus            RADIOLOGY:            ASSESSMENT/PLAN:   HPI:  80yo M h/o HLH diagnosed in , has lost 40 pounds since and in September was told it converted to NHL, has had 2 cycles of "new" chemo since, has lost 6 pounds since. Has had Diarrhea for the past month: describes it as explosive, about 5x/day,  occasionally watery, states it has not been tested.   tonight ptn presents w generalized weakness, was unable to get off the toilet 2/2 generalized weakness and lightheadedness.  denies fever, chills, nausea, vomiting, cough, cp, sob, dark stools, states appetite is poor at baseline.   List of meds not available at this time, will check w ptn's  wife in am (2019 21:48)      Patient now with temperature of    1) Fever  -Tylenol and cooling measures prn for pyrexia  -BC x2  -UA/UC  -CXR  -c/w   -Will continue to closely monitor patient/vitals   -Will endorse to primary team in AM    Ky Stapleton PA-C  Dept of Medicine CC:    HPI:  Notified by RN patient with temperature of 100.4 F. Patient seen and examined by me at bedside.   Patient is alert, NAD.  Patient denied headache, dizziness, chest pain, shortness of breath, cough, rhinorrhea, N/V/D, urinary symptoms, or abdominal pain.            PAST MEDICAL & SURGICAL HISTORY:          VITAL SIGNS:  T(C): 38 (19 @ 01:21), Max: 38.3 (19 @ 02:10)  HR: 119 (19 @ 01:21) (88 - 119)  BP: 112/63 (19 @ 01:21) (94/57 - 136/77)  RR: 20 (19 @ 01:21) (18 - 20)  SpO2: 96% (19 @ 01:21) (88% - 99%)  Wt(kg): --      Physical Exam:  General: WN/WD NAD, AOx3, nontoxic appearing  Head:  NC/AT  CV: RRR, S1S2   Respiratory: CTA B/L, nonlabored. No rales/rhonchi/wheezes.  Abdominal: (+) bowel sounds x4. Soft, NT, ND, no palpable mass, no guarding, or rebound tenderness  MSK: + BLLE edema, + peripheral pulses, FROM all 4 extremity  Skin: (+) warm, dry   Psych: Appropriate affect       LABORATORY:                        7.8    4.89  )-----------( 50       ( 2019 06:41 )             23.7           132<L>  |  103  |  43<H>  ----------------------------<  76  4.2   |  18<L>  |  1.76<H>    Ca    6.9<L>      2019 03:28    TPro  3.9<L>  /  Alb  2.1<L>  /  TBili  1.7<H>  /  DBili  1.2<H>  /  AST  71<H>  /  ALT  49<H>  /  AlkPhos  370<H>        Urinalysis Basic - ( 2019 03:38 )    Color: Yellow / Appearance: Clear / S.019 / pH: x  Gluc: x / Ketone: Negative  / Bili: Negative / Urobili: Negative   Blood: x / Protein: 30 mg/dL / Nitrite: Negative   Leuk Esterase: Negative / RBC: 14 /hpf / WBC 5 /HPF   Sq Epi: x / Non Sq Epi: 1 /hpf / Bacteria: Negative          GI PCR Panel, Stool (collected 19 @ 22:26)  Source: .Stool Feces  Final Report (19 @ 00:01):    GI PCR Results: NOT detected    *******Please Note:*******    GI panel PCR evaluates for:    Campylobacter, Plesiomonas shigelloides, Salmonella,    Vibrio, Yersinia enterocolitica, Enteroaggregative    Escherichia coli (EAEC), Enteropathogenic E.coli (EPEC),    Enterotoxigenic E. coli (ETEC) lt/st, Shiga-like    toxin-producing E. coli (STEC) stx1/stx2,    Shigella/ Enteroinvasive E. coli (EIEC), Cryptosporidium,    Cyclospora cayetanensis, Entamoeba histolytica,    Giardia lamblia, Adenovirus F 40/41, Astrovirus,    Norovirus GI/GII, Rotavirus A, Sapovirus            RADIOLOGY:            ASSESSMENT/PLAN:   HPI:  80yo M h/o HLH diagnosed in , has lost 40 pounds since and in September was told it converted to NHL, has had 2 cycles of "new" chemo since, has lost 6 pounds since. Has had Diarrhea for the past month: describes it as explosive, about 5x/day,  occasionally watery, states it has not been tested.   tonight ptn presents w generalized weakness, was unable to get off the toilet 2/2 generalized weakness and lightheadedness.  denies fever, chills, nausea, vomiting, cough, cp, sob, dark stools, states appetite is poor at baseline.   List of meds not available at this time, will check w ptn's  wife in am (2019 21:48)      Patient now with temperature of 100.4 F    1) Fever  -Tylenol and cooling measures prn for pyrexia  -BCx x 2 pending from yesterday  -UA from yesterday showed no infection  -CT C/A/P negative for pneumonia or colitis  -Continue with Zosyn empirically for fever  -Patient followed by ID  -Will continue to closely monitor patient/vitals   -Will endorse to primary team in AM    Ky Stapleton PA-C  Dept of Medicine

## 2019-11-06 NOTE — CONSULT NOTE ADULT - SUBJECTIVE AND OBJECTIVE BOX
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 Pt states that he has had generalized weakness since diagnosed with HLH in April, the weakness has been progressively worse of the the past few weeks. He was not able to get out of bathroom 3 days ago due to the weakness which prompt him to come to ED. Pt also endorsing 1 months of "loose to watery" stool, intermittent, about once/day. Pt had multiple falls over the past few months, feels like the "leg is giving out on him". + head trauma, no LOC.  He walks with the walker at home.     Pt also endorsing worsening left back and hip pain for past 3 weeks, worsening with movement and walking, describing the pain as burning and radiating to his left leg. Prior to this, he has pain on his right hip but has shifted to the left side 3 weeks ago. He was seen by orthopedics about 3 months ago, had xray of the lumbar spine which showed compression fracture. No MRI at that time. Denied fever, chill, nausea, vomiting, headache, lightheadedness, abdominal pain, numbness/tingling, bowel or urine incontinence.     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)  BPH (benign prostatic hyperplasia) (N40.0)      Past Surgical History:  H/O knee surgery (Z98.890)  Hernia (K46.9)      Social History:  No toxic habits    Family History:  FAMILY HISTORY:      Allergies:  No Known Allergies      ROS:  Constitutional: Patient offers no complaints of fevers or significant weight loss  Ears, Nose, Mouth and Throat: The patient presents with no abnormalities of the head, ears, eyes, nose or throat  Skin: Patient offers no concerns of new rashes or lesions  Respiratory: The patient presents with no abnormalities of the respiratory tract  Cardiovascular: The patient presents with no cardiac abnormalities  Gastrointestinal: The patient presents with no abnormalities of the GI system  Genitourinary: The patient presents with no dysuria, hematuria or frequent urination  Neurological: See HPI  Endocrine: Patient offers no complaints of excessive thirst, urination, or heat/cold intolerance    Advanced care planning reviewed and noted in the chart.    Medications:  acetaminophen   Tablet .. 650 milliGRAM(s) Oral every 6 hours PRN  alfuzosin 10 milliGRAM(s) Oral at bedtime  atovaquone Suspension 1500 milliGRAM(s) Oral daily  dextrose 5% + sodium chloride 0.9%. 1000 milliLiter(s) IV Continuous <Continuous>  finasteride 5 milliGRAM(s) Oral daily  loperamide 2 milliGRAM(s) Oral four times a day PRN  ondansetron Injectable 4 milliGRAM(s) IV Push every 6 hours PRN  pantoprazole  Injectable 40 milliGRAM(s) IV Push every 12 hours  piperacillin/tazobactam IVPB.. 3.375 Gram(s) IV Intermittent every 8 hours      Labs:  CBC Full  -  ( 2019 14:24 )  WBC Count : 3.33 K/uL  RBC Count : 2.20 M/uL  Hemoglobin : 6.9 g/dL  Hematocrit : 21.0 %  Platelet Count - Automated : 33 K/uL  Mean Cell Volume : 95.5 fl  Mean Cell Hemoglobin : 31.4 pg  Mean Cell Hemoglobin Concentration : 32.9 gm/dL  Auto Neutrophil # : x  Auto Lymphocyte # : x  Auto Monocyte # : x  Auto Eosinophil # : x  Auto Basophil # : x  Auto Neutrophil % : x  Auto Lymphocyte % : x  Auto Monocyte % : x  Auto Eosinophil % : x  Auto Basophil % : x        133<L>  |  103  |  38<H>  ----------------------------<  94  3.7   |  18<L>  |  1.48<H>    Ca    6.9<L>      2019 07:09    TPro  3.4<L>  /  Alb  1.9<L>  /  TBili  1.8<H>  /  DBili  x   /  AST  89<H>  /  ALT  46<H>  /  AlkPhos  622<H>      CAPILLARY BLOOD GLUCOSE        LIVER FUNCTIONS - ( 2019 07:09 )  Alb: 1.9 g/dL / Pro: 3.4 g/dL / ALK PHOS: 622 U/L / ALT: 46 U/L / AST: 89 U/L / GGT: x           PT/INR - ( 2019 03:28 )   PT: 15.2 sec;   INR: 1.31 ratio         PTT - ( 2019 03:28 )  PTT:37.1 sec  Urinalysis Basic - ( 2019 03:38 )    Color: Yellow / Appearance: Clear / S.019 / pH: x  Gluc: x / Ketone: Negative  / Bili: Negative / Urobili: Negative   Blood: x / Protein: 30 mg/dL / Nitrite: Negative   Leuk Esterase: Negative / RBC: 14 /hpf / WBC 5 /HPF   Sq Epi: x / Non Sq Epi: 1 /hpf / Bacteria: Negative      Male    Vitals:  Vital Signs Last 24 Hrs  T(C): 37.6 (2019 13:18), Max: 38 (2019 01:21)  T(F): 99.7 (2019 13:18), Max: 100.4 (2019 01:21)  HR: 93 (2019 13:18) (73 - 119)  BP: 98/60 (2019 13:18) (98/60 - 135/65)  BP(mean): --  RR: 20 (2019 13:18) (18 - 20)  SpO2: 95% (2019 13:18) (88% - 97%)    NEUROLOGICAL EXAM:    Mental status: Awake, alert, and in no apparent distress. Oriented to person, place and time. Language function is normal. Recent memory, digit span and concentration were normal.     Cranial Nerves: Pupils were equal, round, reactive to light. Extraocular movements were intact. Visual field were full. Fundoscopic exam was deferred. Facial sensation was intact to light touch. There was no facial asymmetry. The palate was upgoing symmetrically and tongue was midline. Hearing acuity was intact to finger rub AU. Shoulder shrug was full bilaterally    Motor exam: Bulk and tone were normal. Strength was 5/5 in upper extremities. 2/5 on left hip and left leg. 3/5 on right hip and right leg. 4/5 on b/l ankles. Fine finger movements were symmetric and normal. There was no pronator drift. TTP on left hip. + left leg raise test    Reflexes: 2+ in the bilateral upper extremities. 1+ in the bilateral lower extremities. Toes were downgoing bilaterally.     Sensation: Intact to light touch, temperature    Coordination: Finger-nose-finger  was without dysmetria.     Gait: deferred      < from: CT Chest No Cont (19 @ 13:58) >  IMPRESSION:     New small bilateral pleural effusions.    No bowel wall thickening or obstruction.    Thick-walled and trabeculated urinary bladder with diverticula and   moderate perivesicular inflammatory change, similar in appearance to   recent PET/CT 10/29/2019. Bladder wall trabeculation may be seen in the   setting of chronic bladder outlet obstruction. However, inflammatory   change is new since 2019. Correlate with urinalysis.    Ill-defined 2.3 cm hypoattenuating region in the right hepatic lobe   corresponding to region of FDG avidity on prior PET/CTs, indeterminate.    Splenomegaly.    < end of copied text >    < from: NM PET/CT Onc FDG Skull to Thigh, Subsq (10.29.19 @ 11:56) >  BONES: Mild diffusely heterogeneous FDG activity throughout the bone   marrow with several discrete marrow foci. Reference L4 vertebral body SUV   6.0; previous SUV 5.9.     SOFT TISSUES: Diffuse FDG avidity in the posterior cervical and bilateral   forearm musculature.    IMPRESSION: Compared to FDG-PET/CT scan 2019:    1. Mildly decreased size and FDG avidity of indeterminate right hepatic   lobe lesion.    2. Resolution of FDG avidity and decreased size of subcentimeter anterior   left upper lobe nodule.     3. Unchanged diffuse heterogeneous bone marrow activity with several   focal areas in the spine.    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 Pt states that he has had generalized weakness since diagnosed with HLH in April, the weakness has been progressively worse of the the past few weeks. He was not able to get out of bathroom 3 days ago due to the weakness which prompt him to come to ED. Pt also endorsing 1 months of "loose to watery" stool, intermittent, about once/day. Pt had multiple falls over the past few months, feels like the "leg is giving out on him". + head trauma, no LOC.  He walks with the walker at home.     Pt also endorsing worsening left back and hip pain for past 3 weeks, worsening with movement and walking, describing the pain as burning and radiating to his left leg. Prior to this, he has pain on his right hip but has shifted to the left side 3 weeks ago. He was seen by orthopedics about 3 months ago, had xray of the lumbar spine which showed compression fracture. No MRI at that time. Denied fever, chill, nausea, vomiting, headache, lightheadedness, abdominal pain, numbness/tingling, bowel or urine incontinence.     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)  BPH (benign prostatic hyperplasia) (N40.0)      Past Surgical History:  H/O knee surgery (Z98.890)  Hernia (K46.9)      Social History:  No toxic habits    Family History:  FAMILY HISTORY:      Allergies:  No Known Allergies      ROS:  Constitutional: Patient offers no complaints of fevers or significant weight loss  Ears, Nose, Mouth and Throat: The patient presents with no abnormalities of the head, ears, eyes, nose or throat  Skin: Patient offers no concerns of new rashes or lesions  Respiratory: The patient presents with no abnormalities of the respiratory tract  Cardiovascular: The patient presents with no cardiac abnormalities  Gastrointestinal: The patient presents with no abnormalities of the GI system  Genitourinary: The patient presents with no dysuria, hematuria or frequent urination  Neurological: See HPI  Endocrine: Patient offers no complaints of excessive thirst, urination, or heat/cold intolerance    Advanced care planning reviewed and noted in the chart.    Medications:  acetaminophen   Tablet .. 650 milliGRAM(s) Oral every 6 hours PRN  alfuzosin 10 milliGRAM(s) Oral at bedtime  atovaquone Suspension 1500 milliGRAM(s) Oral daily  dextrose 5% + sodium chloride 0.9%. 1000 milliLiter(s) IV Continuous <Continuous>  finasteride 5 milliGRAM(s) Oral daily  loperamide 2 milliGRAM(s) Oral four times a day PRN  ondansetron Injectable 4 milliGRAM(s) IV Push every 6 hours PRN  pantoprazole  Injectable 40 milliGRAM(s) IV Push every 12 hours  piperacillin/tazobactam IVPB.. 3.375 Gram(s) IV Intermittent every 8 hours      Labs:  CBC Full  -  ( 2019 14:24 )  WBC Count : 3.33 K/uL  RBC Count : 2.20 M/uL  Hemoglobin : 6.9 g/dL  Hematocrit : 21.0 %  Platelet Count - Automated : 33 K/uL  Mean Cell Volume : 95.5 fl  Mean Cell Hemoglobin : 31.4 pg  Mean Cell Hemoglobin Concentration : 32.9 gm/dL  Auto Neutrophil # : x  Auto Lymphocyte # : x  Auto Monocyte # : x  Auto Eosinophil # : x  Auto Basophil # : x  Auto Neutrophil % : x  Auto Lymphocyte % : x  Auto Monocyte % : x  Auto Eosinophil % : x  Auto Basophil % : x        133<L>  |  103  |  38<H>  ----------------------------<  94  3.7   |  18<L>  |  1.48<H>    Ca    6.9<L>      2019 07:09    TPro  3.4<L>  /  Alb  1.9<L>  /  TBili  1.8<H>  /  DBili  x   /  AST  89<H>  /  ALT  46<H>  /  AlkPhos  622<H>      CAPILLARY BLOOD GLUCOSE        LIVER FUNCTIONS - ( 2019 07:09 )  Alb: 1.9 g/dL / Pro: 3.4 g/dL / ALK PHOS: 622 U/L / ALT: 46 U/L / AST: 89 U/L / GGT: x           PT/INR - ( 2019 03:28 )   PT: 15.2 sec;   INR: 1.31 ratio         PTT - ( 2019 03:28 )  PTT:37.1 sec  Urinalysis Basic - ( 2019 03:38 )    Color: Yellow / Appearance: Clear / S.019 / pH: x  Gluc: x / Ketone: Negative  / Bili: Negative / Urobili: Negative   Blood: x / Protein: 30 mg/dL / Nitrite: Negative   Leuk Esterase: Negative / RBC: 14 /hpf / WBC 5 /HPF   Sq Epi: x / Non Sq Epi: 1 /hpf / Bacteria: Negative      Male    Vitals:  Vital Signs Last 24 Hrs  T(C): 37.6 (2019 13:18), Max: 38 (2019 01:21)  T(F): 99.7 (2019 13:18), Max: 100.4 (2019 01:21)  HR: 93 (2019 13:18) (73 - 119)  BP: 98/60 (2019 13:18) (98/60 - 135/65)  BP(mean): --  RR: 20 (2019 13:18) (18 - 20)  SpO2: 95% (2019 13:18) (88% - 97%)    NEUROLOGICAL EXAM:    Mental status: Awake, alert, and in no apparent distress. Oriented to person, place and time. Language function is normal. Recent memory, digit span and concentration were normal.     Cranial Nerves: Pupils were equal, round, reactive to light. Extraocular movements were intact. Visual field were full. Fundoscopic exam was deferred. Facial sensation was intact to light touch. There was no facial asymmetry. The palate was upgoing symmetrically and tongue was midline. Hearing acuity was intact to finger rub AU. Shoulder shrug was full bilaterally    Motor exam: Bulk and tone were normal. Strength was 5/5 in upper extremities. 2/5 on left hip and left leg. 3/5 on right hip and 2/5 on right leg. 4/5 on b/l ankles. Fine finger movements were symmetric and normal. There was no pronator drift. TTP on left hip. + left leg raise test    Reflexes: 2+ in the bilateral upper extremities. 1+ in the bilateral lower extremities. Toes were downgoing bilaterally.     Sensation: Intact to light touch, temperature    Coordination: Finger-nose-finger  was without dysmetria.     Gait: deferred      < from: CT Chest No Cont (19 @ 13:58) >  IMPRESSION:     New small bilateral pleural effusions.    No bowel wall thickening or obstruction.    Thick-walled and trabeculated urinary bladder with diverticula and   moderate perivesicular inflammatory change, similar in appearance to   recent PET/CT 10/29/2019. Bladder wall trabeculation may be seen in the   setting of chronic bladder outlet obstruction. However, inflammatory   change is new since 2019. Correlate with urinalysis.    Ill-defined 2.3 cm hypoattenuating region in the right hepatic lobe   corresponding to region of FDG avidity on prior PET/CTs, indeterminate.    Splenomegaly.    < end of copied text >    < from: NM PET/CT Onc FDG Skull to Thigh, Subsq (10.29.19 @ 11:56) >  BONES: Mild diffusely heterogeneous FDG activity throughout the bone   marrow with several discrete marrow foci. Reference L4 vertebral body SUV   6.0; previous SUV 5.9.     SOFT TISSUES: Diffuse FDG avidity in the posterior cervical and bilateral   forearm musculature.    IMPRESSION: Compared to FDG-PET/CT scan 2019:    1. Mildly decreased size and FDG avidity of indeterminate right hepatic   lobe lesion.    2. Resolution of FDG avidity and decreased size of subcentimeter anterior   left upper lobe nodule.     3. Unchanged diffuse heterogeneous bone marrow activity with several   focal areas in the spine.    < end of copied text >

## 2019-11-07 NOTE — PROGRESS NOTE ADULT - SUBJECTIVE AND OBJECTIVE BOX
No pain, no shortness of breath      VITAL:  T(C): , Max: 37.9 (11-07-19 @ 06:06)  T(F): , Max: 100.2 (11-07-19 @ 06:06)  HR: 107 (11-07-19 @ 06:06)  BP: 111/46 (11-07-19 @ 06:06)  BP(mean): --  RR: 18 (11-07-19 @ 06:06)  SpO2: 98% (11-07-19 @ 06:06)      PHYSICAL EXAM:  Constitutional: NAD, Alert; thin to cachectic  HEENT: NCAT, MMM  Neck: Supple, No JVD  Respiratory: CTA-b/l  Cardiovascular: RRR s1s2, no m/r/g  Gastrointestinal: hyperactive, NTND  Extremities: 2+ b/l LE edema  Neurological: reduced generalized strength  Back: no CVAT b/l  Skin: No rashes, no nevi      LABS:                        6.9    3.33  )-----------( 33       ( 06 Nov 2019 14:24 )             21.0     Na(134)/K(4.0)/Cl(105)/HCO3(18)/BUN(36)/Cr(1.26)Glu(98)/Ca(7.1)/Mg(--)/PO4(--)    11-07 @ 06:52  Na(133)/K(3.7)/Cl(103)/HCO3(18)/BUN(38)/Cr(1.48)Glu(94)/Ca(6.9)/Mg(--)/PO4(--)    11-06 @ 07:09  Na(132)/K(4.2)/Cl(103)/HCO3(18)/BUN(43)/Cr(1.76)Glu(76)/Ca(6.9)/Mg(--)/PO4(--)    11-05 @ 03:28  Na(129)/K(4.7)/Cl(99)/HCO3(17)/BUN(48)/Cr(2.08)Glu(91)/Ca(7.8)/Mg(--)/PO4(--)    11-04 @ 18:31      IMPRESSION: 79M w/ HLH/NHL, and BPH, 11/4/19 a/w diarrhea, c/b YEE    (1)Renal - YEE - likely prerenally mediated in association with poor intake, insensible losses (fever), and GI losses from diarrhea. Improved, with IVF. Will he be able to maintain his GFR, off IVF?    (2)Metabolic acidosis - due to GI losses, primarily. Mild/stable for now.    (3)GI - diarrhea      RECOMMEND:  (1)D/C IVF  (2)BMP daily  (3)High-protein diet          Bernardo Galdamez MD  [facility]  [phone] No pain, no shortness of breath. Diarrhea much improved.  Seen at ultrasound      VITAL:  T(C): , Max: 37.9 (11-07-19 @ 06:06)  T(F): , Max: 100.2 (11-07-19 @ 06:06)  HR: 107 (11-07-19 @ 06:06)  BP: 111/46 (11-07-19 @ 06:06)  BP(mean): --  RR: 18 (11-07-19 @ 06:06)  SpO2: 98% (11-07-19 @ 06:06)      PHYSICAL EXAM:  Constitutional: NAD, Alert; thin to cachectic  HEENT: NCAT, MMM  Neck: Supple, No JVD  Respiratory: CTA-b/l  Cardiovascular: RRR s1s2, no m/r/g  Gastrointestinal: hyperactive, NTND  Extremities: 2+ b/l LE edema  Neurological: reduced generalized strength  Back: no CVAT b/l  Skin: No rashes, no nevi      LABS:                        6.9    3.33  )-----------( 33       ( 06 Nov 2019 14:24 )             21.0     Na(134)/K(4.0)/Cl(105)/HCO3(18)/BUN(36)/Cr(1.26)Glu(98)/Ca(7.1)/Mg(--)/PO4(--)    11-07 @ 06:52  Na(133)/K(3.7)/Cl(103)/HCO3(18)/BUN(38)/Cr(1.48)Glu(94)/Ca(6.9)/Mg(--)/PO4(--)    11-06 @ 07:09  Na(132)/K(4.2)/Cl(103)/HCO3(18)/BUN(43)/Cr(1.76)Glu(76)/Ca(6.9)/Mg(--)/PO4(--)    11-05 @ 03:28  Na(129)/K(4.7)/Cl(99)/HCO3(17)/BUN(48)/Cr(2.08)Glu(91)/Ca(7.8)/Mg(--)/PO4(--)    11-04 @ 18:31      IMPRESSION: 79M w/ HLH/NHL, and BPH, 11/4/19 a/w diarrhea, c/b YEE    (1)Renal - YEE - likely prerenally mediated in association with poor intake, insensible losses (fever), and GI losses from diarrhea. Improved, with IVF. Will he be able to maintain his GFR, off IVF?    (2)Metabolic acidosis - due to GI losses, primarily. Mild/stable for now.    (3)GI - diarrhea      RECOMMEND:  (1)D/C IVF  (2)BMP daily  (3)High-protein diet  (4)F/U         Bernardo Galdamez MD  [facility]  [phone]

## 2019-11-07 NOTE — PROGRESS NOTE ADULT - ASSESSMENT
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     # Anaplastic large T cell lymphoa  -s/p brintuximab, minCHOP x 3 cycles, last 10/17  -poor response per BM bx on 10/22 bone marrow biopsy performed with  sheets of macrophages with hemophagocytosis. There is still a large number of large abnormal lymphocytes still visualized.   -previous discussion with Dr. Clay (primary hematologist) and family was trial of GEM/etoposide  -Given pt's poor performance status, ECOG-3-4 (mostly sedentary, needs assistance with ADLS), he may not tolerate the regimen. Chemotherapy may do more harm.  -Had GoC discussion with the patient and wife at bedside. Pt understands the his condition is incurable and chemotherapy at this point may harm him rather than help him. Would like to stay home rather than being in the hospital. But appears still interested in treatment. Will discuss with Primary hematologist Dr. Clay.     # HLH   -2/2 to underling lymphoma  -initially responded to tx with etoposide   -now worsening with increasing ferritin in the setting of persistent T cell lymphoma not responsive to treatment  -r/o infectious causes  -continue supportive care  -keep hgb > 7, plt > 10 or 15 if febrile    # Lower back pain/LE weakness  -pt reports having herniated disc and seen ortho  -currently more weakness of his b/L LE 1-2/5 on exam. Please check  MRI (CT if pt cannot tolerate) of whole spine to r/o leptomeningeal involvement of lymphoma and to better assess the back pain.  -pt will also need an LP to assess possible leptomeningeal involvement    Bernardo Schmitt MD. PGY 5  Heme-Onc fellow  [phone]; [id number]

## 2019-11-07 NOTE — PROGRESS NOTE ADULT - ASSESSMENT
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 Treated with brentuximab and CHOP x 3, relapsed. Recent PET without much improvement.. Recent fracture of lumbar vertebra, diagnosis made in ortho office, no films available. No MRI. Has notable pains with movements. Has very minimal LE power proximally and decreased to absent KJ reflexes. He was unable to get off of the toilet due to weakness. Had CT C/abd/pelvis on 11/5. Needs MRI (preferable, but will not be able to tolerate) will need  CT scan of the C/T/LS spine. Concerns of leptomeningeal involvement a possibility. Dr Clay had suggested additional chemo. Michael Diaz on 11/5 felt ptn is too frail to get more chemo w his performance level at 4 ( needs assistance w most ADLs)  Diarrhea has resolved since admission, GI PCR is negative, all Cx NTD  Afebrile after the fever after a transfusion on 11/5  ID input appreciated  PLAN:   BCx NTD, would DC Zosyn  ptn w HLH and T cell Lymphoma, not responding to chemo. alternative chemo regimen was discussed but ptn is too frail  Anemia ongoing and worsening despite transfusions, prob w aspect of hemolysis, this was d/w Heme/Onc, will transfuse w premedication 2/2 ptn had alloantibodies to previous transfusions  ongoing back pain, MRI C/T/LS spine scheduled for tomorrow. this may be very difficult for the ptn 2/2 severe back pain. to lay on the MRI table for such a lengthy test will be very difficult. will d/w neurology if CT is sufficient  supportive care  NEURO consult appreciated  may need LP to R/O Leptomeningeal spread  CX NTD, off ABx   off IVF,now appears euvolemic, YEE resolved.  Hyponatremia improving  Hypoalbuminemia  GOC d/w ptn/wife, he is full code  will cont discussion of realistic GOC  OOB to chair  DVT ppx w PAS

## 2019-11-07 NOTE — PROGRESS NOTE ADULT - SUBJECTIVE AND OBJECTIVE BOX
Patient is a 79y old  Male who presents with a chief complaint of diarrhea (07 Nov 2019 08:00)      SUBJECTIVE / OVERNIGHT EVENTS: seen in bed, family at bedside, had an abd US, which shows a metastatic process in liver( also seen on PET CT) awaiting MRI C/T/LS spine, seen by neuro, Heme/Onc following, ID following all CX NTD, no diarrhea    MEDICATIONS  (STANDING):  acetaminophen   Tablet .. 650 milliGRAM(s) Oral once  alfuzosin 10 milliGRAM(s) Oral at bedtime  atovaquone Suspension 1500 milliGRAM(s) Oral daily  diphenhydrAMINE 25 milliGRAM(s) Oral once  finasteride 5 milliGRAM(s) Oral daily  gabapentin 100 milliGRAM(s) Oral three times a day  pantoprazole  Injectable 40 milliGRAM(s) IV Push every 12 hours  piperacillin/tazobactam IVPB.. 3.375 Gram(s) IV Intermittent every 8 hours    MEDICATIONS  (PRN):  acetaminophen   Tablet .. 650 milliGRAM(s) Oral every 6 hours PRN Temp greater or equal to 38C (100.4F), Mild Pain (1 - 3)  artificial tears (preservative free) Ophthalmic Solution 2 Drop(s) Right EYE two times a day PRN irritation  loperamide 2 milliGRAM(s) Oral four times a day PRN Diarrhea  ondansetron Injectable 4 milliGRAM(s) IV Push every 6 hours PRN Nausea and/or Vomiting      Vital Signs Last 24 Hrs  T(F): 98.2 (11-07-19 @ 15:41), Max: 100.2 (11-07-19 @ 06:06)  HR: 95 (11-07-19 @ 15:41) (90 - 107)  BP: 113/63 (11-07-19 @ 15:41) (94/61 - 113/63)  RR: 18 (11-07-19 @ 15:41) (18 - 18)  SpO2: 99% (11-07-19 @ 15:41) (95% - 99%)  Telemetry:   CAPILLARY BLOOD GLUCOSE        I&O's Summary    06 Nov 2019 07:01  -  07 Nov 2019 07:00  --------------------------------------------------------  IN: 2605 mL / OUT: 0 mL / NET: 2605 mL    07 Nov 2019 07:01  -  07 Nov 2019 16:23  --------------------------------------------------------  IN: 480 mL / OUT: 0 mL / NET: 480 mL        PHYSICAL EXAM:  GENERAL: NAD, well-developed  HEAD:  Atraumatic, Normocephalic  EYES: EOMI, PERRLA, conjunctiva and sclera clear  NECK: Supple, No JVD  CHEST/LUNG: Clear to auscultation bilaterally; No wheeze  HEART: Regular rate and rhythm; No murmurs, rubs, or gallops  ABDOMEN: Soft, Nontender, Nondistended; Bowel sounds present  EXTREMITIES:  2+ Peripheral Pulses, No clubbing, cyanosis, or edema  PSYCH: AAOx3  NEUROLOGY: non-focal  SKIN: No rashes or lesions    LABS:                        7.1    3.05  )-----------( 22       ( 07 Nov 2019 09:19 )             21.8     11-07    134<L>  |  105  |  36<H>  ----------------------------<  98  4.0   |  18<L>  |  1.26    Ca    7.1<L>      07 Nov 2019 06:52    TPro  3.7<L>  /  Alb  2.0<L>  /  TBili  1.9<H>  /  DBili  x   /  AST  112<H>  /  ALT  56<H>  /  AlkPhos  662<H>  11-07              RADIOLOGY & ADDITIONAL TESTS:    Imaging Personally Reviewed:    Consultant(s) Notes Reviewed:      Care Discussed with Consultants/Other Providers:

## 2019-11-07 NOTE — PROGRESS NOTE ADULT - ASSESSMENT
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 Treated with brentuximab and CHOP x 3, relapsed. Images reviewed. Recent fracture of lumbar vertebra, diagnosis made in ortho office, no films available. No MRI. Has notable pains with movements. Has very minimal LE power proximally and decreased to absent KJ reflexes. He was unable to get off of the toilet due to weakness. Had CT abd/pelvis today. Needs MRI (preferable, but will not tolerate) or CT scan of the lumbar spine. Concerns of leptomeningeal involvement a possibility. Dr Clay had suggested additional chemo  Pt with soft, loose stool and incontinence  Pt with weakness and fevers  Suggest:  BC x 2 sets  stool- GI PCR- negative   and unable to test C diff as stool is not watery  check CMV PCR       work up of lumbar pain- MRI tomorrow  ? leptomeningeal involvement  could stool incontinence be related to this?    Fevers  check blood cultures  agree with zosyn after obtain cultures  no colitis on CT  peripheral IV  check CMV status and cryptococcal ag  I wonder if fevers are just from HLH. May be able to d/c abs soon if cultures remain negative and not neutropenc    NOw with increased ferritin, LFTS , decreased HCT  Heme is to follow up. Pt with known HLH     check AM cortisol     team is addressing GOC

## 2019-11-07 NOTE — PROGRESS NOTE ADULT - SUBJECTIVE AND OBJECTIVE BOX
Patient is a 79y old  Male who presents with a chief complaint of diarrhea (07 Nov 2019 08:00)    Being followed by ID for        Interval history:  No other acute events      ROS:  No cough,SOB,CP  No N/V/D  No abd pain  No urinary complaints  No HA  No joint or limb pain  No other complaints    PAST MEDICAL & SURGICAL HISTORY:  Acute on chronic anemia  HLH (hemophagocytic lymphohistiocytosis)  BPH (benign prostatic hyperplasia)  H/O knee surgery  Hernia    Allergies    No Known Allergies    Intolerances      Antimicrobials:    atovaquone Suspension 1500 milliGRAM(s) Oral daily  piperacillin/tazobactam IVPB.. 3.375 Gram(s) IV Intermittent every 8 hours    MEDICATIONS  (STANDING):  acetaminophen   Tablet .. 650 milliGRAM(s) Oral once  alfuzosin 10 milliGRAM(s) Oral at bedtime  atovaquone Suspension 1500 milliGRAM(s) Oral daily  diphenhydrAMINE 25 milliGRAM(s) Oral once  finasteride 5 milliGRAM(s) Oral daily  gabapentin 100 milliGRAM(s) Oral three times a day  pantoprazole  Injectable 40 milliGRAM(s) IV Push every 12 hours  piperacillin/tazobactam IVPB.. 3.375 Gram(s) IV Intermittent every 8 hours      Vital Signs Last 24 Hrs  T(C): 36.8 (11-07-19 @ 15:41), Max: 37.9 (11-07-19 @ 06:06)  T(F): 98.2 (11-07-19 @ 15:41), Max: 100.2 (11-07-19 @ 06:06)  HR: 95 (11-07-19 @ 15:41) (90 - 107)  BP: 113/63 (11-07-19 @ 15:41) (94/61 - 113/63)  BP(mean): --  RR: 18 (11-07-19 @ 15:41) (18 - 18)  SpO2: 99% (11-07-19 @ 15:41) (95% - 99%)    Physical Exam:    Constitutional well preserved,comfortable,pleasant    HEENT PERRLA EOMI,No pallor or icterus    No oral exudate or erythema    Neck supple no JVD or LN    Chest Good AE,CTA    CVS RRR S1 S2 WNl No murmur or rub or gallop    Abd soft BS normal No tenderness no masses    Ext No cyanosis clubbing or edema    IV site no erythema tenderness or discharge    Joints no swelling or LOM    CNS AAO X 3 no focal    Lab Data:                          7.1    3.05  )-----------( 22       ( 07 Nov 2019 09:19 )             21.8       11-07    134<L>  |  105  |  36<H>  ----------------------------<  98  4.0   |  18<L>  |  1.26    Ca    7.1<L>      07 Nov 2019 06:52    TPro  3.7<L>  /  Alb  2.0<L>  /  TBili  1.9<H>  /  DBili  x   /  AST  112<H>  /  ALT  56<H>  /  AlkPhos  662<H>  11-07          .Stool Feces  11-05-19   GI PCR Results: NOT detected  *******Please Note:*******  GI panel PCR evaluates for:  Campylobacter, Plesiomonas shigelloides, Salmonella,  Vibrio, Yersinia enterocolitica, Enteroaggregative  Escherichia coli (EAEC), Enteropathogenic E.coli (EPEC),  Enterotoxigenic E. coli (ETEC) lt/st, Shiga-like  toxin-producing E. coli (STEC) stx1/stx2,  Shigella/ Enteroinvasive E. coli (EIEC), Cryptosporidium,  Cyclospora cayetanensis, Entamoeba histolytica,  Giardia lamblia, Adenovirus F 40/41, Astrovirus,  Norovirus GI/GII, Rotavirus A, Sapovirus  --  --      .Blood Blood-Peripheral  11-05-19   No growth to date.  --  --      .Blood Blood  11-04-19   No growth to date.  --  --      .Urine Clean Catch (Midstream)  11-04-19   No growth  --  --                    WBC Count: 3.05 (11-07-19 @ 09:19)  WBC Count: 3.33 (11-06-19 @ 14:24)  WBC Count: 2.70 (11-06-19 @ 09:52)  WBC Count: 4.89 (11-05-19 @ 06:41)  WBC Count: 5.87 (11-05-19 @ 03:28)  WBC Count: 5.19 (11-04-19 @ 18:31) Patient is a 79y old  Male who presents with a chief complaint of diarrhea (07 Nov 2019 08:00)    Being followed by ID for        Interval history:  pt resting quietly  neuro input noted , await MRI  no diarrhea  no urinary complaints  for PRBC today  No other acute events          PAST MEDICAL & SURGICAL HISTORY:  Acute on chronic anemia  HLH (hemophagocytic lymphohistiocytosis)  BPH (benign prostatic hyperplasia)  H/O knee surgery  Hernia    Allergies    No Known Allergies    Intolerances      Antimicrobials:    atovaquone Suspension 1500 milliGRAM(s) Oral daily  piperacillin/tazobactam IVPB.. 3.375 Gram(s) IV Intermittent every 8 hours    MEDICATIONS  (STANDING):  acetaminophen   Tablet .. 650 milliGRAM(s) Oral once  alfuzosin 10 milliGRAM(s) Oral at bedtime  atovaquone Suspension 1500 milliGRAM(s) Oral daily  diphenhydrAMINE 25 milliGRAM(s) Oral once  finasteride 5 milliGRAM(s) Oral daily  gabapentin 100 milliGRAM(s) Oral three times a day  pantoprazole  Injectable 40 milliGRAM(s) IV Push every 12 hours  piperacillin/tazobactam IVPB.. 3.375 Gram(s) IV Intermittent every 8 hours      Vital Signs Last 24 Hrs  T(C): 36.8 (11-07-19 @ 15:41), Max: 37.9 (11-07-19 @ 06:06)  T(F): 98.2 (11-07-19 @ 15:41), Max: 100.2 (11-07-19 @ 06:06)  HR: 95 (11-07-19 @ 15:41) (90 - 107)  BP: 113/63 (11-07-19 @ 15:41) (94/61 - 113/63)  BP(mean): --  RR: 18 (11-07-19 @ 15:41) (18 - 18)  SpO2: 99% (11-07-19 @ 15:41) (95% - 99%)    Physical Exam:    Constitutional well preserved,comfortable,pleasant    HEENT PERRLA EOMI,No pallor or icterus    No oral exudate or erythema    Neck supple no JVD or LN    Chest Good AE,CTA    CVS RRR S1 S2 WNl     Abd soft BS normal No tenderness    Ext No cyanosis clubbing or edema    IV site no erythema tenderness or discharge    Joints no swelling or LOM    CNS AAO X 3 bilateral LE weakness    Lab Data:                          7.1    3.05  )-----------( 22       ( 07 Nov 2019 09:19 )             21.8       11-07    134<L>  |  105  |  36<H>  ----------------------------<  98  4.0   |  18<L>  |  1.26    Ca    7.1<L>      07 Nov 2019 06:52    TPro  3.7<L>  /  Alb  2.0<L>  /  TBili  1.9<H>  /  DBili  x   /  AST  112<H>  /  ALT  56<H>  /  AlkPhos  662<H>  11-07          .Stool Feces  11-05-19   GI PCR Results: NOT detected  *******Please Note:*******  GI panel PCR evaluates for:  Campylobacter, Plesiomonas shigelloides, Salmonella,  Vibrio, Yersinia enterocolitica, Enteroaggregative  Escherichia coli (EAEC), Enteropathogenic E.coli (EPEC),  Enterotoxigenic E. coli (ETEC) lt/st, Shiga-like  toxin-producing E. coli (STEC) stx1/stx2,  Shigella/ Enteroinvasive E. coli (EIEC), Cryptosporidium,  Cyclospora cayetanensis, Entamoeba histolytica,  Giardia lamblia, Adenovirus F 40/41, Astrovirus,  Norovirus GI/GII, Rotavirus A, Sapovirus  --  --      .Blood Blood-Peripheral  11-05-19   No growth to date.  --  --      .Blood Blood  11-04-19   No growth to date.  --  --      .Urine Clean Catch (Midstream)  11-04-19   No growth  --  --                    WBC Count: 3.05 (11-07-19 @ 09:19)  WBC Count: 3.33 (11-06-19 @ 14:24)  WBC Count: 2.70 (11-06-19 @ 09:52)  WBC Count: 4.89 (11-05-19 @ 06:41)  WBC Count: 5.87 (11-05-19 @ 03:28)  WBC Count: 5.19 (11-04-19 @ 18:31)

## 2019-11-07 NOTE — PROGRESS NOTE ADULT - SUBJECTIVE AND OBJECTIVE BOX
LEODAN BURGOS:[id number],   79yMale followed for:  No Known Allergies    PAST MEDICAL & SURGICAL HISTORY:  Acute on chronic anemia  HLH (hemophagocytic lymphohistiocytosis)  BPH (benign prostatic hyperplasia)  H/O knee surgery  Hernia    FAMILY HISTORY:    MEDICATIONS  (STANDING):  alfuzosin 10 milliGRAM(s) Oral at bedtime  atovaquone Suspension 1500 milliGRAM(s) Oral daily  dextrose 5% + sodium chloride 0.9%. 1000 milliLiter(s) (75 mL/Hr) IV Continuous <Continuous>  finasteride 5 milliGRAM(s) Oral daily  pantoprazole  Injectable 40 milliGRAM(s) IV Push every 12 hours  piperacillin/tazobactam IVPB.. 3.375 Gram(s) IV Intermittent every 8 hours    MEDICATIONS  (PRN):  acetaminophen   Tablet .. 650 milliGRAM(s) Oral every 6 hours PRN Temp greater or equal to 38C (100.4F), Mild Pain (1 - 3)  artificial tears (preservative free) Ophthalmic Solution 2 Drop(s) Right EYE two times a day PRN irritation  loperamide 2 milliGRAM(s) Oral four times a day PRN Diarrhea  ondansetron Injectable 4 milliGRAM(s) IV Push every 6 hours PRN Nausea and/or Vomiting      Vital Signs Last 24 Hrs  T(C): 37.9 (07 Nov 2019 06:06), Max: 37.9 (07 Nov 2019 06:06)  T(F): 100.2 (07 Nov 2019 06:06), Max: 100.2 (07 Nov 2019 06:06)  HR: 107 (07 Nov 2019 06:06) (90 - 116)  BP: 111/46 (07 Nov 2019 06:06) (98/60 - 111/46)  BP(mean): --  RR: 18 (07 Nov 2019 06:06) (18 - 20)  SpO2: 98% (07 Nov 2019 06:06) (95% - 98%)  nc/at  s1s2  cta  soft, nt, nd no guarding or rebound  no c/c/e    CBC Full  -  ( 06 Nov 2019 14:24 )  WBC Count : 3.33 K/uL  RBC Count : 2.20 M/uL  Hemoglobin : 6.9 g/dL  Hematocrit : 21.0 %  Platelet Count - Automated : 33 K/uL  Mean Cell Volume : 95.5 fl  Mean Cell Hemoglobin : 31.4 pg  Mean Cell Hemoglobin Concentration : 32.9 gm/dL  Auto Neutrophil # : x  Auto Lymphocyte # : x  Auto Monocyte # : x  Auto Eosinophil # : x  Auto Basophil # : x  Auto Neutrophil % : x  Auto Lymphocyte % : x  Auto Monocyte % : x  Auto Eosinophil % : x  Auto Basophil % : x    11-07    134<L>  |  105  |  36<H>  ----------------------------<  98  4.0   |  18<L>  |  1.26    Ca    7.1<L>      07 Nov 2019 06:52    TPro  3.7<L>  /  Alb  2.0<L>  /  TBili  1.9<H>  /  DBili  x   /  AST  112<H>  /  ALT  56<H>  /  AlkPhos  662<H>  11-07

## 2019-11-07 NOTE — PROGRESS NOTE ADULT - SUBJECTIVE AND OBJECTIVE BOX
INTERVAL EVENTS:  Pt feels slightly improved with prbc transfusion. Tm 100.3 this am. Denies any  chills, night sweat, CP, SOB, abd pain, constipation, dysuria    Vital Signs Last 24 Hrs  T(C): 37.8 (07 Nov 2019 09:15), Max: 37.9 (07 Nov 2019 06:06)  T(F): 100 (07 Nov 2019 09:15), Max: 100.2 (07 Nov 2019 06:06)  HR: 101 (07 Nov 2019 09:15) (90 - 116)  BP: 103/64 (07 Nov 2019 09:15) (98/60 - 111/46)  BP(mean): --  RR: 18 (07 Nov 2019 09:15) (18 - 20)  SpO2: 97% (07 Nov 2019 09:15) (95% - 98%)      PHYSICAL EXAM:   GENERAL: no acute distress  HEENT: EOMI, neck supple  RESPIRATORY: LCTAB/L, no rhonchi, rales, or wheezing  CARDIOVASCULAR: RRR, no murmurs, gallops, rubs  ABDOMINAL: soft, non-tender, non-distended, positive bowel sounds   EXTREMITIES: no clubbing, cyanosis, or edema  NEUROLOGICAL: alert and oriented x 3, non-focal  SKIN: no rashes or lesions   MUSCULOSKELETAL: no gross joint deformity                          6.9    3.33  )-----------( 33       ( 06 Nov 2019 14:24 )             21.0     11-07    134<L>  |  105  |  36<H>  ----------------------------<  98  4.0   |  18<L>  |  1.26    Ca    7.1<L>      07 Nov 2019 06:52    TPro  3.7<L>  /  Alb  2.0<L>  /  TBili  1.9<H>  /  DBili  x   /  AST  112<H>  /  ALT  56<H>  /  AlkPhos  662<H>  11-07        MEDICATIONS  (STANDING):  alfuzosin 10 milliGRAM(s) Oral at bedtime  atovaquone Suspension 1500 milliGRAM(s) Oral daily  finasteride 5 milliGRAM(s) Oral daily  pantoprazole  Injectable 40 milliGRAM(s) IV Push every 12 hours  piperacillin/tazobactam IVPB.. 3.375 Gram(s) IV Intermittent every 8 hours

## 2019-11-08 NOTE — CHART NOTE - NSCHARTNOTEFT_GEN_A_CORE
Nutrition Follow Up Note  Patient seen for severe malnutrition follow-up. Per chart, 78 y/o male admitted witih known lymphoma and recent PET with mets to hip/spine, diarrhea, severe back pain. Per oncology, "Given pt's poor performance status, ECOG-3-4 (mostly sedentary, needs assistance with ADLS), he may not tolerate the regimen. Chemotherapy may do more harm."   PMH: [facility] dxc'ed in 2019,    Source: pt, Pt's family at bedside, EMR    Diet: low fiber, Ensure Enlive x2 daily, Prosource x2 daily    Patient reports improved appetite and PO intake, diarrhea has resolved. Pt was alert and conversational. Pt's wife states pt received incorrect lunch tray- RD took pt's preference and resolved issue, pt did not fill out menu yesterday and was encouraged to do so today. Pt has been taking one Ensure Enlive daily, would prefer chocolate flavor. Pt takes jello, had breakfast this morning and intends to have cottage cheese + fresh fruit for lunch. Last BM was yesterday. No nausea/vomiting. Encouraged pt to maintain adequate protein intake and by continuing to taking supplements.     PO intake : Fair, >50%    Source for PO intake: Pt    Daily Weight in k.1 (-), Weight in k ()  % Weight Change    Pertinent Medications: MEDICATIONS  (STANDING):  alfuzosin 10 milliGRAM(s) Oral at bedtime  atovaquone Suspension 1500 milliGRAM(s) Oral daily  finasteride 5 milliGRAM(s) Oral daily  furosemide   Injectable 40 milliGRAM(s) IV Push once  gabapentin 100 milliGRAM(s) Oral three times a day  oxyCODONE    IR 10 milliGRAM(s) Oral once    MEDICATIONS  (PRN):  acetaminophen   Tablet .. 650 milliGRAM(s) Oral every 6 hours PRN Temp greater or equal to 38C (100.4F), Mild Pain (1 - 3)  artificial tears (preservative free) Ophthalmic Solution 2 Drop(s) Right EYE two times a day PRN irritation  loperamide 2 milliGRAM(s) Oral four times a day PRN Diarrhea  ondansetron Injectable 4 milliGRAM(s) IV Push every 6 hours PRN Nausea and/or Vomiting    Pertinent Labs:  @ 07:36: Na 135, BUN 33<H>, Cr 1.27, BG 70, K+ 4.5, Phos --, Mg --, Alk Phos 756<H>, ALT/SGPT 63<H>, AST/SGOT 123<H>, HbA1c --    Skin per nursing documentation: Stage II pressure injuries -> L heel and R elbow  Edema: 2+ L/R leg, 3+ L/R foot    Estimated Needs:   [x] no change since previous assessment: based on 83kg  Estimated Energy Needs (30-35 calories/kg): 9384-0238 kcal/d  Estimated Protein Needs (1.2-1.4 gm/kg): 100-116 g/d  [ ] recalculated:     Previous Nutrition Diagnosis: severe malnutrition  Nutrition Diagnosis is: ongoing- addressed with nutrition supplements and food preferences    New Nutrition Diagnosis: increased nutrient needs  Related to: increased physiological demand in setting of wound healing and catabolic physiological state  As evidenced by: Stage II pressure injuries and metastatic cancer    Interventions: Multivitamin and Vitamin C daily    Recommend  1) Recommended change diet to regular, Ensure Enlive x2 daily, Prosource x2 daily and add Multivitamin + Vitamin C - discussed with NP  2) Pt aware RD remains available for any concerns, questions or diet preferences   3) Nutrition care plan remains in progress    Monitoring and Evaluation:   Continue to monitor Nutritional intake, Tolerance to diet prescription, weights, labs, skin integrity    Kellee Ross RD  Pager [phone]  RD remains available upon request and will follow up per protocol

## 2019-11-08 NOTE — PROGRESS NOTE ADULT - SUBJECTIVE AND OBJECTIVE BOX
Patient is a 79y old  Male who presents with a chief complaint of weakness (08 Nov 2019 09:19)      SUBJECTIVE / OVERNIGHT EVENTS: ptn feels stronger today, eating but appears breathless, has rales on exam.     MEDICATIONS  (STANDING):  alfuzosin 10 milliGRAM(s) Oral at bedtime  atovaquone Suspension 1500 milliGRAM(s) Oral daily  finasteride 5 milliGRAM(s) Oral daily  furosemide   Injectable 40 milliGRAM(s) IV Push once  gabapentin 100 milliGRAM(s) Oral three times a day  oxyCODONE    IR 10 milliGRAM(s) Oral once  sodium bicarbonate 650 milliGRAM(s) Oral two times a day    MEDICATIONS  (PRN):  acetaminophen   Tablet .. 650 milliGRAM(s) Oral every 6 hours PRN Temp greater or equal to 38C (100.4F), Mild Pain (1 - 3)  artificial tears (preservative free) Ophthalmic Solution 2 Drop(s) Right EYE two times a day PRN irritation  loperamide 2 milliGRAM(s) Oral four times a day PRN Diarrhea  ondansetron Injectable 4 milliGRAM(s) IV Push every 6 hours PRN Nausea and/or Vomiting      Vital Signs Last 24 Hrs  T(F): 98 (11-08-19 @ 11:18), Max: 101 (11-07-19 @ 18:01)  HR: 102 (11-08-19 @ 11:18) (90 - 109)  BP: 109/51 (11-08-19 @ 11:18) (93/52 - 136/66)  RR: 18 (11-08-19 @ 11:18) (18 - 18)  SpO2: 98% (11-08-19 @ 11:18) (96% - 99%)  Telemetry:   CAPILLARY BLOOD GLUCOSE        I&O's Summary    07 Nov 2019 07:01  -  08 Nov 2019 07:00  --------------------------------------------------------  IN: 1270 mL / OUT: 0 mL / NET: 1270 mL    08 Nov 2019 07:01  -  08 Nov 2019 14:27  --------------------------------------------------------  IN: 360 mL / OUT: 0 mL / NET: 360 mL        PHYSICAL EXAM:  GENERAL: NAD, well-developed  HEAD:  Atraumatic, Normocephalic  EYES: EOMI, PERRLA, conjunctiva and sclera clear  NECK: Supple, No JVD  CHEST/LUNG: rales 2/3 up b/l  HEART: Regular rate and rhythm; No murmurs, rubs, or gallops  ABDOMEN: Soft, Nontender, Nondistended; Bowel sounds present  EXTREMITIES:  2+ Peripheral Pulses, No clubbing, cyanosis, or edema  PSYCH: AAOx3  NEUROLOGY: non-focal  SKIN: No rashes or lesions    LABS:                        8.0    4.04  )-----------( 18       ( 08 Nov 2019 09:54 )             24.8     11-08    135  |  105  |  33<H>  ----------------------------<  70  4.5   |  15<L>  |  1.27    Ca    7.4<L>      08 Nov 2019 07:36    TPro  3.9<L>  /  Alb  2.0<L>  /  TBili  2.2<H>  /  DBili  x   /  AST  123<H>  /  ALT  63<H>  /  AlkPhos  756<H>  11-08              RADIOLOGY & ADDITIONAL TESTS:    Imaging Personally Reviewed:    Consultant(s) Notes Reviewed:      Care Discussed with Consultants/Other Providers:

## 2019-11-08 NOTE — PROGRESS NOTE ADULT - ASSESSMENT
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 Treated with brentuximab and CHOP x 3, relapsed. Recent PET without much improvement.. Recent fracture of lumbar vertebra, diagnosis made in ortho office, no films available. No MRI. Has notable pains with movements. Has very minimal LE power proximally and decreased to absent KJ reflexes. He was unable to get off of the toilet due to weakness. Had CT C/abd/pelvis on 11/5. Needs MRI (preferable, but will not be able to tolerate) will need  CT scan of the C/T/LS spine. Concerns of leptomeningeal involvement a possibility. Dr Clay had suggested additional chemo. Michael Diaz on 11/5 felt ptn is too frail to get more chemo w his performance level at 4 ( needs assistance w most ADLs)  Diarrhea has resolved since admission, GI PCR is negative, all Cx NTD  Afebrile after the fever after a transfusion on 11/5 and 11/7, s/p IVF  now w acute on chronic diastolic CHF, will give one dose IV Lasix 40 mg    PLAN:   BCx NTD, off ABx, ID on board  ptn w HLH and T cell Lymphoma, not responding to chemo. alternative chemo regimen was discussed but ptn is too frail  Anemia ongoing, has improved post 2 transfusion , prob w aspect of hemolysis, this was d/w Heme/Onc,  ongoing back pain, MRI C/T/LS spine scheduled for today. this may be very difficult for the ptn 2/2 severe back pain. will give him Oxycodone IR prior to the procedure 1/2 hr before the scheduled MRI  NEURO consult appreciated  may need LP to R/O Leptomeningeal spread  off IVF, YEE resolved, but now acute on chronic dCHF, will give 1 dose IV lasix  Hyponatremia improving  Hypoalbuminemia  GOC d/w ptn/wife, he is full code  will cont discussion of realistic GOC  OOB to chair not tolerable 2/2 back pain  DVT ppx w PAS

## 2019-11-08 NOTE — DISCHARGE NOTE NURSING/CASE MANAGEMENT/SOCIAL WORK - PATIENT PORTAL LINK FT
You can access the FollowMyHealth Patient Portal offered by [facility] by registering at the following website: [email or web address]. By joining [facility]’s FollowMyHealth portal, you will also be able to view your health information using other applications (apps) compatible with our system.

## 2019-11-08 NOTE — PROGRESS NOTE ADULT - SUBJECTIVE AND OBJECTIVE BOX
No pain, no shortness of breath      VITAL:  T(C): , Max: 38.3 (11-07-19 @ 18:01)  T(F): , Max: 101 (11-07-19 @ 18:01)  HR: 102 (11-08-19 @ 11:18)  BP: 109/51 (11-08-19 @ 11:18)  RR: 18 (11-08-19 @ 11:18)  SpO2: 98% (11-08-19 @ 11:18)      PHYSICAL EXAM:  Constitutional: NAD, Alert; thin to cachectic  HEENT: NCAT, MMM  Neck: Supple, No JVD  Respiratory: CTA-b/l  Cardiovascular: RRR s1s2, no m/r/g  Gastrointestinal: hyperactive, NTND  Extremities: 2+ b/l LE edema  Neurological: reduced generalized strength  Back: no CVAT b/l  Skin: No rashes, no nevi      LABS:                        8.0    4.04  )-----------( 18       ( 08 Nov 2019 09:54 )             24.8     Na(135)/K(4.5)/Cl(105)/HCO3(15)/BUN(33)/Cr(1.27)Glu(70)/Ca(7.4)/Mg(--)/PO4(--)    11-08 @ 07:36  Na(134)/K(4.0)/Cl(105)/HCO3(18)/BUN(36)/Cr(1.26)Glu(98)/Ca(7.1)/Mg(--)/PO4(--)    11-07 @ 06:52  Na(133)/K(3.7)/Cl(103)/HCO3(18)/BUN(38)/Cr(1.48)Glu(94)/Ca(6.9)/Mg(--)/PO4(--)    11-06 @ 07:09    < from: US Abdomen Complete (11.07.19 @ 10:31) >  Liver: Hepatic lobe hypoechoic lesion measuring 1.5 x 1.5 x 1.8 cm is   unchanged and corresponds to FDG avid lesion seen in prior studies.  Bile ducts: Normal caliber. Common bile duct measures 2 mm.   Gallbladder: Mild sludge within gallbladder lumen. No cholelithiasis or wall thickening.      Pancreas: Visualized portions are within normal limits.  Spleen: 14.6 cm. Splenomegaly.  Right kidney: 11.5 cm. No hydronephrosis. Multiple cysts visualized   largest in the lower pole measuring 2.9 x 2.7 x 2.3 cm.  Left kidney: 9.5 cm.  No hydronephrosis. Lower pole cyst measuring 3.6 x   3.2 x 4.1 cm. Mild fullness of the collecting system.  Ascites: Trace ascites in the right upper quadrant.  Aorta and IVC: Visualized portions are within normal limits.  Right pleural effusion.      IMPRESSION: 79M w/ HLH/NHL, and BPH, 11/4/19 a/w diarrhea, c/b YEE    (1)Renal - YEE - Prerenal. Resolved, s/p IVF  (2)Metabolic acidosis - due to GI losses, primarily. Worse, as of today...with small but rising anion gap. Clinically stable.       RECOMMEND:  (1)Add NaHCO3 650mg po bid  (2)BMP daily; trend bicarb level and anion gap  (3)Check lactate and beta-hydroxybutrate levels with a.m. labs  (4)High-protein diet      Bernardo Galdamez MD  [facility]  [phone] No pain, no shortness of breath      VITAL:  T(C): , Max: 38.3 (11-07-19 @ 18:01)  T(F): , Max: 101 (11-07-19 @ 18:01)  HR: 102 (11-08-19 @ 11:18)  BP: 109/51 (11-08-19 @ 11:18)  RR: 18 (11-08-19 @ 11:18)  SpO2: 98% (11-08-19 @ 11:18)      PHYSICAL EXAM:  Constitutional: NAD, Alert; thin to cachectic  HEENT: NCAT, MMM  Neck: Supple, No JVD  Respiratory: CTA-b/l  Cardiovascular: RRR s1s2, no m/r/g  Gastrointestinal: hyperactive, NTND  Extremities: 2+ b/l LE edema  Neurological: reduced generalized strength  Back: no CVAT b/l  Skin: No rashes, no nevi      LABS:                        8.0    4.04  )-----------( 18       ( 08 Nov 2019 09:54 )             24.8     Na(135)/K(4.5)/Cl(105)/HCO3(15)/BUN(33)/Cr(1.27)Glu(70)/Ca(7.4)/Mg(--)/PO4(--)    11-08 @ 07:36  Na(134)/K(4.0)/Cl(105)/HCO3(18)/BUN(36)/Cr(1.26)Glu(98)/Ca(7.1)/Mg(--)/PO4(--)    11-07 @ 06:52  Na(133)/K(3.7)/Cl(103)/HCO3(18)/BUN(38)/Cr(1.48)Glu(94)/Ca(6.9)/Mg(--)/PO4(--)    11-06 @ 07:09    < from: US Abdomen Complete (11.07.19 @ 10:31) >  Liver: Hepatic lobe hypoechoic lesion measuring 1.5 x 1.5 x 1.8 cm is   unchanged and corresponds to FDG avid lesion seen in prior studies.  Bile ducts: Normal caliber. Common bile duct measures 2 mm.   Gallbladder: Mild sludge within gallbladder lumen. No cholelithiasis or wall thickening.      Pancreas: Visualized portions are within normal limits.  Spleen: 14.6 cm. Splenomegaly.  Right kidney: 11.5 cm. No hydronephrosis. Multiple cysts visualized   largest in the lower pole measuring 2.9 x 2.7 x 2.3 cm.  Left kidney: 9.5 cm.  No hydronephrosis. Lower pole cyst measuring 3.6 x   3.2 x 4.1 cm. Mild fullness of the collecting system.  Ascites: Trace ascites in the right upper quadrant.  Aorta and IVC: Visualized portions are within normal limits.  Right pleural effusion.      IMPRESSION: 79M w/ HLH/NHL, and BPH, 11/4/19 a/w diarrhea, c/b YEE    (1)Renal - YEE - Prerenal. Resolved, s/p IVF  (2)Metabolic acidosis - due to GI losses, primarily. Worse, as of today...with small but rising anion gap. Clinically stable.   (3)CV - SOB earlier today - chf flare? No longer SOB, s/p diuretics earlier today    RECOMMEND:  (1)Lasix 20mg po qd; no NaHCO3 for now given the ?pulmonary edema today  (2)BMP daily; trend bicarb level and anion gap  (3)Check lactate and beta-hydroxybutrate levels with a.m. labs  (4)High-protein diet      Bernardo Galdamez MD  [facility]  [phone]

## 2019-11-08 NOTE — PROGRESS NOTE ADULT - SUBJECTIVE AND OBJECTIVE BOX
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 Pt states that he has had generalized weakness since diagnosed with HLH in April, the weakness has been progressively worse of the the past few weeks. He was not able to get out of bathroom 3 days ago due to the weakness which prompt him to come to ED. Pt also endorsing 1 months of "loose to watery" stool, intermittent, about once/day. Pt had multiple falls over the past few months, feels like the "leg is giving out on him". + head trauma, no LOC.  He walks with the walker at home.     Pt also endorsing worsening left back and hip pain for past 3 weeks, worsening with movement and walking, describing the pain as burning and radiating to his left leg. Prior to this, he has pain on his right hip but has shifted to the left side 3 weeks ago. He was seen by orthopedics about 3 months ago, had xray of the lumbar spine which showed compression fracture. No MRI at that time. Denied fever, chill, nausea, vomiting, headache, lightheadedness, abdominal pain, numbness/tingling, bowel or urine incontinence.     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)  BPH (benign prostatic hyperplasia) (N40.0)      Past Surgical History:  H/O knee surgery (Z98.890)  Hernia (K46.9)      Social History:  No toxic habits    Family History:  FAMILY HISTORY:      Allergies:  No Known Allergies      ROS:  Constitutional: Patient offers no complaints of fevers or significant weight loss  Ears, Nose, Mouth and Throat: The patient presents with no abnormalities of the head, ears, eyes, nose or throat  Skin: Patient offers no concerns of new rashes or lesions  Respiratory: The patient presents with no abnormalities of the respiratory tract  Cardiovascular: The patient presents with no cardiac abnormalities  Gastrointestinal: The patient presents with no abnormalities of the GI system  Genitourinary: The patient presents with no dysuria, hematuria or frequent urination  Neurological: See HPI  Endocrine: Patient offers no complaints of excessive thirst, urination, or heat/cold intolerance    Advanced care planning reviewed and noted in the chart.    Medications:  acetaminophen   Tablet .. 650 milliGRAM(s) Oral every 6 hours PRN  alfuzosin 10 milliGRAM(s) Oral at bedtime  artificial tears (preservative free) Ophthalmic Solution 2 Drop(s) Right EYE two times a day PRN  atovaquone Suspension 1500 milliGRAM(s) Oral daily  finasteride 5 milliGRAM(s) Oral daily  gabapentin 100 milliGRAM(s) Oral three times a day  loperamide 2 milliGRAM(s) Oral four times a day PRN  ondansetron Injectable 4 milliGRAM(s) IV Push every 6 hours PRN  pantoprazole  Injectable 40 milliGRAM(s) IV Push every 12 hours      Labs:  CBC Full  -  ( 07 Nov 2019 09:19 )  WBC Count : 3.05 K/uL  RBC Count : 2.27 M/uL  Hemoglobin : 7.1 g/dL  Hematocrit : 21.8 %  Platelet Count - Automated : 22 K/uL  Mean Cell Volume : 96.0 fl  Mean Cell Hemoglobin : 31.3 pg  Mean Cell Hemoglobin Concentration : 32.6 gm/dL  Auto Neutrophil # : x  Auto Lymphocyte # : x  Auto Monocyte # : x  Auto Eosinophil # : x  Auto Basophil # : x  Auto Neutrophil % : x  Auto Lymphocyte % : x  Auto Monocyte % : x  Auto Eosinophil % : x  Auto Basophil % : x    11-08    135  |  105  |  33<H>  ----------------------------<  70  4.5   |  15<L>  |  1.27    Ca    7.4<L>      08 Nov 2019 07:36    TPro  3.9<L>  /  Alb  2.0<L>  /  TBili  2.2<H>  /  DBili  x   /  AST  123<H>  /  ALT  63<H>  /  AlkPhos  756<H>  11-08    CAPILLARY BLOOD GLUCOSE        LIVER FUNCTIONS - ( 08 Nov 2019 07:36 )  Alb: 2.0 g/dL / Pro: 3.9 g/dL / ALK PHOS: 756 U/L / ALT: 63 U/L / AST: 123 U/L / GGT: x                   Vitals:  Vital Signs Last 24 Hrs  T(C): 37 (08 Nov 2019 05:15), Max: 38.3 (07 Nov 2019 18:01)  T(F): 98.6 (08 Nov 2019 05:15), Max: 101 (07 Nov 2019 18:01)  HR: 109 (08 Nov 2019 05:15) (90 - 109)  BP: 136/66 (08 Nov 2019 05:15) (93/52 - 136/66)  BP(mean): --  RR: 18 (08 Nov 2019 05:15) (18 - 18)  SpO2: 98% (08 Nov 2019 05:15) (96% - 99%)    NEUROLOGICAL EXAM:    Mental status: Awake, alert, and in no apparent distress. Oriented to person, place and time. Language function is normal. Recent memory, digit span and concentration were normal.     Cranial Nerves: Pupils were equal, round, reactive to light. Extraocular movements were intact. Visual field were full. Fundoscopic exam was deferred. Facial sensation was intact to light touch. There was no facial asymmetry. The palate was upgoing symmetrically and tongue was midline. Hearing acuity was intact to finger rub AU. Shoulder shrug was full bilaterally    Motor exam: Bulk and tone were normal. Strength was 5/5 in upper extremities. 2/5 on left hip and left leg. 3/5 on right hip and 2/5 on right leg. left knee ext 3+/5  4/5 on b/l ankles. Fine finger movements were symmetric and normal. There was no pronator drift. TTP on left hip. + left leg raise test    Reflexes: 2+ in the bilateral upper extremities. 1+ in the bilateral lower extremities. Toes were downgoing bilaterally.     Sensation: Intact to light touch, temperature    Coordination: Finger-nose-finger  was without dysmetria.     Gait: deferred      < from: CT Chest No Cont (11.05.19 @ 13:58) >  IMPRESSION:     New small bilateral pleural effusions.    No bowel wall thickening or obstruction.    Thick-walled and trabeculated urinary bladder with diverticula and   moderate perivesicular inflammatory change, similar in appearance to   recent PET/CT 10/29/2019. Bladder wall trabeculation may be seen in the   setting of chronic bladder outlet obstruction. However, inflammatory   change is new since 08/30/2019. Correlate with urinalysis.    Ill-defined 2.3 cm hypoattenuating region in the right hepatic lobe   corresponding to region of FDG avidity on prior PET/CTs, indeterminate.    Splenomegaly.    < end of copied text >    < from: NM PET/CT Onc FDG Skull to Thigh, Subsq (10.29.19 @ 11:56) >  BONES: Mild diffusely heterogeneous FDG activity throughout the bone   marrow with several discrete marrow foci. Reference L4 vertebral body SUV   6.0; previous SUV 5.9.     SOFT TISSUES: Diffuse FDG avidity in the posterior cervical and bilateral   forearm musculature.    IMPRESSION: Compared to FDG-PET/CT scan 8/30/2019:    1. Mildly decreased size and FDG avidity of indeterminate right hepatic   lobe lesion.    2. Resolution of FDG avidity and decreased size of subcentimeter anterior   left upper lobe nodule.     3. Unchanged diffuse heterogeneous bone marrow activity with several   focal areas in the spine.    < end of copied text >

## 2019-11-08 NOTE — PROGRESS NOTE ADULT - ASSESSMENT
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 Treated with brentuximab and CHOP x 3, relapsed. Images reviewed. Recent fracture of lumbar vertebra, diagnosis made in ortho office, no films available. No MRI. Has notable pains with movements. Has very minimal LE power proximally and decreased to absent KJ reflexes. He was unable to get off of the toilet due to weakness. Had CT abd/pelvis today. Needs MRI (preferable, but will not tolerate) or CT scan of the lumbar spine. Concerns of leptomeningeal involvement a possibility. Dr Clay had suggested additional chemo  Pt with soft, loose stool and incontinence  Pt with weakness and fevers  Suggest:  BC x 2 sets  stool- GI PCR- negative   and unable to test C diff as stool is not watery  check CMV PCR       work up of lumbar pain- MRI today  ? leptomeningeal involvement  could stool incontinence be related to this?    Fevers  low grade- likely from the HLH  all cultures negative  no colitis on CT  peripheral IV  check CMV status and cryptococcal ag      NOw with increased ferritin, LFTS , decreased HCT  Heme is to follow up. Pt with known HLH     team is addressing [facility]      ID service will be covering over the weekend. Please call for acute issues or questions. [phone]

## 2019-11-08 NOTE — CONSULT NOTE ADULT - SUBJECTIVE AND OBJECTIVE BOX
CARDIOLOGY CONSULT - Dr. Frank         HPI:  78yo Male with Pmed as mentioned below presented 11/4/19 with diarrhea for one month, as well as weight loss and poor appetite. He was noted to be in  anemic, hyponatremic, w YEE and  is s/p IVF and multiple PRBCs. He subsequently developed SOB and required lasix 40 mg IVP x 1 today. Family at bedside. Denies hx of CHF, valvular disease, CAD or MI. Wife reports hx BBB. She also reports he usually develops SOB post transfusion. +worsening LE edema on Exam. Patient reports to feeling better post lasix. Denies cp, palps. orthopnea, n/v. Echo from 4/2019 ef 75%, nl lv sys fx. no significant valvular disease. ROS otherwise negative.       PAST MEDICAL & SURGICAL HISTORY:  Acute on chronic anemia  HLH (hemophagocytic lymphohistiocytosis)  BPH (benign prostatic hyperplasia)  H/O knee surgery  Hernia          PREVIOUS DIAGNOSTIC TESTING:    [ ] Echocardiogram:  < from: Transthoracic Echocardiogram (04.05.19 @ 08:33) >  EF (Teicholtz): 76 %  Doppler Peak Velocity (m/sec): AoV=1.5  ------------------------------------------------------------------------  Observations:  Mitral Valve: Normal mitral valve. Minimal mitral  regurgitation.  Aortic Valve/Aorta: Normal trileaflet aortic valve. Peak  transaortic valve gradient equals 9 mm Hg, mean transaortic  valve gradient equals 5 mm Hg, aortic valve velocity time  integral equals 21 cm. Mild aortic regurgitation.  Peak  left ventricular outflow tract gradient equals 8 mm Hg,  mean gradient is equal to 3 mm Hg, LVOT velocity time  integral equals 22 cm.  Aortic Root: 3cm.  Left Atrium: Normal left atrium.  Left Ventricle: Normal left ventricular systolic function.  No segmental wall motion abnormalities. Normal left  ventricular internal dimensions and wall thicknesses.  Reversal of the E-A waves of the mitral inflow pattern  consistent with reduced compliance of the left ventricle.  Right Heart: Normal right atrium. Normal right ventricular  size and function. Normal tricuspid valve. Mild tricuspid  regurgitation. Normal pulmonic valve.  Pericardium/Pleura: Normal pericardium with no pericardial  effusion.  Hemodynamic: Estimated right atrial pressure is 8 mm Hg.  Estimated right ventricular systolic pressure equals 18 mm  Hg, assuming right atrial pressure equals 8 mm Hg,  consistent with normal pulmonary pressures.  ------------------------------------------------------------------------  Conclusions:  1. Normal mitral valve. Minimal mitral regurgitation.  2. Normal trileaflet aortic valve. Peak transaortic valve  gradient equals 9 mm Hg, mean transaortic valve gradient  equals 5 mm Hg, aortic valve velocity time integral equals  21 cm. Mild aortic regurgitation.  3. Normal left ventricular systolic function. No segmental  wall motion abnormalities.  4. Normal right ventricular size and function.  *** No previous Echo exam.  ------------------------------------------------------------------------  Confirmed on  4/5/2019 - 14:45:11 by Leo Lockett MD  ------------------------------------------------------------------------    < end of copied text >    [ ]  Catheterization:    [ ] Stress Test:  	    MEDICATIONS:  Home Medications:  acyclovir 400 mg oral tablet: 1 tab(s) orally 2 times a day (07 Nov 2019 11:34)  alfuzosin 10 mg oral tablet, extended release: 1 tab(s) orally once a day (at bedtime) (07 Nov 2019 11:34)  Bactrim  mg-160 mg oral tablet: 1 tab(s) orally once a day (07 Nov 2019 11:34)  cycloSPORINE 100 mg oral capsule: 1 cap(s) orally every 12 hours (07 Nov 2019 11:34)  finasteride 5 mg oral tablet: 1 tab(s) orally once a day (at bedtime) (07 Nov 2019 11:34)  folic acid 0.4 mg oral tablet: 1 tab(s) orally once a day (07 Nov 2019 11:34)  predniSONE 50 mg oral tablet: 2 tab(s) orally once a day (07 Nov 2019 11:34)  Vitamin B12 1000 mcg oral tablet: 1 tab(s) orally once a day (07 Nov 2019 11:34)      MEDICATIONS  (STANDING):  alfuzosin 10 milliGRAM(s) Oral at bedtime  atovaquone Suspension 1500 milliGRAM(s) Oral daily  finasteride 5 milliGRAM(s) Oral daily  gabapentin 100 milliGRAM(s) Oral three times a day  oxyCODONE    IR 10 milliGRAM(s) Oral once  sodium bicarbonate 650 milliGRAM(s) Oral two times a day      FAMILY HISTORY:      SOCIAL HISTORY:    [ x] Non-smoker  [ ] Smoker  [ ] Alcohol    Allergies    No Known Allergies    Intolerances    	    REVIEW OF SYSTEMS:  CONSTITUTIONAL: see hpi   EYES: No eye pain, visual disturbances, or discharge  ENMT:  No difficulty hearing, tinnitus, vertigo; No sinus or throat pain  NECK: No pain or stiffness  RESPIRATORY: No cough, wheezing, chills or hemoptysis; +Shortness of Breath  CARDIOVASCULAR: No chest pain, palpitations, passing out, dizziness, or leg swelling  GASTROINTESTINAL: No abdominal or epigastric pain. No nausea, vomiting, or hematemesis; No diarrhea or constipation. No melena or hematochezia.  GENITOURINARY: No dysuria, frequency, hematuria, or incontinence  NEUROLOGICAL: No headaches, memory loss, loss of strength, numbness, or tremors  SKIN: No itching, burning, rashes, or lesions   	    [x ] All others negative	  [ ] Unable to obtain    PHYSICAL EXAM:  T(C): 37.6 (11-08-19 @ 14:42), Max: 38.3 (11-07-19 @ 18:01)  HR: 102 (11-08-19 @ 14:42) (90 - 109)  BP: 107/63 (11-08-19 @ 14:42) (93/52 - 136/66)  RR: 18 (11-08-19 @ 14:42) (18 - 18)  SpO2: 96% (11-08-19 @ 14:42) (96% - 99%)  Wt(kg): --  I&O's Summary    07 Nov 2019 07:01  -  08 Nov 2019 07:00  --------------------------------------------------------  IN: 1270 mL / OUT: 0 mL / NET: 1270 mL    08 Nov 2019 07:01  -  08 Nov 2019 16:53  --------------------------------------------------------  IN: 360 mL / OUT: 0 mL / NET: 360 mL        Appearance: Normal	  Psychiatry: A & O x 3, Mood & affect appropriate  HEENT:   Normal oral mucosa, PERRL, EOMI	  Lymphatic: No lymphadenopathy  Cardiovascular: Normal S1 S2,RRR  Respiratory: Lungs clear to auscultation	  Gastrointestinal:  Soft, Non-tender, + BS	  Skin: No rashes, No ecchymoses, No cyanosis	  Neurologic: Non-focal  Extremities: LE edema ++     TELEMETRY: 	    ECG:  nsr hr 89 Rbbb	  RADIOLOGY:  < from: US Abdomen Complete (11.07.19 @ 10:31) >  IMPRESSION:     Indeterminate right hepatic lobe hypoechoic lesion corresponding to   region of FDG uptake in prior PET/ CTs.    Trace ascites in the right upper quadrant.    Splenomegaly.    -------------------------------------------------------------------------------------------------------------    < from: CT Chest No Cont (11.05.19 @ 13:58) >    IMPRESSION:     New small bilateral pleural effusions.    No bowel wall thickening or obstruction.    Thick-walled and trabeculated urinary bladder with diverticula and   moderate perivesicular inflammatory change, similar in appearance to   recent PET/CT 10/29/2019. Bladder wall trabeculation may be seen in the   setting of chronic bladder outlet obstruction. However, inflammatory   change is new since 08/30/2019. Correlate with urinalysis.    Ill-defined 2.3 cm hypoattenuating region in the right hepatic lobe   corresponding to region of FDG avidity on prior PET/CTs, indeterminate.    Splenomegaly.      < end of copied text >    OTHER: 	  	  LABS:	 	    CARDIAC MARKERS:  Troponin T, High Sensitivity Result: 65 ng/L (11-04 @ 21:50)  Troponin T, High Sensitivity Result: 99 ng/L (11-04 @ 18:31)                                  8.0    4.04  )-----------( 18       ( 08 Nov 2019 09:54 )             24.8     11-08    135  |  105  |  33<H>  ----------------------------<  70  4.5   |  15<L>  |  1.27    Ca    7.4<L>      08 Nov 2019 07:36    TPro  3.9<L>  /  Alb  2.0<L>  /  TBili  2.2<H>  /  DBili  x   /  AST  123<H>  /  ALT  63<H>  /  AlkPhos  756<H>  11-08      proBNP:   Lipid Profile:   HgA1c:   TSH:

## 2019-11-08 NOTE — PROGRESS NOTE ADULT - SUBJECTIVE AND OBJECTIVE BOX
Patient is a 79y old  Male who presents with a chief complaint of anemia , weakness, fall (08 Nov 2019 14:25)    Being followed by ID for        Interval history:  No other acute events      ROS:  No cough,SOB,CP  No N/V/D  No abd pain  No urinary complaints  No HA  No joint or limb pain  No other complaints    PAST MEDICAL & SURGICAL HISTORY:  Acute on chronic anemia  HLH (hemophagocytic lymphohistiocytosis)  BPH (benign prostatic hyperplasia)  H/O knee surgery  Hernia    Allergies    No Known Allergies    Intolerances      Antimicrobials:    atovaquone Suspension 1500 milliGRAM(s) Oral daily    MEDICATIONS  (STANDING):  alfuzosin 10 milliGRAM(s) Oral at bedtime  atovaquone Suspension 1500 milliGRAM(s) Oral daily  finasteride 5 milliGRAM(s) Oral daily  furosemide   Injectable 40 milliGRAM(s) IV Push once  gabapentin 100 milliGRAM(s) Oral three times a day  oxyCODONE    IR 10 milliGRAM(s) Oral once  sodium bicarbonate 650 milliGRAM(s) Oral two times a day      Vital Signs Last 24 Hrs  T(C): 37.6 (11-08-19 @ 14:42), Max: 38.3 (11-07-19 @ 18:01)  T(F): 99.7 (11-08-19 @ 14:42), Max: 101 (11-07-19 @ 18:01)  HR: 102 (11-08-19 @ 14:42) (90 - 109)  BP: 107/63 (11-08-19 @ 14:42) (93/52 - 136/66)  BP(mean): --  RR: 18 (11-08-19 @ 14:42) (18 - 18)  SpO2: 96% (11-08-19 @ 14:42) (96% - 99%)    Physical Exam:    Constitutional well preserved,comfortable,pleasant    HEENT PERRLA EOMI,No pallor or icterus    No oral exudate or erythema    Neck supple no JVD or LN    Chest Good AE,CTA    CVS RRR S1 S2 WNl No murmur or rub or gallop    Abd soft BS normal No tenderness no masses    Ext No cyanosis clubbing or edema    IV site no erythema tenderness or discharge    Joints no swelling or LOM    CNS AAO X 3 no focal    Lab Data:                          8.0    4.04  )-----------( 18       ( 08 Nov 2019 09:54 )             24.8       11-08    135  |  105  |  33<H>  ----------------------------<  70  4.5   |  15<L>  |  1.27    Ca    7.4<L>      08 Nov 2019 07:36    TPro  3.9<L>  /  Alb  2.0<L>  /  TBili  2.2<H>  /  DBili  x   /  AST  123<H>  /  ALT  63<H>  /  AlkPhos  756<H>  11-08          .Stool Feces  11-07-19   No enteric pathogens to date: Final culture pending  --  --      .Stool Feces  11-05-19   GI PCR Results: NOT detected  *******Please Note:*******  GI panel PCR evaluates for:  Campylobacter, Plesiomonas shigelloides, Salmonella,  Vibrio, Yersinia enterocolitica, Enteroaggregative  Escherichia coli (EAEC), Enteropathogenic E.coli (EPEC),  Enterotoxigenic E. coli (ETEC) lt/st, Shiga-like  toxin-producing E. coli (STEC) stx1/stx2,  Shigella/ Enteroinvasive E. coli (EIEC), Cryptosporidium,  Cyclospora cayetanensis, Entamoeba histolytica,  Giardia lamblia, Adenovirus F 40/41, Astrovirus,  Norovirus GI/GII, Rotavirus A, Sapovirus  --  --      .Blood Blood-Peripheral  11-05-19   No growth to date.  --  --      .Blood Blood  11-04-19   No growth to date.  --  --      .Urine Clean Catch (Midstream)  11-04-19   No growth  --  --                    WBC Count: 4.04 (11-08-19 @ 09:54)  WBC Count: 3.05 (11-07-19 @ 09:19)  WBC Count: 3.33 (11-06-19 @ 14:24)  WBC Count: 2.70 (11-06-19 @ 09:52)  WBC Count: 4.89 (11-05-19 @ 06:41)  WBC Count: 5.87 (11-05-19 @ 03:28)  WBC Count: 5.19 (11-04-19 @ 18:31) Patient is a 79y old  Male who presents with a chief complaint of anemia , weakness, fall (08 Nov 2019 14:25)    Being followed by ID for        Interval history:  pt resting quietly  stable off abs  no diarrhea  awaiting MRI of spine  No other acute events        PAST MEDICAL & SURGICAL HISTORY:  Acute on chronic anemia  HLH (hemophagocytic lymphohistiocytosis)  BPH (benign prostatic hyperplasia)  H/O knee surgery  Hernia    Allergies    No Known Allergies    Intolerances      Antimicrobials:    atovaquone Suspension 1500 milliGRAM(s) Oral daily    MEDICATIONS  (STANDING):  alfuzosin 10 milliGRAM(s) Oral at bedtime  atovaquone Suspension 1500 milliGRAM(s) Oral daily  finasteride 5 milliGRAM(s) Oral daily  furosemide   Injectable 40 milliGRAM(s) IV Push once  gabapentin 100 milliGRAM(s) Oral three times a day  oxyCODONE    IR 10 milliGRAM(s) Oral once  sodium bicarbonate 650 milliGRAM(s) Oral two times a day      Vital Signs Last 24 Hrs  T(C): 37.6 (11-08-19 @ 14:42), Max: 38.3 (11-07-19 @ 18:01)  T(F): 99.7 (11-08-19 @ 14:42), Max: 101 (11-07-19 @ 18:01)  HR: 102 (11-08-19 @ 14:42) (90 - 109)  BP: 107/63 (11-08-19 @ 14:42) (93/52 - 136/66)  BP(mean): --  RR: 18 (11-08-19 @ 14:42) (18 - 18)  SpO2: 96% (11-08-19 @ 14:42) (96% - 99%)    Physical Exam:    Constitutional resting quietly    HEENT PERRLA EOMI,No pallor or icterus    No oral exudate or erythema    Neck supple no JVD or LN    Chest Good AE,CTA    CVS RRR S1 S2 WNl     Abd soft BS normal No tenderness no masses    Ext No cyanosis clubbing or edema    IV site no erythema tenderness or discharge    Joints no swelling or LOM    CNS AAO X 3  LE weakness    Lab Data:                          8.0    4.04  )-----------( 18       ( 08 Nov 2019 09:54 )             24.8       11-08    135  |  105  |  33<H>  ----------------------------<  70  4.5   |  15<L>  |  1.27    Ca    7.4<L>      08 Nov 2019 07:36    TPro  3.9<L>  /  Alb  2.0<L>  /  TBili  2.2<H>  /  DBili  x   /  AST  123<H>  /  ALT  63<H>  /  AlkPhos  756<H>  11-08    Ferritin, Serum in AM (11.05.19 @ 08:31)    Ferritin, Serum: 09903: Test Repeated ng/mL          .Stool Feces  11-07-19   No enteric pathogens to date: Final culture pending  --  --      .Stool Feces  11-05-19   GI PCR Results: NOT detected  *******Please Note:*******  GI panel PCR evaluates for:  Campylobacter, Plesiomonas shigelloides, Salmonella,  Vibrio, Yersinia enterocolitica, Enteroaggregative  Escherichia coli (EAEC), Enteropathogenic E.coli (EPEC),  Enterotoxigenic E. coli (ETEC) lt/st, Shiga-like  toxin-producing E. coli (STEC) stx1/stx2,  Shigella/ Enteroinvasive E. coli (EIEC), Cryptosporidium,  Cyclospora cayetanensis, Entamoeba histolytica,  Giardia lamblia, Adenovirus F 40/41, Astrovirus,  Norovirus GI/GII, Rotavirus A, Sapovirus  --  --      .Blood Blood-Peripheral  11-05-19   No growth to date.  --  --      .Blood Blood  11-04-19   No growth to date.  --  --      .Urine Clean Catch (Midstream)  11-04-19   No growth  --  --          WBC Count: 4.04 (11-08-19 @ 09:54)  WBC Count: 3.05 (11-07-19 @ 09:19)  WBC Count: 3.33 (11-06-19 @ 14:24)  WBC Count: 2.70 (11-06-19 @ 09:52)  WBC Count: 4.89 (11-05-19 @ 06:41)  WBC Count: 5.87 (11-05-19 @ 03:28)  WBC Count: 5.19 (11-04-19 @ 18:31)

## 2019-11-08 NOTE — PROGRESS NOTE ADULT - ASSESSMENT
No diarrhea or GI complaints stool GI PCR negative, non descript liver lesion noted  supportive care, pancytopenic  consider liver MRI if able

## 2019-11-08 NOTE — CONSULT NOTE ADULT - ASSESSMENT
Echo from 4/2019 ef 75%, nl lv sys fx.    a/p  79M w/ HLH/NHL, and BPH admitted with diarrhea, YEE, anemia . Patient being evaluated for SOB.       1. SOB/ acute on chronic Diastolic CHF   likely in the setting of multiple PRBC and IVF   s/p lasix 40 mg IVP x 1 today, clinically improved, denies cp or sob on exam   hypoproteinemia/ hypoalbuminemia/ worsening liver disease likely playing a role in LE edema   consider LE dopplers   Ct chest non Cont 11/5 with new small bl pleural effusion  check  echo to eval LV fx   reassess daily lasix need tomorrow     2. YEE   s/p IVF , renal follow     3. Anemia  hem/onc/ med, GI,  f/u     4. HLH / lymphoma  hem/onc f/u    dvt ppx

## 2019-11-08 NOTE — PROGRESS NOTE ADULT - SUBJECTIVE AND OBJECTIVE BOX
INTERVAL HPI/OVERNIGHT EVENTS:    MEDICATIONS  (STANDING):  alfuzosin 10 milliGRAM(s) Oral at bedtime  atovaquone Suspension 1500 milliGRAM(s) Oral daily  finasteride 5 milliGRAM(s) Oral daily  gabapentin 100 milliGRAM(s) Oral three times a day  pantoprazole  Injectable 40 milliGRAM(s) IV Push every 12 hours    MEDICATIONS  (PRN):  acetaminophen   Tablet .. 650 milliGRAM(s) Oral every 6 hours PRN Temp greater or equal to 38C (100.4F), Mild Pain (1 - 3)  artificial tears (preservative free) Ophthalmic Solution 2 Drop(s) Right EYE two times a day PRN irritation  loperamide 2 milliGRAM(s) Oral four times a day PRN Diarrhea  ondansetron Injectable 4 milliGRAM(s) IV Push every 6 hours PRN Nausea and/or Vomiting      Allergies    No Known Allergies    Intolerances            PHYSICAL EXAM:   Vital Signs:  Vital Signs Last 24 Hrs  T(C): 37 (08 Nov 2019 05:15), Max: 38.3 (07 Nov 2019 18:01)  T(F): 98.6 (08 Nov 2019 05:15), Max: 101 (07 Nov 2019 18:01)  HR: 109 (08 Nov 2019 05:15) (90 - 109)  BP: 136/66 (08 Nov 2019 05:15) (93/52 - 136/66)  BP(mean): --  RR: 18 (08 Nov 2019 05:15) (18 - 18)  SpO2: 98% (08 Nov 2019 05:15) (96% - 99%)  Daily     Daily     GENERAL:  no distress  HEENT:  NC/AT,  anicteric  CHEST:   no increased effort, breath sounds clear  HEART:  Regular rhythm  ABDOMEN:  Soft, non-tender, non-distended, normoactive bowel sounds,  no masses ,no hepato-splenomegaly, no signs of chronic liver disease  EXTEREMITIES:  no cyanosis      LABS:                        7.1    3.05  )-----------( 22       ( 07 Nov 2019 09:19 )             21.8     11-08    135  |  105  |  33<H>  ----------------------------<  70  4.5   |  15<L>  |  1.27    Ca    7.4<L>      08 Nov 2019 07:36    TPro  3.9<L>  /  Alb  2.0<L>  /  TBili  2.2<H>  /  DBili  x   /  AST  123<H>  /  ALT  63<H>  /  AlkPhos  756<H>  11-08          RADIOLOGY & ADDITIONAL TESTS:

## 2019-11-09 NOTE — PROGRESS NOTE ADULT - SUBJECTIVE AND OBJECTIVE BOX
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 Pt states that he has had generalized weakness since diagnosed with HLH in April, the weakness has been progressively worse of the the past few weeks. He was not able to get out of bathroom 3 days ago due to the weakness which prompt him to come to ED. Pt also endorsing 1 months of "loose to watery" stool, intermittent, about once/day. Pt had multiple falls over the past few months, feels like the "leg is giving out on him". + head trauma, no LOC.  He walks with the walker at home.     Pt also endorsing worsening left back and hip pain for past 3 weeks, worsening with movement and walking, describing the pain as burning and radiating to his left leg. Prior to this, he has pain on his right hip but has shifted to the left side 3 weeks ago. He was seen by orthopedics about 3 months ago, had xray of the lumbar spine which showed compression fracture. No MRI at that time. Denied fever, chill, nausea, vomiting, headache, lightheadedness, abdominal pain, numbness/tingling, bowel or urine incontinence.     pt feels better after transfusion, had mris last night    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)  BPH (benign prostatic hyperplasia) (N40.0)      Past Surgical History:  H/O knee surgery (Z98.890)  Hernia (K46.9)      Social History:  No toxic habits    Family History:  FAMILY HISTORY:      Allergies:  No Known Allergies      ROS:  Constitutional: Patient offers no complaints of fevers or significant weight loss  Ears, Nose, Mouth and Throat: The patient presents with no abnormalities of the head, ears, eyes, nose or throat  Skin: Patient offers no concerns of new rashes or lesions  Respiratory: The patient presents with no abnormalities of the respiratory tract  Cardiovascular: The patient presents with no cardiac abnormalities  Gastrointestinal: The patient presents with no abnormalities of the GI system  Genitourinary: The patient presents with no dysuria, hematuria or frequent urination  Neurological: See HPI  Endocrine: Patient offers no complaints of excessive thirst, urination, or heat/cold intolerance    Advanced care planning reviewed and noted in the chart.  Medications:  acetaminophen   Tablet .. 650 milliGRAM(s) Oral every 6 hours PRN  alfuzosin 10 milliGRAM(s) Oral at bedtime  artificial tears (preservative free) Ophthalmic Solution 2 Drop(s) Right EYE two times a day PRN  ascorbic acid 500 milliGRAM(s) Oral daily  atovaquone Suspension 1500 milliGRAM(s) Oral daily  finasteride 5 milliGRAM(s) Oral daily  furosemide    Tablet 20 milliGRAM(s) Oral daily  gabapentin 100 milliGRAM(s) Oral three times a day  loperamide 2 milliGRAM(s) Oral four times a day PRN  multivitamin 1 Tablet(s) Oral daily  ondansetron Injectable 4 milliGRAM(s) IV Push every 6 hours PRN  pantoprazole    Tablet 40 milliGRAM(s) Oral before breakfast      Labs:  CBC Full  -  ( 08 Nov 2019 09:54 )  WBC Count : 4.04 K/uL  RBC Count : 2.57 M/uL  Hemoglobin : 8.0 g/dL  Hematocrit : 24.8 %  Platelet Count - Automated : 18 K/uL  Mean Cell Volume : 96.5 fl  Mean Cell Hemoglobin : 31.1 pg  Mean Cell Hemoglobin Concentration : 32.3 gm/dL  Auto Neutrophil # : x  Auto Lymphocyte # : x  Auto Monocyte # : x  Auto Eosinophil # : x  Auto Basophil # : x  Auto Neutrophil % : x  Auto Lymphocyte % : x  Auto Monocyte % : x  Auto Eosinophil % : x  Auto Basophil % : x    11-09    131<L>  |  105  |  37<H>  ----------------------------<  73  4.5   |  18<L>  |  1.53<H>    Ca    7.6<L>      09 Nov 2019 07:17    TPro  3.7<L>  /  Alb  1.6<L>  /  TBili  1.8<H>  /  DBili  x   /  AST  118<H>  /  ALT  63<H>  /  AlkPhos  818<H>  11-09    CAPILLARY BLOOD GLUCOSE        LIVER FUNCTIONS - ( 09 Nov 2019 07:17 )  Alb: 1.6 g/dL / Pro: 3.7 g/dL / ALK PHOS: 818 U/L / ALT: 63 U/L / AST: 118 U/L / GGT: x                   Vitals:  Vital Signs Last 24 Hrs  T(C): 37.2 (09 Nov 2019 04:10), Max: 37.6 (08 Nov 2019 14:42)  T(F): 98.9 (09 Nov 2019 04:10), Max: 99.7 (08 Nov 2019 14:42)  HR: 92 (09 Nov 2019 04:10) (92 - 102)  BP: 109/70 (09 Nov 2019 04:10) (107/63 - 110/65)  BP(mean): --  RR: 17 (09 Nov 2019 04:10) (17 - 18)  SpO2: 95% (09 Nov 2019 04:10) (95% - 98%)  NEUROLOGICAL EXAM:    Mental status: Awake, alert, and in no apparent distress. Oriented to person, place and time. Language function is normal. Recent memory, digit span and concentration were normal.     Cranial Nerves: Pupils were equal, round, reactive to light. Extraocular movements were intact. Visual field were full. Fundoscopic exam was deferred. Facial sensation was intact to light touch. There was no facial asymmetry. The palate was upgoing symmetrically and tongue was midline. Hearing acuity was intact to finger rub AU. Shoulder shrug was full bilaterally    Motor exam: Bulk and tone were normal. Strength was 5/5 in upper extremities. 2/5 on left hip and left leg. 3/5 on right hip and 2/5 on right leg. left knee ext 3+/5  4/5 on b/l ankles. Fine finger movements were symmetric and normal. There was no pronator drift. TTP on left hip. + left leg raise test    Reflexes: 2+ in the bilateral upper extremities. 1+ in the bilateral lower extremities. Toes were downgoing bilaterally.     Sensation: Intact to light touch, temperature    Coordination: Finger-nose-finger  was without dysmetria.     Gait: deferred      < from: CT Chest No Cont (11.05.19 @ 13:58) >  IMPRESSION:     New small bilateral pleural effusions.    No bowel wall thickening or obstruction.    Thick-walled and trabeculated urinary bladder with diverticula and   moderate perivesicular inflammatory change, similar in appearance to   recent PET/CT 10/29/2019. Bladder wall trabeculation may be seen in the   setting of chronic bladder outlet obstruction. However, inflammatory   change is new since 08/30/2019. Correlate with urinalysis.    Ill-defined 2.3 cm hypoattenuating region in the right hepatic lobe   corresponding to region of FDG avidity on prior PET/CTs, indeterminate.    Splenomegaly.    < end of copied text >    < from: NM PET/CT Onc FDG Skull to Thigh, Subsq (10.29.19 @ 11:56) >  BONES: Mild diffusely heterogeneous FDG activity throughout the bone   marrow with several discrete marrow foci. Reference L4 vertebral body SUV   6.0; previous SUV 5.9.     SOFT TISSUES: Diffuse FDG avidity in the posterior cervical and bilateral   forearm musculature.    IMPRESSION: Compared to FDG-PET/CT scan 8/30/2019:    1. Mildly decreased size and FDG avidity of indeterminate right hepatic   lobe lesion.    2. Resolution of FDG avidity and decreased size of subcentimeter anterior   left upper lobe nodule.     3. Unchanged diffuse heterogeneous bone marrow activity with several   focal areas in the spine.    < end of copied text >

## 2019-11-09 NOTE — PROGRESS NOTE ADULT - ASSESSMENT
no Gi symptoms, LFTs remain elevated, mostly cholestatic  small liver lesion with incr PET activity unclear etiology  was on Zosyn which can caused cholestasis  no other culprit meds  maybe related to liver infiltration due to underlying mailgnancy

## 2019-11-09 NOTE — PROGRESS NOTE ADULT - ASSESSMENT
Echo from 4/2019 ef 75%, nl lv sys fx.    a/p  79M w/ HLH/NHL, and BPH admitted with diarrhea, YEE, anemia . Patient being evaluated for SOB.     1. SOB/acute on chronic Diastolic CHF   -stable, improved  -in the setting of multiple PRBC/IVF ressuc  -s/p lasix 40 mg IV 10/8, cont po lasix 20 for dig le edema and pulmonary edema  -trend creat closely   -hypoproteinemia/ hypoalbuminemia/ worsening liver disease likely playing a role in LE edema   -await echo to eval LV fx     2. YEE   -renal f/u, trend creat    3. Anemia  hem/onc/ med, GI,  f/u     4. HLH / lymphoma  hem/onc f/u    dvt ppx     d/w family at bedside

## 2019-11-09 NOTE — CONSULT NOTE ADULT - SUBJECTIVE AND OBJECTIVE BOX
CC: back/leg pain    History of Present Illness:  Mr. 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  Last treatment was in October.    He more recently presented to [facility] with diarrhea and weakness.  He has had anemia s/p pRBC transfusion.  He has been seen by heme/onc who stated he had a poor response to Brintuximab-miniCHOP x3 cycles.    He has noted back pain radiating to initially R>L and now L>R legs as well as lower extremity weakness.  The pain has been occuring for a few months and the weakness for a few weeks.  He denies urinary/current bowel issues.  He had imaging, x-ray of the lumbar spine completed demonstrating compression fracture prior but no MRI.    MRI done this admission demonstrates abnormal signal involving the L3 vertebral body with compression deformity without retropulsed fragment with peripheral enhancement seen involving left paraspinal soft tissue region involving the psoas muscle at the L3 level and prominent enhancing soft tissue involving both paraspinal soft tissue region at the L3 level compatible with underlying pathology.  Also noted is abnormal enhancement involving the cauda equina potentially consistent with lymphoma.    Today he reports some pain involving the back and left buttock.  While he previously was using a walker to get around at home, he is currently not able to walk.  His family reports that while he hasn't been receiving chemotherapy as of lately, they plan after discharge to take him to [facility] to see a physician there regarding additional systemic therapy.    Past Medical/Surgical History:  Acute on chronic anemia  HLH (hemophagocytic lymphohistiocytosis)  BPH (benign prostatic hyperplasia)  H/O knee surgery  Hernia    Allergies:  No Known Allergies    Medications:  alfuzosin, ascorbic acid, atovaquone, finasteride, furosemide, gabapentin, lidocaine, multivitamin, pantoprazole, acetaminophen PRN, artificial tears PRN, loperamide PRN, ondansetron PRN    Family/Social History:  family history of colon/ovarian cancer    ROS: [  ] Fever  [  ] Chills  [  ]Chest Pain [  ] SOB  [  ]Cough [  ] N/V  [  ] Diarrhea [  ]Constipation [  ]Other ROS:  [x  ] ROS otherwise negative    PHYSICAL EXAM  KPS 30  Vital Signs Last 24 Hrs  T(C): 37.2 (09 Nov 2019 10:00), Max: 37.2 (09 Nov 2019 04:10)  T(F): 98.9 (09 Nov 2019 10:00), Max: 98.9 (09 Nov 2019 04:10)  HR: 104 (09 Nov 2019 16:03) (92 - 104)  BP: 102/60 (09 Nov 2019 16:03) (99/65 - 109/70)  BP(mean): --  RR: 18 (09 Nov 2019 16:03) (17 - 18)  SpO2: 95% (09 Nov 2019 16:03) (95% - 97%)    General: elderly man, no acute distress  HEENT: extraocular movements intact  CV: s1 and s2 auscultated  Lungs: breath sounds appreciated bilaterally  Abdomen: non-tender, non-distended  MSK: Vertebral spine non-tender to palpation  Neuro: full strength of upper extremities, decreased strength of lower extremities 2-3/5, sensation intact in all extremities

## 2019-11-09 NOTE — CONSULT NOTE ADULT - ASSESSMENT
Mr. Benitez is a 79 year old man with a history of HLH and now anaplastic large T cell lymphoma on systemic therapy with back/leg pain for a few months, weakness for a few weeks and imaging evidence of L3 compression fracture with potential lymphomatous involvement of neighboring soft tissue and leptomeningeal involvement.    For his type of disease, the treatment would consist of systemic therapy such as chemotherapy.  If he is medically fit, can consider steroid therapy as treatment for his symptoms in the meantime. We can offer a course of palliative radiotherapy to his spinal/paraspinal/cauda equinal lesion to potentially help with palliation of his symptoms.  We discussed potential therapy and the family/him seem apt to proceed with treatment.  We will coordinate with our inpatient team to determine how to proceed.  We can arrange for transportation early next week for palliative radiotherapy at our [facility] radiation facility for simulation/treatment.  He then can continue with his planned care at [facility] otherwise.  Please call [phone] with further questions.

## 2019-11-09 NOTE — PROGRESS NOTE ADULT - SUBJECTIVE AND OBJECTIVE BOX
CARDIOLOGY FOLLOW UP NOTE - DR. MCKENZIE    Subjective:    feels better  resolved dyspnea  no chest pain, palpitations    PHYSICAL EXAM:  T(C): 37.2 (11-09-19 @ 04:10), Max: 37.6 (11-08-19 @ 14:42)  HR: 92 (11-09-19 @ 04:10) (92 - 102)  BP: 109/70 (11-09-19 @ 04:10) (107/63 - 110/65)  RR: 17 (11-09-19 @ 04:10) (17 - 18)  SpO2: 95% (11-09-19 @ 04:10) (95% - 96%)  Wt(kg): --  I&O's Summary    08 Nov 2019 07:01  -  09 Nov 2019 07:00  --------------------------------------------------------  IN: 700 mL / OUT: 0 mL / NET: 700 mL    09 Nov 2019 07:01  -  09 Nov 2019 12:31  --------------------------------------------------------  IN: 240 mL / OUT: 0 mL / NET: 240 mL      Daily     Daily     Appearance: Normal	  Cardiovascular: Normal S1 S2,RRR, No JVD, No murmurs  Respiratory: Lungs clear to auscultation	  Gastrointestinal:  Soft, Non-tender, + BS	  Extremities: Normal range of motion, edema b/l 1-2+      Home Medications:  acyclovir 400 mg oral tablet: 1 tab(s) orally 2 times a day (07 Nov 2019 11:34)  alfuzosin 10 mg oral tablet, extended release: 1 tab(s) orally once a day (at bedtime) (07 Nov 2019 11:34)  Bactrim  mg-160 mg oral tablet: 1 tab(s) orally once a day (07 Nov 2019 11:34)  cycloSPORINE 100 mg oral capsule: 1 cap(s) orally every 12 hours (07 Nov 2019 11:34)  finasteride 5 mg oral tablet: 1 tab(s) orally once a day (at bedtime) (07 Nov 2019 11:34)  folic acid 0.4 mg oral tablet: 1 tab(s) orally once a day (07 Nov 2019 11:34)  predniSONE 50 mg oral tablet: 2 tab(s) orally once a day (07 Nov 2019 11:34)  Vitamin B12 1000 mcg oral tablet: 1 tab(s) orally once a day (07 Nov 2019 11:34)      MEDICATIONS  (STANDING):  alfuzosin 10 milliGRAM(s) Oral at bedtime  ascorbic acid 500 milliGRAM(s) Oral daily  atovaquone Suspension 1500 milliGRAM(s) Oral daily  finasteride 5 milliGRAM(s) Oral daily  furosemide    Tablet 20 milliGRAM(s) Oral daily  gabapentin 200 milliGRAM(s) Oral three times a day  multivitamin 1 Tablet(s) Oral daily  pantoprazole    Tablet 40 milliGRAM(s) Oral before breakfast      TELEMETRY: 	    ECG:  	  RADIOLOGY:   DIAGNOSTIC TESTING:  [ ] Echocardiogram:  [ ] Catheterization:  [ ] Stress Test:    OTHER: 	    LABS:	 	    CARDIAC MARKERS:  Troponin T, High Sensitivity Result: 65 ng/L (11-04 @ 21:50)  Troponin T, High Sensitivity Result: 99 ng/L (11-04 @ 18:31)                                7.2    2.88  )-----------( 21       ( 09 Nov 2019 11:05 )             23.1     11-09    131<L>  |  105  |  37<H>  ----------------------------<  73  4.5   |  18<L>  |  1.53<H>    Ca    7.6<L>      09 Nov 2019 07:17    TPro  3.7<L>  /  Alb  1.6<L>  /  TBili  1.8<H>  /  DBili  x   /  AST  118<H>  /  ALT  63<H>  /  AlkPhos  818<H>  11-09    proBNP:     Lipid Profile:   HgA1c:     Creatinine, Serum: 1.53 mg/dL (11-09-19 @ 07:17)  Creatinine, Serum: 1.27 mg/dL (11-08-19 @ 07:36)  Creatinine, Serum: 1.26 mg/dL (11-07-19 @ 06:52)

## 2019-11-09 NOTE — PROGRESS NOTE ADULT - ASSESSMENT
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 Treated with brentuximab and CHOP x 3, relapsed. Recent PET without much improvement.. Recent fracture of lumbar vertebra, diagnosis made in ortho office, no films available. No MRI. Has notable pains with movements. Has very minimal LE power proximally and decreased to absent KJ reflexes. He was unable to get off of the toilet due to weakness PTA.   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 alternative chemo regimen was discussed but ptn is too frail  Had CT C/abd/pelvis on 11/5.   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 Radiation/Oncology called, Otn, family aware, Neuro aware  based on the most recent PET scan, Dr Clay had suggested additional chemo.   Michael Diaz on 11/5 felt ptn is too frail to get more chemo w his performance level at 4 ( needs assistance w most ADLs)  Diarrhea has resolved since admission, GI PCR is negative, all Cx NTD  Afebrile after the fever after a transfusion on 11/5 and 11/7, s/p IVF  s/p acute on chronic diastolic CHF, now resolvedm diuresed well post one dose IV Lasix 40 mg  BCx NTD, off ABx, ID on board  Anemia ongoing, has improved post 2 transfusion , prob w aspect of hemolysis, this was d/w Heme/Onc,  off IVF, YEE resolved,  Hyponatremia improving  Hypoalbuminemia  LE edema  Functional paraplegia   GOC d/w ptn/wife, he is full code  will cont discussion of realistic GOC  OOB to chair not tolerable 2/2 back pain  DVT ppx w PAS

## 2019-11-09 NOTE — PROGRESS NOTE ADULT - ASSESSMENT
IMPRESSION: 79M w/ HLH/NHL, and BPH, 11/4/19 a/w diarrhea, c/b YEE    (1)Renal - YEE - Prerenal. Resolved, s/p IVF  (2)Metabolic acidosis - due to GI losses, primarily. Worse, as of today...with small but rising anion gap. Clinically stable.   (3)CV - SOB earlier today - chf flare? No longer SOB, s/p diuretics earlier today    RECOMMEND:  (1)Lasix 20mg po qd; no NaHCO3 for now given the ?pulmonary edema today  (2)BMP daily; trend bicarb level and anion gap  (3)Check lactate and beta-hydroxybutrate levels with a.m. labs  (4)High-protein diet    Suki Arrington NP-C  [facility]  [phone] IMPRESSION: 79M w/ HLH/NHL, and BPH, 11/4/19 a/w diarrhea, c/b YEE    (1)Renal - YEE - Prerenal. Creatinine elevated today   (2)Metabolic acidosis - due to GI losses, primarily. Slightly improved as of today, anion gap lower.  Clinically stable.   (3)CV - SOB yesterday - chf flare? No longer SOB, s/p IV Lasix 40 mg x 1 yesterday     RECOMMEND:  (1)Lasix 20mg po qd; no NaHCO3 for now   (2)BMP daily; trend bicarb level and anion gap  (3)High-protein diet    D/w Dr. Jeffery Arrington, NP-C  [facility]  [phone]

## 2019-11-09 NOTE — PROGRESS NOTE ADULT - SUBJECTIVE AND OBJECTIVE BOX
INTERVAL HPI/OVERNIGHT EVENTS:    MEDICATIONS  (STANDING):  alfuzosin 10 milliGRAM(s) Oral at bedtime  ascorbic acid 500 milliGRAM(s) Oral daily  atovaquone Suspension 1500 milliGRAM(s) Oral daily  finasteride 5 milliGRAM(s) Oral daily  furosemide    Tablet 20 milliGRAM(s) Oral daily  gabapentin 200 milliGRAM(s) Oral three times a day  multivitamin 1 Tablet(s) Oral daily  pantoprazole    Tablet 40 milliGRAM(s) Oral before breakfast    MEDICATIONS  (PRN):  acetaminophen   Tablet .. 650 milliGRAM(s) Oral every 6 hours PRN Temp greater or equal to 38C (100.4F), Mild Pain (1 - 3)  artificial tears (preservative free) Ophthalmic Solution 2 Drop(s) Right EYE two times a day PRN irritation  loperamide 2 milliGRAM(s) Oral four times a day PRN Diarrhea  ondansetron Injectable 4 milliGRAM(s) IV Push every 6 hours PRN Nausea and/or Vomiting      Allergies    No Known Allergies    Intolerances            PHYSICAL EXAM:   Vital Signs:  Vital Signs Last 24 Hrs  T(C): 37.2 (09 Nov 2019 04:10), Max: 37.6 (08 Nov 2019 14:42)  T(F): 98.9 (09 Nov 2019 04:10), Max: 99.7 (08 Nov 2019 14:42)  HR: 92 (09 Nov 2019 04:10) (92 - 102)  BP: 109/70 (09 Nov 2019 04:10) (107/63 - 110/65)  BP(mean): --  RR: 17 (09 Nov 2019 04:10) (17 - 18)  SpO2: 95% (09 Nov 2019 04:10) (95% - 96%)  Daily     Daily     GENERAL:  no distress  HEENT:  NC/AT,  anicteric  CHEST:   no increased effort, breath sounds clear  HEART:  Regular rhythm  ABDOMEN:  Soft, non-tender, non-distended, normoactive bowel sounds,  no masses ,no hepato-splenomegaly, no signs of chronic liver disease  EXTEREMITIES:  no cyanosis      LABS:                        8.0    4.04  )-----------( 18       ( 08 Nov 2019 09:54 )             24.8     11-09    131<L>  |  105  |  37<H>  ----------------------------<  73  4.5   |  18<L>  |  1.53<H>    Ca    7.6<L>      09 Nov 2019 07:17    TPro  3.7<L>  /  Alb  1.6<L>  /  TBili  1.8<H>  /  DBili  x   /  AST  118<H>  /  ALT  63<H>  /  AlkPhos  818<H>  11-09          RADIOLOGY & ADDITIONAL TESTS:

## 2019-11-09 NOTE — PROGRESS NOTE ADULT - SUBJECTIVE AND OBJECTIVE BOX
Patient is a 79y old  Male who presents with a chief complaint of weakness (09 Nov 2019 10:21)      SUBJECTIVE / OVERNIGHT EVENTS: ptn is comfortable, MRI C/T/LS spine reviewed, tolerated test well while on oxycodone IR10 mg, results d/w ptn, wife, brother, sons     MEDICATIONS  (STANDING):  alfuzosin 10 milliGRAM(s) Oral at bedtime  ascorbic acid 500 milliGRAM(s) Oral daily  atovaquone Suspension 1500 milliGRAM(s) Oral daily  finasteride 5 milliGRAM(s) Oral daily  furosemide    Tablet 20 milliGRAM(s) Oral daily  gabapentin 200 milliGRAM(s) Oral three times a day  multivitamin 1 Tablet(s) Oral daily  pantoprazole    Tablet 40 milliGRAM(s) Oral before breakfast    MEDICATIONS  (PRN):  acetaminophen   Tablet .. 650 milliGRAM(s) Oral every 6 hours PRN Temp greater or equal to 38C (100.4F), Mild Pain (1 - 3)  artificial tears (preservative free) Ophthalmic Solution 2 Drop(s) Right EYE two times a day PRN irritation  loperamide 2 milliGRAM(s) Oral four times a day PRN Diarrhea  ondansetron Injectable 4 milliGRAM(s) IV Push every 6 hours PRN Nausea and/or Vomiting      Vital Signs Last 24 Hrs  T(F): 98.9 (11-09-19 @ 10:00), Max: 99.4 (11-08-19 @ 17:05)  HR: 99 (11-09-19 @ 10:00) (92 - 99)  BP: 99/65 (11-09-19 @ 10:00) (99/65 - 110/65)  RR: 18 (11-09-19 @ 10:00) (17 - 18)  SpO2: 97% (11-09-19 @ 10:00) (95% - 97%)  Telemetry:   CAPILLARY BLOOD GLUCOSE        I&O's Summary    08 Nov 2019 07:01  -  09 Nov 2019 07:00  --------------------------------------------------------  IN: 700 mL / OUT: 0 mL / NET: 700 mL    09 Nov 2019 07:01  -  09 Nov 2019 14:47  --------------------------------------------------------  IN: 480 mL / OUT: 0 mL / NET: 480 mL        PHYSICAL EXAM:  GENERAL: NAD, well-developed  HEAD:  Atraumatic, Normocephalic  EYES: EOMI, PERRLA, conjunctiva and sclera clear  NECK: Supple, No JVD  CHEST/LUNG: Clear to auscultation bilaterally; No wheeze  HEART: Regular rate and rhythm; No murmurs, rubs, or gallops  ABDOMEN: Soft, Nontender, Nondistended; Bowel sounds present  EXTREMITIES:  2+ Peripheral Pulses, No clubbing, cyanosis, or edema  PSYCH: AAOx3  NEUROLOGY: non-focal  SKIN: No rashes or lesions    LABS:                        7.2    2.88  )-----------( 21       ( 09 Nov 2019 11:05 )             23.1     11-09    131<L>  |  105  |  37<H>  ----------------------------<  73  4.5   |  18<L>  |  1.53<H>    Ca    7.6<L>      09 Nov 2019 07:17    TPro  3.7<L>  /  Alb  1.6<L>  /  TBili  1.8<H>  /  DBili  x   /  AST  118<H>  /  ALT  63<H>  /  AlkPhos  818<H>  11-09              RADIOLOGY & ADDITIONAL TESTS:    Imaging Personally Reviewed:    Consultant(s) Notes Reviewed:      Care Discussed with Consultants/Other Providers:

## 2019-11-09 NOTE — PROGRESS NOTE ADULT - SUBJECTIVE AND OBJECTIVE BOX
NEPHROLOGY           MEDICATIONS  (STANDING):  alfuzosin 10 milliGRAM(s) Oral at bedtime  ascorbic acid 500 milliGRAM(s) Oral daily  atovaquone Suspension 1500 milliGRAM(s) Oral daily  finasteride 5 milliGRAM(s) Oral daily  furosemide    Tablet 20 milliGRAM(s) Oral daily  gabapentin 200 milliGRAM(s) Oral three times a day  multivitamin 1 Tablet(s) Oral daily  pantoprazole    Tablet 40 milliGRAM(s) Oral before breakfast    VITALS:  T(C): , Max: 37.6 (11-08-19 @ 14:42)  T(F): , Max: 99.7 (11-08-19 @ 14:42)  HR: 92 (11-09-19 @ 04:10)  BP: 109/70 (11-09-19 @ 04:10)  RR: 17 (11-09-19 @ 04:10)  SpO2: 95% (11-09-19 @ 04:10)    I and O's:    11-08 @ 07:01  -  11-09 @ 07:00  --------------------------------------------------------  IN: 700 mL / OUT: 0 mL / NET: 700 mL    11-09 @ 07:01  -  11-09 @ 11:52  --------------------------------------------------------  IN: 240 mL / OUT: 0 mL / NET: 240 mL    PHYSICAL EXAM:  Constitutional: NAD, Alert; thin to cachectic  HEENT: NCAT, MMM  Neck: Supple, No JVD  Respiratory: CTA-b/l  Cardiovascular: RRR s1s2, no m/r/g  Gastrointestinal: hyperactive, NTND  Extremities: 2+ b/l LE edema  Neurological: reduced generalized strength  Back: no CVAT b/l  Skin: No rashes, no nevi    LABS:                        7.2    2.88  )-----------( 21       ( 09 Nov 2019 11:05 )             23.1     11-09    131<L>  |  105  |  37<H>  ----------------------------<  73  4.5   |  18<L>  |  1.53<H>    Ca    7.6<L>      09 Nov 2019 07:17    TPro  3.7<L>  /  Alb  1.6<L>  /  TBili  1.8<H>  /  DBili  x   /  AST  118<H>  /  ALT  63<H>  /  AlkPhos  818<H>  11-09    RADIOLOGY & ADDITIONAL STUDIES: NEPHROLOGY         Pt seen and examined laying in bed. Pt without complaints.      MEDICATIONS  (STANDING):  alfuzosin 10 milliGRAM(s) Oral at bedtime  ascorbic acid 500 milliGRAM(s) Oral daily  atovaquone Suspension 1500 milliGRAM(s) Oral daily  finasteride 5 milliGRAM(s) Oral daily  furosemide    Tablet 20 milliGRAM(s) Oral daily  gabapentin 200 milliGRAM(s) Oral three times a day  multivitamin 1 Tablet(s) Oral daily  pantoprazole    Tablet 40 milliGRAM(s) Oral before breakfast    VITALS:  T(C): , Max: 37.6 (11-08-19 @ 14:42)  T(F): , Max: 99.7 (11-08-19 @ 14:42)  HR: 92 (11-09-19 @ 04:10)  BP: 109/70 (11-09-19 @ 04:10)  RR: 17 (11-09-19 @ 04:10)  SpO2: 95% (11-09-19 @ 04:10)    I and O's:    11-08 @ 07:01  -  11-09 @ 07:00  --------------------------------------------------------  IN: 700 mL / OUT: 0 mL / NET: 700 mL    11-09 @ 07:01  -  11-09 @ 11:52  --------------------------------------------------------  IN: 240 mL / OUT: 0 mL / NET: 240 mL    PHYSICAL EXAM:  Constitutional: NAD, Alert; thin to cachectic  HEENT: NCAT, MMM  Neck: Supple, No JVD  Respiratory: CTA-b/l  Cardiovascular: RRR s1s2, no m/r/g  Gastrointestinal: hyperactive, NTND  Extremities: 2+ b/l LE edema  Neurological: reduced generalized strength  Back: no CVAT b/l  Skin: No rashes, no nevi    LABS:                        7.2    2.88  )-----------( 21 ( 09 Nov 2019 11:05 )             23.1     11-09    131<L>  |  105  |  37<H>  ----------------------------<  73  4.5   |  18<L>  |  1.53<H>    Ca    7.6<L>      09 Nov 2019 07:17    TPro  3.7<L>  /  Alb  1.6<L>  /  TBili  1.8<H>  /  DBili  x   /  AST  118<H>  /  ALT  63<H>  /  AlkPhos  818<H>  11-09    RADIOLOGY & ADDITIONAL STUDIES: NEPHROLOGY         Pt seen and examined laying in bed. Pt without complaints, in no acute distress.     MEDICATIONS  (STANDING):  alfuzosin 10 milliGRAM(s) Oral at bedtime  ascorbic acid 500 milliGRAM(s) Oral daily  atovaquone Suspension 1500 milliGRAM(s) Oral daily  finasteride 5 milliGRAM(s) Oral daily  furosemide    Tablet 20 milliGRAM(s) Oral daily  gabapentin 200 milliGRAM(s) Oral three times a day  multivitamin 1 Tablet(s) Oral daily  pantoprazole    Tablet 40 milliGRAM(s) Oral before breakfast    VITALS:  T(C): , Max: 37.6 (11-08-19 @ 14:42)  T(F): , Max: 99.7 (11-08-19 @ 14:42)  HR: 92 (11-09-19 @ 04:10)  BP: 109/70 (11-09-19 @ 04:10)  RR: 17 (11-09-19 @ 04:10)  SpO2: 95% (11-09-19 @ 04:10)    I and O's:    11-08 @ 07:01  -  11-09 @ 07:00  --------------------------------------------------------  IN: 700 mL / OUT: 0 mL / NET: 700 mL    11-09 @ 07:01  -  11-09 @ 11:52  --------------------------------------------------------  IN: 240 mL / OUT: 0 mL / NET: 240 mL    PHYSICAL EXAM:  Constitutional: NAD, Alert; thin to cachectic  HEENT: NCAT, MMM  Neck: Supple, No JVD  Respiratory: CTA-b/l  Cardiovascular: RRR s1s2, no m/r/g  Gastrointestinal: hyperactive, NTND  Extremities: 2+ b/l LE edema  Neurological: reduced generalized strength  Back: no CVAT b/l  Skin: No rashes, no nevi    LABS:                        7.2    2.88  )-----------( 21       ( 09 Nov 2019 11:05 )             23.1     11-09    131<L>  |  105  |  37<H>  ----------------------------<  73  4.5   |  18<L>  |  1.53<H>    Ca    7.6<L>      09 Nov 2019 07:17    TPro  3.7<L>  /  Alb  1.6<L>  /  TBili  1.8<H>  /  DBili  x   /  AST  118<H>  /  ALT  63<H>  /  AlkPhos  818<H>  11-09

## 2019-11-10 NOTE — PROGRESS NOTE ADULT - ASSESSMENT
Echo from 4/2019 ef 75%, nl lv sys fx.    a/p  79M w/ HLH/NHL, and BPH admitted with diarrhea, YEE, anemia . Patient being evaluated for SOB.     1. SOB/acute on chronic Diastolic CHF   -stable, resolved  -in the setting of multiple PRBC/IVF ressuc  -s/p lasix 40 mg IV 10/8, cont po lasix 20 for dig le edema and pulmonary edema  -can decrease to 20mg Q 48 hours as likely intravascularly hypovolemic    -hypoproteinemia/ hypoalbuminemia/ worsening liver disease likely playing a role in LE edema   -await echo to eval LV fx   -if receives further PRBC would give lasix with tranfusion    2. YEE   -renal f/u, trend creat  -creat elevated but stable     3. Anemia  hem/onc/ med, GI,  f/u   heme < 7 today     4. HLH / lymphoma  hem/onc f/u    5, Hyponatremia   ? inc free water intake  tx per renal     dvt ppx

## 2019-11-10 NOTE — PROGRESS NOTE ADULT - SUBJECTIVE AND OBJECTIVE BOX
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 Pt states that he has had generalized weakness since diagnosed with HLH in April, the weakness has been progressively worse of the the past few weeks. He was not able to get out of bathroom 3 days ago due to the weakness which prompt him to come to ED. Pt also endorsing 1 months of "loose to watery" stool, intermittent, about once/day. Pt had multiple falls over the past few months, feels like the "leg is giving out on him". + head trauma, no LOC.  He walks with the walker at home.     Pt also endorsing worsening left back and hip pain for past 3 weeks, worsening with movement and walking, describing the pain as burning and radiating to his left leg. Prior to this, he has pain on his right hip but has shifted to the left side 3 weeks ago. He was seen by orthopedics about 3 months ago, had xray of the lumbar spine which showed compression fracture. No MRI at that time. Denied fever, chill, nausea, vomiting, headache, lightheadedness, abdominal pain, numbness/tingling, bowel or urine incontinence.     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)  BPH (benign prostatic hyperplasia) (N40.0)      Past Surgical History:  H/O knee surgery (Z98.890)  Hernia (K46.9)      Social History:  No toxic habits    Family History:  FAMILY HISTORY:      Allergies:  No Known Allergies      ROS:  Constitutional: Patient offers no complaints of fevers or significant weight loss  Ears, Nose, Mouth and Throat: The patient presents with no abnormalities of the head, ears, eyes, nose or throat  Skin: Patient offers no concerns of new rashes or lesions  Respiratory: The patient presents with no abnormalities of the respiratory tract  Cardiovascular: The patient presents with no cardiac abnormalities  Gastrointestinal: The patient presents with no abnormalities of the GI system  Genitourinary: The patient presents with no dysuria, hematuria or frequent urination  Neurological: See HPI  Endocrine: Patient offers no complaints of excessive thirst, urination, or heat/cold intolerance    Advanced care planning reviewed and noted in the chart.  Medications:  acetaminophen   Tablet .. 650 milliGRAM(s) Oral every 6 hours PRN  acetaminophen   Tablet .. 650 milliGRAM(s) Oral once  alfuzosin 10 milliGRAM(s) Oral at bedtime  artificial tears (preservative free) Ophthalmic Solution 2 Drop(s) Right EYE two times a day PRN  ascorbic acid 500 milliGRAM(s) Oral daily  atovaquone Suspension 1500 milliGRAM(s) Oral daily  diphenhydrAMINE 25 milliGRAM(s) Oral once PRN  finasteride 5 milliGRAM(s) Oral daily  furosemide    Tablet 20 milliGRAM(s) Oral daily  gabapentin 200 milliGRAM(s) Oral three times a day  lidocaine   Patch 1 Patch Transdermal daily  loperamide 2 milliGRAM(s) Oral four times a day PRN  multivitamin 1 Tablet(s) Oral daily  ondansetron Injectable 4 milliGRAM(s) IV Push every 6 hours PRN  pantoprazole    Tablet 40 milliGRAM(s) Oral before breakfast      Labs:  CBC Full  -  ( 10 Nov 2019 09:19 )  WBC Count : 2.36 K/uL  RBC Count : 2.17 M/uL  Hemoglobin : 6.6 g/dL  Hematocrit : 20.5 %  Platelet Count - Automated : 16 K/uL  Mean Cell Volume : 94.5 fl  Mean Cell Hemoglobin : 30.4 pg  Mean Cell Hemoglobin Concentration : 32.2 gm/dL  Auto Neutrophil # : x  Auto Lymphocyte # : x  Auto Monocyte # : x  Auto Eosinophil # : x  Auto Basophil # : x  Auto Neutrophil % : x  Auto Lymphocyte % : x  Auto Monocyte % : x  Auto Eosinophil % : x  Auto Basophil % : x    11-10    129<L>  |  103  |  42<H>  ----------------------------<  85  3.9   |  20<L>  |  1.51<H>    Ca    7.5<L>      10 Nov 2019 06:54    TPro  3.3<L>  /  Alb  1.4<L>  /  TBili  2.0<H>  /  DBili  1.7<H>  /  AST  132<H>  /  ALT  66<H>  /  AlkPhos  778<H>  11-10    CAPILLARY BLOOD GLUCOSE        LIVER FUNCTIONS - ( 10 Nov 2019 06:54 )  Alb: 1.4 g/dL / Pro: 3.3 g/dL / ALK PHOS: 778 U/L / ALT: 66 U/L / AST: 132 U/L / GGT: x                   Vitals:  Vital Signs Last 24 Hrs  T(C): 37.1 (10 Nov 2019 04:21), Max: 37.1 (10 Nov 2019 04:21)  T(F): 98.8 (10 Nov 2019 04:21), Max: 98.8 (10 Nov 2019 04:21)  HR: 98 (10 Nov 2019 04:21) (96 - 104)  BP: 104/60 (10 Nov 2019 04:21) (102/60 - 106/68)  BP(mean): --  RR: 18 (10 Nov 2019 04:21) (17 - 18)  SpO2: 97% (10 Nov 2019 04:21) (95% - 97%)    NEUROLOGICAL EXAM:    Mental status: Awake, alert, and in no apparent distress. Oriented to person, place and time. Language function is normal. Recent memory, digit span and concentration were normal.     Cranial Nerves: Pupils were equal, round, reactive to light. Extraocular movements were intact. Visual field were full. Fundoscopic exam was deferred. Facial sensation was intact to light touch. There was no facial asymmetry. The palate was upgoing symmetrically and tongue was midline. Hearing acuity was intact to finger rub AU. Shoulder shrug was full bilaterally    Motor exam: Bulk and tone were normal. Strength was 5/5 in upper extremities. 2/5 on left hip and left leg. 3/5 on right hip and 2/5 on right leg. left knee ext 4/5  4/5 on b/l ankles. Fine finger movements were symmetric and normal. There was no pronator drift. TTP on left hip. + left leg raise test    Reflexes: 2+ in the bilateral upper extremities. 1+ in the bilateral lower extremities. Toes were downgoing bilaterally.     Sensation: Intact to light touch, temperature    Coordination: Finger-nose-finger  was without dysmetria.     Gait: deferred      < from: CT Chest No Cont (11.05.19 @ 13:58) >  IMPRESSION:     New small bilateral pleural effusions.    No bowel wall thickening or obstruction.    Thick-walled and trabeculated urinary bladder with diverticula and   moderate perivesicular inflammatory change, similar in appearance to   recent PET/CT 10/29/2019. Bladder wall trabeculation may be seen in the   setting of chronic bladder outlet obstruction. However, inflammatory   change is new since 08/30/2019. Correlate with urinalysis.    Ill-defined 2.3 cm hypoattenuating region in the right hepatic lobe   corresponding to region of FDG avidity on prior PET/CTs, indeterminate.    Splenomegaly.    < end of copied text >    < from: NM PET/CT Onc FDG Skull to Thigh, Subsq (10.29.19 @ 11:56) >  BONES: Mild diffusely heterogeneous FDG activity throughout the bone   marrow with several discrete marrow foci. Reference L4 vertebral body SUV   6.0; previous SUV 5.9.     SOFT TISSUES: Diffuse FDG avidity in the posterior cervical and bilateral   forearm musculature.    IMPRESSION: Compared to FDG-PET/CT scan 8/30/2019:    1. Mildly decreased size and FDG avidity of indeterminate right hepatic   lobe lesion.    2. Resolution of FDG avidity and decreased size of subcentimeter anterior   left upper lobe nodule.     3. Unchanged diffuse heterogeneous bone marrow activity with several   focal areas in the spine.    < end of copied text >  < from: MR Lumbar Spine w/wo IV Cont (11.08.19 @ 21:55) >    EXAM:  MR SPINE THORACIC WAW IC                          EXAM:  MR SPINE LUMBAR WAW IC                          EXAM:  MR SPINE CERVICAL WAW IC                            PROCEDURE DATE:  11/08/2019            INTERPRETATION:  Clinical indication: Non-Hodgkin's lymphoma.    MRI of the cervical thoracic and lumbar spine was performed using   sagittal T1-T2 and STIR sequence. Axial T1 and T2-weighted sequences were   performed. The patient was injected with approximately 10 cc of Gadavist   IV and sagittal and axial T1-weighted sequences were performed.    Cervical spine:    This exam is somewhat limited by motion.    Loss of the normal cervical lordosis is seen. This could be due to   positioning or muscle spasm    The vertebral body height and alignment appear normal    Disc desiccation is seen involving the entire cervical spine, though most   prominent at the C5-C6 C6-7 levels. These findings are secondary to   degenerative change.    No abnormal areas enhancement seen.    C2-3: Normal    C3-4: Disc bulge is identified. Mild narrowing of the spinal canal.   Moderate narrowing of both neural foramen.    C4-5: Disc bulge is identified. Bilateral hypertrophic facet joint   changes are seen. Severe narrowing of both neural foramen is seen.    C5-6: Disc bulge is seen. Bilateral hypertrophic facet joint changes are   seen. Hypertrophic change involving the right uncovertebral joint. Mild   to moderate narrowing of the left neural foramen and spinal canal. Severe   narrowing of the right neural foramen    C6-7: Normal    C7-T1: Normal    The spinal cord demonstrates normal signal and caliber.    Grossly, no abnormal areas of enhancement is seen.    Thoracic spine:    This exam is somewhat limited by motion    Increased kyphosis is seen.    The vertebral body height and alignment appear normal.    Schmorl's node is seen involving the superior endplate of T12 with slight   retropulsed fragment seen causing minimal effacement of the ventral   thecal sac. No abnormal signal is seen throughout the rest of the   vertebral body.    There are no abnormal disc herniations or significant central or neural   foraminal stenosis seen.    No abnormal masses or collections are seen.    No abnormal areas of enhancement seen.    Lumbar spine:    Reversal of the normal lumbar lordosis is seen. This could be due to   positioning or muscle spasm. There is abnormal T1 prolongation seen   involving the L3 vertebral body. This does demonstrate associated   enhancement and could be compatible with underlying lesion such as   lymphoma metastasis, multiple myeloma or other similar etiology. Please   correlate clinically. Mild compression deformity seen involving the   superior endplate of this vertebral body. No significant retropulsed   fragment is identified.    Grade 1 anterolisthesis seen involving L5 and S1. Bilateral lysis defects   are suspected and can be confirmed with plain film.    T12-L1: Normal    L1-2: Normal    L2-3: Bilateral hypertrophic facet changes are seen. No significant   compromise of the spinal canal or either neural foramen    L3-4: Disc bulge and bilateral hypertrophic facet changes are seen. Mild   narrowing of the spinal canal and moderate narrowing of both neural   foramen right greater than left.    L4-5: Disc bulge and bilateral hypertrophic changes are seen. Mild   narrowing of the spinal canal.    L5-S1: Disc bulge is seen. No significant, as of the spinal or canal   either neural foramen    There is abnormal enhancement seen involving the cauda equina. This could   be secondary to patient's known lymphoma though the possibility of   arachnoiditis must be considered.    There is abnormal signal with peripheral enhancement seen involving the   left paraspinal soft tissue region. This involves the psoas muscle at the   L3 level. Prominent enhancing soft tissue is seen involving both   paraspinal soft tissue region at the L3 level which could be compatible   with underlying pathology.    Impression: Degenerative changes as described above.    Enhancement of the cauda equina which could be secondary patient's known   lymphoma.    Abnormal signal and enhancement involving the L3 vertebral body is likely   secondary patient's known lymphoma. Involvement of the paraspinal soft   tissue region is also seen at this level.                    EMILY GARCIA M.D., ATTENDING RADIOLOGIST  This document has been electronically signed. Nov 9 2019 11:22AM                < end of copied text >

## 2019-11-10 NOTE — PROGRESS NOTE ADULT - ASSESSMENT
No GI complaints,   LFTs remain elevated  MRI spine results noted and plan for palliative radiation  could have infiltration of liver due to lymphoma

## 2019-11-10 NOTE — PROGRESS NOTE ADULT - SUBJECTIVE AND OBJECTIVE BOX
CARDIOLOGY FOLLOW UP NOTE - DR. MCKENZIE    Subjective:    no chest pain, sob, palpitations  feels better    PHYSICAL EXAM:  T(C): 37.1 (11-10-19 @ 04:21), Max: 37.1 (11-10-19 @ 04:21)  HR: 98 (11-10-19 @ 04:21) (96 - 104)  BP: 104/60 (11-10-19 @ 04:21) (102/60 - 106/68)  RR: 18 (11-10-19 @ 04:21) (17 - 18)  SpO2: 97% (11-10-19 @ 04:21) (95% - 97%)  Wt(kg): --  I&O's Summary    09 Nov 2019 07:01  -  10 Nov 2019 07:00  --------------------------------------------------------  IN: 820 mL / OUT: 0 mL / NET: 820 mL    10 Nov 2019 07:01  -  10 Nov 2019 12:19  --------------------------------------------------------  IN: 240 mL / OUT: 0 mL / NET: 240 mL      Daily     Daily     Appearance: Normal	  Cardiovascular: Normal S1 S2,RRR, No JVD, No murmurs  Respiratory: Lungs clear to auscultation	  Gastrointestinal:  Soft, Non-tender, + BS	  Extremities: Normal range of motion, less edema       Home Medications:  acyclovir 400 mg oral tablet: 1 tab(s) orally 2 times a day (07 Nov 2019 11:34)  alfuzosin 10 mg oral tablet, extended release: 1 tab(s) orally once a day (at bedtime) (07 Nov 2019 11:34)  Bactrim  mg-160 mg oral tablet: 1 tab(s) orally once a day (07 Nov 2019 11:34)  cycloSPORINE 100 mg oral capsule: 1 cap(s) orally every 12 hours (07 Nov 2019 11:34)  finasteride 5 mg oral tablet: 1 tab(s) orally once a day (at bedtime) (07 Nov 2019 11:34)  folic acid 0.4 mg oral tablet: 1 tab(s) orally once a day (07 Nov 2019 11:34)  predniSONE 50 mg oral tablet: 2 tab(s) orally once a day (07 Nov 2019 11:34)  Vitamin B12 1000 mcg oral tablet: 1 tab(s) orally once a day (07 Nov 2019 11:34)      MEDICATIONS  (STANDING):  acetaminophen   Tablet .. 650 milliGRAM(s) Oral once  alfuzosin 10 milliGRAM(s) Oral at bedtime  ascorbic acid 500 milliGRAM(s) Oral daily  atovaquone Suspension 1500 milliGRAM(s) Oral daily  finasteride 5 milliGRAM(s) Oral daily  furosemide    Tablet 20 milliGRAM(s) Oral daily  gabapentin 200 milliGRAM(s) Oral three times a day  lidocaine   Patch 1 Patch Transdermal daily  multivitamin 1 Tablet(s) Oral daily  pantoprazole    Tablet 40 milliGRAM(s) Oral before breakfast      TELEMETRY: 	    ECG:  	  RADIOLOGY:   DIAGNOSTIC TESTING:  [ ] Echocardiogram:  [ ] Catheterization:  [ ] Stress Test:    OTHER: 	    LABS:	 	    CARDIAC MARKERS:                                6.6    2.36  )-----------( 16       ( 10 Nov 2019 09:19 )             20.5     11-10    129<L>  |  103  |  42<H>  ----------------------------<  85  3.9   |  20<L>  |  1.51<H>    Ca    7.5<L>      10 Nov 2019 06:54    TPro  3.3<L>  /  Alb  1.4<L>  /  TBili  2.0<H>  /  DBili  1.7<H>  /  AST  132<H>  /  ALT  66<H>  /  AlkPhos  778<H>  11-10    proBNP:     Lipid Profile:   HgA1c:     Creatinine, Serum: 1.51 mg/dL (11-10-19 @ 06:54)  Creatinine, Serum: 1.53 mg/dL (11-09-19 @ 07:17)  Creatinine, Serum: 1.27 mg/dL (11-08-19 @ 07:36)

## 2019-11-10 NOTE — PROGRESS NOTE ADULT - SUBJECTIVE AND OBJECTIVE BOX
INTERVAL HPI/OVERNIGHT EVENTS:    MEDICATIONS  (STANDING):  alfuzosin 10 milliGRAM(s) Oral at bedtime  ascorbic acid 500 milliGRAM(s) Oral daily  atovaquone Suspension 1500 milliGRAM(s) Oral daily  finasteride 5 milliGRAM(s) Oral daily  furosemide    Tablet 20 milliGRAM(s) Oral daily  gabapentin 200 milliGRAM(s) Oral three times a day  lidocaine   Patch 1 Patch Transdermal daily  multivitamin 1 Tablet(s) Oral daily  pantoprazole    Tablet 40 milliGRAM(s) Oral before breakfast    MEDICATIONS  (PRN):  acetaminophen   Tablet .. 650 milliGRAM(s) Oral every 6 hours PRN Temp greater or equal to 38C (100.4F), Mild Pain (1 - 3)  artificial tears (preservative free) Ophthalmic Solution 2 Drop(s) Right EYE two times a day PRN irritation  loperamide 2 milliGRAM(s) Oral four times a day PRN Diarrhea  ondansetron Injectable 4 milliGRAM(s) IV Push every 6 hours PRN Nausea and/or Vomiting      Allergies    No Known Allergies    Intolerances            PHYSICAL EXAM:   Vital Signs:  Vital Signs Last 24 Hrs  T(C): 37.1 (10 Nov 2019 04:21), Max: 37.2 (09 Nov 2019 10:00)  T(F): 98.8 (10 Nov 2019 04:21), Max: 98.9 (09 Nov 2019 10:00)  HR: 98 (10 Nov 2019 04:21) (96 - 104)  BP: 104/60 (10 Nov 2019 04:21) (99/65 - 106/68)  BP(mean): --  RR: 18 (10 Nov 2019 04:21) (17 - 18)  SpO2: 97% (10 Nov 2019 04:21) (95% - 97%)  Daily     Daily     GENERAL:  no distress  HEENT:  NC/AT,  anicteric  CHEST:   no increased effort, breath sounds clear  HEART:  Regular rhythm  ABDOMEN:  Soft, non-tender, non-distended, normoactive bowel sounds,  no masses ,no hepato-splenomegaly, no signs of chronic liver disease  EXTEREMITIES:  no cyanosis      LABS:                        7.2    2.88  )-----------( 21       ( 09 Nov 2019 11:05 )             23.1     11-10    129<L>  |  103  |  42<H>  ----------------------------<  85  3.9   |  20<L>  |  1.51<H>    Ca    7.5<L>      10 Nov 2019 06:54    TPro  3.3<L>  /  Alb  1.4<L>  /  TBili  2.0<H>  /  DBili  1.7<H>  /  AST  132<H>  /  ALT  66<H>  /  AlkPhos  778<H>  11-10          RADIOLOGY & ADDITIONAL TESTS:

## 2019-11-10 NOTE — PROGRESS NOTE ADULT - SUBJECTIVE AND OBJECTIVE BOX
Patient is a 79y old  Male who presents with a chief complaint of weakness (10 Nov 2019 11:34)      SUBJECTIVE / OVERNIGHT EVENTS: ptn appears breathless, has LE pain and tenderness, feels warm to touch, BS decreased in b/l lung bases, ptn unable to sit up properly for comprehensive exam 2/2 back pain    MEDICATIONS  (STANDING):  acetaminophen   Tablet .. 650 milliGRAM(s) Oral once  alfuzosin 10 milliGRAM(s) Oral at bedtime  ascorbic acid 500 milliGRAM(s) Oral daily  atovaquone Suspension 1500 milliGRAM(s) Oral daily  finasteride 5 milliGRAM(s) Oral daily  furosemide    Tablet 20 milliGRAM(s) Oral daily  furosemide   Injectable 20 milliGRAM(s) IV Push once  gabapentin 200 milliGRAM(s) Oral three times a day  lidocaine   Patch 1 Patch Transdermal daily  multivitamin 1 Tablet(s) Oral daily  pantoprazole    Tablet 40 milliGRAM(s) Oral before breakfast    MEDICATIONS  (PRN):  acetaminophen   Tablet .. 650 milliGRAM(s) Oral every 6 hours PRN Temp greater or equal to 38C (100.4F), Mild Pain (1 - 3)  artificial tears (preservative free) Ophthalmic Solution 2 Drop(s) Right EYE two times a day PRN irritation  diphenhydrAMINE 25 milliGRAM(s) Oral once PRN Rash and/or Itching  loperamide 2 milliGRAM(s) Oral four times a day PRN Diarrhea  ondansetron Injectable 4 milliGRAM(s) IV Push every 6 hours PRN Nausea and/or Vomiting      Vital Signs Last 24 Hrs  T(F): 97.6 (11-10-19 @ 12:28), Max: 98.8 (11-10-19 @ 04:21)  HR: 98 (11-10-19 @ 12:28) (96 - 98)  BP: 101/72 (11-10-19 @ 12:28) (101/72 - 106/68)  RR: 18 (11-10-19 @ 12:28) (17 - 18)  SpO2: 98% (11-10-19 @ 12:28) (96% - 98%)  Telemetry:   CAPILLARY BLOOD GLUCOSE        I&O's Summary    09 Nov 2019 07:01  -  10 Nov 2019 07:00  --------------------------------------------------------  IN: 820 mL / OUT: 0 mL / NET: 820 mL    10 Nov 2019 07:01  -  10 Nov 2019 16:16  --------------------------------------------------------  IN: 480 mL / OUT: 0 mL / NET: 480 mL        PHYSICAL EXAM:  GENERAL: NAD, well-developed  HEAD:  Atraumatic, Normocephalic  EYES: EOMI, PERRLA, conjunctiva and sclera clear  NECK: Supple, No JVD  CHEST/LUNG: Clear to auscultation bilaterally; No wheeze  HEART: Regular rate and rhythm; No murmurs, rubs, or gallops  ABDOMEN: Soft, Nontender, Nondistended; Bowel sounds present  EXTREMITIES:  2+ Peripheral Pulses, No clubbing, cyanosis, or edema  PSYCH: AAOx3  NEUROLOGY: non-focal  SKIN: No rashes or lesions    LABS:                        6.6    2.36  )-----------( 16       ( 10 Nov 2019 09:19 )             20.5     11-10    129<L>  |  103  |  42<H>  ----------------------------<  85  3.9   |  20<L>  |  1.51<H>    Ca    7.5<L>      10 Nov 2019 06:54    TPro  3.3<L>  /  Alb  1.4<L>  /  TBili  2.0<H>  /  DBili  1.7<H>  /  AST  132<H>  /  ALT  66<H>  /  AlkPhos  778<H>  11-10              RADIOLOGY & ADDITIONAL TESTS:    Imaging Personally Reviewed:    Consultant(s) Notes Reviewed:      Care Discussed with Consultants/Other Providers:

## 2019-11-10 NOTE — PROGRESS NOTE ADULT - ASSESSMENT
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 Treated with brentuximab and CHOP x 3, relapsed. Recent PET without much improvement.. Recent fracture of lumbar vertebra, diagnosis made in ortho office, no films available. No MRI. Has notable pains with movements. Has very minimal LE power proximally and decreased to absent KJ reflexes. He was unable to get off of the toilet due to weakness PTA.   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 alternative chemo regimen was discussed but ptn is too frail  Had CT C/abd/pelvis on 11/5.   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 Radiation/Oncology called, Otn, family aware, Neuro aware  based on the most recent PET scan, Dr Clay had suggested additional chemo.   Michael Diaz on 11/5 felt ptn is too frail to get more chemo w his performance level at 4 ( needs assistance w most ADLs)  Diarrhea has resolved since admission, GI PCR is negative, all Cx NTD  Afebrile after the fever after a transfusion on 11/5 and 11/7, s/p IVF  ptn appears breathless, and reports dyspnea, lung bases are dull, im concerned for significant pleural effusion vs pulm edema, will give LASIX IV 20 mg now, get a rectal temp 2/2 has been coughing up phlegm and feels warm, pan culture if elevated temp and start ABX/check RVP, get CT Chest-NC.. HH is low today, awaiting 1U PRBC post premedication. LE pain 2/2 metastatic dz to spine. awaiting RTx consult, neuro following.   s/p acute on chronic diastolic CHF 2 days ago, did well post diuresis w  IV Lasix 40 mg  BCx NTD, off ABx, ID on board  Anemia ongoing, has improved post 2 transfusion , prob w aspect of hemolysis, this was d/w Heme/Onc,  off IVF, YEE resolved,  Hyponatremia improving  Hypoalbuminemia  LE edema  Functional paraplegia   GOC d/w ptn/wife, he is full code  will cont discussion of realistic GOC  OOB to chair not tolerable 2/2 back pain  DVT ppx w PAS

## 2019-11-11 NOTE — PROGRESS NOTE ADULT - SUBJECTIVE AND OBJECTIVE BOX
INTERVAL hx: no overnight event      REVIEW OF SYSTEMS:    CONSTITUTIONAL: No weakness, fevers or chills  EYES/ENT: No visual changes, no throat pain   RESPIRATORY: No cough, wheezing, hemoptysis; No shortness of breath  CARDIOVASCULAR: No chest pain or palpitations  GASTROINTESTINAL: No abdominal pain, nausea, vomiting, or hematemesis; No diarrhea or constipation. No melena or hematochezia.  GENITOURINARY: No dysuria, frequency or hematuria  MUSCULOSKELETAL: No joint pain.  NEUROLOGICAL: No dizziness, numbness, or weakness  SKIN: No itching, burning, rashes, or lesions   All other review of systems is negative unless indicated above.  Allergies    No Known Allergies    Intolerances        PAST MEDICAL & SURGICAL HISTORY:  Acute on chronic anemia  HLH (hemophagocytic lymphohistiocytosis)  BPH (benign prostatic hyperplasia)  H/O knee surgery  Hernia    VITAL SIGNS:  Vital Signs Last 24 Hrs  T(C): 36.7 (11 Nov 2019 10:36), Max: 37.2 (11 Nov 2019 04:22)  T(F): 98 (11 Nov 2019 10:36), Max: 98.9 (11 Nov 2019 04:22)  HR: 98 (11 Nov 2019 10:36) (97 - 98)  BP: 102/66 (11 Nov 2019 10:36) (102/66 - 130/74)  BP(mean): --  RR: 18 (11 Nov 2019 10:36) (17 - 18)  SpO2: 95% (11 Nov 2019 10:36) (95% - 97%)      PHYSICAL EXAM:     GENERAL: no acute distress  HEENT: EOMI, neck supple  RESPIRATORY: LCTAB/L, no rhonchi, rales, or wheezing  CARDIOVASCULAR: RRR, no murmurs, gallops, rubs  ABDOMINAL: soft, non-tender, non-distended, positive bowel sounds   EXTREMITIES: no clubbing, cyanosis, or edema  NEUROLOGICAL: alert and oriented x 3, non-focal  SKIN: no rashes or lesions   MUSCULOSKELETAL: no gross joint deformity                          6.6    2.36  )-----------( 16       ( 10 Nov 2019 09:19 )             20.5     11-11    131<L>  |  100  |  50<H>  ----------------------------<  89  3.9   |  20<L>  |  1.46<H>    Ca    7.4<L>      11 Nov 2019 07:05    TPro  3.3<L>  /  Alb  1.7<L>  /  TBili  2.5<H>  /  DBili  x   /  AST  156<H>  /  ALT  82<H>  /  AlkPhos  993<H>  11-11      MEDICATIONS  (STANDING):  alfuzosin 10 milliGRAM(s) Oral at bedtime  ascorbic acid 500 milliGRAM(s) Oral daily  atovaquone Suspension 1500 milliGRAM(s) Oral daily  finasteride 5 milliGRAM(s) Oral daily  furosemide    Tablet 20 milliGRAM(s) Oral daily  gabapentin 200 milliGRAM(s) Oral three times a day  lidocaine   Patch 1 Patch Transdermal daily  multivitamin 1 Tablet(s) Oral daily  pantoprazole    Tablet 40 milliGRAM(s) Oral before breakfast INTERVAL hx: no overnight event, patient was seen and examined at bedside, does not have any acute complaint, found to have Left Femoral DVT, V/Q w low prob PE.       REVIEW OF SYSTEMS:    CONSTITUTIONAL: No weakness, fevers or chills  EYES/ENT: No visual changes, no throat pain   RESPIRATORY: No cough, wheezing, hemoptysis; No shortness of breath  CARDIOVASCULAR: No chest pain or palpitations  GASTROINTESTINAL: No abdominal pain, nausea, vomiting, or hematemesis; No diarrhea or constipation. No melena or hematochezia.  GENITOURINARY: No dysuria, frequency or hematuria  MUSCULOSKELETAL: No joint pain.  NEUROLOGICAL: No dizziness, numbness, or weakness  SKIN: No itching, burning, rashes, or lesions   All other review of systems is negative unless indicated above.  Allergies    No Known Allergies    Intolerances        PAST MEDICAL & SURGICAL HISTORY:  Acute on chronic anemia  HLH (hemophagocytic lymphohistiocytosis)  BPH (benign prostatic hyperplasia)  H/O knee surgery  Hernia    VITAL SIGNS:  Vital Signs Last 24 Hrs  T(C): 36.7 (11 Nov 2019 10:36), Max: 37.2 (11 Nov 2019 04:22)  T(F): 98 (11 Nov 2019 10:36), Max: 98.9 (11 Nov 2019 04:22)  HR: 98 (11 Nov 2019 10:36) (97 - 98)  BP: 102/66 (11 Nov 2019 10:36) (102/66 - 130/74)  BP(mean): --  RR: 18 (11 Nov 2019 10:36) (17 - 18)  SpO2: 95% (11 Nov 2019 10:36) (95% - 97%)      PHYSICAL EXAM:     GENERAL: NAD, well-developed  HEAD:  Atraumatic, Normocephalic  EYES: EOMI, PERRLA, conjunctiva and sclera clear  NECK: Supple, No JVD  CHEST/LUNG: Clear to auscultation bilaterally; No wheeze  HEART: Regular rate and rhythm; No murmurs, rubs, or gallops  ABDOMEN: Soft, Nontender, Nondistended; Bowel sounds present  EXTREMITIES:  2+ Peripheral Pulses, No clubbing, cyanosis, or edema  PSYCH: AAOx3  NEUROLOGY: non-focal  SKIN: No rashes or lesions                        6.6    2.36  )-----------( 16       ( 10 Nov 2019 09:19 )             20.5     11-11    131<L>  |  100  |  50<H>  ----------------------------<  89  3.9   |  20<L>  |  1.46<H>    Ca    7.4<L>      11 Nov 2019 07:05    TPro  3.3<L>  /  Alb  1.7<L>  /  TBili  2.5<H>  /  DBili  x   /  AST  156<H>  /  ALT  82<H>  /  AlkPhos  993<H>  11-11      MEDICATIONS  (STANDING):  alfuzosin 10 milliGRAM(s) Oral at bedtime  ascorbic acid 500 milliGRAM(s) Oral daily  atovaquone Suspension 1500 milliGRAM(s) Oral daily  finasteride 5 milliGRAM(s) Oral daily  furosemide    Tablet 20 milliGRAM(s) Oral daily  gabapentin 200 milliGRAM(s) Oral three times a day  lidocaine   Patch 1 Patch Transdermal daily  multivitamin 1 Tablet(s) Oral daily  pantoprazole    Tablet 40 milliGRAM(s) Oral before breakfast

## 2019-11-11 NOTE — PROGRESS NOTE ADULT - ASSESSMENT
no diarrhe atoday, cholestatic hepatitis meds vs multifactoral vs infiltraiton, conservative mangement

## 2019-11-11 NOTE — PROVIDER CONTACT NOTE (OTHER) - ACTION/TREATMENT ORDERED:
Lab notified and entered results. NIMISHA Hdz notified & stated to just draw the PT/INR lab & to discontinue the CBC & BMP.

## 2019-11-11 NOTE — PROVIDER CONTACT NOTE (OTHER) - REASON
phone call made to laboratory since this AM labs were received by lab but not entered in results section and NIMISHA Greenberg wanted these labs redrawn if they weren't done

## 2019-11-11 NOTE — CHART NOTE - NSCHARTNOTEFT_GEN_A_CORE
please doroteo to discuss decision on possible transfer to [facility] for inpt radiation     Tam Morse MD  cell  please call to discuss decision on possible transfer or transportation to [facility] for inpt radiation- I reviewed the MRI with Dr Hans Stapleton- I would assume that the pain is mostly due to the L-3 compression fracture, which will not be reversed/affected by radiation- the cauda quina involvement in this area is minimal.... I do not feel that radiation is likely to have a significant positive effect on quality of life, even in the short term....     Tam Morse MD  cell

## 2019-11-11 NOTE — PROGRESS NOTE ADULT - SUBJECTIVE AND OBJECTIVE BOX
No pain, no shortness of breath      VITAL:  T(C): , Max: 37.2 (11-11-19 @ 04:22)  T(F): , Max: 98.9 (11-11-19 @ 04:22)  HR: 98 (11-11-19 @ 04:22)  BP: 116/74 (11-11-19 @ 04:22)  BP(mean): --  RR: 17 (11-11-19 @ 04:22)  SpO2: 96% (11-11-19 @ 04:22)      PHYSICAL EXAM:  Constitutional: NAD, Alert; thin to cachectic  HEENT: NCAT, MMM  Neck: Supple, No JVD  Respiratory: CTA-b/l  Cardiovascular: RRR s1s2, no m/r/g  Gastrointestinal: hyperactive, NTND  Extremities: 2+ b/l LE edema  Neurological: reduced generalized strength  Back: no CVAT b/l  Skin: No rashes, no nevi      LABS:                        6.6    2.36  )-----------( 16       ( 10 Nov 2019 09:19 )             20.5     Na(131)/K(3.9)/Cl(100)/HCO3(20)/BUN(50)/Cr(1.46)Glu(89)/Ca(7.4)/Mg(--)/PO4(--)    11-11 @ 07:05  Na(129)/K(3.9)/Cl(103)/HCO3(20)/BUN(42)/Cr(1.51)Glu(85)/Ca(7.5)/Mg(--)/PO4(--)    11-10 @ 06:54  Na(131)/K(4.5)/Cl(105)/HCO3(18)/BUN(37)/Cr(1.53)Glu(73)/Ca(7.6)/Mg(--)/PO4(--)    11-09 @ 07:17      IMPRESSION: 79M w/ HLH/NHL, and BPH, 11/4/19 a/w diarrhea, c/b YEE    (1)Renal - CKD3; fluctuating numbers based on hemodynamic status  (2)Metabolic acidosis - improved relative to late last week. No longer with elevated anion gap  (3)CV - acceptable volume status. On Lasix 20qd.  (4)Anemia - HLH-associated - s/p PRBCs yesterday    RECOMMEND:  (1)Lasix as ordered  (2)Dose new meds for GFR 40-50ml/min          Bernardo Galdamez MD  [facility]  [phone] No pain, no shortness of breath      VITAL:  T(C): , Max: 37.2 (11-11-19 @ 04:22)  T(F): , Max: 98.9 (11-11-19 @ 04:22)  HR: 98 (11-11-19 @ 04:22)  BP: 116/74 (11-11-19 @ 04:22)  BP(mean): --  RR: 17 (11-11-19 @ 04:22)  SpO2: 96% (11-11-19 @ 04:22)      PHYSICAL EXAM:  Constitutional: NAD, Alert; thin to cachectic  HEENT: NCAT, MMM  Neck: Supple, No JVD  Respiratory: CTA-b/l  Cardiovascular: RRR s1s2, no m/r/g  Gastrointestinal: hyperactive, NTND  Extremities: 2+ b/l LE edema  Neurological: reduced generalized strength  Back: no CVAT b/l  Skin: No rashes, no nevi      LABS:                        6.6    2.36  )-----------( 16       ( 10 Nov 2019 09:19 )             20.5     Na(131)/K(3.9)/Cl(100)/HCO3(20)/BUN(50)/Cr(1.46)Glu(89)/Ca(7.4)/Mg(--)/PO4(--)    11-11 @ 07:05  Na(129)/K(3.9)/Cl(103)/HCO3(20)/BUN(42)/Cr(1.51)Glu(85)/Ca(7.5)/Mg(--)/PO4(--)    11-10 @ 06:54  Na(131)/K(4.5)/Cl(105)/HCO3(18)/BUN(37)/Cr(1.53)Glu(73)/Ca(7.6)/Mg(--)/PO4(--)    11-09 @ 07:17      IMPRESSION: 79M w/ HLH/NHL, and BPH, 11/4/19 a/w diarrhea, c/b YEE    (1)Renal - CKD3; fluctuating numbers based on hemodynamic status  (2)Metabolic acidosis - improved relative to late last week. No longer with elevated anion gap  (3)Hypocalcemia - due to hypoalbuminemia; therefore, not requiring treatment  (4)CV - acceptable volume status. On Lasix 20qd.  (5)Anemia - HLH-associated - s/p PRBCs yesterday    RECOMMEND:  (1)Lasix as ordered  (2)Dose new meds for GFR 40-50ml/min  (3)High-protein diet        Bernardo Galdamez MD  [facility]  [phone]

## 2019-11-11 NOTE — PROGRESS NOTE ADULT - ASSESSMENT
Echo from 4/2019 ef 75%, nl lv sys fx.    a/p  79M w/ HLH/NHL, and BPH admitted with diarrhea, YEE, anemia . Patient being evaluated for SOB.     1. SOB/acute on chronic Diastolic CHF   -stable, resolved  -in the setting of multiple PRBC/IVF ressuc  -s/p lasix 40 mg IV 10/8  - ct chest noted  -cont po lasix 20 for dig le edema and pulmonary edema  -hypoproteinemia/ hypoalbuminemia/ worsening liver disease likely playing a role in LE edema   -await echo to eval LV fx   -if receives further PRBC would give additional 20 mg IVP lasix with tranfusion    2. YEE   -renal f/u, trend creat  -creat elevated but stable     3. Anemia  hem/onc/ med, GI,  f/u   heme < 7 today     4. HLH / lymphoma  hem/onc f/u    5, Hyponatremia   ? inc free water intake  tx per renal     dvt ppx

## 2019-11-11 NOTE — PROGRESS NOTE ADULT - SUBJECTIVE AND OBJECTIVE BOX
Patient is a 79y old  Male who presents with a chief complaint of increaseed lfts, diarrhea (11 Nov 2019 08:55)    Being followed by ID for        Interval history:    No other acute events      ROS:  No cough,SOB,CP  No N/V/D  No abd pain  No urinary complaints  No HA  No joint or limb pain  No other complaints    PAST MEDICAL & SURGICAL HISTORY:  Acute on chronic anemia  HLH (hemophagocytic lymphohistiocytosis)  BPH (benign prostatic hyperplasia)  H/O knee surgery  Hernia    Allergies    No Known Allergies    Intolerances      Antimicrobials:    atovaquone Suspension 1500 milliGRAM(s) Oral daily    MEDICATIONS  (STANDING):  alfuzosin 10 milliGRAM(s) Oral at bedtime  ascorbic acid 500 milliGRAM(s) Oral daily  atovaquone Suspension 1500 milliGRAM(s) Oral daily  Biotene Dry Mouth Oral Rinse 5 milliLiter(s) Swish and Spit two times a day  finasteride 5 milliGRAM(s) Oral daily  furosemide    Tablet 20 milliGRAM(s) Oral daily  gabapentin 200 milliGRAM(s) Oral three times a day  lidocaine   Patch 1 Patch Transdermal daily  multivitamin 1 Tablet(s) Oral daily  pantoprazole    Tablet 40 milliGRAM(s) Oral before breakfast      Vital Signs Last 24 Hrs  T(C): 36.7 (11-11-19 @ 10:36), Max: 37.2 (11-11-19 @ 04:22)  T(F): 98 (11-11-19 @ 10:36), Max: 98.9 (11-11-19 @ 04:22)  HR: 98 (11-11-19 @ 10:36) (97 - 98)  BP: 102/66 (11-11-19 @ 10:36) (102/66 - 130/74)  BP(mean): --  RR: 18 (11-11-19 @ 10:36) (17 - 18)  SpO2: 95% (11-11-19 @ 10:36) (95% - 97%)    Physical Exam:    Constitutional well preserved,comfortable,pleasant    HEENT PERRLA EOMI,No pallor or icterus    No oral exudate or erythema    Neck supple no JVD or LN    Chest Good AE,CTA    CVS RRR S1 S2 WNl No murmur or rub or gallop    Abd soft BS normal No tenderness no masses    Ext No cyanosis clubbing or edema    IV site no erythema tenderness or discharge    Joints no swelling or LOM    CNS AAO X 3 no focal    Lab Data:                          6.6    2.36  )-----------( 16       ( 10 Nov 2019 09:19 )             20.5       11-11    131<L>  |  100  |  50<H>  ----------------------------<  89  3.9   |  20<L>  |  1.46<H>    Ca    7.4<L>      11 Nov 2019 07:05    TPro  3.3<L>  /  Alb  1.7<L>  /  TBili  2.5<H>  /  DBili  x   /  AST  156<H>  /  ALT  82<H>  /  AlkPhos  993<H>  11-11          .Stool Feces  11-07-19   No enteric pathogens isolated.  (Stool culture examined for Salmonella,  Shigella, Campylobacter, Aeromonas, Plesiomonas,  Vibrio, E.coli O157 and Yersinia)  --  --      .Stool Feces  11-05-19   GI PCR Results: NOT detected  *******Please Note:*******  GI panel PCR evaluates for:  Campylobacter, Plesiomonas shigelloides, Salmonella,  Vibrio, Yersinia enterocolitica, Enteroaggregative  Escherichia coli (EAEC), Enteropathogenic E.coli (EPEC),  Enterotoxigenic E. coli (ETEC) lt/st, Shiga-like  toxin-producing E. coli (STEC) stx1/stx2,  Shigella/ Enteroinvasive E. coli (EIEC), Cryptosporidium,  Cyclospora cayetanensis, Entamoeba histolytica,  Giardia lamblia, Adenovirus F 40/41, Astrovirus,  Norovirus GI/GII, Rotavirus A, Sapovirus  --  --      .Blood Blood-Peripheral  11-05-19   No growth at 5 days.  --  --      .Blood Blood  11-04-19   No growth at 5 days.  --  --      .Urine Clean Catch (Midstream)  11-04-19   No growth  --  --      < from: NM Pulmonary Ventilation/Perfusion Scan (11.11.19 @ 09:15) >  IMPRESSION:     Low probability of pulmonary embolus.    < end of copied text >      < from: MR Lumbar Spine w/wo IV Cont (11.08.19 @ 21:55) >  Enhancement of the cauda equina which could be secondary patient's known   lymphoma.    Abnormal signal and enhancement involving the L3 vertebral body is likely   secondary patient's known lymphoma. Involvement of the paraspinal soft   tissue region is also seen at this level.      < end of copied text >      WBC Count: 2.36 (11-10-19 @ 09:19)  WBC Count: 2.88 (11-09-19 @ 11:05)  WBC Count: 4.04 (11-08-19 @ 09:54)  WBC Count: 3.05 (11-07-19 @ 09:19)  WBC Count: 3.33 (11-06-19 @ 14:24)  WBC Count: 2.70 (11-06-19 @ 09:52)  WBC Count: 4.89 (11-05-19 @ 06:41)  WBC Count: 5.87 (11-05-19 @ 03:28)  WBC Count: 5.19 (11-04-19 @ 18:31) Patient is a 79y old  Male who presents with a chief complaint of increaseed lfts, diarrhea (11 Nov 2019 08:55)    Being followed by ID for        Interval history:  events of weekend noted  pt denies breathing issues but is wearing oxygen  unaware of caliber of stools   still with RLE weakness  pt just returned from [place]  according to wife is going to [facility] tomorrow for RT    No other acute events        PAST MEDICAL & SURGICAL HISTORY:  Acute on chronic anemia  HLH (hemophagocytic lymphohistiocytosis)  BPH (benign prostatic hyperplasia)  H/O knee surgery  Hernia    Allergies    No Known Allergies    Intolerances      Antimicrobials:    atovaquone Suspension 1500 milliGRAM(s) Oral daily    MEDICATIONS  (STANDING):  alfuzosin 10 milliGRAM(s) Oral at bedtime  ascorbic acid 500 milliGRAM(s) Oral daily  atovaquone Suspension 1500 milliGRAM(s) Oral daily  Biotene Dry Mouth Oral Rinse 5 milliLiter(s) Swish and Spit two times a day  finasteride 5 milliGRAM(s) Oral daily  furosemide    Tablet 20 milliGRAM(s) Oral daily  gabapentin 200 milliGRAM(s) Oral three times a day  lidocaine   Patch 1 Patch Transdermal daily  multivitamin 1 Tablet(s) Oral daily  pantoprazole    Tablet 40 milliGRAM(s) Oral before breakfast      Vital Signs Last 24 Hrs  T(C): 36.7 (11-11-19 @ 10:36), Max: 37.2 (11-11-19 @ 04:22)  T(F): 98 (11-11-19 @ 10:36), Max: 98.9 (11-11-19 @ 04:22)  HR: 98 (11-11-19 @ 10:36) (97 - 98)  BP: 102/66 (11-11-19 @ 10:36) (102/66 - 130/74)  BP(mean): --  RR: 18 (11-11-19 @ 10:36) (17 - 18)  SpO2: 95% (11-11-19 @ 10:36) (95% - 97%)    Physical Exam:    Constitutional well preserved,comfortable,pleasant    HEENT PERRLA EOMI,No pallor or icterus    No oral exudate or erythema    Neck supple no JVD or LN    Chest Good AE,CTA    CVS RRR S1 S2 WNl     Abd soft BS normal No tenderness    Ext edema    IV site no erythema tenderness or discharge    Joints no swelling or LOM    CNS AAO X 3 LE weakness    Lab Data:                          6.6    2.36  )-----------( 16       ( 10 Nov 2019 09:19 )             20.5       11-11    131<L>  |  100  |  50<H>  ----------------------------<  89  3.9   |  20<L>  |  1.46<H>    Ca    7.4<L>      11 Nov 2019 07:05    TPro  3.3<L>  /  Alb  1.7<L>  /  TBili  2.5<H>  /  DBili  x   /  AST  156<H>  /  ALT  82<H>  /  AlkPhos  993<H>  11-11          .Stool Feces  11-07-19   No enteric pathogens isolated.  (Stool culture examined for Salmonella,  Shigella, Campylobacter, Aeromonas, Plesiomonas,  Vibrio, E.coli O157 and Yersinia)  --  --      .Stool Feces  11-05-19   GI PCR Results: NOT detected  *******Please Note:*******  GI panel PCR evaluates for:  Campylobacter, Plesiomonas shigelloides, Salmonella,  Vibrio, Yersinia enterocolitica, Enteroaggregative  Escherichia coli (EAEC), Enteropathogenic E.coli (EPEC),  Enterotoxigenic E. coli (ETEC) lt/st, Shiga-like  toxin-producing E. coli (STEC) stx1/stx2,  Shigella/ Enteroinvasive E. coli (EIEC), Cryptosporidium,  Cyclospora cayetanensis, Entamoeba histolytica,  Giardia lamblia, Adenovirus F 40/41, Astrovirus,  Norovirus GI/GII, Rotavirus A, Sapovirus  --  --      .Blood Blood-Peripheral  11-05-19   No growth at 5 days.  --  --      .Blood Blood  11-04-19   No growth at 5 days.  --  --      .Urine Clean Catch (Midstream)  11-04-19   No growth  --  --      < from: NM Pulmonary Ventilation/Perfusion Scan (11.11.19 @ 09:15) >  IMPRESSION:     Low probability of pulmonary embolus.    < end of copied text >      < from: MR Lumbar Spine w/wo IV Cont (11.08.19 @ 21:55) >  Enhancement of the cauda equina which could be secondary patient's known   lymphoma.    Abnormal signal and enhancement involving the L3 vertebral body is likely   secondary patient's known lymphoma. Involvement of the paraspinal soft   tissue region is also seen at this level.      < end of copied text >      WBC Count: 2.36 (11-10-19 @ 09:19)  WBC Count: 2.88 (11-09-19 @ 11:05)  WBC Count: 4.04 (11-08-19 @ 09:54)  WBC Count: 3.05 (11-07-19 @ 09:19)  WBC Count: 3.33 (11-06-19 @ 14:24)  WBC Count: 2.70 (11-06-19 @ 09:52)  WBC Count: 4.89 (11-05-19 @ 06:41)  WBC Count: 5.87 (11-05-19 @ 03:28)  WBC Count: 5.19 (11-04-19 @ 18:31)

## 2019-11-11 NOTE — CHART NOTE - NSCHARTNOTEFT_GEN_A_CORE
Nutrition Follow Up Note  3cohen    Patient seen for: Malnutrition follow up  Subjective: Family at bedside, patient alert and oriented; patient states he is drinking the ensure 2x daily, and taking prosource x2 daily.  Pt denies chewing or swallowing difficulty, no Gi issues. 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   RT planned      Diet : Regular with supplements ensure enliive  x2 daily, prosource protein supplement  x2 daily    Patient reports: "I am eating better"   PO intake : 75% noted     Source for PO intake: chart, patient         Daily Weight in k.1 (), Weight in k ()  % Weight Change    Pertinent Medications: MEDICATIONS  (STANDING):  alfuzosin 10 milliGRAM(s) Oral at bedtime  ascorbic acid 500 milliGRAM(s) Oral daily  atovaquone Suspension 1500 milliGRAM(s) Oral daily  Biotene Dry Mouth Oral Rinse 5 milliLiter(s) Swish and Spit two times a day  finasteride 5 milliGRAM(s) Oral daily  furosemide    Tablet 20 milliGRAM(s) Oral daily  gabapentin 200 milliGRAM(s) Oral three times a day  lidocaine   Patch 1 Patch Transdermal daily  multivitamin 1 Tablet(s) Oral daily  pantoprazole    Tablet 40 milliGRAM(s) Oral before breakfast    MEDICATIONS  (PRN):  acetaminophen   Tablet .. 650 milliGRAM(s) Oral every 6 hours PRN Temp greater or equal to 38C (100.4F), Mild Pain (1 - 3)  artificial tears (preservative free) Ophthalmic Solution 2 Drop(s) Right EYE two times a day PRN irritation  loperamide 2 milliGRAM(s) Oral four times a day PRN Diarrhea  ondansetron Injectable 4 milliGRAM(s) IV Push every 6 hours PRN Nausea and/or Vomiting    Pertinent Labs:  @ 07:05: Na 131<L>, BUN 50<H>, Cr 1.46<H>, BG 89, K+ 3.9, Phos --, Mg --, Alk Phos 993<H>, ALT/SGPT 82<H>, AST/SGOT 156<H>, HbA1c --        Skin per nursing documentation:    Edema: L, R leg 2+,   L, R foot 3+    Estimated Needs:   [ X] no change since previous assessment  [ ] recalculated:  Previous Nutrition Diagnosis: Severe  Malnutrition  Nutrition Diagnosis is: ongoing, addressed, improved intake      Recommend  1)  continue ensure enlive x2 daily  2. continue prosource supplement 2x daily,  3. continue neprovite, Vitamin C  4. Monitor/encourage PO intake.   5. assist with menus, provide food preferences   Discussed with team     Monitoring and Evaluation:     Continue to monitor Nutritional intake, Tolerance to diet prescription, weights, labs, skin integrity    RD remains available upon request and will follow up per protocol

## 2019-11-11 NOTE — PROGRESS NOTE ADULT - ASSESSMENT
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 Treated with brentuximab and CHOP x 3, relapsed. Images reviewed. Recent fracture of lumbar vertebra, diagnosis made in ortho office, no films available. No MRI. Has notable pains with movements. Has very minimal LE power proximally and decreased to absent KJ reflexes. He was unable to get off of the toilet due to weakness. Had CT abd/pelvis today. Needs MRI (preferable, but will not tolerate) or CT scan of the lumbar spine. Concerns of leptomeningeal involvement a possibility. 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 alternative chemo regimen was discussed but ptn is too frail    no further dyspnea today , ongoing weakness, LE pain and LBP, found to have Left Femoral DVT, V/Q w low prob PE, NOT A CANDIDATE FOR FULL AC, HEME/ONC.Family  want everything done and do not want comfort care, ptn has had a total 3 units PRBC in 7 day period, needs premedication 2/2 having alloABx. ptn is full code.    ptn will be arranged for IVC filter in IR, will probably need a platelet transfusion on the way to IR  MRI of C/T/LS spine done 11/8: has a metastatic focus in L3 w cauda equina involvement. Radiation/Oncology input appreciated, Ptn will be mapped tomorrow and has a palliation RTx scheduled     Continue to monitor off abs        I will be away  omorrow. My associates will be covering. Please call [phone] with acute issues, questions.

## 2019-11-11 NOTE — PROGRESS NOTE ADULT - SUBJECTIVE AND OBJECTIVE BOX
Patient is a 79y old  Male who presents with a chief complaint of increaseed lfts, diarrhea (11 Nov 2019 08:55)      SUBJECTIVE / OVERNIGHT EVENTS: no further dyspnea, ongoing weakness, LE pain and LBP, found to have Left Femoral DVT, V/Q w low prob PE, NOT A CANDIDATE FOR FULL AC, this was d/w wife, family, HEME/ONC. they want everything done and do not want comfort care, ptn has been requiring a blood transfuasion every other day nearly 2/2 nature of dz/ metastatic Tcell Lymphoma, ptn is full code. GOC d/w family x 30 min    MEDICATIONS  (STANDING):  alfuzosin 10 milliGRAM(s) Oral at bedtime  ascorbic acid 500 milliGRAM(s) Oral daily  atovaquone Suspension 1500 milliGRAM(s) Oral daily  Biotene Dry Mouth Oral Rinse 5 milliLiter(s) Swish and Spit two times a day  finasteride 5 milliGRAM(s) Oral daily  furosemide    Tablet 20 milliGRAM(s) Oral daily  gabapentin 200 milliGRAM(s) Oral three times a day  lidocaine   Patch 1 Patch Transdermal daily  multivitamin 1 Tablet(s) Oral daily  pantoprazole    Tablet 40 milliGRAM(s) Oral before breakfast    MEDICATIONS  (PRN):  acetaminophen   Tablet .. 650 milliGRAM(s) Oral every 6 hours PRN Temp greater or equal to 38C (100.4F), Mild Pain (1 - 3)  artificial tears (preservative free) Ophthalmic Solution 2 Drop(s) Right EYE two times a day PRN irritation  loperamide 2 milliGRAM(s) Oral four times a day PRN Diarrhea  ondansetron Injectable 4 milliGRAM(s) IV Push every 6 hours PRN Nausea and/or Vomiting      Vital Signs Last 24 Hrs  T(F): 96.8 (11-11-19 @ 14:40), Max: 98.9 (11-11-19 @ 04:22)  HR: 96 (11-11-19 @ 14:40) (96 - 98)  BP: 104/68 (11-11-19 @ 14:40) (102/66 - 130/74)  RR: 18 (11-11-19 @ 14:40) (17 - 18)  SpO2: 95% (11-11-19 @ 14:40) (95% - 97%)  Telemetry:   CAPILLARY BLOOD GLUCOSE        I&O's Summary    10 Nov 2019 07:01  -  11 Nov 2019 07:00  --------------------------------------------------------  IN: 1540 mL / OUT: 0 mL / NET: 1540 mL    11 Nov 2019 07:01  -  11 Nov 2019 16:06  --------------------------------------------------------  IN: 360 mL / OUT: 0 mL / NET: 360 mL        PHYSICAL EXAM:  GENERAL: NAD, well-developed  HEAD:  Atraumatic, Normocephalic  EYES: EOMI, PERRLA, conjunctiva and sclera clear  NECK: Supple, No JVD  CHEST/LUNG: Clear to auscultation bilaterally; No wheeze  HEART: Regular rate and rhythm; No murmurs, rubs, or gallops  ABDOMEN: Soft, Nontender, Nondistended; Bowel sounds present  EXTREMITIES:  2+ Peripheral Pulses, No clubbing, cyanosis, or edema  PSYCH: AAOx3  NEUROLOGY: non-focal  SKIN: No rashes or lesions    LABS:                        6.6    2.36  )-----------( 16       ( 10 Nov 2019 09:19 )             20.5     11-11    131<L>  |  100  |  50<H>  ----------------------------<  89  3.9   |  20<L>  |  1.46<H>    Ca    7.4<L>      11 Nov 2019 07:05    TPro  3.3<L>  /  Alb  1.7<L>  /  TBili  2.5<H>  /  DBili  x   /  AST  156<H>  /  ALT  82<H>  /  AlkPhos  993<H>  11-11              RADIOLOGY & ADDITIONAL TESTS:    Imaging Personally Reviewed:    Consultant(s) Notes Reviewed:      Care Discussed with Consultants/Other Providers:

## 2019-11-11 NOTE — CHART NOTE - NSCHARTNOTEFT_GEN_A_CORE
There is acute, occlusive thrombus affecting the left femoral vein in the proximal and mid thigh., pt high risk for bleed with AC, will likely need IVC filter, called IR to evaluate, pt is anemic, low platelt count, may need transfusion prior to procedure, awaiting call back from IR for decision on IVC filter to place order, D/W Dr Judge There is acute, occlusive thrombus affecting the left femoral vein in the proximal and mid thigh., pt high risk for bleed with AC, will likely need IVC filter, called IR to evaluate, pt is anemic, low platelet count, may need transfusion prior to procedure, will get stat blood work now, awaiting call back from IR for decision on IVC filter to place order, D/W Dr Judge

## 2019-11-11 NOTE — PROGRESS NOTE ADULT - ASSESSMENT
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     # Anaplastic large T cell lymphoa  -s/p brintuximab, minCHOP x 3 cycles, last 10/17  -poor response per BM bx on 10/22 bone marrow biopsy performed with  sheets of macrophages with hemophagocytosis. There is still a large number of large abnormal lymphocytes still visualized.   -previous discussion with Dr. Clay (primary hematologist) and family was trial of GEM/etoposide  -Given pt's poor performance status, ECOG-3-4 (mostly sedentary, needs assistance with ADLS), he may not tolerate the regimen. Chemotherapy may do more harm.  -Had GoC discussion with the patient and wife at bedside. Pt understands the his condition is incurable and chemotherapy at this point may harm him rather than help him. Would like to stay home rather than being in the hospital. But appears still interested in treatment. Will discuss with Primary hematologist Dr. Clay.     # HLH   -2/2 to underling lymphoma  -initially responded to tx with etoposide   -now worsening with increasing ferritin in the setting of persistent T cell lymphoma not responsive to treatment  -r/o infectious causes  -continue supportive care  -keep hgb > 7, plt > 10 or 15 if febrile    # Lower back pain/LE weakness  -pt reports having herniated disc and seen ortho  -currently more weakness of his b/L LE 1-2/5 on exam. Please check  MRI (CT if pt cannot tolerate) of whole spine to r/o leptomeningeal involvement of lymphoma and to better assess the back pain.  -Per Rad Onc radiation will probably have little positive impact on Mr Benitez's QOL, given the bone fracture at L-3. Patient's wife wishes to try radiation. 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       # Anaplastic large T cell lymphoa  -s/p brintuximab, minCHOP x 3 cycles, last 10/17  -poor response per BM bx on 10/22 bone marrow biopsy performed with  sheets of macrophages with hemophagocytosis. There is still a large number of large abnormal lymphocytes still visualized.   -Previous discussion with Dr. Clay (primary hematologist) and family was trial of GEM/etoposide  -Given pt's poor performance status, ECOG-3-4 (mostly sedentary, needs assistance with ADLS), he may not tolerate the regimen. Chemotherapy may do more harm.  -Plan to transfer to [facility] for single fraction of radiation will be given to the L-3 spine/vertebra on Wednesday.  -Had GoC discussion with the patient and wife at bedside multiple times. They underestood the prognosis and want to try RT at this point    #DVT  -Left Femoral DVT, V/Q w low prob PE, team will discuss with IR for possibility of IVC filter as family wants to go for it    # HLH   -2/2 to underling lymphoma  -initially responded to tx with etoposide   -now worsening with increasing ferritin in the setting of persistent T cell lymphoma not responsive to treatment  -continue supportive care  -keep hgb > 7, plt > 10 or 15 if febrile    # Lower back pain/LE weakness  -currently more weakness of his b/L LE 1-2/5 on exam. Please check  MRI (CT if pt cannot tolerate) of whole spine to r/o leptomeningeal involvement of lymphoma and to better assess the back pain.  -Per Rad Onc radiation will probably have little positive impact on Mr Benitez's QOL, given the bone fracture at L-3. Patient's wife wishes to try radiation.    Erin Long PGY4  Heme/Onc fellow

## 2019-11-11 NOTE — PROGRESS NOTE ADULT - ASSESSMENT
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 Treated with brentuximab and CHOP x 3, relapsed. Recent PET without much improvement.. Recent fracture of lumbar vertebra, diagnosis made in ortho office, no films available. No MRI. Has notable pains with movements. Has very minimal LE power proximally and decreased to absent KJ reflexes. He was unable to get off of the toilet due to weakness PTA.   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 alternative chemo regimen was discussed but ptn is too frail  Had CT C/abd/pelvis on 11/5.   no further dyspnea today , ongoing weakness, LE pain and LBP, found to have Left Femoral DVT, V/Q w low prob PE, NOT A CANDIDATE FOR FULL AC, this was d/w wife, family, HEME/ONC. they want everything done and do not want comfort care, ptn has been requiring a blood transfusion nearly every day  2/2 nature of dz/ metastatic Tcell Lymphoma,total 7 units PRBC in span of one week. ptn is full code. GOC d/w family x 30 min. ptn will be arranged for IVC filter in IR, will probably need a platelet transfusion on the way to IR  MRI of C/T/LS spine done 11/8: has a metastatic focus in L3 w cauda equina involvement. Radiation/Oncology input appreciated, Ptn will be mapped tomorrow and has a palliation RTx scheduled on 11/13.   Michael Diaz on 11/5 felt ptn is too frail to get more chemo w his performance level at 4 ( needs assistance w most ADLs)  Diarrhea has resolved since admission, GI PCR is negative, all Cx NTD  Afebrile after the fever after a transfusion on 11/5 and 11/7, s/p IVF  CT Chest-NC yesterday w b/l pleural effusions, mild, little worse than on admission. HH not done today 2/2 hard stick.   LE pain 2/2 metastatic dz to spine. .   s/p acute on chronic diastolic CHF 4 days ago, did well post diuresis w  IV Lasix 40 mg  BCx NTD, off ABx, ID on board  off IVF, YEE resolved,  Hyponatremia improving  Hypoalbuminemia  LE edema, new Left femoral DVT  Functional paraplegia   GOC d/w ptn/wife, he is full code  will cont discussion of realistic GOC  OOB to chair not tolerable 2/2 back pain  DVT ppx w PAS Pt with Dx HLH, treated with etoposide  and steroids, relapsed, and diagnosed with T cell CD 30+ lymphoma as underlying cause. Treated with brentuximab and CHOP x 3, relapsed. Recent PET without much improvement.. Recent fracture of lumbar vertebra, diagnosis made in ortho office, no films available. No MRI. Has notable pains with movements. Has very minimal LE power proximally and decreased to absent KJ reflexes. He was unable to get off of the toilet due to weakness PTA.   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 alternative chemo regimen was discussed but ptn is too frail  Had CT C/abd/pelvis on 11/5.   no further dyspnea today , ongoing weakness, LE pain and LBP, found to have Left Femoral DVT, V/Q w low prob PE, NOT A CANDIDATE FOR FULL AC, this was d/w wife, family, HEME/ONC. they want everything done and do not want comfort care, ptn has had a total 3 units PRBC in 7 day period, needs premedication 2/2 having alloABx. ptn is full code. GOC d/w family x 30 min. ptn will be arranged for IVC filter in IR, will probably need a platelet transfusion on the way to IR  MRI of C/T/LS spine done 11/8: has a metastatic focus in L3 w cauda equina involvement. Radiation/Oncology input appreciated, Ptn will be mapped tomorrow and has a palliation RTx scheduled on 11/13.   Michael Diaz on 11/5 felt ptn is too frail to get more chemo w his performance level at 4 ( needs assistance w most ADLs)  Diarrhea has resolved since admission, GI PCR is negative, all Cx NTD  Afebrile after the fever after a transfusion on 11/5 and 11/7, s/p IVF  CT Chest-NC yesterday w b/l pleural effusions, mild, little worse than on admission. HH not done today 2/2 hard stick.   LE pain 2/2 metastatic dz to spine. .   s/p acute on chronic diastolic CHF 4 days ago, did well post diuresis w  IV Lasix 40 mg  BCx NTD, off ABx, ID on board  off IVF, YEE resolved,  Hyponatremia improving  Hypoalbuminemia  LE edema, new Left femoral DVT  Functional paraplegia   GOC d/w ptn/wife, he is full code  will cont discussion of realistic GOC  OOB to chair not tolerable 2/2 back pain  DVT ppx w PAS

## 2019-11-11 NOTE — CHART NOTE - NSCHARTNOTEFT_GEN_A_CORE
Called by vascular lab,  LE doppler results + for left side femoral vein DVT, D/W Dr Judge who will discuss options with hematology

## 2019-11-11 NOTE — PROGRESS NOTE ADULT - SUBJECTIVE AND OBJECTIVE BOX
LEODAN BURGOS:[id number],   79yMale followed for:  No Known Allergies    PAST MEDICAL & SURGICAL HISTORY:  Acute on chronic anemia  HLH (hemophagocytic lymphohistiocytosis)  BPH (benign prostatic hyperplasia)  H/O knee surgery  Hernia    FAMILY HISTORY:    MEDICATIONS  (STANDING):  alfuzosin 10 milliGRAM(s) Oral at bedtime  ascorbic acid 500 milliGRAM(s) Oral daily  atovaquone Suspension 1500 milliGRAM(s) Oral daily  finasteride 5 milliGRAM(s) Oral daily  furosemide    Tablet 20 milliGRAM(s) Oral daily  gabapentin 200 milliGRAM(s) Oral three times a day  lidocaine   Patch 1 Patch Transdermal daily  multivitamin 1 Tablet(s) Oral daily  pantoprazole    Tablet 40 milliGRAM(s) Oral before breakfast    MEDICATIONS  (PRN):  acetaminophen   Tablet .. 650 milliGRAM(s) Oral every 6 hours PRN Temp greater or equal to 38C (100.4F), Mild Pain (1 - 3)  artificial tears (preservative free) Ophthalmic Solution 2 Drop(s) Right EYE two times a day PRN irritation  loperamide 2 milliGRAM(s) Oral four times a day PRN Diarrhea  ondansetron Injectable 4 milliGRAM(s) IV Push every 6 hours PRN Nausea and/or Vomiting      Vital Signs Last 24 Hrs  T(C): 37.2 (11 Nov 2019 04:22), Max: 37.2 (11 Nov 2019 04:22)  T(F): 98.9 (11 Nov 2019 04:22), Max: 98.9 (11 Nov 2019 04:22)  HR: 98 (11 Nov 2019 04:22) (97 - 98)  BP: 116/74 (11 Nov 2019 04:22) (101/72 - 130/74)  BP(mean): --  RR: 17 (11 Nov 2019 04:22) (17 - 18)  SpO2: 96% (11 Nov 2019 04:22) (96% - 98%)  nc/at  s1s2  cta  soft, nt, nd no guarding or rebound  no c/c/e    CBC Full  -  ( 10 Nov 2019 09:19 )  WBC Count : 2.36 K/uL  RBC Count : 2.17 M/uL  Hemoglobin : 6.6 g/dL  Hematocrit : 20.5 %  Platelet Count - Automated : 16 K/uL  Mean Cell Volume : 94.5 fl  Mean Cell Hemoglobin : 30.4 pg  Mean Cell Hemoglobin Concentration : 32.2 gm/dL  Auto Neutrophil # : x  Auto Lymphocyte # : x  Auto Monocyte # : x  Auto Eosinophil # : x  Auto Basophil # : x  Auto Neutrophil % : x  Auto Lymphocyte % : x  Auto Monocyte % : x  Auto Eosinophil % : x  Auto Basophil % : x    11-11    131<L>  |  100  |  50<H>  ----------------------------<  89  3.9   |  20<L>  |  1.46<H>    Ca    7.4<L>      11 Nov 2019 07:05    TPro  3.3<L>  /  Alb  1.7<L>  /  TBili  2.5<H>  /  DBili  x   /  AST  156<H>  /  ALT  82<H>  /  AlkPhos  993<H>  11-11

## 2019-11-11 NOTE — PROVIDER CONTACT NOTE (OTHER) - REASON
multiple attempts made by different staff members to draw blood but pt is very hard stick, very small amount of blood drawn in blue tube and sent to lab but cancelled by lab due to insufficient amount.

## 2019-11-11 NOTE — CHART NOTE - NSCHARTNOTEFT_GEN_A_CORE
80yo M h/o HLH diagnosed in 4/19, has lost 40 pounds since and in September was told it converted to NHL, has had 2 cycles of "new" chemo since, has lost 6 pounds since. Has had Diarrhea for the past month and has become incontinent.     We were consulted regarding his MRI below that shows leptomeningeal enhancement:    < from: MR Lumbar Spine w/wo IV Cont (11.08.19 @ 21:55) >    Abnormal signal and enhancement involving the L3 vertebral body is likely   secondary patient's known lymphoma. Involvement of the paraspinal soft   tissue region is also seen at this level.    His KPS is 30-40, he is on low flow oxygen, requires two staff to turn him and cannot self ambulate.    He has been getting packed RBCs for hemoglobin of 6.6 on last chart check.     Please have his wife call to discuss his care with Dr. Morse as we are reluctant to transport him to [facility] for radiation treatment  given his poor KPS status.    [phone] (Dr. Morse) 78yo M h/o HLH diagnosed in 4/19, has lost 40 pounds since and in September was told it converted to NHL, has had 2 cycles of "new" chemo since, has lost 6 pounds since. Has had Diarrhea for the past month and has become incontinent.     We were consulted regarding his MRI below that shows leptomeningeal enhancement:    < from: MR Lumbar Spine w/wo IV Cont (11.08.19 @ 21:55) >    Abnormal signal and enhancement involving the L3 vertebral body is likely   secondary patient's known lymphoma. Involvement of the paraspinal soft   tissue region is also seen at this level.    His KPS is 30-40, he is on low flow oxygen, requires two staff to turn him and cannot self ambulate.    He has been getting packed RBCs for hemoglobin of 6.6 on last chart check.     I have discussed with his wife that radiation will probably have little positive impact on Mr Benitez's QOL, given the bone fracture at L-3...Nevertheless, she wishes to try radiation, please call discuss...    [phone] (Dr. Morse) 78yo M h/o HLH diagnosed in 4/19, has lost 40 pounds since and in September was told it converted to NHL, has had 2 cycles of "new" chemo since, has lost 6 pounds since. Has had Diarrhea for the past month and has become incontinent.     We were consulted regarding his MRI below that shows leptomeningeal enhancement:    < from: MR Lumbar Spine w/wo IV Cont (11.08.19 @ 21:55) >    Abnormal signal and enhancement involving the L3 vertebral body is likely   secondary patient's known lymphoma. Involvement of the paraspinal soft   tissue region is also seen at this level.    His KPS is 30-40, he is on low flow oxygen, requires two staff to turn him and cannot self ambulate.    He has been getting packed RBCs for hemoglobin of 6.6 on last chart check.     I have discussed with his wife that radiation will probably have little positive impact on Mr Benitez's QOL, given the bone fracture at L-3...Nevertheless, she wishes to try radiation, please call discuss...    [phone] (Dr. Morse)    Addendum:  12:30 PM- I met with pt./wife/family- exam shows bilateral leg weakness, present for several months as per pt, but worse in last several weeks- Mr Benitez is alert/oriented, fully able to participate in discussion regarding the limited prospects for QOL improvement with radiation- will proceed with transportation tomorrow morning ,around 10:00 AM to [facility] for simulation tomorrow, then a single fraction of radiation will be given to the L-3 spine/vertebra on Wednesday.    Tam Morse MD cell

## 2019-11-12 PROBLEM — D76.1 HEMOPHAGOCYTIC LYMPHOHISTIOCYTOSIS: Chronic | Status: ACTIVE | Noted: 2019-01-01

## 2019-11-12 PROBLEM — D64.9 ANEMIA, UNSPECIFIED: Chronic | Status: ACTIVE | Noted: 2019-01-01

## 2019-11-12 NOTE — PROGRESS NOTE ADULT - SUBJECTIVE AND OBJECTIVE BOX
Vascular & Interventional Radiology Pre-Procedure Note    Procedure Name: placement of retrievable inferior vena cava filter    HPI: 79y Male with NHL with thrombocytopenia, now with left femoral DVT, unable to anticoagulate, now for placement of retrievable IVC filter    Allergies:     Medications:  alfuzosin: 10 milliGRAM(s) Oral (11-11 @ 21:26)  atovaquone Suspension: 1500 milliGRAM(s) Oral (11-11 @ 11:48)  furosemide    Tablet: 20 milliGRAM(s) Oral (11-12 @ 05:18)  furosemide   Injectable: 20 milliGRAM(s) IV Push (11-10 @ 16:15)      Data:  Vital Signs Last 24 Hrs  T(C): 36.4 (12 Nov 2019 09:00), Max: 37.2 (11 Nov 2019 20:42)  T(F): 97.5 (12 Nov 2019 09:00), Max: 98.9 (11 Nov 2019 20:42)  HR: 107 (12 Nov 2019 09:00) (96 - 107)  BP: 95/61 (12 Nov 2019 09:00) (95/61 - 107/70)  BP(mean): --  RR: 18 (12 Nov 2019 09:00) (18 - 18)  SpO2: 95% (12 Nov 2019 09:00) (95% - 98%)    LABS:                        8.1    2.43  )-----------( 18       ( 12 Nov 2019 09:02 )             25.2     11-12    132<L>  |  99  |  58<H>  ----------------------------<  91  4.3   |  19<L>  |  1.54<H>    Ca    7.9<L>      12 Nov 2019 06:51    TPro  3.3<L>  /  Alb  1.7<L>  /  TBili  2.5<H>  /  DBili  x   /  AST  156<H>  /  ALT  82<H>  /  AlkPhos  993<H>  11-11    PT/INR - ( 11 Nov 2019 22:01 )   PT: 13.5 sec;   INR: 1.18 ratio             Imaging: < from: [facility] Duplex Lower Ext Vein Scan, Bilat (11.11.19 @ 15:12) >  There is acute, occlusive thrombus affecting the left femoral vein in the   proximal and mid thigh.    < end of copied text >      Plan:   -79y Male presents for above procedure  -Risks/Benefits/alternatives explained with the patient and/or healthcare proxy and witnessed informed consent obtained.   -patient receiving 1U platelets immediately prior to procedure

## 2019-11-12 NOTE — CONSULT NOTE ADULT - SUBJECTIVE AND OBJECTIVE BOX
[facility] - [facility]  CONSULT NOTE    CHIEF COMPLAINT:    HPI   :Mr. 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  Last treatment was in October.    He more recently presented to [facility] with diarrhea and weakness.  He has had anemia s/p pRBC transfusion.  He has been seen by heme/onc who stated he had a poor response to Brintuximab-miniCHOP x3 cycles.    He has noted back pain radiating to initially R>L and now L>R legs as well as lower extremity weakness.  The pain has been occuring for a few months and the weakness for a few weeks.  He denies urinary/current bowel issues.  He had imaging, x-ray of the lumbar spine completed demonstrating compression fracture prior but no MRI.    He has been diagnosed with a DVT, but v/q scan low probability for a pulmonary embolus.  The patient is overall weak, sleepy but in no acute respiratory distress      PMHX:  Acute on chronic anemia  HLH (hemophagocytic lymphohistiocytosis)  BPH (benign prostatic hyperplasia)      PSHX:  H/O knee surgery  Hernia      FAMILY HISTORY:      SOCIAL HISTORY:    Pulmonary Medications:       Antimicrobials:  atovaquone Suspension 1500 milliGRAM(s) Oral daily      Cardiology:  alfuzosin 10 milliGRAM(s) Oral at bedtime  furosemide    Tablet 20 milliGRAM(s) Oral daily      Other:  acetaminophen   Tablet .. 650 milliGRAM(s) Oral every 6 hours PRN  artificial tears (preservative free) Ophthalmic Solution 2 Drop(s) Right EYE two times a day PRN  ascorbic acid 500 milliGRAM(s) Oral daily  Biotene Dry Mouth Oral Rinse 5 milliLiter(s) Swish and Spit two times a day  finasteride 5 milliGRAM(s) Oral daily  gabapentin 200 milliGRAM(s) Oral three times a day  lidocaine   Patch 1 Patch Transdermal daily  loperamide 2 milliGRAM(s) Oral four times a day PRN  multivitamin 1 Tablet(s) Oral daily  ondansetron Injectable 4 milliGRAM(s) IV Push every 6 hours PRN  pantoprazole    Tablet 40 milliGRAM(s) Oral before breakfast      Allergies    No Known Allergies    Intolerances        HOME MEDICATIONS:    REVIEW OF SYSTEMS: (Negative unless otherwise delineated)     Constitutional: ++ fatigue, weakness, malaise, lethargy  Eyes: No itching or discharge from the eyes, visual change, blurred vision, double vision, yellow sclerae, eye pain.  ENT: No ear pain, ear discharge, tinnitus, change in hearing, nasal congestion, runny nose, post nasal drip, epistaxis, sinus pain, sore throat, globus sensation, dental problems, oral ulcers/lesions  CV: No chest pain, chest pressure, chest discomfort, palpitations, lightheadedness/dizziness, syncope, lower extremity edema, inability to lay flat to sleep, awakening from sleep unable to sleep, claudication.  Resp:  prior dyspnea- improved.  no cough, wheeze, congestion  GI:  + diarrhea  : No burning on urination, urinary urgency, urinary frequency, urinary hesitancy, incontinence, blood in the urine, waking up at night to urinate, change in urine color, urinary retention.   Neurological: weakness of LER  MSK: No muscle pain, back pain, joint pain, joint stiffness, joint swelling   Hematologic: No anemia, bleeding, bruising, ecchymoses.   Lymphatics: No enlarged nodes, history of splenectomy  Psychiatric: No depression, anxiety, bipolar disorde, schizophrenia, hallucinations, suicidal/homicidal thoughts  Endocrinologic: No weight change, sweating, cold or heat intolerance, polyuria, polydipsia, polyphagia, abnormal hair growth, change in nails, tremor, neck pain or swelling.   Allergies: No hives, eczema, allergic rhinitis  Integumentary: No rash, new/growing/changing skin lesions, bruising, pruritis, decubiti                                                                                                                                                                                [ ]Unable to obtain    OBJECTIVE:      ICU Vital Signs Last 24 Hrs  T(C): 36.7 (12 Nov 2019 05:15), Max: 37.2 (11 Nov 2019 20:42)  T(F): 98.1 (12 Nov 2019 05:15), Max: 98.9 (11 Nov 2019 20:42)  HR: 99 (12 Nov 2019 05:15) (96 - 99)  BP: 102/64 (12 Nov 2019 05:15) (102/64 - 107/70)  BP(mean): --  ABP: --  ABP(mean): --  RR: 18 (12 Nov 2019 05:15) (18 - 18)  SpO2: 98% (12 Nov 2019 05:15) (95% - 98%)      I&O's Detail    11 Nov 2019 07:01 - 12 Nov 2019 07:00  --------------------------------------------------------  IN:    Oral Fluid: 960 mL  Total IN: 960 mL    OUT:  Total OUT: 0 mL    Total NET: 960 mL        Daily     Daily   CAPILLARY BLOOD GLUCOSE          PHYSICAL EXAM:  General: asleep, arousable, frail  Head: Atraumatic, normocephalic  Eyes: Anicteric, conjunctiva/corneas clear, EOM's intact, PERRL, both eyes               Ears: External examination WNL, both ears                Nose: Nares normal, septum midline, mucosa normal, no drainage or sinus tenderness  Throat:   No tonsillar or pharyngeal exudates. Lips, mucosa and tongue WNL; teeth and gums WNL  Neck: Supple, symmetrica, trachea midline; thyroid without enlargement/tenderness/nodules; no carotid bruit; no JVD  Back: Symmetric, no curvature, range of motion normal, no CVA tenderness  Chest wall: No tenderness or deformity  Respiratory: Respirations unlabored; Clear to auscultation and percussion bilaterally  Cardiovascular: Regular rate and rhythm, S1 S2 normal. No murmurs, rubs or gallops.   Abdomen: Soft, non-tender, non-distended. No organomegaly. No masses. Normal bowel sounds  Extremities: Warm to touch. No clubbing or cyanosis. No pedal edema.  Pulses: 2+ peripheral pulses all extremities  Skin: Normal skin color, texture and turgor. No rashes or lesions  Lymph Nodes: Cervical, supraclavicular and axillary nodes normal  Neurological: Motor and sensory examination equal and normal. A and O x 3  Psychiatry: Appropriate mood and affect.    LABS:                        7.4    2.13  )-----------( 15       ( 11 Nov 2019 09:26 )             22.3     11-12    132<L>  |  99  |  58<H>  ----------------------------<  91  4.3   |  19<L>  |  1.54<H>    Ca    7.9<L>      12 Nov 2019 06:51    TPro  3.3<L>  /  Alb  1.7<L>  /  TBili  2.5<H>  /  DBili  x   /  AST  156<H>  /  ALT  82<H>  /  AlkPhos  993<H>  11-11    PT/INR - ( 11 Nov 2019 22:01 )   PT: 13.5 sec;   INR: 1.18 ratio                   MICROBIOLOGY:     RADIOLOGY:  [ ] Reviewed and interpreted by me    < from: [facility] Duplex Lower Ext Vein Scan, Bilat (11.11.19 @ 15:12) >  AM:  DUPLEX SCAN EXT VEINS LOWER BI                            PROCEDURE DATE:  11/11/2019            INTERPRETATION:  CLINICAL INFORMATION: Lymphoma, lower extremity   swelling, rule out DVT.    COMPARISON: None available.    TECHNIQUE: Duplex sonography of the BILATERAL LOWER extremity veins with   color and spectral Doppler, with and without compression.      FINDINGS:    There is normal compressibility of the right common femoral, femoral and   popliteal veins.     Doppler examination shows normal spontaneous and phasic flow.    On the left, there is acute, occlusive thrombus affecting the femoral   vein in the proximal and mid thigh.    The left popliteal and common femoral veins are patent and free of   thrombus.    No calf vein thrombosis is detected.    IMPRESSION:     No evidence of deep venous thrombosis of the right lower extremity.    There is acute, occlusive thrombus affecting the left femoral vein in the   proximal and mid thigh.    NP Dionicio Hernandez notified.                    < end of copied text >  < from: CT Chest No Cont (11.11.19 @ 09:43) >  XAM:  CT CHEST                            PROCEDURE DATE:  11/11/2019            INTERPRETATION:  CLINICAL INFORMATION: Shortness breath in setting of   lymphoma.    COMPARISON: CT chest from 11/5/2019 and 4/15/2019. PET/CT from 8/3/2019   10/29/2019.    PROCEDURE:   CT of the Chest was performed without intravenous contrast.  Sagittal and coronal reformats were performed.      FINDINGS:    LUNGS AND AIRWAYS: Secretions in the distal trachea and right mainstem   bronchus. Bilateral lower lobe mild partial passive atelectasis. Right   upper lobe 3 mm nodule, unchanged from 10/29/2019 (2:37).     PLEURA: Small bilateral pleural effusions, right greater than left,   mildly increased from 11/5/2019. No pneumothorax.    MEDIASTINUM AND PIETER: Nolymphadenopathy.    HEART: Heart size is normal. No pericardial effusion. Coronary artery   calcifications.    CHEST WALL AND LOWER NECK: Anasarca. Calcification in the thyroid.    VISUALIZED UPPER ABDOMEN: Splenomegaly. Left renal cyst, partially   visualized. Nonobstructing left kidney calculi. Previously described   central right hepatic lobe hypodensity is not well seen on this study.    BONES: Degenerative spinal disease. Old right anterolateral seventh and   eighth rib fractures.    IMPRESSION:     Small bilateral pleural effusions, mildly increased from 11/5/2019.        < end of copied text >  < from: NM Pulmonary Ventilation/Perfusion Scan (11.11.19 @ 09:15) >  EXAM:  NM PULM VENTILATION PERFUS IMG                                PROCEDURE DATE:  11/11/2019          INTERPRETATION:  RADIOPHARMACEUTICAL: 1 mCi Tc-99m-DTPA aerosol by   inhalation; 6 mCi Tc-99m-MAA, I.V.    CLINICAL STATEMENT: 79-year-old male with history of lymphoma presenting   with shortness of breath.    TECHNIQUE:  Ventilation and perfusion images of the lungs were obtained   following administration of Tc-99m-DTPA and Tc-99m-MAA. Images were   obtained in the anterior, posterior, both lateral, and all 4 oblique   projections. The study was interpreted in conjunction with chest   radiograph of 11/10/2019.    No prior VQ scan.    FINDINGS: There is a matched ventilation/perfusion defect involving the   posterior basal segment of the right upper lobe and posterior basal   segment of the left lower lobe with no corresponding opacity on chest   x-ray. There is heterogeneous tracer distribution in the remainder of the   lungs on both the ventilation and images. There are no mismatched defects.    IMPRESSION:     Low probability of pulmonary embolus.    < end of copied text >

## 2019-11-12 NOTE — PROGRESS NOTE ADULT - SUBJECTIVE AND OBJECTIVE BOX
Patient is a 79y old  Male who presents with a chief complaint of dvt (12 Nov 2019 08:57)      SUBJECTIVE / OVERNIGHT EVENTS: d/w ptn/wife/brother GOC. wife states nobody told her that her  is terminal and has less than 6 mo to live, she sttes nobody told her he wont be getting any more chemo. wants to hear it from Dr. berkowitz, his hematologist. Heme notes indicate GOC discussions did take place and ptn is not a chemo candidate. She doesnt want to make him DNR nor put him on Home Hospice. she will look at the lists for [facility] if he gets accepted to any places of her choice. today awaiting IVC filter, cbc stable, tomorrow mapping and RTX for L3 metastasis    MEDICATIONS  (STANDING):  alfuzosin 10 milliGRAM(s) Oral at bedtime  ascorbic acid 500 milliGRAM(s) Oral daily  atovaquone Suspension 1500 milliGRAM(s) Oral daily  Biotene Dry Mouth Oral Rinse 5 milliLiter(s) Swish and Spit two times a day  finasteride 5 milliGRAM(s) Oral daily  lidocaine   Patch 1 Patch Transdermal daily  multivitamin 1 Tablet(s) Oral daily  pantoprazole    Tablet 40 milliGRAM(s) Oral before breakfast    MEDICATIONS  (PRN):  acetaminophen   Tablet .. 650 milliGRAM(s) Oral every 6 hours PRN Temp greater or equal to 38C (100.4F), Mild Pain (1 - 3)  artificial tears (preservative free) Ophthalmic Solution 2 Drop(s) Right EYE two times a day PRN irritation  loperamide 2 milliGRAM(s) Oral four times a day PRN Diarrhea  ondansetron Injectable 4 milliGRAM(s) IV Push every 6 hours PRN Nausea and/or Vomiting      Vital Signs Last 24 Hrs  T(F): 100.2 (11-12-19 @ 16:05), Max: 100.2 (11-12-19 @ 16:05)  HR: 103 (11-12-19 @ 16:05) (98 - 107)  BP: 99/61 (11-12-19 @ 16:05) (95/61 - 107/70)  RR: 20 (11-12-19 @ 16:05) (18 - 20)  SpO2: 96% (11-12-19 @ 16:05) (95% - 98%)  Telemetry:   CAPILLARY BLOOD GLUCOSE        I&O's Summary    11 Nov 2019 07:01  -  12 Nov 2019 07:00  --------------------------------------------------------  IN: 960 mL / OUT: 0 mL / NET: 960 mL        PHYSICAL EXAM:  GENERAL: NAD, well-developed  HEAD:  Atraumatic, Normocephalic  EYES: EOMI, PERRLA, conjunctiva and sclera clear  NECK: Supple, No JVD  CHEST/LUNG: Clear to auscultation bilaterally; No wheeze  HEART: Regular rate and rhythm; No murmurs, rubs, or gallops  ABDOMEN: Soft, Nontender, Nondistended; Bowel sounds present  EXTREMITIES:  2+ Peripheral Pulses, No clubbing, cyanosis, or edema  PSYCH: AAOx3  NEUROLOGY: non-focal  SKIN: No rashes or lesions    LABS:                        8.1    2.43  )-----------( 18       ( 12 Nov 2019 09:02 )             25.2     11-12    132<L>  |  99  |  58<H>  ----------------------------<  91  4.3   |  19<L>  |  1.54<H>    Ca    7.9<L>      12 Nov 2019 06:51    TPro  3.3<L>  /  Alb  1.7<L>  /  TBili  2.5<H>  /  DBili  x   /  AST  156<H>  /  ALT  82<H>  /  AlkPhos  993<H>  11-11    PT/INR - ( 11 Nov 2019 22:01 )   PT: 13.5 sec;   INR: 1.18 ratio                   RADIOLOGY & ADDITIONAL TESTS:    Imaging Personally Reviewed:    Consultant(s) Notes Reviewed:      Care Discussed with Consultants/Other Providers:

## 2019-11-12 NOTE — PROGRESS NOTE ADULT - SUBJECTIVE AND OBJECTIVE BOX
INTERVAL HPI/OVERNIGHT EVENTS:    MEDICATIONS  (STANDING):  alfuzosin 10 milliGRAM(s) Oral at bedtime  ascorbic acid 500 milliGRAM(s) Oral daily  atovaquone Suspension 1500 milliGRAM(s) Oral daily  Biotene Dry Mouth Oral Rinse 5 milliLiter(s) Swish and Spit two times a day  finasteride 5 milliGRAM(s) Oral daily  furosemide    Tablet 20 milliGRAM(s) Oral daily  gabapentin 200 milliGRAM(s) Oral three times a day  lidocaine   Patch 1 Patch Transdermal daily  multivitamin 1 Tablet(s) Oral daily  pantoprazole    Tablet 40 milliGRAM(s) Oral before breakfast    MEDICATIONS  (PRN):  acetaminophen   Tablet .. 650 milliGRAM(s) Oral every 6 hours PRN Temp greater or equal to 38C (100.4F), Mild Pain (1 - 3)  artificial tears (preservative free) Ophthalmic Solution 2 Drop(s) Right EYE two times a day PRN irritation  loperamide 2 milliGRAM(s) Oral four times a day PRN Diarrhea  ondansetron Injectable 4 milliGRAM(s) IV Push every 6 hours PRN Nausea and/or Vomiting      Allergies    No Known Allergies    Intolerances            PHYSICAL EXAM:   Vital Signs:  Vital Signs Last 24 Hrs  T(C): 36.7 (12 Nov 2019 05:15), Max: 37.2 (11 Nov 2019 20:42)  T(F): 98.1 (12 Nov 2019 05:15), Max: 98.9 (11 Nov 2019 20:42)  HR: 99 (12 Nov 2019 05:15) (96 - 99)  BP: 102/64 (12 Nov 2019 05:15) (102/64 - 107/70)  BP(mean): --  RR: 18 (12 Nov 2019 05:15) (18 - 18)  SpO2: 98% (12 Nov 2019 05:15) (95% - 98%)  Daily     Daily     GENERAL:  no distress  HEENT:  NC/AT,  anicteric  CHEST:   no increased effort, breath sounds clear  HEART:  Regular rhythm  ABDOMEN:  Soft, non-tender, non-distended, normoactive bowel sounds,  no masses ,no hepato-splenomegaly, no signs of chronic liver disease  EXTEREMITIES:  no cyanosis      LABS:                        7.4    2.13  )-----------( 15       ( 11 Nov 2019 09:26 )             22.3     11-12    132<L>  |  99  |  58<H>  ----------------------------<  91  4.3   |  19<L>  |  1.54<H>    Ca    7.9<L>      12 Nov 2019 06:51    TPro  3.3<L>  /  Alb  1.7<L>  /  TBili  2.5<H>  /  DBili  x   /  AST  156<H>  /  ALT  82<H>  /  AlkPhos  993<H>  11-11    PT/INR - ( 11 Nov 2019 22:01 )   PT: 13.5 sec;   INR: 1.18 ratio               RADIOLOGY & ADDITIONAL TESTS:

## 2019-11-12 NOTE — PROGRESS NOTE ADULT - ASSESSMENT
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 Treated with brentuximab and CHOP x 3, relapsed. Recent PET without much improvement.. Recent fracture of lumbar vertebra, diagnosis made in ortho office, no films available. No MRI. Has notable pains with movements. Has very minimal LE power proximally and decreased to absent KJ reflexes. He was unable to get off of the toilet due to weakness PTA.   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 alternative chemo regimen was discussed but ptn is too frail  Had CT C/abd/pelvis on 11/5.   no further dyspnea today , ongoing weakness, LE pain and LBP, found to have Left Femoral DVT, V/Q w low prob PE, NOT A CANDIDATE FOR FULL AC, this was d/w wife, family, HEME/ONC. they want everything done and do not want comfort care, ptn has had a total 3 units PRBC in 7 day period, needs premedication 2/2 having alloABx. ptn is full code. GOC d/w family x 30 min. awaiting IVC filter in IR today,  platelet transfusion on the way to IR.  d/w ptn/wife/brother GOC. wife states nobody told her that her  is terminal and has less than 6 mo to live, she states nobody from [facility] told her he wont be getting any more chemo. wants to hear it from Dr. Clay, his hematologist. [facility] notes indicate GOC discussions did take place and ptn is not a chemo candidate. She doesnt want to make him DNR nor put him on Home Hospice. she will look at the lists for [facility] if he gets accepted to any places of her choice. cbc stable, tomorrow mapping and RTX for L3 metastasis, a single treatment    MRI of C/T/LS spine done 11/8: has a metastatic focus in L3 w cauda equina involvement. Radiation/Oncology input appreciated, Ptn will be mapped tomorrow and has a palliation RTx scheduled on 11/13.   Michael Diaz on 11/5 felt ptn is too frail to get more chemo w his performance level at 4 ( needs assistance w most ADLs)  Diarrhea has resolved since admission, GI PCR is negative, all Cx NTD  Afebrile after the fever after a transfusion on 11/5 and 11/7, s/p IVF  CT Chest-NC yesterday w b/l pleural effusions, mild, little worse than on admission. HH not done today 2/2 hard stick.   LE pain 2/2 metastatic dz to spine. .   s/p acute on chronic diastolic CHF 4 days ago, did well post diuresis w  IV Lasix 40 mg  BCx NTD, off ABx, ID on board  off IVF, YEE resolved,  Hyponatremia improving  Hypoalbuminemia  LE edema, new Left femoral DVT  Functional paraplegia   GOC d/w ptn/wife, he is full code  will cont discussion of realistic GOC  OOB to chair not tolerable 2/2 back pain  DVT ppx w PAS

## 2019-11-12 NOTE — PROGRESS NOTE ADULT - SUBJECTIVE AND OBJECTIVE BOX
CARDIOLOGY FOLLOW UP - Dr. Frank    CC no cp or sob        PHYSICAL EXAM:  T(C): 36.4 (11-12-19 @ 09:00), Max: 37.2 (11-11-19 @ 20:42)  HR: 107 (11-12-19 @ 09:00) (96 - 107)  BP: 95/61 (11-12-19 @ 09:00) (95/61 - 107/70)  RR: 18 (11-12-19 @ 09:00) (18 - 18)  SpO2: 95% (11-12-19 @ 09:00) (95% - 98%)  Wt(kg): --  I&O's Summary    11 Nov 2019 07:01  -  12 Nov 2019 07:00  --------------------------------------------------------  IN: 960 mL / OUT: 0 mL / NET: 960 mL        Appearance: Normal	  Cardiovascular: Normal S1 S2,RRR, No JVD, No murmurs  Respiratory: diminished at base    Gastrointestinal:  Soft, Non-tender, + BS	  Extremities: bl le edema        MEDICATIONS  (STANDING):  alfuzosin 10 milliGRAM(s) Oral at bedtime  ascorbic acid 500 milliGRAM(s) Oral daily  atovaquone Suspension 1500 milliGRAM(s) Oral daily  Biotene Dry Mouth Oral Rinse 5 milliLiter(s) Swish and Spit two times a day  finasteride 5 milliGRAM(s) Oral daily  furosemide    Tablet 20 milliGRAM(s) Oral daily  gabapentin 200 milliGRAM(s) Oral three times a day  lidocaine   Patch 1 Patch Transdermal daily  multivitamin 1 Tablet(s) Oral daily  pantoprazole    Tablet 40 milliGRAM(s) Oral before breakfast      TELEMETRY: 	    ECG:  	  RADIOLOGY:   DIAGNOSTIC TESTING:  [ ] Echocardiogram:  < from: TTE with Doppler (w/Cont) (11.11.19 @ 06:18) >  EF (Visual Estimate): 65-70 %  Doppler Peak Velocity (m/sec): AoV=1.2  ------------------------------------------------------------------------  Observations:  Mitral Valve: Mitral valve not well visualized, probably  normal. Minimal mitral regurgitation.  Aortic Valve/Aorta: Aortic valve not well visualized;  probably normal. Peak transaortic valve gradient equals 6  mm Hg. No aortic valve regurgitation seen. Peak left  ventricular outflow tract gradient equals 3 mm Hg.  Aortic Root: 3.3 cm.  Left Atrium: Normal left atrium.  LA volume index = 34  cc/m2.  Left Ventricle: Endocardialvisualization enhanced with  intravenous injection of Ultrasonic Enhancing Agent  (Definity). Normal left ventricular systolic function. No  segmental wall motion abnormalities. Normal left  ventricular internal dimensions and wall thicknesses.  Indeterminate diastolic function.  Right Heart: Right atrium not well visualized, probably  normal. The right ventricle is not well visualized; grossly  normal right ventricular systolic function. Tricuspid valve  not well visualized, probably normal. Minimal tricuspid  regurgitation. Pulmonic valve not well visualized.  Pericardium/Pleura: Normal pericardium with no pericardial  effusion.  ------------------------------------------------------------------------  Conclusions:  1. Mitral valve not wellvisualized, probably normal.  Minimal mitral regurgitation.  2. Aortic valve not well visualized; probably normal. No  aortic valve regurgitation seen.  3. Endocardial visualization enhanced with intravenous  injection of Ultrasonic Enhancing Agent (Definity). Normal  left ventricular systolic function. No segmental wall  motion abnormalities.  4. The right ventricle is not well visualized; grossly  normal right ventricular systolic function.  5. Normal pericardium with no pericardial effusion.  *** Compared with echocardiogram of 4/5/2019, no  significant changes noted.  ------------------------------------------------------------------------  Confirmed on  11/11/2019 - 15:40:59 by Amrit Rosales M.D.  ------------------------------------------------------------------------    < end of copied text >    [ ]  Catheterization:  [ ] Stress Test:    OTHER: 	    LABS:	 	                            8.1    2.43  )-----------( 18       ( 12 Nov 2019 09:02 )             25.2     11-12    132<L>  |  99  |  58<H>  ----------------------------<  91  4.3   |  19<L>  |  1.54<H>    Ca    7.9<L>      12 Nov 2019 06:51    TPro  3.3<L>  /  Alb  1.7<L>  /  TBili  2.5<H>  /  DBili  x   /  AST  156<H>  /  ALT  82<H>  /  AlkPhos  993<H>  11-11    PT/INR - ( 11 Nov 2019 22:01 )   PT: 13.5 sec;   INR: 1.18 ratio

## 2019-11-12 NOTE — PROGRESS NOTE ADULT - ASSESSMENT
no complaints, going for IVC filter  LFTs remain elevated, on carlos, normal liver imaging  conservative management

## 2019-11-12 NOTE — PROGRESS NOTE ADULT - SUBJECTIVE AND OBJECTIVE BOX
No pain, no shortness of breath      VITAL:  T(C): , Max: 37.2 (11-11-19 @ 20:42)  T(F): , Max: 98.9 (11-11-19 @ 20:42)  HR: 107 (11-12-19 @ 09:00)  BP: 95/61 (11-12-19 @ 09:00)  BP(mean): --  RR: 18 (11-12-19 @ 09:00)  SpO2: 95% (11-12-19 @ 09:00)      PHYSICAL EXAM:  Constitutional: NAD, Alert; thin to cachectic  HEENT: NCAT, MMM  Neck: Supple, No JVD  Respiratory: CTA-b/l  Cardiovascular: RRR s1s2, no m/r/g  Gastrointestinal: hyperactive, NTND  Extremities: 2+ b/l LE edema  Neurological: reduced generalized strength  Back: no CVAT b/l  Skin: No rashes, no nevi      LABS:                        8.1    2.43  )-----------( 18       ( 12 Nov 2019 09:02 )             25.2     Na(132)/K(4.3)/Cl(99)/HCO3(19)/BUN(58)/Cr(1.54)Glu(91)/Ca(7.9)/Mg(--)/PO4(--)    11-12 @ 06:51  Na(131)/K(3.9)/Cl(100)/HCO3(20)/BUN(50)/Cr(1.46)Glu(89)/Ca(7.4)/Mg(--)/PO4(--)    11-11 @ 07:05  Na(129)/K(3.9)/Cl(103)/HCO3(20)/BUN(42)/Cr(1.51)Glu(85)/Ca(7.5)/Mg(--)/PO4(--)    11-10 @ 06:54      IMPRESSION: 79M w/ HLH/NHL, and BPH, 11/4/19 a/w diarrhea, c/b YEE    (1)Renal - CKD3; fluctuating numbers based on hemodynamic status. GFR now ~35-45ml/min.  (2)CV - BUN and creatinine uptrending, on Lasix. Especially given his frequent fevers/high insensible losses, I do not believe that he requires the Lasix      RECOMMEND:  (1)D/C Lasix  (2)Dose new meds for GFR 35-45ml/min (present dosing is acceptable)  (3)High-protein diet            Bernardo Galdamez MD  [facility]  [phone]

## 2019-11-12 NOTE — CONSULT NOTE ADULT - ASSESSMENT
79 yr old man, history as above- very frail- including NHL, now with diarrhea, elevated LFTs, acute DVT-    fortunately, no pulmonary embolus and no evidence acute pulmonary abnormalities.  No pneumonia, bronchospasm, obstruction, cough or significant pleural effusions or CHF.  Prior dyspnea likely secondary to overall weak and deconditioned state    IVC filter likely  encourage incentive spirometry  OOB as able  O2 sat >92%  no need for pulmonary meds    Balbina Hurley MD  [phone]  Pulmonary

## 2019-11-12 NOTE — PROGRESS NOTE ADULT - ASSESSMENT
Evelio Cox is a 78 year old male here for  Chief Complaint   Patient presents with   • Follow-up     Social History     Tobacco Use   Smoking Status Former Smoker   • Packs/day: 1.00   • Years: 20.00   • Pack years: 20.00   • Types: Cigarettes   • Last attempt to quit: 1988   • Years since quittin.8   Smokeless Tobacco Never Used   Tobacco Comment    1ppd for 20 years     Advance Directives Filed: Yes    ECOG: 3 - Capable of only limited self-care, confined to bed or chair more than 50% of waking hours.    Height:  Ht Readings from Last 1 Encounters:   10/24/19 5' 7\" (1.702 m)     Weight:  Wt Readings from Last 3 Encounters:   19 81.8 kg   10/24/19 81.6 kg   10/17/19 82.4 kg     BMI: Body mass index is 28.24 kg/m².    REVIEW OF SYSTEMS  GENERAL:  Patient denies headache, fevers, chills, night sweats, excessive fatigue, change in appetite, weight loss, dizziness  ALLERGIC/IMMUNOLOGIC: Verified allergies: Yes  EYES:  Patient denies significant visual difficulties, double vision, blurred vision  ENT/MOUTH: Patient denies problems with hearing, sore throat, sinus drainage, mouth sores  ENDOCRINE:  Patient denies diabetes, thyroid disease, hormone replacement, hot flashes  HEMATOLOGIC/LYMPHATIC: Patient denies bleeding, tender lymph nodes, swollen lymph nodes, but complains of: easy bruising  BREASTS: Patient denies abnormal masses of breast, nipple discharge, pain  RESPIRATORY:  Patient denies lung pain with breathing, cough, coughing up blood, shortness of breath  CARDIOVASCULAR:  Patient denies anginal chest pain, palpitations, shortness of breath when lying flat, peripheral edema  GASTROINTESTINAL: Patient denies abdominal pain , nausea, vomiting, diarrhea, GI bleeding, constipation, change in bowel habits, heartburn, sensation of feeling full, difficulty swallowing  : Patient denies blood in the urine, burning with urination, frequency, urgency, hesitancy, incontinence  MUSCULOSKELETAL:   Patient denies bone pain, joint swelling, redness, decreased range of motion, but complains of: joint pain  SKIN:  Patient denies chronic rashes, inflammation, ulcerations, skin changes, itching  NEUROLOGIC:  Patient denies loss of balance, areas of focal weakness, abnormal gait, sensory problems, tingling, but complains of: numbness    PSYCHIATRIC: Patient denies insomnia, depression, anxiety     await stool studies, roderick orellana for now.  follow lfts closely, likely multifactoral

## 2019-11-12 NOTE — PROGRESS NOTE ADULT - SUBJECTIVE AND OBJECTIVE BOX
Vascular & Interventional Radiology Post-Procedure Note    placement of retrievable inferior vena cava filter    Pre-Procedure Diagnosis: 79y Male with NHL with thrombocytopenia, now with left femoral DVT, unable to anticoagulate, now for placement of retrievable IVC filter  Post-Procedure Diagnosis: Same as pre.  Indications for Procedure: LLE DVT with contraindication for anticoagulation (thrombocytopenia)    : Patria  Assistant(s): Dixie    Procedure Details/Findings: right internal jugular access, 6Fr sheath, venogram shows normal ivc. option elite IVC filter placed infrarenally, platelets given before and running during procedure    Complications: none  Estimated Blood Loss: Minimal  Specimen: none  Sedation: none  Patient Condition/Disposition: interventional recovery then floor    Plan:   - monitor for bleeding  - may advance diet per primary team

## 2019-11-12 NOTE — PROGRESS NOTE ADULT - ASSESSMENT
Echo from 4/2019 ef 75%, nl lv sys fx.    a/p  79M w/ HLH/NHL, and BPH admitted with diarrhea, YEE, anemia . Patient being evaluated for SOB.     1. SOB/acute on chronic Diastolic CHF   -stable, resolved  -in the setting of multiple PRBC/IVF ressuc  -s/p lasix 40 mg IV 10/8  - ct chest noted, creat noted today  -cont po lasix 20 for le edema and pulmonary edema  -hypoproteinemia/ hypoalbuminemia/ worsening liver disease likely playing a role in LE edema   -echo with nl LV sys fx no significant valvular disease   -if receives further PRBC would give additional 20 mg IVP lasix with tranfusion    2. YEE   -renal f/u, trend creat  -creat elevated but stable     3. Anemia  hem/onc/ med, GI,  f/u   prbc/ platelets prn per med/ hem/onc    4. HLH / lymphoma  + dvt to left femoral vein in the proximal and mid thigh  hem/onc f/u    5, Hyponatremia   tx per renal     dvt ppx

## 2019-11-13 NOTE — PROGRESS NOTE ADULT - SUBJECTIVE AND OBJECTIVE BOX
Patient is a 79y old  Male who presents with a chief complaint of weakness (13 Nov 2019 12:18)      SUBJECTIVE / OVERNIGHT EVENTS: wife not available to speak with today, Palliative care consult not completed, ptn s/p RRT this am 2/2 hypotension, worsening pancytopenia, s/p 1U PRBC, RTX deferred til tomorrow and ptn tolerated IVC filter placement well yesterday, now BP improved, on empiric ABx, ID following, procalcitonin not accurate in a setting of CKD    MEDICATIONS  (STANDING):  alfuzosin 10 milliGRAM(s) Oral at bedtime  ascorbic acid 500 milliGRAM(s) Oral daily  atovaquone Suspension 1500 milliGRAM(s) Oral daily  Biotene Dry Mouth Oral Rinse 5 milliLiter(s) Swish and Spit two times a day  finasteride 5 milliGRAM(s) Oral daily  lidocaine   Patch 1 Patch Transdermal daily  multivitamin 1 Tablet(s) Oral daily  pantoprazole    Tablet 40 milliGRAM(s) Oral before breakfast  piperacillin/tazobactam IVPB.. 3.375 Gram(s) IV Intermittent every 8 hours    MEDICATIONS  (PRN):  acetaminophen   Tablet .. 650 milliGRAM(s) Oral every 6 hours PRN Temp greater or equal to 38C (100.4F), Mild Pain (1 - 3)  artificial tears (preservative free) Ophthalmic Solution 2 Drop(s) Right EYE two times a day PRN irritation  loperamide 2 milliGRAM(s) Oral four times a day PRN Diarrhea  ondansetron Injectable 4 milliGRAM(s) IV Push every 6 hours PRN Nausea and/or Vomiting      Vital Signs Last 24 Hrs  T(F): 97.8 (11-13-19 @ 19:19), Max: 99.7 (11-13-19 @ 00:58)  HR: 98 (11-13-19 @ 19:19) (88 - 102)  BP: 99/68 (11-13-19 @ 19:19) (64/50 - 124/70)  RR: 18 (11-13-19 @ 19:19) (18 - 19)  SpO2: 96% (11-13-19 @ 19:19) (93% - 98%)  Telemetry:   CAPILLARY BLOOD GLUCOSE      POCT Blood Glucose.: 99 mg/dL (12 Nov 2019 23:53)    I&O's Summary    12 Nov 2019 07:01  -  13 Nov 2019 07:00  --------------------------------------------------------  IN: 280 mL / OUT: 0 mL / NET: 280 mL    13 Nov 2019 07:01  -  13 Nov 2019 19:21  --------------------------------------------------------  IN: 720 mL / OUT: 100 mL / NET: 620 mL        PHYSICAL EXAM:  GENERAL: NAD, well-developed  HEAD:  Atraumatic, Normocephalic  EYES: EOMI, PERRLA, conjunctiva and sclera clear  NECK: Supple, No JVD  CHEST/LUNG: Clear to auscultation bilaterally; No wheeze  HEART: Regular rate and rhythm; No murmurs, rubs, or gallops  ABDOMEN: Soft, Nontender, Nondistended; Bowel sounds present  EXTREMITIES:  2+ Peripheral Pulses, No clubbing, cyanosis, or edema  PSYCH: AAOx3  NEUROLOGY: non-focal  SKIN: No rashes or lesions    LABS:                        8.1    2.58  )-----------( 24       ( 13 Nov 2019 10:49 )             25.7     11-13    137  |  103  |  68<H>  ----------------------------<  102<H>  3.9   |  17<L>  |  1.75<H>    Ca    7.5<L>      13 Nov 2019 10:49      PT/INR - ( 11 Nov 2019 22:01 )   PT: 13.5 sec;   INR: 1.18 ratio                   RADIOLOGY & ADDITIONAL TESTS:    Imaging Personally Reviewed:    Consultant(s) Notes Reviewed:      Care Discussed with Consultants/Other Providers:

## 2019-11-13 NOTE — PROGRESS NOTE ADULT - ASSESSMENT
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 Treated with brentuximab and CHOP x 3, relapsed. Images reviewed. Recent fracture of lumbar vertebra, diagnosis made in ortho office, no films available. No MRI. Has notable pains with movements. Has very minimal LE power proximally and decreased to absent KJ reflexes. He was unable to get off of the toilet due to weakness. Had CT abd/pelvis today. Needs MRI (preferable, but will not tolerate) or CT scan of the lumbar spine. Concerns of leptomeningeal involvement a possibility. 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 alternative chemo regimen was discussed but ptn is too frail    no further dyspnea today , ongoing weakness, LE pain and LBP, found to have Left Femoral DVT, V/Q w low prob PE, NOT A CANDIDATE FOR FULL AC, HEME/ONC.Family  want everything done and do not want comfort care, ptn has had a total 3 units PRBC in 7 day period, needs premedication 2/2 having alloABx. ptn is full code.    ptn s/p r IVC filter in IR,   MRI of C/T/LS spine done 11/8: has a metastatic focus in L3 w cauda equina involvement. Radiation/Oncology input appreciated, palliation RTx is  scheduled     decrease in HCT- / from hlh vs bleed  Pt was started on abs- cultures pending  please repeat u/a and culture    please obtain a diff on the wbc    nose appears erythematous but patient claims this is chronic    appreciate palliative care input

## 2019-11-13 NOTE — CHART NOTE - NSCHARTNOTEFT_GEN_A_CORE
Notified  by RN that pt is hypotensive. RN called RRT, the team arrived, but was cancelled. Pt seen and examined at the bedside. Calm, alert oriented x3, follows commands. " I want to sleep, I'm not dizzy". Denies CP, palpitations, diaphoresis, dyspnea, nausea, vomiting, headache, dizziness, chills, back or abdominal pain.     Vital Signs Last 24 Hrs  T(C): 37.4 (13 Nov 2019 00:00), Max: 37.9 (12 Nov 2019 16:05)  T(F): 99.4 (13 Nov 2019 00:00), Max: 100.2 (12 Nov 2019 16:05)  HR: 100 (13 Nov 2019 00:00) (80 - 107)  BP: 64/50 (13 Nov 2019 00:02) (64/50 - 104/63)  RR: 18 (13 Nov 2019 00:00) (18 - 20)  SpO2: 98% (13 Nov 2019 00:00) (95% - 99%)    alfuzosin 10 milliGRAM(s) Oral at bedtime  atovaquone Suspension 1500 milliGRAM(s) Oral daily  piperacillin/tazobactam IVPB. 3.375 Gram(s) IV Intermittent once  piperacillin/tazobactam IVPB.. 3.375 Gram(s) IV Intermittent every 8 hours      Labs                          8.1    2.43  )-----------( 18       ( 12 Nov 2019 09:02 )             25.2       11-12    132<L>  |  99  |  58<H>  ----------------------------<  91  4.3   |  19<L>  |  1.54<H>    Ca    7.9<L>      12 Nov 2019 06:51    TPro  3.3<L>  /  Alb  1.7<L>  /  TBili  2.5<H>  /  DBili  x   /  AST  156<H>  /  ALT  82<H>  /  AlkPhos  993<H>  11-11      PHYSICAL EXAM:  GENERAL: NAD, well-developed  HEAD:  Atraumatic, Normocephalic  EYES: EOMI, PERRLA, sclera icteric   NECK: Supple, No JVD  CHEST/LUNG: Clear to auscultation bilaterally; No wheeze  HEART: Regular rate and rhythm; No murmurs, rubs, or gallops  ABDOMEN: Soft, Nontender, Nondistended; Bowel sounds present,   EXTREMITIES:  2+ Peripheral Pulses, b/l +2 b/l  edema  PSYCH: AAOx3  NEUROLOGY: non-focal  SKIN: No rashes or lesions    A/P:   78 year old man with HLH dxc'ed in April 2019, CD30+  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 Found to have acute femoral DVT s/p IVC filter 11/12/19. Episode of hypotension during the day. Now with hypotension.      1. Hypotension 2/2 anemia / sepsis  - ml IV bolus  -albumin 5% IVPB x 1 (BP improving 91/50)  -midodrine 10 mg x1  -checked VBG: lactic acid 3.5, procalcitonin 1.57  -BCX x 2, UA,   -zosyn 3.375 gm IVPB started  -CBC (resolved 6.3 , prior Hb 8.1)  -PRBC 1 unit , premedicate with Tylenol and benadryl (pt had alloantibodies to previous transfusions)  -will consider Lasix 20 mg  IVP prior PRBC (if BP allows)  -monitor vital signs closely    2. Anaplastic large T cell lymphoma  -Followed by heme-oncology  -poor response per BM bx on 10/22 bone marrow biopsy performed with  sheets of macrophages with hemophagocytosis.  -Plan to transfer to [facility]  for single fraction of radiation will be given to the L-3 spine/vertebra on Wednesday (11/13/19)    Will follow up with day team   Dr. Sneed called, waiting for response. Notified  by RN that pt is hypotensive. RN called RRT, the team arrived, but RRT was cancelled. Before the RRT left treatment recommendations were given by RRT  at the pt's bedside. Upon exam of the pt at the bedside. Pt was calm, alert oriented x3, follows commands. " I want to sleep, I'm not dizzy". Denies CP, palpitations, diaphoresis, dyspnea, nausea, vomiting, headache, dizziness, chills, back or abdominal pain.     Vital Signs Last 24 Hrs  T(C): 37.4 (13 Nov 2019 00:00), Max: 37.9 (12 Nov 2019 16:05)  T(F): 99.4 (13 Nov 2019 00:00), Max: 100.2 (12 Nov 2019 16:05)  HR: 100 (13 Nov 2019 00:00) (80 - 107)  BP: 64/50 (13 Nov 2019 00:02) (64/50 - 104/63)  RR: 18 (13 Nov 2019 00:00) (18 - 20)  SpO2: 98% (13 Nov 2019 00:00) (95% - 99%)    alfuzosin 10 milliGRAM(s) Oral at bedtime  atovaquone Suspension 1500 milliGRAM(s) Oral daily  piperacillin/tazobactam IVPB. 3.375 Gram(s) IV Intermittent once  piperacillin/tazobactam IVPB.. 3.375 Gram(s) IV Intermittent every 8 hours      Labs                          8.1    2.43  )-----------( 18       ( 12 Nov 2019 09:02 )             25.2       11-12    132<L>  |  99  |  58<H>  ----------------------------<  91  4.3   |  19<L>  |  1.54<H>    Ca    7.9<L>      12 Nov 2019 06:51    TPro  3.3<L>  /  Alb  1.7<L>  /  TBili  2.5<H>  /  DBili  x   /  AST  156<H>  /  ALT  82<H>  /  AlkPhos  993<H>  11-11      PHYSICAL EXAM:  GENERAL: NAD, well-developed  HEAD:  Atraumatic, Normocephalic  EYES: EOMI, PERRLA, sclera icteric   NECK: Supple, No JVD  CHEST/LUNG: Clear to auscultation bilaterally; No wheeze  HEART: Regular rate and rhythm; No murmurs, rubs, or gallops  ABDOMEN: Soft, Nontender, Nondistended; Bowel sounds present,   EXTREMITIES:  2+ Peripheral Pulses, b/l +2 b/l  edema  PSYCH: AAOx3  NEUROLOGY: non-focal  SKIN: No rashes or lesions    A/P:   78 year old man with HLH dxc'ed in April 2019, CD30+  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 Found to have acute femoral DVT s/p IVC filter 11/12/19. Episode of hypotension during the day. Now with hypotension.      1. Hypotension  likely 2/2 anemia  / sepsis  -RRT team before leaving gave following recommendations:   - ml IV bolus  -albumin 5% IVPB x 1 (BP improving 91/50)  -midodrine 10 mg x1   -checked VBG: lactic acid 3.5, procalcitonin 1.57  -BCX x 2, UA,   -zosyn 3.375 gm IVPB started  -CBC (resolved 6.3 , prior Hb 8.1)  -check occult blood  -PRBC 1 unit , premedicate with Tylenol and benadryl (pt had alloantibodies to previous transfusions)  -will consider Lasix 20 mg  IVP prior PRBC (if BP allows)  -monitor vital signs closely  -MICU consult  was called    2. Anaplastic large T cell lymphoma  -Followed by heme-oncology  -poor response per BM bx on 10/22 bone marrow biopsy performed with  sheets of macrophages with hemophagocytosis.  -Plan to transfer to [facility]  for single fraction of radiation will be given to the L-3 spine/vertebra on Wednesday (11/13/19)    Will follow up with day team   Dr. Sneed called, waiting for response.

## 2019-11-13 NOTE — PROGRESS NOTE ADULT - SUBJECTIVE AND OBJECTIVE BOX
Follow-up Pulm Progress Note    No new respiratory events overnight.  Denies increased SOB, chest pain, cough or mucus. has had episodic hypotension    Medications:  MEDICATIONS  (STANDING):  alfuzosin 10 milliGRAM(s) Oral at bedtime  ascorbic acid 500 milliGRAM(s) Oral daily  atovaquone Suspension 1500 milliGRAM(s) Oral daily  Biotene Dry Mouth Oral Rinse 5 milliLiter(s) Swish and Spit two times a day  finasteride 5 milliGRAM(s) Oral daily  lidocaine   Patch 1 Patch Transdermal daily  multivitamin 1 Tablet(s) Oral daily  pantoprazole    Tablet 40 milliGRAM(s) Oral before breakfast  piperacillin/tazobactam IVPB.. 3.375 Gram(s) IV Intermittent every 8 hours    MEDICATIONS  (PRN):  acetaminophen   Tablet .. 650 milliGRAM(s) Oral every 6 hours PRN Temp greater or equal to 38C (100.4F), Mild Pain (1 - 3)  artificial tears (preservative free) Ophthalmic Solution 2 Drop(s) Right EYE two times a day PRN irritation  loperamide 2 milliGRAM(s) Oral four times a day PRN Diarrhea  ondansetron Injectable 4 milliGRAM(s) IV Push every 6 hours PRN Nausea and/or Vomiting      Vent settings (if applicable)      Vital Signs Last 24 Hrs  T(C): 36.3 (13 Nov 2019 07:30), Max: 37.9 (12 Nov 2019 16:05)  T(F): 97.3 (13 Nov 2019 07:30), Max: 100.2 (12 Nov 2019 16:05)  HR: 99 (13 Nov 2019 07:30) (80 - 107)  BP: 96/60 (13 Nov 2019 07:30) (64/50 - 104/63)  BP(mean): --  RR: 18 (13 Nov 2019 07:30) (18 - 20)  SpO2: 95% (13 Nov 2019 07:30) (93% - 99%)          11-12 @ 07:01  -  11-13 @ 07:00  --------------------------------------------------------  IN: 280 mL / OUT: 0 mL / NET: 280 mL          LABS:                        6.3    1.62  )-----------( 23       ( 13 Nov 2019 00:46 )             19.1     11-13    136  |  105  |  58<H>  ----------------------------<  91  4.1   |  18<L>  |  1.61<H>    Ca    7.5<L>      13 Nov 2019 00:46            CAPILLARY BLOOD GLUCOSE      POCT Blood Glucose.: 99 mg/dL (12 Nov 2019 23:53)    PT/INR - ( 11 Nov 2019 22:01 )   PT: 13.5 sec;   INR: 1.18 ratio             Procalcitonin, Serum: 1.57 ng/mL (11-13-19 @ 00:46)        CULTURES:  Culture Results:   No enteric pathogens isolated.  (Stool culture examined for Salmonella,  Shigella, Campylobacter, Aeromonas, Plesiomonas,  Vibrio, E.coli O157 and Yersinia) (11-07 @ 10:14)        Physical Examination:  Awake and alert, generally comfortable  HEENT: unremarkable  PULM: Clear to auscultation bilaterally, no significant sputum production  CVS: Regular rate and rhythm, no murmurs, rubs, or gallops  Abd:  soft, non tender  Extrem: No CCE    RADIOLOGY REVIEWED  CXR:    CT chest:

## 2019-11-13 NOTE — PROGRESS NOTE ADULT - ASSESSMENT
Echo from 4/2019 ef 75%, nl lv sys fx.    a/p  79M w/ HLH/NHL, and BPH admitted with diarrhea, YEE, anemia . Patient being evaluated for SOB.     1. SOB/acute on chronic Diastolic CHF   -stable, resolved  -in the setting of multiple PRBC/IVF ressuc  -s/p lasix 40 mg IV 10/8  - ct chest noted, creat elevated. hypotensive readings noted ,hypovolemic;  lasix on hold   -hypoproteinemia/ hypoalbuminemia/ worsening liver disease likely playing a role in LE edema   -echo with nl LV sys fx no significant valvular disease     2. YEE   -renal f/u, trend creat    3. Anemia  hem/onc/ med, GI,  f/u   prbc/ platelets prn per med/ hem/onc    4. HLH / lymphoma  + dvt to left femoral vein in the proximal and mid thigh  s/p placement of retrievable inferior vena cava filter  hem/onc f/u    5, Hyponatremia   tx per renal     dvt ppx

## 2019-11-13 NOTE — PROGRESS NOTE ADULT - ASSESSMENT
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       # Anaplastic large T cell lymphoa  -s/p brintuximab, minCHOP x 3 cycles, last 10/17  -poor response per BM bx on 10/22 bone marrow biopsy performed with  sheets of macrophages with hemophagocytosis. There is still a large number of large abnormal lymphocytes still visualized.   -Previous discussion with Dr. Clay (primary hematologist) and family was trial of GEM/etoposide  -Given pt's poor performance status, ECOG-3-4 (mostly sedentary, needs assistance with ADLS), he may not tolerate the regimen. Chemotherapy may do more harm.  -Plan to transfer to [facility] for single fraction of radiation will be given to the L-3 spine/vertebra on Wednesday.  -Had GoC discussion with the patient and wife at bedside multiple times. They understood the prognosis and want to try RT at this point    #DVT  -Left Femoral DVT, V/Q w low prob PE,   -s/p IVC filter placement on 11/12    # HLH   -2/2 to underling lymphoma  -initially responded to tx with etoposide   -now worsening with increasing ferritin in the setting of persistent T cell lymphoma not responsive to treatment  -continue supportive care  -keep hgb > 7, plt > 10 or 15 if febrile    # Lower back pain/LE weakness  -currently more weakness of his b/L LE 1-2/5 on exam.   -CHELSEA spine showed L-3 fx and mild caudaequina enchancement  -Per Rad Onc radiation will probably have little positive impact on Mr Benitez's QOL, given the bone fracture at L-3. Patient's wife wishes to try radiation.  -RT was initially planned for 11/13 but postpone to 11/14 due to episode of hypotension this morning.      Bernardo Schmitt MD. PGY 5  Heme-Onc fellow  [phone]; [id number]

## 2019-11-13 NOTE — CHART NOTE - NSCHARTNOTEFT_GEN_A_CORE
I just spoke with inpt team NP- I was informed that transportation to [facility] was cancelled due to unstable hypotension- we will reschedule for tomorrow morning- please call me at  if any questions, and please notify by 8 AM tomorrow if any issues regarding rescheduling for  tomorrow   Cruz Morse MD  cell

## 2019-11-13 NOTE — CONSULT NOTE ADULT - ATTENDING COMMENTS
Libia Kimball M.D. ,   Pager [phone]     after 5PM/ weekends [phone]      Thank you   will follow with you
Agree with above NP note.  patient with NHL/HLH now with dyspnea, pleural effusions in setting of prbc/ivf ressuc  cv stable  improved pst IV lasix   reassess terese for daily need  check echo to eval lv, valve fxn
Pt with Dx HLH, treated with etop and steroids, relapsed, and diagnosed with T cell CD 30+ lymphoma as underlying cause. Treated with brentuximab and CHOP x 3, relapsed. Images reviewed. Recent fracture of lumbar vertebra, diagnosis made in ortho office, no films available. No MRI. Has notable pains with movements. Has very minimal LE power proximally and decreased to absent KJ reflexes. He was unable to get off of the toilet due to weakness. Had CT abd/pelvis today. Needs MRI (preferable, but will not tolerate) or CT scan of the lumbar spine. Concerns of leptomeningeal involvement a possibility. Dr Clay had suggested additional chemo. We discussed his performance status and the low likelihood of response along with potential excess toxicity. I discussed goals of care, and then he asked me what chemo I was going to give him. Spoke with Dr Clay about my recommendations. I agree with the note above from Dr Schmitt.
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 RRT called overnight for Hypotension.  - Borderline low BP with MAP >65 mmHg to add PO Midodrine 10 mg TID  - Neutropenic Sepsis and Pancytopenia on empiric ABx coverage and ID follow up   - Anemia with Hb 6.3 on 1st PRBC transfusion   - [facility] Transfer for Spinal L3 Radiation plan today
79 male with known lymphoma and recent PET with mets to hip/spine. presents with b/l leg pains and weakness, back pain. exam shows distal > proximal weakness of L>R leg and radicular type pain down left leg, described as burning, otherwise normal mentation and no other focality    recs:    MRI C/T/L spines w/wo evaluate extent of lymphoma mets   depending on results may consider MRI brain w/wo if upper cervical lesion are found   pain management  gabapentin renally dosed 100mg TID for radicular pain  PT fall precautions, strengthening  heme/onc following ? rad-onc eval needed after MRI spine studies done  dvt prophylaxis  will follow

## 2019-11-13 NOTE — CONSULT NOTE ADULT - ASSESSMENT
80 yo m w/ HLH diagnosed in 4/19, wt loss 40 pounds with conversion to NHL, s/p chemo. p/w weakness. Palliative care called for GOC

## 2019-11-13 NOTE — CONSULT NOTE ADULT - PROBLEM SELECTOR RECOMMENDATION 4
kps 30-40%  spoke with wife via phone.  She is at an "eye doctor appt" and asked that I call her back later today.  Will attempt to call again later today.

## 2019-11-13 NOTE — PROGRESS NOTE ADULT - SUBJECTIVE AND OBJECTIVE BOX
No pain, no shortness of breath      VITAL:  T(C): , Max: 37.9 (11-12-19 @ 16:05)  T(F): , Max: 100.2 (11-12-19 @ 16:05)  HR: 99 (11-13-19 @ 07:30)  BP: 96/60 (11-13-19 @ 07:30)  BP(mean): --  RR: 18 (11-13-19 @ 07:30)  SpO2: 95% (11-13-19 @ 07:30)      PHYSICAL EXAM:  Constitutional: NAD, Alert; thin to cachectic  HEENT: NCAT, MMM  Neck: Supple, No JVD  Respiratory: CTA-b/l  Cardiovascular: RRR s1s2, no m/r/g  Gastrointestinal: hyperactive, NTND  Extremities: 2+ b/l LE edema  Neurological: reduced generalized strength  Back: no CVAT b/l  Skin: No rashes, no nevi      LABS:                        6.3    1.62  )-----------( 23       ( 13 Nov 2019 00:46 )             19.1     Na(136)/K(4.1)/Cl(105)/HCO3(18)/BUN(58)/Cr(1.61)Glu(91)/Ca(7.5)/Mg(--)/PO4(--)    11-13 @ 00:46  Na(132)/K(4.3)/Cl(99)/HCO3(19)/BUN(58)/Cr(1.54)Glu(91)/Ca(7.9)/Mg(--)/PO4(--)    11-12 @ 06:51  Na(131)/K(3.9)/Cl(100)/HCO3(20)/BUN(50)/Cr(1.46)Glu(89)/Ca(7.4)/Mg(--)/PO4(--)    11-11 @ 07:05      IMPRESSION: 79M w/ HLH/NHL, and BPH, 11/4/19 a/w diarrhea, c/b YEE    (1)Renal - CKD3; fluctuating numbers based on hemodynamic status. GFR now ~35-45ml/min.  (2)CV - slightly hypovolemic. Believed to have gone into pulmonary edema earlier this admission when given IVF. We do not have to give IVF at present, but would certainly not add back diuretics either      RECOMMEND:  (1)No IVF/No diuretics  (2)Dose new meds for GFR 35-45ml/min (present dosing is acceptable)  (3)High-protein diet        Bernardo Galdamez MD  [facility]  [phone]

## 2019-11-13 NOTE — CONSULT NOTE ADULT - PROVIDER SPECIALTY LIST ADULT
MICU
Nephrology
Cardiology
Heme/Onc
Gastroenterology
Neurology
Pulmonology
Rad Onc
Infectious Disease
Palliative Care

## 2019-11-13 NOTE — CONSULT NOTE ADULT - ASSESSMENT
77 yo M PMHx HLH (April 2019), CD30+ Anaplastic large T cell lymphoma (recently dx in Aug 2019) on tx with brituximab, miniCHOP (s/p 3 cycles, last 10/17) presents with generalized weakness, diarrhea (improved), fever and anemia, s/p 3 Units PRBC. Found to have acute femoral DVT s/p IVC filter 11/12/19. Overnight was hypotensive to 60s systolic, he was given albumin, total of 750 cc of NS, and midodrine 10. He subsequently received 1 unit of pRBCs. This AM at time of assessment he had no complaints. He denied dizziness, confusion, chest pain, palpitations, bleeding, bruising, fevers, chills, cough, diarrhea, urinary burning or frequency.     # Hypotension: At time of reassessment 87 systolic, unclear etiology regarding hypotension, has anemia ?hypovolemia or bleeding. Denied infectious symptoms or bleeding.   - Currently receiving 1 unit pRBCs, please give second unit of blood  - Please give another midodrine 10 mg now, and continue TID  - Please assess for bleeding   - Not MICU candidate at this time  - Pending transfer to [facility] for radiation therapy.   - Please reconsult as needed     D/w Dr. Zamora 79 yo M PMHx HLH (April 2019), CD30+ Anaplastic large T cell lymphoma (recently dx in Aug 2019) on tx with brituximab, miniCHOP (s/p 3 cycles, last 10/17) presents with generalized weakness, diarrhea (improved), fever and anemia, s/p 3 Units PRBC. Found to have acute femoral DVT s/p IVC filter 11/12/19. Overnight was hypotensive to 60s systolic, he was given albumin, total of 750 cc of NS, and midodrine 10. He subsequently received 1 unit of pRBCs. This AM at time of assessment he had no complaints. He denied dizziness, confusion, chest pain, palpitations, bleeding, bruising, fevers, chills, cough, diarrhea, urinary burning or frequency.     # Hypotension: At time of reassessment 87 systolic, unclear etiology regarding hypotension, has anemia ?hypovolemia or bleeding. Denied infectious symptoms or bleeding.   - Currently receiving 1 unit pRBCs, please give second unit of blood  - Please give another midodrine 10 mg now, and continue TID  - Please assess for bleeding   - Not MICU candidate at this time  - Pending transfer to [facility] for radiation therapy. Would defer to primary team to decide when medically appropriate for transfer.   - Please reconsult as needed     D/w Dr. Zamora

## 2019-11-13 NOTE — CONSULT NOTE ADULT - SUBJECTIVE AND OBJECTIVE BOX
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 Found to have acute femoral DVT s/p IVC filter 11/12/19. Episode of hypotension during the day. Now with hypotension.       PAST MEDICAL/SURGICAL HISTORY  PAST MEDICAL & SURGICAL HISTORY:  Acute on chronic anemia  HLH (hemophagocytic lymphohistiocytosis)  BPH (benign prostatic hyperplasia)  H/O knee surgery  Hernia      REVIEW OF SYSTEMS:  CONSTITUTIONAL: No fever, weight loss, or fatigue  EYES: No eye pain, visual disturbances, or discharge  ENMT:  No difficulty hearing, tinnitus, vertigo; No sinus or throat pain  NECK: No pain or stiffness  BREASTS: No pain, masses, or nipple discharge  RESPIRATORY: No cough, wheezing, chills or hemoptysis; No shortness of breath  CARDIOVASCULAR: No chest pain, palpitations, dizziness, or leg swelling  GASTROINTESTINAL: No abdominal or epigastric pain. No nausea, vomiting, or hematemesis; No diarrhea or constipation. No melena or hematochezia.  GENITOURINARY: No dysuria, frequency, hematuria, or incontinence  NEUROLOGICAL: No headaches, memory loss, loss of strength, numbness, or tremors  SKIN: No itching, burning, rashes, or lesions   LYMPH NODES: No enlarged glands  ENDOCRINE: No heat or cold intolerance; No hair loss  MUSCULOSKELETAL: No joint pain or swelling; No muscle, back, or extremity pain  PSYCHIATRIC: No depression, anxiety, mood swings, or difficulty sleeping  HEME/LYMPH: No easy bruising, or bleeding gums  ALLERY AND IMMUNOLOGIC: No hives or eczema    T(C): 36.7 (11-13-19 @ 04:30), Max: 37.9 (11-12-19 @ 16:05)  HR: 97 (11-13-19 @ 04:30) (80 - 107)  BP: 89/51 (11-13-19 @ 04:30) (64/50 - 104/63)  RR: 19 (11-13-19 @ 04:30) (18 - 20)  SpO2: 93% (11-13-19 @ 04:30) (93% - 99%)  Wt(kg): --Vital Signs Last 24 Hrs  T(C): 36.7 (13 Nov 2019 04:30), Max: 37.9 (12 Nov 2019 16:05)  T(F): 98 (13 Nov 2019 04:30), Max: 100.2 (12 Nov 2019 16:05)  HR: 97 (13 Nov 2019 04:30) (80 - 107)  BP: 89/51 (13 Nov 2019 04:30) (64/50 - 104/63)  BP(mean): --  RR: 19 (13 Nov 2019 04:30) (18 - 20)  SpO2: 93% (13 Nov 2019 04:30) (93% - 99%)    PHYSICAL EXAM:  GENERAL: NAD, well-groomed, well-developed  HEAD:  Atraumatic, Normocephalic  EYES: EOMI, PERRLA, conjunctiva and sclera clear  ENMT: No tonsillar erythema, exudates, or enlargement; Moist mucous membranes, Good dentition, No lesions  NECK: Supple, No JVD, Normal thyroid  NERVOUS SYSTEM:  Alert & Oriented X3, Good concentration; Motor Strength 5/5 B/L upper and lower extremities; DTRs 2+ intact and symmetric  CHEST/LUNG: Clear to percussion bilaterally; No rales, rhonchi, wheezing, or rubs  HEART: Regular rate and rhythm; No murmurs, rubs, or gallops  ABDOMEN: Soft, Nontender, Nondistended; Bowel sounds present  EXTREMITIES:  2+ Peripheral Pulses, No clubbing, cyanosis, or edema  LYMPH: No lymphadenopathy noted  SKIN: No rashes or lesions    Consultant(s) Notes Reviewed:  [x ] YES  [ ] NO  Care Discussed with Consultants/Other Providers [ x] YES  [ ] NO    LABS:  CBC   11-13-19 @ 00:46  Hematcorit 19.1  Hemoglobin 6.3  Mean Cell Hemoglobin 31.0  Platelet Count-Automated 23  RBC Count 2.03  Red Cell Distrib Width 25.0  Wbc Count 1.62  11-12-19 @ 09:02  Hematcorit 25.2  Hemoglobin 8.1  Mean Cell Hemoglobin 30.3  Platelet Count-Automated 18  RBC Count 2.67  Red Cell Distrib Width 25.1  Wbc Count 2.43      BMP  11-13-19 @ 00:46  Anion Gap. Serum 13  Blood Urea Nitrogen,Serm 58  Calcium, Total Serum 7.5  Carbon Dioxide, Serum 18  Chloride, Serum 105  Creatinine, Serum 1.61  eGFR in  46  eGFR in Non Afican American 40  Gloucose, serum 91  Potassium, Serum 4.1  Sodium, Serum 136              11-12-19 @ 06:51  Anion Gap. Serum 14  Blood Urea Nitrogen,Serm 58  Calcium, Total Serum 7.9  Carbon Dioxide, Serum 19  Chloride, Serum 99  Creatinine, Serum 1.54  eGFR in  49  eGFR in Non Afican American 42  Gloucose, serum 91  Potassium, Serum 4.3  Sodium, Serum 132              11-11-19 @ 07:05  Anion Gap. Serum 11  Blood Urea Nitrogen,Serm 50  Calcium, Total Serum 7.4  Carbon Dioxide, Serum 20  Chloride, Serum 100  Creatinine, Serum 1.46  eGFR in  52  eGFR in Non Afican American 45  Gloucose, serum 89  Potassium, Serum 3.9  Sodium, Serum 131              11-10-19 @ 06:54  Anion Gap. Serum 6  Blood Urea Nitrogen,Serm 42  Calcium, Total Serum 7.5  Carbon Dioxide, Serum 20  Chloride, Serum 103  Creatinine, Serum 1.51  eGFR in  50  eGFR in Non Afican American 43  Gloucose, serum 85  Potassium, Serum 3.9  Sodium, Serum 129                  CMP  11-13-19 @ 00:46  Traci Aminotransferase(ALT/SGPT)--  Albumin, Serum --  Alkaline Phosphatase, Serum --  Anion Gap, Serum 13  Aspartate Aminotransferase (AST/SGOT)--  Bilirubin Total, Serum --  Blood Urea Nitrogen, Serum 58  Calcium,Total Serum 7.5  Carbon Dioxide, Serum 18  Chloride, Serum 105  Creatinine, Serum 1.61  eGFR if  46  eGFR if Non African American 40  Glucose, Serum 91  Potassium, Serum 4.1  Protein Total, Serum --  Sodium, Serum 136                      11-12-19 @ 06:51  Traci Aminotransferase(ALT/SGPT)--  Albumin, Serum --  Alkaline Phosphatase, Serum --  Anion Gap, Serum 14  Aspartate Aminotransferase (AST/SGOT)--  Bilirubin Total, Serum --  Blood Urea Nitrogen, Serum 58  Calcium,Total Serum 7.9  Carbon Dioxide, Serum 19  Chloride, Serum 99  Creatinine, Serum 1.54  eGFR if  49  eGFR if Non African American 42  Glucose, Serum 91  Potassium, Serum 4.3  Protein Total, Serum --  Sodium, Serum 132                      11-11-19 @ 07:05  Traci Aminotransferase(ALT/SGPT)82  Albumin, Serum 1.7  Alkaline Phosphatase, Serum 993  Anion Gap, Serum 11  Aspartate Aminotransferase (AST/SGOT)156  Bilirubin Total, Serum 2.5  Blood Urea Nitrogen, Serum 50  Calcium,Total Serum 7.4  Carbon Dioxide, Serum 20  Chloride, Serum 100  Creatinine, Serum 1.46  eGFR if  52  eGFR if Non African American 45  Glucose, Serum 89  Potassium, Serum 3.9  Protein Total, Serum 3.3  Sodium, Serum 131                      11-10-19 @ 06:54  Traci Aminotransferase(ALT/SGPT)66  Albumin, Serum 1.4  Alkaline Phosphatase, Serum 778  Anion Gap, Serum 6  Aspartate Aminotransferase (AST/SGOT)132  Bilirubin Total, Serum 2.0  Blood Urea Nitrogen, Serum 42  Calcium,Total Serum 7.5  Carbon Dioxide, Serum 20  Chloride, Serum 103  Creatinine, Serum 1.51  eGFR if  50  eGFR if Non African American 43  Glucose, Serum 85  Potassium, Serum 3.9  Protein Total, Serum 3.3  Sodium, Serum 129                          PT/INR  PT/INR  11-11-19 @ 22:01  INR 1.18  Prothrombin Time Comment --  Prothrobin Time, [id number]      Amylase/Lipase            RADIOLOGY & ADDITIONAL TESTS:    Imaging Personally Reviewed:  [ ] YES  [ ] NO LEODAN BURGOS  79y  Male    HPI:  78 year old man with HLH (April 2019), CD30+ Anaplastic large T cell lymphoma (recently dx in Aug 2019) on tx with brituximab, miniCHOP (s/p 3 cycles, last 10/17) presents with generalized weakness, diarrhea (improved), fever and anemia, s/p 3 Units PRBC. Found to have acute femoral DVT s/p IVC filter 11/12/19. Overnight was hypotensive to       PAST MEDICAL/SURGICAL HISTORY  PAST MEDICAL & SURGICAL HISTORY:  Acute on chronic anemia  HLH (hemophagocytic lymphohistiocytosis)  BPH (benign prostatic hyperplasia)  H/O knee surgery  Hernia      REVIEW OF SYSTEMS:  CONSTITUTIONAL: No fever, weight loss, or fatigue  EYES: No eye pain, visual disturbances, or discharge  ENMT:  No difficulty hearing, tinnitus, vertigo; No sinus or throat pain  NECK: No pain or stiffness  BREASTS: No pain, masses, or nipple discharge  RESPIRATORY: No cough, wheezing, chills or hemoptysis; No shortness of breath  CARDIOVASCULAR: No chest pain, palpitations, dizziness, or leg swelling  GASTROINTESTINAL: No abdominal or epigastric pain. No nausea, vomiting, or hematemesis; No diarrhea or constipation. No melena or hematochezia.  GENITOURINARY: No dysuria, frequency, hematuria, or incontinence  NEUROLOGICAL: No headaches, memory loss, loss of strength, numbness, or tremors  SKIN: No itching, burning, rashes, or lesions   LYMPH NODES: No enlarged glands  ENDOCRINE: No heat or cold intolerance; No hair loss  MUSCULOSKELETAL: No joint pain or swelling; No muscle, back, or extremity pain  PSYCHIATRIC: No depression, anxiety, mood swings, or difficulty sleeping  HEME/LYMPH: No easy bruising, or bleeding gums  ALLERY AND IMMUNOLOGIC: No hives or eczema    T(C): 36.7 (11-13-19 @ 04:30), Max: 37.9 (11-12-19 @ 16:05)  HR: 97 (11-13-19 @ 04:30) (80 - 107)  BP: 89/51 (11-13-19 @ 04:30) (64/50 - 104/63)  RR: 19 (11-13-19 @ 04:30) (18 - 20)  SpO2: 93% (11-13-19 @ 04:30) (93% - 99%)  Wt(kg): --Vital Signs Last 24 Hrs  T(C): 36.7 (13 Nov 2019 04:30), Max: 37.9 (12 Nov 2019 16:05)  T(F): 98 (13 Nov 2019 04:30), Max: 100.2 (12 Nov 2019 16:05)  HR: 97 (13 Nov 2019 04:30) (80 - 107)  BP: 89/51 (13 Nov 2019 04:30) (64/50 - 104/63)  BP(mean): --  RR: 19 (13 Nov 2019 04:30) (18 - 20)  SpO2: 93% (13 Nov 2019 04:30) (93% - 99%)    PHYSICAL EXAM:  GENERAL: NAD, well-groomed, well-developed  HEAD:  Atraumatic, Normocephalic  EYES: EOMI, PERRLA, conjunctiva and sclera clear  ENMT: No tonsillar erythema, exudates, or enlargement; Moist mucous membranes, Good dentition, No lesions  NECK: Supple, No JVD, Normal thyroid  NERVOUS SYSTEM:  Alert & Oriented X3, Good concentration; Motor Strength 5/5 B/L upper and lower extremities; DTRs 2+ intact and symmetric  CHEST/LUNG: Clear to percussion bilaterally; No rales, rhonchi, wheezing, or rubs  HEART: Regular rate and rhythm; No murmurs, rubs, or gallops  ABDOMEN: Soft, Nontender, Nondistended; Bowel sounds present  EXTREMITIES:  2+ Peripheral Pulses, No clubbing, cyanosis, or edema  LYMPH: No lymphadenopathy noted  SKIN: No rashes or lesions    Consultant(s) Notes Reviewed:  [x ] YES  [ ] NO  Care Discussed with Consultants/Other Providers [ x] YES  [ ] NO    LABS:  CBC   11-13-19 @ 00:46  Hematcorit 19.1  Hemoglobin 6.3  Mean Cell Hemoglobin 31.0  Platelet Count-Automated 23  RBC Count 2.03  Red Cell Distrib Width 25.0  Wbc Count 1.62  11-12-19 @ 09:02  Hematcorit 25.2  Hemoglobin 8.1  Mean Cell Hemoglobin 30.3  Platelet Count-Automated 18  RBC Count 2.67  Red Cell Distrib Width 25.1  Wbc Count 2.43      BMP  11-13-19 @ 00:46  Anion Gap. Serum 13  Blood Urea Nitrogen,Serm 58  Calcium, Total Serum 7.5  Carbon Dioxide, Serum 18  Chloride, Serum 105  Creatinine, Serum 1.61  eGFR in  46  eGFR in Non Afican American 40  Gloucose, serum 91  Potassium, Serum 4.1  Sodium, Serum 136              11-12-19 @ 06:51  Anion Gap. Serum 14  Blood Urea Nitrogen,Serm 58  Calcium, Total Serum 7.9  Carbon Dioxide, Serum 19  Chloride, Serum 99  Creatinine, Serum 1.54  eGFR in  49  eGFR in Non Afican American 42  Gloucose, serum 91  Potassium, Serum 4.3  Sodium, Serum 132              11-11-19 @ 07:05  Anion Gap. Serum 11  Blood Urea Nitrogen,Serm 50  Calcium, Total Serum 7.4  Carbon Dioxide, Serum 20  Chloride, Serum 100  Creatinine, Serum 1.46  eGFR in  52  eGFR in Non Afican American 45  Gloucose, serum 89  Potassium, Serum 3.9  Sodium, Serum 131              11-10-19 @ 06:54  Anion Gap. Serum 6  Blood Urea Nitrogen,Serm 42  Calcium, Total Serum 7.5  Carbon Dioxide, Serum 20  Chloride, Serum 103  Creatinine, Serum 1.51  eGFR in  50  eGFR in Non Afican American 43  Gloucose, serum 85  Potassium, Serum 3.9  Sodium, Serum 129                  CMP  11-13-19 @ 00:46  Traci Aminotransferase(ALT/SGPT)--  Albumin, Serum --  Alkaline Phosphatase, Serum --  Anion Gap, Serum 13  Aspartate Aminotransferase (AST/SGOT)--  Bilirubin Total, Serum --  Blood Urea Nitrogen, Serum 58  Calcium,Total Serum 7.5  Carbon Dioxide, Serum 18  Chloride, Serum 105  Creatinine, Serum 1.61  eGFR if  46  eGFR if Non African American 40  Glucose, Serum 91  Potassium, Serum 4.1  Protein Total, Serum --  Sodium, Serum 136                      11-12-19 @ 06:51  Traci Aminotransferase(ALT/SGPT)--  Albumin, Serum --  Alkaline Phosphatase, Serum --  Anion Gap, Serum 14  Aspartate Aminotransferase (AST/SGOT)--  Bilirubin Total, Serum --  Blood Urea Nitrogen, Serum 58  Calcium,Total Serum 7.9  Carbon Dioxide, Serum 19  Chloride, Serum 99  Creatinine, Serum 1.54  eGFR if  49  eGFR if Non African American 42  Glucose, Serum 91  Potassium, Serum 4.3  Protein Total, Serum --  Sodium, Serum 132                      11-11-19 @ 07:05  Traci Aminotransferase(ALT/SGPT)82  Albumin, Serum 1.7  Alkaline Phosphatase, Serum 993  Anion Gap, Serum 11  Aspartate Aminotransferase (AST/SGOT)156  Bilirubin Total, Serum 2.5  Blood Urea Nitrogen, Serum 50  Calcium,Total Serum 7.4  Carbon Dioxide, Serum 20  Chloride, Serum 100  Creatinine, Serum 1.46  eGFR if  52  eGFR if Non African American 45  Glucose, Serum 89  Potassium, Serum 3.9  Protein Total, Serum 3.3  Sodium, Serum 131                      11-10-19 @ 06:54  Traci Aminotransferase(ALT/SGPT)66  Albumin, Serum 1.4  Alkaline Phosphatase, Serum 778  Anion Gap, Serum 6  Aspartate Aminotransferase (AST/SGOT)132  Bilirubin Total, Serum 2.0  Blood Urea Nitrogen, Serum 42  Calcium,Total Serum 7.5  Carbon Dioxide, Serum 20  Chloride, Serum 103  Creatinine, Serum 1.51  eGFR if  50  eGFR if Non African American 43  Glucose, Serum 85  Potassium, Serum 3.9  Protein Total, Serum 3.3  Sodium, Serum 129                          PT/INR  PT/INR  11-11-19 @ 22:01  INR 1.18  Prothrombin Time Comment --  Prothrobin Time, [id number]      Amylase/Lipase            RADIOLOGY & ADDITIONAL TESTS:    Imaging Personally Reviewed:  [ ] YES  [ ] NO RUDYDAPHNEYLEODAN ARRINGTON  79y  Male    HPI:  78 year old man with HLH (April 2019), CD30+ Anaplastic large T cell lymphoma (recently dx in Aug 2019) on tx with brituximab, miniCHOP (s/p 3 cycles, last 10/17) presents with generalized weakness, diarrhea (improved), fever and anemia, s/p 3 Units PRBC. Found to have acute femoral DVT s/p IVC filter 11/12/19. Overnight was hypotensive to 60s systolic, he was given albumin, total of 750 cc of NS, and midodrine 10. He subsequently received 1 unit of pRBCs. This AM at time of assessment he had no complaints. He denied dizziness, confusion, chest pain, palpitations, bleeding, bruising, fevers, chills, cough, diarrhea, urinary burning or frequency.       PAST MEDICAL/SURGICAL HISTORY  PAST MEDICAL & SURGICAL HISTORY:  Acute on chronic anemia  HLH (hemophagocytic lymphohistiocytosis)  BPH (benign prostatic hyperplasia)  H/O knee surgery  Hernia      REVIEW OF SYSTEMS:  CONSTITUTIONAL: No fever, weight loss, or fatigue  EYES: No eye pain, visual disturbances, or discharge  ENMT:  No difficulty hearing, tinnitus, vertigo; No sinus or throat pain  NECK: No pain or stiffness  RESPIRATORY: No cough, wheezing, chills or hemoptysis; No shortness of breath  CARDIOVASCULAR: No chest pain, palpitations, dizziness, or leg swelling  GASTROINTESTINAL: No abdominal or epigastric pain. No diarrhea  GENITOURINARY: No dysuria, frequency  NEUROLOGICAL: No headaches  HEME/LYMPH: No easy bruising, or bruising    T(C): 36.7 (11-13-19 @ 04:30), Max: 37.9 (11-12-19 @ 16:05)  HR: 97 (11-13-19 @ 04:30) (80 - 107)  BP: 89/51 (11-13-19 @ 04:30) (64/50 - 104/63)  RR: 19 (11-13-19 @ 04:30) (18 - 20)  SpO2: 93% (11-13-19 @ 04:30) (93% - 99%)  Wt(kg): --Vital Signs Last 24 Hrs  T(C): 36.7 (13 Nov 2019 04:30), Max: 37.9 (12 Nov 2019 16:05)  T(F): 98 (13 Nov 2019 04:30), Max: 100.2 (12 Nov 2019 16:05)  HR: 97 (13 Nov 2019 04:30) (80 - 107)  BP: 89/51 (13 Nov 2019 04:30) (64/50 - 104/63)  BP(mean): --  RR: 19 (13 Nov 2019 04:30) (18 - 20)  SpO2: 93% (13 Nov 2019 04:30) (93% - 99%)    PHYSICAL EXAM:  GENERAL: NAD, well-groomed, well-developed  HEAD:  Atraumatic, Normocephalic  NERVOUS SYSTEM:  Good concentration; alert and conversing appropriately  CHEST/LUNG: Clear to percussion bilaterally; No rales, rhonchi, wheezing  HEART: Regular rate and rhythm; No murmurs, rubs, or gallops  ABDOMEN: Soft, Nontender, Nondistended;   EXTREMITIES:  No edema  SKIN: No rashes or lesions  PSYCH: Calm, cooperative      Consultant(s) Notes Reviewed:  [x ] YES  [ ] NO  Care Discussed with Consultants/Other Providers [ x] YES  [ ] NO    LABS:  CBC   11-13-19 @ 00:46  Hematcorit 19.1  Hemoglobin 6.3  Mean Cell Hemoglobin 31.0  Platelet Count-Automated 23  RBC Count 2.03  Red Cell Distrib Width 25.0  Wbc Count 1.62  11-12-19 @ 09:02  Hematcorit 25.2  Hemoglobin 8.1  Mean Cell Hemoglobin 30.3  Platelet Count-Automated 18  RBC Count 2.67  Red Cell Distrib Width 25.1  Wbc Count 2.43      BMP  11-13-19 @ 00:46  Anion Gap. Serum 13  Blood Urea Nitrogen,Serm 58  Calcium, Total Serum 7.5  Carbon Dioxide, Serum 18  Chloride, Serum 105  Creatinine, Serum 1.61  eGFR in  46  eGFR in Non Afican American 40  Gloucose, serum 91  Potassium, Serum 4.1  Sodium, Serum 136              11-12-19 @ 06:51  Anion Gap. Serum 14  Blood Urea Nitrogen,Serm 58  Calcium, Total Serum 7.9  Carbon Dioxide, Serum 19  Chloride, Serum 99  Creatinine, Serum 1.54  eGFR in  49  eGFR in Non Afican American 42  Gloucose, serum 91  Potassium, Serum 4.3  Sodium, Serum 132              11-11-19 @ 07:05  Anion Gap. Serum 11  Blood Urea Nitrogen,Serm 50  Calcium, Total Serum 7.4  Carbon Dioxide, Serum 20  Chloride, Serum 100  Creatinine, Serum 1.46  eGFR in  52  eGFR in Non Afican American 45  Gloucose, serum 89  Potassium, Serum 3.9  Sodium, Serum 131              11-10-19 @ 06:54  Anion Gap. Serum 6  Blood Urea Nitrogen,Serm 42  Calcium, Total Serum 7.5  Carbon Dioxide, Serum 20  Chloride, Serum 103  Creatinine, Serum 1.51  eGFR in  50  eGFR in Non Afican American 43  Gloucose, serum 85  Potassium, Serum 3.9  Sodium, Serum 129                  CMP  11-13-19 @ 00:46  Traci Aminotransferase(ALT/SGPT)--  Albumin, Serum --  Alkaline Phosphatase, Serum --  Anion Gap, Serum 13  Aspartate Aminotransferase (AST/SGOT)--  Bilirubin Total, Serum --  Blood Urea Nitrogen, Serum 58  Calcium,Total Serum 7.5  Carbon Dioxide, Serum 18  Chloride, Serum 105  Creatinine, Serum 1.61  eGFR if  46  eGFR if Non African American 40  Glucose, Serum 91  Potassium, Serum 4.1  Protein Total, Serum --  Sodium, Serum 136                      11-12-19 @ 06:51  Traci Aminotransferase(ALT/SGPT)--  Albumin, Serum --  Alkaline Phosphatase, Serum --  Anion Gap, Serum 14  Aspartate Aminotransferase (AST/SGOT)--  Bilirubin Total, Serum --  Blood Urea Nitrogen, Serum 58  Calcium,Total Serum 7.9  Carbon Dioxide, Serum 19  Chloride, Serum 99  Creatinine, Serum 1.54  eGFR if  49  eGFR if Non African American 42  Glucose, Serum 91  Potassium, Serum 4.3  Protein Total, Serum --  Sodium, Serum 132                      11-11-19 @ 07:05  Traci Aminotransferase(ALT/SGPT)82  Albumin, Serum 1.7  Alkaline Phosphatase, Serum 993  Anion Gap, Serum 11  Aspartate Aminotransferase (AST/SGOT)156  Bilirubin Total, Serum 2.5  Blood Urea Nitrogen, Serum 50  Calcium,Total Serum 7.4  Carbon Dioxide, Serum 20  Chloride, Serum 100  Creatinine, Serum 1.46  eGFR if  52  eGFR if Non African American 45  Glucose, Serum 89  Potassium, Serum 3.9  Protein Total, Serum 3.3  Sodium, Serum 131                      11-10-19 @ 06:54  Traci Aminotransferase(ALT/SGPT)66  Albumin, Serum 1.4  Alkaline Phosphatase, Serum 778  Anion Gap, Serum 6  Aspartate Aminotransferase (AST/SGOT)132  Bilirubin Total, Serum 2.0  Blood Urea Nitrogen, Serum 42  Calcium,Total Serum 7.5  Carbon Dioxide, Serum 20  Chloride, Serum 103  Creatinine, Serum 1.51  eGFR if  50  eGFR if Non African American 43  Glucose, Serum 85  Potassium, Serum 3.9  Protein Total, Serum 3.3  Sodium, Serum 129                          PT/INR  PT/INR  11-11-19 @ 22:01  INR 1.18  Prothrombin Time Comment --  Prothrobin Time, [id number]      Amylase/Lipase            RADIOLOGY & ADDITIONAL TESTS:    Imaging Personally Reviewed:  [ ] YES  [ ] NO

## 2019-11-13 NOTE — CONSULT NOTE ADULT - SUBJECTIVE AND OBJECTIVE BOX
HPI:  80yo M h/o HLH diagnosed in 4/19, has lost 40 pounds since and in September was told it converted to NHL, has had 2 cycles of "new" chemo since, has lost 6 pounds since. Has had Diarrhea for the past month: describes it as explosive, about 5x/day,  occasionally watery, states it has not been tested.   tonight ptn presents w generalized weakness, was unable to get off the toilet 2/2 generalized weakness and lightheadedness.  denies fever, chills, nausea, vomiting, cough, cp, sob, dark stools, states appetite is poor at baseline.   List of meds not available at this time, will check w ptn's  wife in am (04 Nov 2019 21:48)    PERTINENT PM/SXH:   Acute on chronic anemia  HLH (hemophagocytic lymphohistiocytosis)  BPH (benign prostatic hyperplasia)    H/O knee surgery  Hernia    FAMILY HISTORY:    ITEMS NOT CHECKED ARE NOT PRESENT    SOCIAL HISTORY:   Significant other/partner: Brannon wife [ ]  Children:  [ ]  Orthodox/Spirituality:  Substance hx:  [ ]   Tobacco hx:  [ ]   Alcohol hx: [ ]   Home Opioid hx:  [ ] I-Stop Reference No:  Living Situation: [x ]Home  [ ]Long term care  [ ]Rehab [ ]Other    ADVANCE DIRECTIVES:    DNR    MOLST  [ ]    Living Will  [ ]   DECISION MAKER(s):  [ ] Health Care Proxy(s)  [x ] Surrogate(s)  [ ] Guardian           Name(s): Phone Number(s): brannon [phone]    BASELINE (I)ADL(s) (prior to admission):  [place]: [ ]Total  [x ] Moderate [ ]Dependent    Allergies    No Known Allergies    Intolerances    MEDICATIONS  (STANDING):  alfuzosin 10 milliGRAM(s) Oral at bedtime  ascorbic acid 500 milliGRAM(s) Oral daily  atovaquone Suspension 1500 milliGRAM(s) Oral daily  Biotene Dry Mouth Oral Rinse 5 milliLiter(s) Swish and Spit two times a day  finasteride 5 milliGRAM(s) Oral daily  lidocaine   Patch 1 Patch Transdermal daily  multivitamin 1 Tablet(s) Oral daily  pantoprazole    Tablet 40 milliGRAM(s) Oral before breakfast  piperacillin/tazobactam IVPB.. 3.375 Gram(s) IV Intermittent every 8 hours    MEDICATIONS  (PRN):  acetaminophen   Tablet .. 650 milliGRAM(s) Oral every 6 hours PRN Temp greater or equal to 38C (100.4F), Mild Pain (1 - 3)  artificial tears (preservative free) Ophthalmic Solution 2 Drop(s) Right EYE two times a day PRN irritation  loperamide 2 milliGRAM(s) Oral four times a day PRN Diarrhea  ondansetron Injectable 4 milliGRAM(s) IV Push every 6 hours PRN Nausea and/or Vomiting    PRESENT SYMPTOMS: [ ]Unable to obtain due to poor mentation   Source if other than patient:  [ ]Family   [ ]Team     Pain: [x ] yes [ ] no  QOL impact - yes  Location -    back                Aggravating factors - movement  Quality - sharp sore  Radiation -  Timing-  Severity (0-10 scale):  Minimal acceptable level (0-10 scale):     PAIN AD Score:     http://geriatrictoolkit.[facility]/cog/painad.pdf (press ctrl +  left click to view)    Dyspnea:                           [ ]Mild [ ]Moderate [ ]Severe  Anxiety:                             [ ]Mild [ ]Moderate [ ]Severe  Fatigue:                             [ ]Mild [ ]Moderate [x ]Severe  Nausea:                            [ ]Mild [ ]Moderate [ ]Severe  Loss of appetite:               [ ]Mild [ ]Moderate [ ]Severe  Constipation:                    [ ]Mild [ ]Moderate [ ]Severe    Other Symptoms:  [ ]All other review of systems negative     Karnofsky Performance Score/Palliative Performance Status Version 2:    30-40     %    http://npcrc.org/files/news/palliative_performance_scale_ppsv2.pdf    PHYSICAL EXAM:  Vital Signs Last 24 Hrs  T(C): 36.5 (13 Nov 2019 11:31), Max: 37.9 (12 Nov 2019 16:05)  T(F): 97.7 (13 Nov 2019 11:31), Max: 100.2 (12 Nov 2019 16:05)  HR: 99 (13 Nov 2019 11:31) (80 - 103)  BP: 106/68 (13 Nov 2019 11:31) (64/50 - 106/68)  BP(mean): --  RR: 18 (13 Nov 2019 11:31) (18 - 20)  SpO2: 97% (13 Nov 2019 11:31) (93% - 99%) I&O's Summary    12 Nov 2019 07:01  -  13 Nov 2019 07:00  --------------------------------------------------------  IN: 280 mL / OUT: 0 mL / NET: 280 mL    13 Nov 2019 07:01  -  13 Nov 2019 12:23  --------------------------------------------------------  IN: 240 mL / OUT: 0 mL / NET: 240 mL    GENERAL:  [ ]Alert  [ ]Oriented x   [x ]Lethargic  [x ]Cachexia  [ ]Unarousable  [x ]Verbal  [ ]Non-Verbal    Behavioral:   [ ] Anxiety  [ ] Delirium [ ] Agitation [ ] Other    HEENT:  [ ]Normal   [x ]Dry mouth   [ ]ET Tube/Trach  [ ]Oral lesions    PULMONARY:   [ x]Clear [ ]Tachypnea  [ ]Audible excessive secretions   [ ]Rhonchi        [ ]Right [ ]Left [ ]Bilateral  [ ]Crackles        [ ]Right [ ]Left [ ]Bilateral  [ ]Wheezing     [ ]Right [ ]Left [ ]Bilateral    CARDIOVASCULAR:    [ ]Regular [ ]Irregular [x ]Tachy  [ ]Jorge [ ]Murmur [ ]Other    GASTROINTESTINAL:  [ x]Soft  [ ]Distended   [x ]+BS  [ ]Non tender [ ]Tender  [ ]PEG [ ]OGT/ NGT  Last BM:     GENITOURINARY:  [ ]Normal [x] Incontinent   [ ]Oliguria/Anuria   [ ]Arnold    MUSCULOSKELETAL:   [ ]Normal   [x ]Weakness  [x ]Bed/Wheelchair bound [ ]Edema    NEUROLOGIC:   [ ]No focal deficits  [ ] Cognitive impairment  [ ] Dysphagia [ ]Dysarthria [ ] Paresis [ ]Other     SKIN:   [ ]Normal   [ ]Pressure ulcer(s)  [ ]Rash    CRITICAL CARE:  [ ] Shock Present  [ ] Septic [ ]Cardiogenic [ ]Neurologic [ ]Hypovolemic  [ ]  Vasopressors [ ]  Inotropes   [ ] Respiratory failure present [ ] mechanical ventilation [ ] non-invasive ventilatory support [ ] High flow  [ ] Acute  [ ] Chronic [ ] Hypoxic  [ ] Hypercarbic [ ] Other  [ ] Other organ failure     LABS:                        8.1    2.58  )-----------( 24       ( 13 Nov 2019 10:49 )             25.7   11-13    137  |  103  |  68<H>  ----------------------------<  102<H>  3.9   |  17<L>  |  1.75<H>    Ca    7.5<L>      13 Nov 2019 10:49    PT/INR - ( 11 Nov 2019 22:01 )   PT: 13.5 sec;   INR: 1.18 ratio               RADIOLOGY & ADDITIONAL STUDIES:    PROTEIN CALORIE MALNUTRITION PRESENT: [ ] Yes [ ] No  [ ] PPSV2 < or = to 30% [ ] significant weight loss  [ ] poor nutritional intake [ ] catabolic state [ ] anasarca     Albumin, Serum: 1.7 g/dL (11-11-19 @ 07:05)  Artificial Nutrition [ ]     REFERRALS:   [ ]Chaplaincy  [ ] Hospice  [ ]Child Life  [ ]Social Work  [ ]Case management [ ]Holistic Therapy     Goals of Care Document:   Care Coordination Assessment [C. Provider] (11-06-19 @ 14:15)   Progress Notes - Care Coordination [C. Provider] (11-12-19 @ 14:48)

## 2019-11-13 NOTE — PROGRESS NOTE ADULT - SUBJECTIVE AND OBJECTIVE BOX
STATUS POST:  placement of retrievable IVC filter    POST OPERATIVE DAY #: 1    SUBJECTIVE:   Overnight, patient was hypotensive, responded to fluids. Hgb was low 6.3, requiring 1UPRBC, but no signs of bleeding, patient baseline Hgb in 7-8 range.     Patient denies abdominal pain.  ---------------------------------------------------------------    Vital Signs Last 24 Hrs  T(C): 36.3 (13 Nov 2019 07:30), Max: 37.9 (12 Nov 2019 16:05)  T(F): 97.3 (13 Nov 2019 07:30), Max: 100.2 (12 Nov 2019 16:05)  HR: 99 (13 Nov 2019 07:30) (80 - 103)  BP: 96/60 (13 Nov 2019 07:30) (64/50 - 104/63)  BP(mean): --  RR: 18 (13 Nov 2019 07:30) (18 - 20)  SpO2: 95% (13 Nov 2019 07:30) (93% - 99%)    I&O's Detail    12 Nov 2019 07:01  -  13 Nov 2019 07:00  --------------------------------------------------------  IN:    Oral Fluid: 280 mL  Total IN: 280 mL    OUT:  Total OUT: 0 mL    Total NET: 280 mL      13 Nov 2019 07:01  -  13 Nov 2019 10:10  --------------------------------------------------------  IN:    Oral Fluid: 240 mL  Total IN: 240 mL    OUT:  Total OUT: 0 mL    Total NET: 240 mL          ----------------------------------------------------------------    Physical Exam:  General: NAD, Comfortable in bed     Neuro: A&Ox3  Respiratory: nonlabored breathing, no respiratory distress    HEENT: right neck puncture site clean dry intact, soft without hematoma or echymossis, dressing removed.   Abd: soft, nontender, nondistended,        -------------------------------------------------------------------    LABS:                        6.3    1.62  )-----------( 23       ( 13 Nov 2019 00:46 )             19.1     11-13    136  |  105  |  58<H>  ----------------------------<  91  4.1   |  18<L>  |  1.61<H>    Ca    7.5<L>      13 Nov 2019 00:46      PT/INR - ( 11 Nov 2019 22:01 )   PT: 13.5 sec;   INR: 1.18 ratio                 RADIOLOGY & ADDITIONAL STUDIES: STATUS POST:  placement of retrievable IVC filter    POST OPERATIVE DAY #: 1    SUBJECTIVE:   Overnight, patient was hypotensive, responded to fluids. Hgb was low 6.3, requiring 1UPRBC, but no signs of bleeding, patient baseline Hgb in 7-8 range. Patient has since been hemodynamically stable after fluid resucitation    Patient denies abdominal pain.  ---------------------------------------------------------------    Vital Signs Last 24 Hrs  T(C): 36.3 (13 Nov 2019 07:30), Max: 37.9 (12 Nov 2019 16:05)  T(F): 97.3 (13 Nov 2019 07:30), Max: 100.2 (12 Nov 2019 16:05)  HR: 99 (13 Nov 2019 07:30) (80 - 103)  BP: 96/60 (13 Nov 2019 07:30) (64/50 - 104/63)  BP(mean): --  RR: 18 (13 Nov 2019 07:30) (18 - 20)  SpO2: 95% (13 Nov 2019 07:30) (93% - 99%)    I&O's Detail    12 Nov 2019 07:01  -  13 Nov 2019 07:00  --------------------------------------------------------  IN:    Oral Fluid: 280 mL  Total IN: 280 mL    OUT:  Total OUT: 0 mL    Total NET: 280 mL      13 Nov 2019 07:01  -  13 Nov 2019 10:10  --------------------------------------------------------  IN:    Oral Fluid: 240 mL  Total IN: 240 mL    OUT:  Total OUT: 0 mL    Total NET: 240 mL          ----------------------------------------------------------------    Physical Exam:  General: NAD, Comfortable in bed     Neuro: A&Ox3  Respiratory: nonlabored breathing, no respiratory distress    HEENT: right neck puncture site clean dry intact, soft without hematoma or echymossis, dressing removed.   Abd: soft, nontender, nondistended,        -------------------------------------------------------------------    LABS:                        6.3    1.62  )-----------( 23       ( 13 Nov 2019 00:46 )             19.1     11-13    136  |  105  |  58<H>  ----------------------------<  91  4.1   |  18<L>  |  1.61<H>    Ca    7.5<L>      13 Nov 2019 00:46      PT/INR - ( 11 Nov 2019 22:01 )   PT: 13.5 sec;   INR: 1.18 ratio                 RADIOLOGY & ADDITIONAL STUDIES:

## 2019-11-13 NOTE — PROGRESS NOTE ADULT - ASSESSMENT
79 yr old man, history as above- very frail- including NHL, now with diarrhea, elevated LFTs, acute DVT, sepsis    fortunately, no pulmonary embolus and no evidence acute pulmonary abnormalities.  No pneumonia, bronchospasm, obstruction, cough or significant pleural effusions or CHF.  Prior dyspnea likely secondary to overall weak and deconditioned state    IVC filter in place  encourage incentive spirometry  OOB as able  O2 sat >92%  no need for pulmonary meds  respiratory status stable- please recall if we can be of further assistance    Balbina Hurley MD  [phone]  Pulmonary

## 2019-11-13 NOTE — PROGRESS NOTE ADULT - ASSESSMENT
79y Male with NHL with thrombocytopenia, now with left femoral DVT, unable to anticoagulate, now s/p placement of retrievable IVC filter    - continue care per primary team  - IR will follow 79y Male with NHL with thrombocytopenia, now with left femoral DVT, unable to anticoagulate, now s/p placement of retrievable IVC filter    - followup CBC post transfusion  - continue care per primary team  - IR will follow

## 2019-11-13 NOTE — PROGRESS NOTE ADULT - SUBJECTIVE AND OBJECTIVE BOX
Patient is a 79y old  Male who presents with a chief complaint of weakness (13 Nov 2019 12:18)    Being followed by ID for        Interval history:  No other acute events      ROS:  No cough,SOB,CP  No N/V/D  No abd pain  No urinary complaints  No HA  No joint or limb pain  No other complaints    PAST MEDICAL & SURGICAL HISTORY:  Acute on chronic anemia  HLH (hemophagocytic lymphohistiocytosis)  BPH (benign prostatic hyperplasia)  H/O knee surgery  Hernia    Allergies    No Known Allergies    Intolerances      Antimicrobials:    atovaquone Suspension 1500 milliGRAM(s) Oral daily  piperacillin/tazobactam IVPB.. 3.375 Gram(s) IV Intermittent every 8 hours    MEDICATIONS  (STANDING):  alfuzosin 10 milliGRAM(s) Oral at bedtime  ascorbic acid 500 milliGRAM(s) Oral daily  atovaquone Suspension 1500 milliGRAM(s) Oral daily  Biotene Dry Mouth Oral Rinse 5 milliLiter(s) Swish and Spit two times a day  finasteride 5 milliGRAM(s) Oral daily  lidocaine   Patch 1 Patch Transdermal daily  multivitamin 1 Tablet(s) Oral daily  pantoprazole    Tablet 40 milliGRAM(s) Oral before breakfast  piperacillin/tazobactam IVPB.. 3.375 Gram(s) IV Intermittent every 8 hours      Vital Signs Last 24 Hrs  T(C): 37.3 (11-13-19 @ 15:40), Max: 37.6 (11-12-19 @ 17:34)  T(F): 99.2 (11-13-19 @ 15:40), Max: 99.7 (11-12-19 @ 17:34)  HR: 98 (11-13-19 @ 15:40) (80 - 100)  BP: 124/70 (11-13-19 @ 15:40) (64/50 - 124/70)  BP(mean): --  RR: 18 (11-13-19 @ 15:40) (18 - 20)  SpO2: 96% (11-13-19 @ 15:40) (93% - 99%)    Physical Exam:    Constitutional well preserved,comfortable,pleasant    HEENT PERRLA EOMI,No pallor or icterus    No oral exudate or erythema    Neck supple no JVD or LN    Chest Good AE,CTA    CVS RRR S1 S2 WNl No murmur or rub or gallop    Abd soft BS normal No tenderness no masses    Ext No cyanosis clubbing or edema    IV site no erythema tenderness or discharge    Joints no swelling or LOM    CNS AAO X 3 no focal    Lab Data:                          8.1    2.58  )-----------( 24       ( 13 Nov 2019 10:49 )             25.7       11-13    137  |  103  |  68<H>  ----------------------------<  102<H>  3.9   |  17<L>  |  1.75<H>    Ca    7.5<L>      13 Nov 2019 10:49        .Stool Feces  11-07-19   No enteric pathogens isolated.  (Stool culture examined for Salmonella,  Shigella, Campylobacter, Aeromonas, Plesiomonas,  Vibrio, E.coli O157 and Yersinia)  --  --          WBC Count: 2.58 (11-13-19 @ 10:49)  WBC Count: 1.62 (11-13-19 @ 00:46)  WBC Count: 2.43 (11-12-19 @ 09:02)  WBC Count: 2.13 (11-11-19 @ 09:26)  WBC Count: 2.36 (11-10-19 @ 09:19)  WBC Count: 2.88 (11-09-19 @ 11:05)  WBC Count: 4.04 (11-08-19 @ 09:54)  WBC Count: 3.05 (11-07-19 @ 09:19) Patient is a 79y old  Male who presents with a chief complaint of weakness (13 Nov 2019 12:18)    Being followed by ID for        Interval history:  events reviewed   s/p ivC filter  low HCT noted- reuqired PRBC  pt's low bP is being addressed by tx, fluid and was culutred and started on antibioitcs  pt denies localizing sxs  no cough  no diarrhea  nose was noted to be erythematous but patient claims he often gets that and puts on creams  No other acute events      PAST MEDICAL & SURGICAL HISTORY:  Acute on chronic anemia  HLH (hemophagocytic lymphohistiocytosis)  BPH (benign prostatic hyperplasia)  H/O knee surgery  Hernia    Allergies    No Known Allergies    Intolerances      Antimicrobials:    atovaquone Suspension 1500 milliGRAM(s) Oral daily  piperacillin/tazobactam IVPB.. 3.375 Gram(s) IV Intermittent every 8 hours    MEDICATIONS  (STANDING):  alfuzosin 10 milliGRAM(s) Oral at bedtime  ascorbic acid 500 milliGRAM(s) Oral daily  atovaquone Suspension 1500 milliGRAM(s) Oral daily  Biotene Dry Mouth Oral Rinse 5 milliLiter(s) Swish and Spit two times a day  finasteride 5 milliGRAM(s) Oral daily  lidocaine   Patch 1 Patch Transdermal daily  multivitamin 1 Tablet(s) Oral daily  pantoprazole    Tablet 40 milliGRAM(s) Oral before breakfast  piperacillin/tazobactam IVPB.. 3.375 Gram(s) IV Intermittent every 8 hours      Vital Signs Last 24 Hrs  T(C): 37.3 (11-13-19 @ 15:40), Max: 37.6 (11-12-19 @ 17:34)  T(F): 99.2 (11-13-19 @ 15:40), Max: 99.7 (11-12-19 @ 17:34)  HR: 98 (11-13-19 @ 15:40) (80 - 100)  BP: 124/70 (11-13-19 @ 15:40) (64/50 - 124/70)  BP(mean): --  RR: 18 (11-13-19 @ 15:40) (18 - 20)  SpO2: 96% (11-13-19 @ 15:40) (93% - 99%)    Physical Exam:    Constitutional  resting quietly eating dinner    HEENT PERRLA EOMI,No pallor or icterus  nose is erythematous    No oral exudate or erythema    Neck supple no JVD or LN    Chest Good AE,CTA    CVS RRR S1 S2 WNl    Abd soft BS normal No tenderness     Ext edema    IV site no erythema tenderness or discharge    Joints no swelling or LOM    CNS AAO X 3 biolateral le weakness    Lab Data:                          8.1    2.58  )-----------( 24       ( 13 Nov 2019 10:49 )             25.7       11-13    137  |  103  |  68<H>  ----------------------------<  102<H>  3.9   |  17<L>  |  1.75<H>    Ca    7.5<L>      13 Nov 2019 10:49        .Stool Feces  11-07-19   No enteric pathogens isolated.  (Stool culture examined for Salmonella,  Shigella, Campylobacter, Aeromonas, Plesiomonas,  Vibrio, E.coli O157 and Yersinia)  --  --      Urinalysis (11.05.19 @ 03:38)    pH Urine: 6.0    Blood, Urine: Moderate    Glucose Qualitative, Urine: Negative    Color: Yellow    Urine Appearance: Clear    Bilirubin: Negative    Ketone - Urine: Negative    Specific Gravity: 1.019    Protein, Urine: 30 mg/dL    Urobilinogen: Negative    Nitrite: Negative    Leukocyte Esterase Concentration: Negative        WBC Count: 2.58 (11-13-19 @ 10:49)  WBC Count: 1.62 (11-13-19 @ 00:46)  WBC Count: 2.43 (11-12-19 @ 09:02)  WBC Count: 2.13 (11-11-19 @ 09:26)  WBC Count: 2.36 (11-10-19 @ 09:19)  WBC Count: 2.88 (11-09-19 @ 11:05)  WBC Count: 4.04 (11-08-19 @ 09:54)  WBC Count: 3.05 (11-07-19 @ 09:19)

## 2019-11-13 NOTE — PROGRESS NOTE ADULT - SUBJECTIVE AND OBJECTIVE BOX
CARDIOLOGY FOLLOW UP - Dr. Frank    CC no cp or sob    events noted, s/p IVC filters  hypotensive over night     PHYSICAL EXAM:  T(C): 36.5 (11-13-19 @ 11:31), Max: 37.9 (11-12-19 @ 16:05)  HR: 99 (11-13-19 @ 11:31) (80 - 103)  BP: 106/68 (11-13-19 @ 11:31) (64/50 - 106/68)  RR: 18 (11-13-19 @ 11:31) (18 - 20)  SpO2: 97% (11-13-19 @ 11:31) (93% - 99%)  Wt(kg): --  I&O's Summary    12 Nov 2019 07:01  -  13 Nov 2019 07:00  --------------------------------------------------------  IN: 280 mL / OUT: 0 mL / NET: 280 mL    13 Nov 2019 07:01  -  13 Nov 2019 12:42  --------------------------------------------------------  IN: 240 mL / OUT: 0 mL / NET: 240 mL        Appearance: Normal	  Cardiovascular: Normal S1 S2,RRR, No JVD, No murmurs  Respiratory: Lungs clear to auscultation	  Gastrointestinal:  Soft, Non-tender, + BS	  Extremities: Normal range of motion, No clubbing, cyanosis or edema        MEDICATIONS  (STANDING):  alfuzosin 10 milliGRAM(s) Oral at bedtime  ascorbic acid 500 milliGRAM(s) Oral daily  atovaquone Suspension 1500 milliGRAM(s) Oral daily  Biotene Dry Mouth Oral Rinse 5 milliLiter(s) Swish and Spit two times a day  finasteride 5 milliGRAM(s) Oral daily  lidocaine   Patch 1 Patch Transdermal daily  multivitamin 1 Tablet(s) Oral daily  pantoprazole    Tablet 40 milliGRAM(s) Oral before breakfast  piperacillin/tazobactam IVPB.. 3.375 Gram(s) IV Intermittent every 8 hours      TELEMETRY: 	    ECG:  	  RADIOLOGY:   DIAGNOSTIC TESTING:  [ ] Echocardiogram:  [ ]  Catheterization:  [ ] Stress Test:    OTHER: 	    LABS:	 	                            8.1    2.58  )-----------( 24       ( 13 Nov 2019 10:49 )             25.7     11-13    137  |  103  |  68<H>  ----------------------------<  102<H>  3.9   |  17<L>  |  1.75<H>    Ca    7.5<L>      13 Nov 2019 10:49      PT/INR - ( 11 Nov 2019 22:01 )   PT: 13.5 sec;   INR: 1.18 ratio CARDIOLOGY FOLLOW UP - Dr. Frank    CC no cp or sob    events noted, s/p IVC filters  hypotensive over night     PHYSICAL EXAM:  T(C): 36.5 (11-13-19 @ 11:31), Max: 37.9 (11-12-19 @ 16:05)  HR: 99 (11-13-19 @ 11:31) (80 - 103)  BP: 106/68 (11-13-19 @ 11:31) (64/50 - 106/68)  RR: 18 (11-13-19 @ 11:31) (18 - 20)  SpO2: 97% (11-13-19 @ 11:31) (93% - 99%)  Wt(kg): --  I&O's Summary    12 Nov 2019 07:01  -  13 Nov 2019 07:00  --------------------------------------------------------  IN: 280 mL / OUT: 0 mL / NET: 280 mL    13 Nov 2019 07:01  -  13 Nov 2019 12:42  --------------------------------------------------------  IN: 240 mL / OUT: 0 mL / NET: 240 mL        Appearance: Normal	  Cardiovascular: Normal S1 S2,RRR, No JVD, No murmurs  Respiratory: Lungs clear to auscultation	  Gastrointestinal:  Soft, Non-tender, + BS	  Extremities: le edema        MEDICATIONS  (STANDING):  alfuzosin 10 milliGRAM(s) Oral at bedtime  ascorbic acid 500 milliGRAM(s) Oral daily  atovaquone Suspension 1500 milliGRAM(s) Oral daily  Biotene Dry Mouth Oral Rinse 5 milliLiter(s) Swish and Spit two times a day  finasteride 5 milliGRAM(s) Oral daily  lidocaine   Patch 1 Patch Transdermal daily  multivitamin 1 Tablet(s) Oral daily  pantoprazole    Tablet 40 milliGRAM(s) Oral before breakfast  piperacillin/tazobactam IVPB.. 3.375 Gram(s) IV Intermittent every 8 hours      TELEMETRY: 	    ECG:  	  RADIOLOGY:   DIAGNOSTIC TESTING:  [ ] Echocardiogram:  [ ]  Catheterization:  [ ] Stress Test:    OTHER: 	    LABS:	 	                            8.1    2.58  )-----------( 24       ( 13 Nov 2019 10:49 )             25.7     11-13    137  |  103  |  68<H>  ----------------------------<  102<H>  3.9   |  17<L>  |  1.75<H>    Ca    7.5<L>      13 Nov 2019 10:49      PT/INR - ( 11 Nov 2019 22:01 )   PT: 13.5 sec;   INR: 1.18 ratio

## 2019-11-13 NOTE — CHART NOTE - NSCHARTNOTEFT_GEN_A_CORE
Patient was scheduled for transport to [facility] for CT scan L-spine simulation & 1 XRT tx today;  was scheduled for 7:30am but transport cxld for now 2/2 patient's unstable condition at this time. I spoke with Rad/Onc, Dr Morse and procedure will be re-scheduled for tomorrow.    Dr Judge made aware.    Pt's wife informed.      NIMISHA Patel [phone]

## 2019-11-13 NOTE — PROGRESS NOTE ADULT - SUBJECTIVE AND OBJECTIVE BOX
INTERVAL HPI/OVERNIGHT EVENTS:    MEDICATIONS  (STANDING):  alfuzosin 10 milliGRAM(s) Oral at bedtime  ascorbic acid 500 milliGRAM(s) Oral daily  atovaquone Suspension 1500 milliGRAM(s) Oral daily  Biotene Dry Mouth Oral Rinse 5 milliLiter(s) Swish and Spit two times a day  finasteride 5 milliGRAM(s) Oral daily  lidocaine   Patch 1 Patch Transdermal daily  multivitamin 1 Tablet(s) Oral daily  pantoprazole    Tablet 40 milliGRAM(s) Oral before breakfast  piperacillin/tazobactam IVPB.. 3.375 Gram(s) IV Intermittent every 8 hours    MEDICATIONS  (PRN):  acetaminophen   Tablet .. 650 milliGRAM(s) Oral every 6 hours PRN Temp greater or equal to 38C (100.4F), Mild Pain (1 - 3)  artificial tears (preservative free) Ophthalmic Solution 2 Drop(s) Right EYE two times a day PRN irritation  loperamide 2 milliGRAM(s) Oral four times a day PRN Diarrhea  ondansetron Injectable 4 milliGRAM(s) IV Push every 6 hours PRN Nausea and/or Vomiting      Allergies    No Known Allergies    Intolerances            PHYSICAL EXAM:   Vital Signs:  Vital Signs Last 24 Hrs  T(C): 36.3 (13 Nov 2019 07:30), Max: 37.9 (12 Nov 2019 16:05)  T(F): 97.3 (13 Nov 2019 07:30), Max: 100.2 (12 Nov 2019 16:05)  HR: 99 (13 Nov 2019 07:30) (80 - 103)  BP: 96/60 (13 Nov 2019 07:30) (64/50 - 104/63)  BP(mean): --  RR: 18 (13 Nov 2019 07:30) (18 - 20)  SpO2: 95% (13 Nov 2019 07:30) (93% - 99%)  Daily     Daily     GENERAL:  no distress  HEENT:  NC/AT,  anicteric  CHEST:   no increased effort, breath sounds clear  HEART:  Regular rhythm  ABDOMEN:  Soft, non-tender, non-distended, normoactive bowel sounds,  no masses ,no hepato-splenomegaly, no signs of chronic liver disease  EXTEREMITIES:  no cyanosis      LABS:                        6.3    1.62  )-----------( 23       ( 13 Nov 2019 00:46 )             19.1     11-13    136  |  105  |  58<H>  ----------------------------<  91  4.1   |  18<L>  |  1.61<H>    Ca    7.5<L>      13 Nov 2019 00:46      PT/INR - ( 11 Nov 2019 22:01 )   PT: 13.5 sec;   INR: 1.18 ratio               RADIOLOGY & ADDITIONAL TESTS:

## 2019-11-13 NOTE — PROGRESS NOTE ADULT - ASSESSMENT
events noted, hypotensive and had diarrhea, better after hydration and transfusion  no reports of melena or BRBPR, on Zosyn  maybe abx associated diarrhea, r/o c. diff,pt immunocompromised  diet as tolerates

## 2019-11-13 NOTE — PROGRESS NOTE ADULT - ASSESSMENT
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 Treated with brentuximab and CHOP x 3, relapsed. Recent PET without much improvement.. Recent fracture of lumbar vertebra, diagnosis made in ortho office, no films available. No MRI. Has notable pains with movements. Has very minimal LE power proximally and decreased to absent KJ reflexes. He was unable to get off of the toilet due to weakness PTA.   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 alternative chemo regimen was discussed but ptn is too frail  Had CT C/abd/pelvis on 11/5.   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 Radiation/Oncology input appreciated,  wife not available to speak with today, Palliative care consult not completed, ptn s/p RRT this am 2/2 hypotension, worsening pancytopenia, s/p 1U PRBC, RTX deferred til tomorrow and ptn tolerated IVC filter placement well yesterday, no sign of bleeding, trend HH,  now BP improved, on empiric ABx, ID following, procalcitonin not accurate in a setting of CKD  ongoing weakness, LE pain and LBP, LLE DVT, not a candidate for full AC. Palliative care consult pending once wife is available. RTX mapping and treatment 11/14  Michael Diaz on 11/5 felt ptn is too frail to get more chemo w his performance level at 4 ( needs assistance w most ADLs)  Diarrhea has resolved since admission, GI PCR is negative, all Cx NTD  Afebrile after the fever after a transfusion on 11/5 and 11/7, s/p IVF  CT Chest-NC yesterday w b/l pleural effusions, mild, little worse than on admission. HH not done today 2/2 hard stick.   LE pain 2/2 metastatic dz to spine. .   s/p acute on chronic diastolic CHF 4 days ago, did well post diuresis w  IV Lasix 40 mg  BCx NTD, off ABx, ID on board  off IVF, YEE resolved,  Hyponatremia improving  Hypoalbuminemia  LE edema, new Left femoral DVT  Functional paraplegia   GOC d/w ptn/wife, he is full code  will cont discussion of realistic GOC  OOB to chair not tolerable 2/2 back pain  DVT ppx w PAS

## 2019-11-13 NOTE — PROGRESS NOTE ADULT - SUBJECTIVE AND OBJECTIVE BOX
INTERVAL EVENTS:  Pt had an episode of hypotension this morning. RRT was called. Hgb was found to be low. Giving a unit of prbc. He reports feeling tired. Back pain is slightly better. Still remain bedbound. Denies any fever, chills, night sweat, CP, SOB, abd pain, constipation, diarrhea, dysuria.      Vital Signs Last 24 Hrs  T(C): 36.3 (13 Nov 2019 07:30), Max: 37.9 (12 Nov 2019 16:05)  T(F): 97.3 (13 Nov 2019 07:30), Max: 100.2 (12 Nov 2019 16:05)  HR: 99 (13 Nov 2019 07:30) (80 - 103)  BP: 96/60 (13 Nov 2019 07:30) (64/50 - 104/63)  BP(mean): --  RR: 18 (13 Nov 2019 07:30) (18 - 20)  SpO2: 95% (13 Nov 2019 07:30) (93% - 99%)      PHYSICAL EXAM:   GENERAL: appears fatigued, no acute distress  HEENT: EOMI, neck supple  RESPIRATORY: LCTAB/L, no rhonchi, rales, or wheezing  CARDIOVASCULAR: RRR, no murmurs, gallops, rubs  ABDOMINAL: soft, non-tender, non-distended, positive bowel sounds   EXTREMITIES: + lower extremity weakness b/l  NEUROLOGICAL: alert and oriented x 3, non-focal  SKIN: no rashes or lesions   MUSCULOSKELETAL: no gross joint deformity                          6.3    1.62  )-----------( 23       ( 13 Nov 2019 00:46 )             19.1     11-13    136  |  105  |  58<H>  ----------------------------<  91  4.1   |  18<L>  |  1.61<H>    Ca    7.5<L>      13 Nov 2019 00:46      PT/INR - ( 11 Nov 2019 22:01 )   PT: 13.5 sec;   INR: 1.18 ratio             MEDICATIONS  (STANDING):  alfuzosin 10 milliGRAM(s) Oral at bedtime  ascorbic acid 500 milliGRAM(s) Oral daily  atovaquone Suspension 1500 milliGRAM(s) Oral daily  Biotene Dry Mouth Oral Rinse 5 milliLiter(s) Swish and Spit two times a day  finasteride 5 milliGRAM(s) Oral daily  lidocaine   Patch 1 Patch Transdermal daily  multivitamin 1 Tablet(s) Oral daily  pantoprazole    Tablet 40 milliGRAM(s) Oral before breakfast  piperacillin/tazobactam IVPB.. 3.375 Gram(s) IV Intermittent every 8 hours      < from: CT Chest No Cont (11.11.19 @ 09:43) >  LUNGS AND AIRWAYS: Secretions in the distal trachea and right mainstem   bronchus. Bilateral lower lobe mild partial passive atelectasis. Right   upper lobe 3 mm nodule, unchanged from 10/29/2019 (2:37).     PLEURA: Small bilateral pleural effusions, right greater than left,   mildly increased from 11/5/2019. No pneumothorax.    MEDIASTINUM AND PIETER: Nolymphadenopathy.    HEART: Heart size is normal. No pericardial effusion. Coronary artery   calcifications.    CHEST WALL AND LOWER NECK: Anasarca. Calcification in the thyroid.    VISUALIZED UPPER ABDOMEN: Splenomegaly. Left renal cyst, partially   visualized. Nonobstructing left kidney calculi. Previously described   central right hepatic lobe hypodensity is not well seen on this study.    BONES: Degenerative spinal disease. Old right anterolateral seventh and   eighth rib fractures.    IMPRESSION:     Small bilateral pleural effusions, mildly increased from 11/5/2019.    < end of copied text >      < from: US Abdomen Complete (11.07.19 @ 10:31) >  IMPRESSION:     Indeterminate right hepatic lobe hypoechoic lesion corresponding to   region of FDG uptake in prior PET/ CTs.    Trace ascites in the right upper quadrant.    Splenomegaly.      < end of copied text >    < from: MR Lumbar Spine w/wo IV Cont (11.08.19 @ 21:55) >  INTERPRETATION:  Clinical indication: Non-Hodgkin's lymphoma.    MRI of the cervical thoracic and lumbar spine was performed using   sagittal T1-T2 and STIR sequence. Axial T1 and T2-weighted sequences were   performed. The patient was injected with approximately 10 cc of Gadavist   IV and sagittal and axial T1-weighted sequences were performed.    Cervical spine:    This exam is somewhat limited by motion.    Loss of the normal cervical lordosis is seen. This could be due to   positioning or muscle spasm    The vertebral body height and alignment appear normal    Disc desiccation is seen involving the entire cervical spine, though most   prominent at the C5-C6 C6-7 levels. These findings are secondary to   degenerative change.    No abnormal areas enhancement seen.    C2-3: Normal    C3-4: Disc bulge is identified. Mild narrowing of the spinal canal.   Moderate narrowing of both neural foramen.    C4-5: Disc bulge is identified. Bilateral hypertrophic facet joint   changes are seen. Severe narrowing of both neural foramen is seen.    C5-6: Disc bulge is seen. Bilateral hypertrophic facet joint changes are   seen. Hypertrophic change involving the right uncovertebral joint. Mild   to moderate narrowing of the left neural foramen and spinal canal. Severe   narrowing of the right neural foramen    C6-7: Normal    C7-T1: Normal    The spinal cord demonstrates normal signal and caliber.    Grossly, no abnormal areas of enhancement is seen.    Thoracic spine:    This exam is somewhat limited by motion    Increased kyphosis is seen.    The vertebral body height and alignment appear normal.    Schmorl's node is seen involving the superior endplate of T12 with slight   retropulsed fragment seen causing minimal effacement of the ventral   thecal sac. No abnormal signal is seen throughout the rest of the   vertebral body.    There are no abnormal disc herniations or significant central or neural   foraminal stenosis seen.    No abnormal masses or collections are seen.    No abnormal areas of enhancement seen.    Lumbar spine:    Reversal of the normal lumbar lordosis is seen. This could be due to   positioning or muscle spasm. There is abnormal T1 prolongation seen   involving the L3 vertebral body. This does demonstrate associated   enhancement and could be compatible with underlying lesion such as   lymphoma metastasis, multiple myeloma or other similar etiology. Please   correlate clinically. Mild compression deformity seen involving the   superior endplate of this vertebral body. No significant retropulsed   fragment is identified.    Grade 1 anterolisthesis seen involving L5 and S1. Bilateral lysis defects   are suspected and can be confirmed with plain film.    T12-L1: Normal    L1-2: Normal    L2-3: Bilateral hypertrophic facet changes are seen. No significant   compromise of the spinal canal or either neural foramen    L3-4: Disc bulge and bilateral hypertrophic facet changes are seen. Mild   narrowing of the spinal canal and moderate narrowing of both neural   foramen right greater than left.    L4-5: Disc bulge and bilateral hypertrophic changes are seen. Mild   narrowing of the spinal canal.    L5-S1: Disc bulge is seen. No significant, as of the spinal or canal   either neural foramen    There is abnormal enhancement seen involving the cauda equina. This could   be secondary to patient's known lymphoma though the possibility of   arachnoiditis must be considered.    There is abnormal signal with peripheral enhancement seen involving the   left paraspinal soft tissue region. This involves the psoas muscle at the   L3 level. Prominent enhancing soft tissue is seen involving both   paraspinal soft tissue region at the L3 level which could be compatible   with underlying pathology.    Impression: Degenerative changes as described above.    Enhancement of the cauda equina which could be secondary patient's known   lymphoma.    Abnormal signal and enhancement involving the L3 vertebral body is likely   secondary patient's known lymphoma. Involvement of the paraspinal soft   tissue region is also seen at this level.    < end of copied text >

## 2019-11-14 NOTE — CHART NOTE - NSCHARTNOTEFT_GEN_A_CORE
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 Discussed with patient and wife  Noted by MD for high protein diet, patient consuming  additional protein in supplements which he accepts,   ensure 40gm, prosource 30gm, plus improved PO intake at meals. Discussed with patient and wife to increase protein intake, reinforcing ensure x2, prosource x2 daily.  Patient eats better if fed by his wife, as stated by wife.    Patient reports: drinking ensure 2xdaily, patient states she is "pushing himself" to drink it; recommended to ad prosource to OJ     PO intake : last 3 meals noted, 70%, 75%, 50% improvement  shown an increase     Source for PO intake: chart flowsheets and staff, patient and wife        Daily Weight in k (11-13)   Dosing weight  82.3kg   Weight Change increase noted without significant edema, ? scale discrepancy    Pertinent Medications: MEDICATIONS  (STANDING):  alfuzosin 10 milliGRAM(s) Oral at bedtime  ascorbic acid 500 milliGRAM(s) Oral daily  atovaquone Suspension 1500 milliGRAM(s) Oral daily  Biotene Dry Mouth Oral Rinse 5 milliLiter(s) Swish and Spit two times a day  finasteride 5 milliGRAM(s) Oral daily  lidocaine   Patch 1 Patch Transdermal daily  multivitamin 1 Tablet(s) Oral daily  pantoprazole    Tablet 40 milliGRAM(s) Oral before breakfast  piperacillin/tazobactam IVPB.. 3.375 Gram(s) IV Intermittent every 8 hours  sodium chloride 0.9% Bolus 250 milliLiter(s) IV Bolus once  sodium chloride 0.9%. 1000 milliLiter(s) (60 mL/Hr) IV Continuous <Continuous>    MEDICATIONS  (PRN):  acetaminophen   Tablet .. 650 milliGRAM(s) Oral every 6 hours PRN Temp greater or equal to 38C (100.4F), Mild Pain (1 - 3)  artificial tears (preservative free) Ophthalmic Solution 2 Drop(s) Right EYE two times a day PRN irritation  loperamide 2 milliGRAM(s) Oral four times a day PRN Diarrhea  ondansetron Injectable 4 milliGRAM(s) IV Push every 6 hours PRN Nausea and/or Vomiting    Pertinent Labs: - @ 06:26: Na 138, BUN 66<H>, Cr 1.68<H>, BG 73, K+ 4.2, Phos 3.5, Mg 1.8, Alk Phos 1020<H>, ALT/SGPT 116<H>, AST/SGOT 228<H>, HbA1c --        BM x4, no diarrhea, followed by GI no issues  Skin per nursing documentation: L heel decubiti st II, R elbow decubiti st II  Edema: L leg , R leg 1+    Estimated Needs:   [X ] no change since previous assessment  [ ] recalculated:     Previous Nutrition Diagnosis: Malnutrition, severe  Nutrition Diagnosis is: ongoing        Recommend  1) continue ensure  2x daily  2) continue prosource x2  3) Monitor/encourage PO intake at mealtime,  provide food preferences  4) assist with menus, tray preparation  Discussed with team     Monitoring and Evaluation:     Continue to monitor Nutritional intake, Tolerance to diet prescription, weights, labs, skin integrity    RD remains available upon request and will follow up per protocol

## 2019-11-14 NOTE — PROGRESS NOTE ADULT - SUBJECTIVE AND OBJECTIVE BOX
CARDIOLOGY FOLLOW UP - Dr. Frank    CC no cp or sob       PHYSICAL EXAM:  T(C): 36.7 (11-14-19 @ 04:50), Max: 37.3 (11-13-19 @ 15:40)  HR: 100 (11-14-19 @ 04:50) (98 - 105)  BP: 118/75 (11-14-19 @ 04:50) (99/68 - 124/70)  RR: 18 (11-14-19 @ 04:50) (18 - 18)  SpO2: 96% (11-14-19 @ 04:50) (95% - 97%)  Wt(kg): --  I&O's Summary    13 Nov 2019 07:01  -  14 Nov 2019 07:00  --------------------------------------------------------  IN: 1540 mL / OUT: 100 mL / NET: 1440 mL        Appearance: Normal	  Cardiovascular: Normal S1 S2,RRR, No JVD, No murmurs  Respiratory: Lungs clear to auscultation	  Gastrointestinal:  Soft, Non-tender, + BS	  Extremities: Normal range of motion, No clubbing, cyanosis or edema        MEDICATIONS  (STANDING):  alfuzosin 10 milliGRAM(s) Oral at bedtime  ascorbic acid 500 milliGRAM(s) Oral daily  atovaquone Suspension 1500 milliGRAM(s) Oral daily  Biotene Dry Mouth Oral Rinse 5 milliLiter(s) Swish and Spit two times a day  finasteride 5 milliGRAM(s) Oral daily  lidocaine   Patch 1 Patch Transdermal daily  multivitamin 1 Tablet(s) Oral daily  pantoprazole    Tablet 40 milliGRAM(s) Oral before breakfast  piperacillin/tazobactam IVPB.. 3.375 Gram(s) IV Intermittent every 8 hours      TELEMETRY: 	    ECG:  	  RADIOLOGY:   DIAGNOSTIC TESTING:  [ ] Echocardiogram:  [ ]  Catheterization:  [ ] Stress Test:    OTHER: 	    LABS:	 	                            8.9    2.17  )-----------( 21       ( 14 Nov 2019 08:22 )             27.8     11-14    138  |  102  |  66<H>  ----------------------------<  73  4.2   |  19<L>  |  1.68<H>    Ca    7.8<L>      14 Nov 2019 06:26  Phos  3.5     11-14  Mg     1.8     11-14    TPro  3.6<L>  /  Alb  1.9<L>  /  TBili  6.1<H>  /  DBili  x   /  AST  228<H>  /  ALT  116<H>  /  AlkPhos  1020<H>  11-14

## 2019-11-14 NOTE — PROGRESS NOTE ADULT - ASSESSMENT
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 Treated with brentuximab and CHOP x 3, relapsed. Images reviewed. Recent fracture of lumbar vertebra, diagnosis made in ortho office, no films available. No MRI. Has notable pains with movements. Has very minimal LE power proximally and decreased to absent KJ reflexes. He was unable to get off of the toilet due to weakness. Had CT abd/pelvis today. Needs MRI (preferable, but will not tolerate) or CT scan of the lumbar spine. Concerns of leptomeningeal involvement a possibility. 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 alternative chemo regimen was discussed but ptn is too frail    no further dyspnea today , ongoing weakness, LE pain and LBP, found to have Left Femoral DVT, V/Q w low prob PE, NOT A CANDIDATE FOR FULL AC, HEME/ONC.Family  want everything done and do not want comfort care, ptn has had a total 3 units PRBC in 7 day period, needs premedication 2/2 having alloABx. ptn is full code.    ptn s/p r IVC filter in IR,   MRI of C/T/LS spine done 11/8: has a metastatic focus in L3 w cauda equina involvement. Radiation/Oncology input appreciated, palliation RTx is  scheduled     decrease in HCT- / from hlh vs bleed  Pt was started on abs- cultures pending  u/a does not suggest UTI  Cultures NGTD- would d/c abs if negative for 48 hours  no left shift on WBC    nose appears erythematous but patient claims this is chronic    appreciate palliative care input

## 2019-11-14 NOTE — PROGRESS NOTE ADULT - ASSESSMENT
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 Treated with brentuximab and CHOP x 3, relapsed. Recent PET without much improvement.. Recent fracture of lumbar vertebra, diagnosis made in ortho office, no films available. No MRI. Has notable pains with movements. Has very minimal LE power proximally and decreased to absent KJ reflexes. He was unable to get off of the toilet due to weakness PTA.   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 alternative chemo regimen was discussed but ptn is too frail. AT THIS POINT NO PLANS FOR CHEMO  Had CT C/abd/pelvis on 11/5.   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 Radiation/Oncology input appreciated, s/p mapping and a single treatment today  wife not available to speak with today, she is visiting [facility] facilities, Palliative care consult reviewed, ptn's wife is hoping mr Benitez will walk again after RTx.   ongoing weakness, LE pain and LBP, LLE DVT, not a candidate for full AC. Has IVC filter  ptn s/p hypotensive RRT , Blood Cx NTD, Urine Cx pending on empiric ZOSYN, ID following  Pancytopenia 2/2 HLH/Tcell lymphoma  Michael Diaz on 11/5 felt ptn is too frail to get more chemo w his performance level at 4 ( needs assistance w most ADLs)  Diarrhea has resolved since admission, GI PCR is negative, all Cx NTD  Afebrile after the fever after a transfusion on 11/5 and 11/7, s/p IVF  CT Chest-NC yesterday w b/l pleural effusions, mild, little worse than on admission. HH not done today 2/2 hard stick.   LE pain 2/2 metastatic dz to spine. .   s/p acute on chronic diastolic CHF 4 days ago, did well post diuresis w  IV Lasix 40 mg  BCx NTD, off ABx, ID on board  off IVF, YEE resolved,  Hyponatremia improving  Hypoalbuminemia  LE edema, new Left femoral DVT  Functional paraplegia   GOC d/w ptn/wife, ptn is now DNR  will cont discussion of realistic GOC  OOB to chair not tolerable 2/2 back pain  DVT ppx w PAS

## 2019-11-14 NOTE — PROGRESS NOTE ADULT - SUBJECTIVE AND OBJECTIVE BOX
LEODAN BURGOS:[id number],   79yMale followed for:  No Known Allergies    PAST MEDICAL & SURGICAL HISTORY:  Acute on chronic anemia  HLH (hemophagocytic lymphohistiocytosis)  BPH (benign prostatic hyperplasia)  H/O knee surgery  Hernia    FAMILY HISTORY:    MEDICATIONS  (STANDING):  alfuzosin 10 milliGRAM(s) Oral at bedtime  ascorbic acid 500 milliGRAM(s) Oral daily  atovaquone Suspension 1500 milliGRAM(s) Oral daily  Biotene Dry Mouth Oral Rinse 5 milliLiter(s) Swish and Spit two times a day  finasteride 5 milliGRAM(s) Oral daily  lidocaine   Patch 1 Patch Transdermal daily  multivitamin 1 Tablet(s) Oral daily  pantoprazole    Tablet 40 milliGRAM(s) Oral before breakfast  piperacillin/tazobactam IVPB.. 3.375 Gram(s) IV Intermittent every 8 hours    MEDICATIONS  (PRN):  acetaminophen   Tablet .. 650 milliGRAM(s) Oral every 6 hours PRN Temp greater or equal to 38C (100.4F), Mild Pain (1 - 3)  artificial tears (preservative free) Ophthalmic Solution 2 Drop(s) Right EYE two times a day PRN irritation  loperamide 2 milliGRAM(s) Oral four times a day PRN Diarrhea  ondansetron Injectable 4 milliGRAM(s) IV Push every 6 hours PRN Nausea and/or Vomiting      Vital Signs Last 24 Hrs  T(C): 36.7 (14 Nov 2019 04:50), Max: 37.3 (13 Nov 2019 15:40)  T(F): 98.1 (14 Nov 2019 04:50), Max: 99.2 (13 Nov 2019 15:40)  HR: 100 (14 Nov 2019 04:50) (98 - 105)  BP: 118/75 (14 Nov 2019 04:50) (99/68 - 124/70)  BP(mean): --  RR: 18 (14 Nov 2019 04:50) (18 - 18)  SpO2: 96% (14 Nov 2019 04:50) (95% - 97%)  nc/at  s1s2  cta  soft, nt, nd no guarding or rebound  no c/c/e    CBC Full  -  ( 13 Nov 2019 10:49 )  WBC Count : 2.58 K/uL  RBC Count : 2.68 M/uL  Hemoglobin : 8.1 g/dL  Hematocrit : 25.7 %  Platelet Count - Automated : 24 K/uL  Mean Cell Volume : 95.9 fl  Mean Cell Hemoglobin : 30.2 pg  Mean Cell Hemoglobin Concentration : 31.5 gm/dL  Auto Neutrophil # : x  Auto Lymphocyte # : x  Auto Monocyte # : x  Auto Eosinophil # : x  Auto Basophil # : x  Auto Neutrophil % : x  Auto Lymphocyte % : x  Auto Monocyte % : x  Auto Eosinophil % : x  Auto Basophil % : x    11-14    138  |  102  |  66<H>  ----------------------------<  73  4.2   |  19<L>  |  1.68<H>    Ca    7.8<L>      14 Nov 2019 06:26  Phos  3.5     11-14  Mg     1.8     11-14    TPro  3.6<L>  /  Alb  1.9<L>  /  TBili  6.1<H>  /  DBili  x   /  AST  228<H>  /  ALT  116<H>  /  AlkPhos  1020<H>  11-14

## 2019-11-14 NOTE — PROGRESS NOTE ADULT - ASSESSMENT
Echo from 4/2019 ef 75%, nl lv sys fx.    a/p  79M w/ HLH/NHL, and BPH admitted with diarrhea, YEE, anemia . Patient being evaluated for SOB.     1. SOB/acute on chronic Diastolic CHF   -stable, resolved  -in the setting of multiple PRBC/IVF ressuc  -s/p diuresis   -hypotensive readings noted ,hypovolemic;  lasix on hold , creat improving  -hypoproteinemia/ hypoalbuminemia/ worsening liver disease likely playing a role in LE edema   -echo with nl LV sys fx no significant valvular disease     2. YEE   -renal f/u, trend creat    3. Anemia  hem/onc/ med, GI,  f/u   prbc/ platelets prn per med/ hem/onc    4. HLH / lymphoma  + dvt to left femoral vein in the proximal and mid thigh  s/p placement of retrievable inferior vena cava filter  hem/onc f/u    5, Hyponatremia , resolved  renal f/u    dvt ppx

## 2019-11-14 NOTE — PROGRESS NOTE ADULT - SUBJECTIVE AND OBJECTIVE BOX
No pain, no shortness of breath      VITAL:  T(C): , Max: 37.3 (19 @ 15:40)  T(F): , Max: 99.2 (19 @ 15:40)  HR: 100 (19 @ 04:50)  BP: 118/75 (19 @ 04:50)  BP(mean): --  RR: 18 (19 @ 04:50)  SpO2: 96% (19 @ 04:50)      PHYSICAL EXAM:  Constitutional: NAD, Alert; thin to cachectic  HEENT: NCAT, MMM  Neck: Supple, No JVD  Respiratory: CTA-b/l  Cardiovascular: RRR s1s2, no m/r/g  Gastrointestinal: hyperactive, NTND  Extremities: 2+ b/l LE edema  Neurological: reduced generalized strength  Back: no CVAT b/l  Skin: No rashes, no nevi    LABS:                        8.1    2.58  )-----------( 24       ( 2019 10:49 )             25.7     Na(138)/K(4.2)/Cl(102)/HCO3(19)/BUN(66)/Cr(1.68)Glu(73)/Ca(7.8)/Mg(1.8)/PO4(3.5)     @ 06:26  Na(137)/K(3.9)/Cl(103)/HCO3(17)/BUN(68)/Cr(1.75)Glu(102)/Ca(7.5)/Mg(--)/PO4(--)     @ 10:49  Na(136)/K(4.1)/Cl(105)/HCO3(18)/BUN(58)/Cr(1.61)Glu(91)/Ca(7.5)/Mg(--)/PO4(--)     @ 00:46  Na(132)/K(4.3)/Cl(99)/HCO3(19)/BUN(58)/Cr(1.54)Glu(91)/Ca(7.9)/Mg(--)/PO4(--)     @ 06:51    Urinalysis Basic - ( 2019 23:46 )  Color: Lolly / Appearance: Slightly Turbid / S.021 / pH: x  Gluc: x / Ketone: Negative  / Bili: Small / Urobili: <2 mg/dL   Blood: x / Protein: 30 mg/dL / Nitrite: Negative   Leuk Esterase: Negative / RBC: 712 /HPF / WBC 7 /HPF   Sq Epi: x / Non Sq Epi: 1 /HPF / Bacteria: Negative      IMPRESSION: 79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 GFR now ~35-45ml/min.  (2)CV - acceptable volume for now    RECOMMEND:  (1)No IVF/No diuretics  (2)Dose new meds for GFR 35-45ml/min (present dosing is acceptable)  (3)High-protein diet                Bernardo Galdamez MD  [facility]  [phone] No pain, no shortness of breath      VITAL:  T(C): , Max: 37.3 (19 @ 15:40)  T(F): , Max: 99.2 (19 @ 15:40)  HR: 100 (19 @ 04:50)  BP: 118/75 (19 @ 04:50)  RR: 18 (19 @ 04:50)  SpO2: 96% (19 @ 04:50)      PHYSICAL EXAM:  Constitutional: NAD, Alert; thin to cachectic  HEENT: NCAT, MMM  Neck: Supple, No JVD  Respiratory: CTA-b/l  Cardiovascular: RRR s1s2, no m/r/g  Gastrointestinal: hyperactive, NTND  Extremities: 2+ b/l LE edema  Neurological: reduced generalized strength  Back: no CVAT b/l  Skin: No rashes, no nevi    LABS:                        8.1    2.58  )-----------( 24       ( 2019 10:49 )             25.7     Na(138)/K(4.2)/Cl(102)/HCO3(19)/BUN(66)/Cr(1.68)Glu(73)/Ca(7.8)/Mg(1.8)/PO4(3.5)     @ 06:26  Na(137)/K(3.9)/Cl(103)/HCO3(17)/BUN(68)/Cr(1.75)Glu(102)/Ca(7.5)/Mg(--)/PO4(--)     @ 10:49  Na(136)/K(4.1)/Cl(105)/HCO3(18)/BUN(58)/Cr(1.61)Glu(91)/Ca(7.5)/Mg(--)/PO4(--)     @ 00:46  Na(132)/K(4.3)/Cl(99)/HCO3(19)/BUN(58)/Cr(1.54)Glu(91)/Ca(7.9)/Mg(--)/PO4(--)     @ 06:51    Urinalysis Basic - ( 2019 23:46 )  Color: Lolly / Appearance: Slightly Turbid / S.021 / pH: x  Gluc: x / Ketone: Negative  / Bili: Small / Urobili: <2 mg/dL   Blood: x / Protein: 30 mg/dL / Nitrite: Negative   Leuk Esterase: Negative / RBC: 712 /HPF / WBC 7 /HPF   Sq Epi: x / Non Sq Epi: 1 /HPF / Bacteria: Negative      IMPRESSION: 79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 GFR now ~35-45ml/min.  (2)CV - tenuous hemodynamics; s/p RRT yesterday    RECOMMEND:  (1)NS 500cc bolus prn SBP<90  (2)Dose new meds for GFR 35-45ml/min (present dosing is acceptable)  (3)High-protein diet  (4)Palliative f/u regarding goals of care              Bernardo Galdamez MD  [facility]  [phone] No pain, no shortness of breath      VITAL:  T(C): , Max: 37.3 (19 @ 15:40)  T(F): , Max: 99.2 (19 @ 15:40)  HR: 100 (19 @ 04:50)  BP: 118/75 (19 @ 04:50)  RR: 18 (19 @ 04:50)  SpO2: 96% (19 @ 04:50)      PHYSICAL EXAM:  Constitutional: NAD, Alert; thin to cachectic  HEENT: NCAT, MMM  Neck: Supple, No JVD  Respiratory: CTA-b/l  Cardiovascular: RRR s1s2, no m/r/g  Gastrointestinal: hyperactive, NTND  Extremities: 2+ b/l LE edema  Neurological: reduced generalized strength  Back: no CVAT b/l  Skin: No rashes, no nevi    LABS:                        8.1    2.58  )-----------( 24       ( 2019 10:49 )             25.7     Na(138)/K(4.2)/Cl(102)/HCO3(19)/BUN(66)/Cr(1.68)Glu(73)/Ca(7.8)/Mg(1.8)/PO4(3.5)     @ 06:26  Na(137)/K(3.9)/Cl(103)/HCO3(17)/BUN(68)/Cr(1.75)Glu(102)/Ca(7.5)/Mg(--)/PO4(--)     @ 10:49  Na(136)/K(4.1)/Cl(105)/HCO3(18)/BUN(58)/Cr(1.61)Glu(91)/Ca(7.5)/Mg(--)/PO4(--)     @ 00:46  Na(132)/K(4.3)/Cl(99)/HCO3(19)/BUN(58)/Cr(1.54)Glu(91)/Ca(7.9)/Mg(--)/PO4(--)     @ 06:51    Urinalysis Basic - ( 2019 23:46 )  Color: Lolly / Appearance: Slightly Turbid / S.021 / pH: x  Gluc: x / Ketone: Negative  / Bili: Small / Urobili: <2 mg/dL   Blood: x / Protein: 30 mg/dL / Nitrite: Negative   Leuk Esterase: Negative / RBC: 712 /HPF / WBC 7 /HPF   Sq Epi: x / Non Sq Epi: 1 /HPF / Bacteria: Negative      IMPRESSION: 79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 GFR now ~35-45ml/min.    (2)CV - tenuous hemodynamics; s/p RRT yesterday    (3)Metabolic acidosis - increased anion gap - starvation ketoacidosis? Urine negative for ketones, but this would not be surprising in setting of starvation ketoacidosis. Mild type A lactic acidosis also a possibility. AG stable as of this am, relative to yesterday.    RECOMMEND:  (1)NS 500cc bolus prn SBP<90  (2)Dose new meds for GFR 35-45ml/min (present dosing is acceptable)  (3)High-protein diet  (4)Palliative f/u regarding goals of care              Bernardo Galdamez MD  [facility]  [phone] No pain, no shortness of breath      VITAL:  T(C): , Max: 37.3 (19 @ 15:40)  T(F): , Max: 99.2 (19 @ 15:40)  HR: 100 (19 @ 04:50)  BP: 118/75 (19 @ 04:50)  RR: 18 (19 @ 04:50)  SpO2: 96% (19 @ 04:50)      PHYSICAL EXAM:  Constitutional: NAD, Alert; thin to cachectic  HEENT: NCAT, MMM  Neck: Supple, No JVD  Respiratory: CTA-b/l  Cardiovascular: RRR s1s2, no m/r/g  Gastrointestinal: hyperactive, NTND  Extremities: 2+ b/l LE edema  Neurological: reduced generalized strength  Back: no CVAT b/l  Skin: No rashes, no nevi    LABS:                        8.1    2.58  )-----------( 24       ( 2019 10:49 )             25.7     Na(138)/K(4.2)/Cl(102)/HCO3(19)/BUN(66)/Cr(1.68)Glu(73)/Ca(7.8)/Mg(1.8)/PO4(3.5)     @ 06:26  Na(137)/K(3.9)/Cl(103)/HCO3(17)/BUN(68)/Cr(1.75)Glu(102)/Ca(7.5)/Mg(--)/PO4(--)     @ 10:49  Na(136)/K(4.1)/Cl(105)/HCO3(18)/BUN(58)/Cr(1.61)Glu(91)/Ca(7.5)/Mg(--)/PO4(--)     @ 00:46  Na(132)/K(4.3)/Cl(99)/HCO3(19)/BUN(58)/Cr(1.54)Glu(91)/Ca(7.9)/Mg(--)/PO4(--)     @ 06:51    Urinalysis Basic - ( 2019 23:46 )  Color: Lolly / Appearance: Slightly Turbid / S.021 / pH: x  Gluc: x / Ketone: Negative  / Bili: Small / Urobili: <2 mg/dL   Blood: x / Protein: 30 mg/dL / Nitrite: Negative   Leuk Esterase: Negative / RBC: 712 /HPF / WBC 7 /HPF   Sq Epi: x / Non Sq Epi: 1 /HPF / Bacteria: Negative      IMPRESSION: 79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 GFR now ~35-45ml/min.    (2)CV - tenuous hemodynamics; s/p RRT yesterday    (3)Metabolic acidosis - increased anion gap - starvation ketoacidosis? Urine negative for ketones, but this would not be surprising in setting of starvation ketoacidosis. Mild type A lactic acidosis also a possibility. AG stable as of this am, relative to yesterday.      RECOMMEND:  (1)NS 500cc bolus prn SBP<90  (2)Dose new meds for GFR 35-45ml/min (present dosing is acceptable)  (3)High-protein diet  (4)Palliative f/u regarding goals of care        Bernardo Galdamez MD  [facility]  [phone]

## 2019-11-14 NOTE — PROGRESS NOTE ADULT - ASSESSMENT
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  Palliative care called for GOC.

## 2019-11-14 NOTE — PROGRESS NOTE ADULT - SUBJECTIVE AND OBJECTIVE BOX
SUBJECTIVE AND OBJECTIVE: pt sleeping comfortably in bed  INTERVAL HPI/OVERNIGHT EVENTS: pain in back overnight.  Used tylenol x 2 doses    DNR on chart:   Allergies    No Known Allergies    Intolerances    MEDICATIONS  (STANDING):  alfuzosin 10 milliGRAM(s) Oral at bedtime  ascorbic acid 500 milliGRAM(s) Oral daily  atovaquone Suspension 1500 milliGRAM(s) Oral daily  Biotene Dry Mouth Oral Rinse 5 milliLiter(s) Swish and Spit two times a day  finasteride 5 milliGRAM(s) Oral daily  lidocaine   Patch 1 Patch Transdermal daily  multivitamin 1 Tablet(s) Oral daily  pantoprazole    Tablet 40 milliGRAM(s) Oral before breakfast  piperacillin/tazobactam IVPB.. 3.375 Gram(s) IV Intermittent every 8 hours    MEDICATIONS  (PRN):  acetaminophen   Tablet .. 650 milliGRAM(s) Oral every 6 hours PRN Temp greater or equal to 38C (100.4F), Mild Pain (1 - 3)  artificial tears (preservative free) Ophthalmic Solution 2 Drop(s) Right EYE two times a day PRN irritation  loperamide 2 milliGRAM(s) Oral four times a day PRN Diarrhea  ondansetron Injectable 4 milliGRAM(s) IV Push every 6 hours PRN Nausea and/or Vomiting      ITEMS UNCHECKED ARE NOT PRESENT    PRESENT SYMPTOMS: [ ]Unable to obtain due to poor mentation   Source if other than patient:  [ ]Family   [ ]Team     Pain:  [x ] yes [ ] no  QOL impact - yes, inability to walk  Location -         lower back           Aggravating factors - movement  Quality - sharp   Radiation - across back  Timing-  Severity (0-10 scale): 9/10  Minimal acceptable level (0-10 scale):     Dyspnea:                           [ ]Mild [ ]Moderate [ ]Severe  Anxiety:                             [ ]Mild [ ]Moderate [ ]Severe  Fatigue:                             [x ]Mild [ ]Moderate [ ]Severe  Nausea:                            [ ]Mild [ ]Moderate [ ]Severe  Loss of appetite:               [x ]Mild [ ]Moderate [ ]Severe  Constipation:                    [ ]Mild [ ]Moderate [ ]Severe    PAIN AD Score:	  http://geriatrictoolkit.[place].[email or web address]/painad.pdf (Ctrl + left click to view)    Other Symptoms:  [ ]All other review of systems negative     Karnofsky Performance Score/Palliative Performance Status Version 2:     30    %    http://npcrc.org/files/news/palliative_performance_scale_ppsv2.pdf  PPSv2.pdf    PHYSICAL EXAM:  Vital Signs Last 24 Hrs  T(C): 36.7 (2019 04:50), Max: 37.3 (2019 15:40)  T(F): 98.1 (2019 04:50), Max: 99.2 (2019 15:40)  HR: 100 (2019 04:50) (98 - 105)  BP: 118/75 (2019 04:50) (99/68 - 124/70)  BP(mean): --  RR: 18 (2019 04:50) (18 - 18)  SpO2: 96% (2019 04:50) (95% - 97%) I&O's Summary    2019 07:01  -  2019 07:00  --------------------------------------------------------  IN: 1540 mL / OUT: 100 mL / NET: 1440 mL     GENERAL:  [ ]Alert  [ ]Oriented x   [x ]Lethargic  [x ]Cachexia  [ ]Unarousable  [ x]Verbal  [ ]Non-Verbal    Behavioral:   [ ] Anxiety  [ ] Delirium [ ] Agitation [ ] Other    HEENT:  [ ]Normal   [x ]Dry mouth   [ ]ET Tube/Trach  [ ]Oral lesions    PULMONARY:   [ x]Clear [ ]Tachypnea  [ ]Audible excessive secretions   [ ]Rhonchi        [ ]Right [ ]Left [ ]Bilateral  [ ]Crackles        [ ]Right [ ]Left [ ]Bilateral  [ ]Wheezing     [ ]Right [ ]Left [ ]Bilateral    CARDIOVASCULAR:    [ ]Regular [ ]Irregular [x ]Tachy  [ ]Jorge [ ]Murmur [ ]Other    GASTROINTESTINAL:  [x ]Soft  [x ]Distended   [x ]+BS  [ ]Non tender [ ]Tender  [ ]PEG [ ]OGT/ NGT   Last BM:     GENITOURINARY:  [x ]Normal [ ] Incontinent   [ ]Oliguria/Anuria   [ ]Arnold    MUSCULOSKELETAL:   [ ]Normal   [x ]Weakness  [ x]Bed/Wheelchair bound [ ]Edema    NEUROLOGIC:   [x ]No focal deficits  [ ] Cognitive impairment  [ ] Dysphagia [ ]Dysarthria [ ] Paresis [ ]Other     SKIN:   [ ]Normal   [ ]Pressure ulcer(s)  [ ]Rash    CRITICAL CARE:  [ ] Shock Present  [ ] Septic [ ]Cardiogenic [ ]Neurologic [ ]Hypovolemic  [ ]  Vasopressors [ ]  Inotropes   [ ] Respiratory failure present [ ] Mechanical Ventilation [ ] Non-invasive ventilatory support [ ] High-Flow  [ ] Acute  [ ] Chronic [ ] Hypoxic  [ ] Hypercarbic [ ] Other  [ ] Other organ failure     LABS:                        8.9    2.17  )-----------( 21       ( 2019 08:22 )             27.8       138  |  102  |  66<H>  ----------------------------<  73  4.2   |  19<L>  |  1.68<H>    Ca    7.8<L>      2019 06:26  Phos  3.5       Mg     1.8         TPro  3.6<L>  /  Alb  1.9<L>  /  TBili  6.1<H>  /  DBili  x   /  AST  228<H>  /  ALT  116<H>  /  AlkPhos  1020<H>        Urinalysis Basic - ( 2019 23:46 )    Color: Lolly / Appearance: Slightly Turbid / S.021 / pH: x  Gluc: x / Ketone: Negative  / Bili: Small / Urobili: <2 mg/dL   Blood: x / Protein: 30 mg/dL / Nitrite: Negative   Leuk Esterase: Negative / RBC: 712 /HPF / WBC 7 /HPF   Sq Epi: x / Non Sq Epi: 1 /HPF / Bacteria: Negative      RADIOLOGY & ADDITIONAL STUDIES:    Protein Calorie Malnutrition Present: [ ] yes [ ] no  [x ] PPSV2 < or = 30%  [x ] significant weight loss [x ] poor nutritional intake [ ] anasarca [ ] catabolic state Albumin, Serum: 1.9 g/dL (19 @ 06:26)  Artificial Nutrition [ ]     REFERRALS:   [ ]Chaplaincy  [ ] Hospice  [ ]Child Life  [ ]Social Work  [ ]Case management [ ]Holistic Therapy     Goals of Care Document:    Care Coordination Assessment [C. Provider] (19 @ 14:15)   Progress Note Adult-Cardiology NP [ALVAREZ Chakraborty] (19 @ 09:45)

## 2019-11-14 NOTE — PROGRESS NOTE ADULT - SUBJECTIVE AND OBJECTIVE BOX
Patient is a 79y old  Male who presents with a chief complaint of weakness (2019 10:06)    Being followed by ID for        Interval history:  pt s/p RT earlier  Pt's BP was low earlier today again  still with much pain in LE  No other acute events      PAST MEDICAL & SURGICAL HISTORY:  Acute on chronic anemia  HLH (hemophagocytic lymphohistiocytosis)  BPH (benign prostatic hyperplasia)  H/O knee surgery  Hernia    Allergies    No Known Allergies    Intolerances      Antimicrobials:    atovaquone Suspension 1500 milliGRAM(s) Oral daily  piperacillin/tazobactam IVPB.. 3.375 Gram(s) IV Intermittent every 8 hours    MEDICATIONS  (STANDING):  alfuzosin 10 milliGRAM(s) Oral at bedtime  ascorbic acid 500 milliGRAM(s) Oral daily  atovaquone Suspension 1500 milliGRAM(s) Oral daily  Biotene Dry Mouth Oral Rinse 5 milliLiter(s) Swish and Spit two times a day  finasteride 5 milliGRAM(s) Oral daily  lidocaine   Patch 1 Patch Transdermal daily  multivitamin 1 Tablet(s) Oral daily  pantoprazole    Tablet 40 milliGRAM(s) Oral before breakfast  piperacillin/tazobactam IVPB.. 3.375 Gram(s) IV Intermittent every 8 hours  sodium chloride 0.9% Bolus 250 milliLiter(s) IV Bolus once  sodium chloride 0.9%. 1000 milliLiter(s) (60 mL/Hr) IV Continuous <Continuous>      Vital Signs Last 24 Hrs  T(C): 36.6 (19 @ 12:55), Max: 37.3 (19 @ 15:40)  T(F): 97.8 (19 @ 12:55), Max: 99.2 (19 @ 15:40)  HR: 102 (19 @ 12:55) (98 - 105)  BP: 86/52 (19 @ 12:55) (86/52 - 124/70)  BP(mean): --  RR: 18 (19 @ 12:55) (18 - 18)  SpO2: 93% (19 @ 12:55) (93% - 96%)    Physical Exam:    Constitutional well preserved,comfortable,pleasant    HEENT PERRLA EOMI, rash over nose ( old per wife)    No oral exudate or erythema    Neck supple no JVD or LN    Chest Good AE,CTA    CVS RRR S1 S2 WNl     Abd soft BS normal No tenderness     Ext  edema    IV site no erythema tenderness or discharge    Joints no swelling or LOM    CNS AAO X 3 bilateral LE weakness    Lab Data:                          8.9    2.17  )-----------( 21       ( 2019 08:22 )             27.8           138  |  102  |  66<H>  ----------------------------<  73  4.2   |  19<L>  |  1.68<H>    Ca    7.8<L>      2019 06:26  Phos  3.5       Mg     1.8         TPro  3.6<L>  /  Alb  1.9<L>  /  TBili  6.1<H>  /  DBili  x   /  AST  228<H>  /  ALT  116<H>  /  AlkPhos  1020<H>        Urinalysis Basic - ( 2019 23:46 )    Color: Lolly / Appearance: Slightly Turbid / S.021 / pH: x  Gluc: x / Ketone: Negative  / Bili: Small / Urobili: <2 mg/dL   Blood: x / Protein: 30 mg/dL / Nitrite: Negative   Leuk Esterase: Negative / RBC: 712 /HPF / WBC 7 /HPF   Sq Epi: x / Non Sq Epi: 1 /HPF / Bacteria: Negative        .Blood Blood-Peripheral  19   No growth to date.  --  --      WBC Count: 2.17 (19 @ 08:22)  WBC Count: 2.58 (19 @ 10:49)  WBC Count: 1.62 (19 @ 00:46)  WBC Count: 2.43 (19 @ 09:02)  WBC Count: 2.13 (19 @ 09:26)  WBC Count: 2.36 (11-10-19 @ 09:19)  WBC Count: 2.88 (19 @ 11:05)  WBC Count: 4.04 (19 @ 09:54)

## 2019-11-14 NOTE — PROGRESS NOTE ADULT - SUBJECTIVE AND OBJECTIVE BOX
Patient is a 79y old  Male who presents with a chief complaint of weakness (2019 10:06)      SUBJECTIVE / OVERNIGHT EVENTS: ptn is s/p RTx today, feels well, stronger    MEDICATIONS  (STANDING):  alfuzosin 10 milliGRAM(s) Oral at bedtime  ascorbic acid 500 milliGRAM(s) Oral daily  atovaquone Suspension 1500 milliGRAM(s) Oral daily  Biotene Dry Mouth Oral Rinse 5 milliLiter(s) Swish and Spit two times a day  finasteride 5 milliGRAM(s) Oral daily  lidocaine   Patch 1 Patch Transdermal daily  multivitamin 1 Tablet(s) Oral daily  pantoprazole    Tablet 40 milliGRAM(s) Oral before breakfast  piperacillin/tazobactam IVPB.. 3.375 Gram(s) IV Intermittent every 8 hours  sodium chloride 0.9% Bolus 250 milliLiter(s) IV Bolus once  sodium chloride 0.9%. 1000 milliLiter(s) (60 mL/Hr) IV Continuous <Continuous>    MEDICATIONS  (PRN):  acetaminophen   Tablet .. 650 milliGRAM(s) Oral every 6 hours PRN Temp greater or equal to 38C (100.4F), Mild Pain (1 - 3)  artificial tears (preservative free) Ophthalmic Solution 2 Drop(s) Right EYE two times a day PRN irritation  loperamide 2 milliGRAM(s) Oral four times a day PRN Diarrhea  ondansetron Injectable 4 milliGRAM(s) IV Push every 6 hours PRN Nausea and/or Vomiting      Vital Signs Last 24 Hrs  T(F): 97.8 (19 @ 12:55), Max: 99.2 (19 @ 15:40)  HR: 98 (19 @ 14:00) (98 - 105)  BP: 98/54 (19 @ 14:00) (86/52 - 124/70)  RR: 18 (19 @ 12:55) (18 - 18)  SpO2: 93% (19 @ 12:55) (93% - 96%)  Telemetry:   CAPILLARY BLOOD GLUCOSE        I&O's Summary    2019 07:01  -  2019 07:00  --------------------------------------------------------  IN: 1540 mL / OUT: 100 mL / NET: 1440 mL    2019 07:01  -  2019 15:35  --------------------------------------------------------  IN: 1270 mL / OUT: 0 mL / NET: 1270 mL        PHYSICAL EXAM:  GENERAL: NAD, well-developed  HEAD:  Atraumatic, Normocephalic  EYES: EOMI, PERRLA, conjunctiva and sclera clear  NECK: Supple, No JVD  CHEST/LUNG: Clear to auscultation bilaterally; No wheeze  HEART: Regular rate and rhythm; No murmurs, rubs, or gallops  ABDOMEN: Soft, Nontender, Nondistended; Bowel sounds present  EXTREMITIES:  2+ Peripheral Pulses, No clubbing, cyanosis, or edema  PSYCH: AAOx3  NEUROLOGY: non-focal  SKIN: No rashes or lesions    LABS:                        8.9    2.17  )-----------( 21       ( 2019 08:22 )             27.8     -    138  |  102  |  66<H>  ----------------------------<  73  4.2   |  19<L>  |  1.68<H>    Ca    7.8<L>      2019 06:26  Phos  3.5       Mg     1.8         TPro  3.6<L>  /  Alb  1.9<L>  /  TBili  6.1<H>  /  DBili  x   /  AST  228<H>  /  ALT  116<H>  /  AlkPhos  1020<H>            Urinalysis Basic - ( 2019 23:46 )    Color: Lolly / Appearance: Slightly Turbid / S.021 / pH: x  Gluc: x / Ketone: Negative  / Bili: Small / Urobili: <2 mg/dL   Blood: x / Protein: 30 mg/dL / Nitrite: Negative   Leuk Esterase: Negative / RBC: 712 /HPF / WBC 7 /HPF   Sq Epi: x / Non Sq Epi: 1 /HPF / Bacteria: Negative        RADIOLOGY & ADDITIONAL TESTS:    Imaging Personally Reviewed:    Consultant(s) Notes Reviewed:      Care Discussed with Consultants/Other Providers:

## 2019-11-15 NOTE — PROGRESS NOTE ADULT - ASSESSMENT
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       # Anaplastic large T cell lymphoa  -s/p brintuximab, minCHOP x 3 cycles, last 10/17  -poor response per BM bx on 10/22 bone marrow biopsy performed with  sheets of macrophages with hemophagocytosis. There is still a large number of large abnormal lymphocytes still visualized.   -Previous discussion with Dr. Clay (primary hematologist) and family was trial of GEM/etoposide  -Given pt's poor performance status, ECOG-3-4 (mostly sedentary, needs assistance with ADLS), he may not tolerate the regimen. Chemotherapy may do more harm.  -pt received single fraction of radiation on 11/14 given to the L-3 spine/vertebra for palliation  -Had GoC discussion with the patient and wife at bedside multiple times. Palliative care onboard.    #DVT  -Left Femoral DVT, V/Q w low prob PE,   -s/p IVC filter placement on 11/12    # HLH   -2/2 to underling lymphoma  -initially responded to tx with etoposide   -now worsening with increasing ferritin in the setting of persistent T cell lymphoma not responsive to treatment  -continue supportive care  -keep hgb > 7, plt > 10 or 15 if febrile    # Lower back pain/LE weakness  -currently more weakness of his b/L LE 1-2/5 on exam.   -CHELSEA spine showed L-3 fx and mild caudaequina enchancement s/p 1 session of RT on 11/14 for palliation.    Bernardo Schmitt MD. PGY 5  Heme-Onc fellow  [phone]; [id number]

## 2019-11-15 NOTE — PROGRESS NOTE ADULT - SUBJECTIVE AND OBJECTIVE BOX
INTERVAL EVENTS:  No acute events overnight. Pt feels tired. Denies any fever, chills, night sweat, CP, SOB, abd pain, constipation, diarrhea, dysuria    REVIEW OF SYSTEMS:  CONSTITUTIONAL: No weakness, fevers or chills  EYES/ENT: No visual changes, no throat pain   RESPIRATORY: No cough, wheezing, hemoptysis; No shortness of breath  CARDIOVASCULAR: No chest pain or palpitations  GASTROINTESTINAL: No abdominal pain, nausea, vomiting, or hematemesis; No diarrhea or constipation. No melena or hematochezia.  GENITOURINARY: No dysuria, frequency or hematuria  MUSCULOSKELETAL: No joint pain.  NEUROLOGICAL: No dizziness, numbness, or weakness  SKIN: No itching, burning, rashes, or lesions   All other review of systems is negative unless indicated above.    Vital Signs Last 24 Hrs  T(C): 36.3 (15 Nov 2019 11:16), Max: 37.4 (14 Nov 2019 15:37)  T(F): 97.4 (15 Nov 2019 11:16), Max: 99.3 (14 Nov 2019 15:37)  HR: 97 (15 Nov 2019 11:16) (97 - 108)  BP: 92/50 (15 Nov 2019 04:57) (86/52 - 106/61)  BP(mean): --  RR: 20 (15 Nov 2019 11:16) (18 - 20)  SpO2: 94% (15 Nov 2019 11:16) (93% - 96%)      PHYSICAL EXAM:   GENERAL: no acute distress  HEENT: EOMI, neck supple  RESPIRATORY: LCTAB/L, no rhonchi, rales, or wheezing  CARDIOVASCULAR: RRR, no murmurs, gallops, rubs  ABDOMINAL: soft, non-tender, non-distended, positive bowel sounds   EXTREMITIES: no clubbing, cyanosis, or edema  NEUROLOGICAL: alert and oriented x 3, non-focal  SKIN: no rashes or lesions   MUSCULOSKELETAL: no gross joint deformity                          7.5    2.02  )-----------( 19       ( 15 Nov 2019 08:37 )             23.0     11-15    136  |  106  |  70<H>  ----------------------------<  82  3.9   |  18<L>  |  1.89<H>    Ca    7.5<L>      15 Nov 2019 06:48  Phos  3.5     11-14  Mg     1.8     11-14    TPro  3.2<L>  /  Alb  1.6<L>  /  TBili  8.2<H>  /  DBili  x   /  AST  221<H>  /  ALT  109<H>  /  AlkPhos  853<H>  11-15        MEDICATIONS  (STANDING):  alfuzosin 10 milliGRAM(s) Oral at bedtime  ascorbic acid 500 milliGRAM(s) Oral daily  atovaquone Suspension 1500 milliGRAM(s) Oral daily  Biotene Dry Mouth Oral Rinse 5 milliLiter(s) Swish and Spit two times a day  finasteride 5 milliGRAM(s) Oral daily  lidocaine   Patch 1 Patch Transdermal daily  multivitamin 1 Tablet(s) Oral daily  pantoprazole    Tablet 40 milliGRAM(s) Oral before breakfast  piperacillin/tazobactam IVPB.. 3.375 Gram(s) IV Intermittent every 8 hours  sodium chloride 0.9%. 1000 milliLiter(s) (60 mL/Hr) IV Continuous <Continuous> INTERVAL EVENTS:  No acute events overnight.  Pt had radiation to the spine yesterday. Pt feels tired. Denies any fever, chills, night sweat, CP, SOB, abd pain, constipation, diarrhea, dysuria    REVIEW OF SYSTEMS:  CONSTITUTIONAL: No weakness, fevers or chills  EYES/ENT: No visual changes, no throat pain   RESPIRATORY: No cough, wheezing, hemoptysis; No shortness of breath  CARDIOVASCULAR: No chest pain or palpitations  GASTROINTESTINAL: No abdominal pain, nausea, vomiting, or hematemesis; No diarrhea or constipation. No melena or hematochezia.  GENITOURINARY: No dysuria, frequency or hematuria  MUSCULOSKELETAL: No joint pain.  NEUROLOGICAL: No dizziness, numbness, or weakness  SKIN: No itching, burning, rashes, or lesions   All other review of systems is negative unless indicated above.    Vital Signs Last 24 Hrs  T(C): 36.3 (15 Nov 2019 11:16), Max: 37.4 (14 Nov 2019 15:37)  T(F): 97.4 (15 Nov 2019 11:16), Max: 99.3 (14 Nov 2019 15:37)  HR: 97 (15 Nov 2019 11:16) (97 - 108)  BP: 92/50 (15 Nov 2019 04:57) (86/52 - 106/61)  BP(mean): --  RR: 20 (15 Nov 2019 11:16) (18 - 20)  SpO2: 94% (15 Nov 2019 11:16) (93% - 96%)      PHYSICAL EXAM:   GENERAL: no acute distress  HEENT: EOMI, neck supple  RESPIRATORY: LCTAB/L, no rhonchi, rales, or wheezing  CARDIOVASCULAR: RRR, no murmurs, gallops, rubs  ABDOMINAL: soft, non-tender, non-distended, positive bowel sounds   EXTREMITIES: no clubbing, cyanosis, or edema  NEUROLOGICAL: alert and oriented x 3, non-focal  SKIN: no rashes or lesions   MUSCULOSKELETAL: no gross joint deformity                          7.5    2.02  )-----------( 19       ( 15 Nov 2019 08:37 )             23.0     11-15    136  |  106  |  70<H>  ----------------------------<  82  3.9   |  18<L>  |  1.89<H>    Ca    7.5<L>      15 Nov 2019 06:48  Phos  3.5     11-14  Mg     1.8     11-14    TPro  3.2<L>  /  Alb  1.6<L>  /  TBili  8.2<H>  /  DBili  x   /  AST  221<H>  /  ALT  109<H>  /  AlkPhos  853<H>  11-15        MEDICATIONS  (STANDING):  alfuzosin 10 milliGRAM(s) Oral at bedtime  ascorbic acid 500 milliGRAM(s) Oral daily  atovaquone Suspension 1500 milliGRAM(s) Oral daily  Biotene Dry Mouth Oral Rinse 5 milliLiter(s) Swish and Spit two times a day  finasteride 5 milliGRAM(s) Oral daily  lidocaine   Patch 1 Patch Transdermal daily  multivitamin 1 Tablet(s) Oral daily  pantoprazole    Tablet 40 milliGRAM(s) Oral before breakfast  piperacillin/tazobactam IVPB.. 3.375 Gram(s) IV Intermittent every 8 hours  sodium chloride 0.9%. 1000 milliLiter(s) (60 mL/Hr) IV Continuous <Continuous>

## 2019-11-15 NOTE — PROGRESS NOTE ADULT - ASSESSMENT
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 Treated with brentuximab and CHOP x 3, relapsed. Images reviewed. Recent fracture of lumbar vertebra, diagnosis made in ortho office, no films available. No MRI. Has notable pains with movements. Has very minimal LE power proximally and decreased to absent KJ reflexes. He was unable to get off of the toilet due to weakness. Had CT abd/pelvis today. Needs MRI (preferable, but will not tolerate) or CT scan of the lumbar spine. Concerns of leptomeningeal involvement a possibility. 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 alternative chemo regimen was discussed but ptn is too frail    no further dyspnea today , ongoing weakness, LE pain and LBP, found to have Left Femoral DVT, V/Q w low prob PE, NOT A CANDIDATE FOR FULL AC, HEME/ONC.Family  want everything done and do not want comfort care, ptn has had a total 3 units PRBC in 7 day period, needs premedication 2/2 having alloABx. ptn is full code.    ptn s/p r IVC filter in IR,   MRI of C/T/LS spine done 11/8: has a metastatic focus in L3 w cauda equina involvement. Radiation/Oncology input appreciated, palliation RTx is  scheduled     decrease in HCT- / from hlh vs bleed  Pt was started on abs- cultures pending  u/a does not suggest UTI  Cultures NGTD- would d/c abs   no left shift on WBC    nose appears erythematous but patient claims this is chronic- improved today    appreciate palliative care input    ID service will be covering over the weekend. Please call for acute issues or questions. [phone]

## 2019-11-15 NOTE — PROVIDER CONTACT NOTE (CRITICAL VALUE NOTIFICATION) - ACTION/TREATMENT ORDERED:
NP notified. no new orders. continue to follow orders per NP.
NP notified. orders for blood transfusion to follow. Per NP, allow albumin to finish, then give pre-medication for blood transfusion followed by transfusion then zosyn.
NP made aware
NP Dionicio Hernandez aware of plt 15, will continue to monitor pt
Per NIMISHA Saravia, repeat lab
no additonal action done at this time, will continue terese onitor.
no additional action done at this time, and will continue to monitor.
No transfusion ordered due to previous transfusion reaction
As per NIMISHA Berg, T/S stat, 1 unit PRBC's ordered; will follow up.  Will continue to monitor.

## 2019-11-15 NOTE — PROVIDER CONTACT NOTE (CRITICAL VALUE NOTIFICATION) - ASSESSMENT
pt alert and oriented times three, pt afebrile
resting in bed,
Pt vss wnl. Pt showing no signs of distress
resting in bed,
Patient c/o generalized weakness; Vss

## 2019-11-15 NOTE — PROVIDER CONTACT NOTE (CRITICAL VALUE NOTIFICATION) - BACKGROUND
Pt with history of hemophagocytic lymphohistiocytosis.
admitted with Hyponatremia and Hypovolemia
pt admitted with anemia, pt with history of NHL
Pt admitted 11/4/19 with anemia. Pt s/p 1 unit prbc transfusion reaction
admitted with Hyponatremia, Hypovolemia YEE
pt admitted for anemia
Chronic Anemia, HLH, BPH

## 2019-11-15 NOTE — PROGRESS NOTE ADULT - ASSESSMENT
icteric, predominantly cholestatic LFTs on Zosyn which can cause, also likely hepatic infiltration  supportive care, consider stopping or changing Zosyn

## 2019-11-15 NOTE — PROGRESS NOTE ADULT - SUBJECTIVE AND OBJECTIVE BOX
Patient is a 79y old  Male who presents with a chief complaint of weakness, anemia (2019 15:35)    Being followed by ID for        Interval history:  No other acute events      ROS:  No cough,SOB,CP  No N/V/D  No abd pain  No urinary complaints  No HA  No joint or limb pain  No other complaints    PAST MEDICAL & SURGICAL HISTORY:  Acute on chronic anemia  HLH (hemophagocytic lymphohistiocytosis)  BPH (benign prostatic hyperplasia)  H/O knee surgery  Hernia    Allergies    No Known Allergies    Intolerances      Antimicrobials:    atovaquone Suspension 1500 milliGRAM(s) Oral daily  piperacillin/tazobactam IVPB.. 3.375 Gram(s) IV Intermittent every 8 hours    MEDICATIONS  (STANDING):  alfuzosin 10 milliGRAM(s) Oral at bedtime  ascorbic acid 500 milliGRAM(s) Oral daily  atovaquone Suspension 1500 milliGRAM(s) Oral daily  Biotene Dry Mouth Oral Rinse 5 milliLiter(s) Swish and Spit two times a day  finasteride 5 milliGRAM(s) Oral daily  furosemide   Injectable 40 milliGRAM(s) IV Push once  lidocaine   Patch 1 Patch Transdermal daily  multivitamin 1 Tablet(s) Oral daily  pantoprazole    Tablet 40 milliGRAM(s) Oral before breakfast  piperacillin/tazobactam IVPB.. 3.375 Gram(s) IV Intermittent every 8 hours  sodium chloride 0.9%. 1000 milliLiter(s) (60 mL/Hr) IV Continuous <Continuous>      Vital Signs Last 24 Hrs  T(C): 36.3 (11-15-19 @ 11:16), Max: 37.4 (19 @ 15:37)  T(F): 97.4 (11-15-19 @ 11:16), Max: 99.3 (19 @ 15:37)  HR: 97 (11-15-19 @ 11:16) (97 - 108)  BP: 92/50 (11-15-19 @ 04:57) (86/52 - 106/61)  BP(mean): --  RR: 20 (11-15-19 @ 11:16) (18 - 20)  SpO2: 94% (11-15-19 @ 11:16) (93% - 96%)    Physical Exam:    Constitutional well preserved,comfortable,pleasant    HEENT PERRLA EOMI,No pallor or icterus    No oral exudate or erythema    Neck supple no JVD or LN    Chest Good AE,CTA    CVS RRR S1 S2 WNl No murmur or rub or gallop    Abd soft BS normal No tenderness no masses    Ext No cyanosis clubbing or edema    IV site no erythema tenderness or discharge    Joints no swelling or LOM    CNS AAO X 3 no focal    Lab Data:                          7.5    2.02  )-----------(        ( 15 Nov 2019 08:37 )             23.0       11-15    136  |  106  |  70<H>  ----------------------------<  82  3.9   |  18<L>  |  1.89<H>    Ca    7.5<L>      15 Nov 2019 06:48  Phos  3.5       Mg     1.8         TPro  3.2<L>  /  Alb  1.6<L>  /  TBili  8.2<H>  /  DBili  x   /  AST  221<H>  /  ALT  109<H>  /  AlkPhos  853<H>  11-15      Urinalysis Basic - ( 2019 23:46 )    Color: Lolly / Appearance: Slightly Turbid / S.021 / pH: x  Gluc: x / Ketone: Negative  / Bili: Small / Urobili: <2 mg/dL   Blood: x / Protein: 30 mg/dL / Nitrite: Negative   Leuk Esterase: Negative / RBC: 712 /HPF / WBC 7 /HPF   Sq Epi: x / Non Sq Epi: 1 /HPF / Bacteria: Negative        .Blood Blood-Peripheral  19   No growth to date.  --  --          WBC Count: 2.02 (11-15-19 @ 08:37)  WBC Count: 2.17 (19 @ 08:22)  WBC Count: 2.58 (19 @ 10:49)  WBC Count: 1.62 (19 @ 00:46)  WBC Count: 2.43 (19 @ 09:02)  WBC Count: 2.13 (19 @ 09:26)  WBC Count: 2.36 (11-10-19 @ 09:19)  WBC Count: 2.88 (19 @ 11:05) Patient is a 79y old  Male who presents with a chief complaint of weakness, anemia (2019 15:35)    Being followed by ID for        Interval history:  pt eating poorly  wife at bedside  no change in LE weakness  no diarrhea  No other acute events      PAST MEDICAL & SURGICAL HISTORY:  Acute on chronic anemia  HLH (hemophagocytic lymphohistiocytosis)  BPH (benign prostatic hyperplasia)  H/O knee surgery  Hernia    Allergies    No Known Allergies    Intolerances      Antimicrobials:    atovaquone Suspension 1500 milliGRAM(s) Oral daily  piperacillin/tazobactam IVPB.. 3.375 Gram(s) IV Intermittent every 8 hours    MEDICATIONS  (STANDING):  alfuzosin 10 milliGRAM(s) Oral at bedtime  ascorbic acid 500 milliGRAM(s) Oral daily  atovaquone Suspension 1500 milliGRAM(s) Oral daily  Biotene Dry Mouth Oral Rinse 5 milliLiter(s) Swish and Spit two times a day  finasteride 5 milliGRAM(s) Oral daily  furosemide   Injectable 40 milliGRAM(s) IV Push once  lidocaine   Patch 1 Patch Transdermal daily  multivitamin 1 Tablet(s) Oral daily  pantoprazole    Tablet 40 milliGRAM(s) Oral before breakfast  piperacillin/tazobactam IVPB.. 3.375 Gram(s) IV Intermittent every 8 hours  sodium chloride 0.9%. 1000 milliLiter(s) (60 mL/Hr) IV Continuous <Continuous>      Vital Signs Last 24 Hrs  T(C): 36.3 (11-15-19 @ 11:16), Max: 37.4 (19 @ 15:37)  T(F): 97.4 (11-15-19 @ 11:16), Max: 99.3 (19 @ 15:37)  HR: 97 (11-15-19 @ 11:16) (97 - 108)  BP: 92/50 (11-15-19 @ 04:57) (86/52 - 106/61)  BP(mean): --  RR: 20 (11-15-19 @ 11:16) (18 - 20)  SpO2: 94% (11-15-19 @ 11:16) (93% - 96%)    Physical Exam:    Constitutional  resting quietly    HEENT PERRLA EOMI, icteric    No oral exudate or erythema    Neck supple no JVD or LN    Chest Good AE,CTA    CVS RRR S1 S2 WNl     Abd soft BS normal No tenderness    Ext  edema    IV site no erythema tenderness or discharge    Joints no swelling or LOM    CNS AAO X 3  bilateral LE weakness    Lab Data:                          7.5    2.02  )-----------( 19       ( 15 Nov 2019 08:37 )             23.0       11-15    136  |  106  |  70<H>  ----------------------------<  82  3.9   |  18<L>  |  1.89<H>    Ca    7.5<L>      15 Nov 2019 06:48  Phos  3.5       Mg     1.8         TPro  3.2<L>  /  Alb  1.6<L>  /  TBili  8.2<H>  /  DBili  x   /  AST  221<H>  /  ALT  109<H>  /  AlkPhos  853<H>  11-15      Urinalysis Basic - ( 2019 23:46 )    Color: Lolly / Appearance: Slightly Turbid / S.021 / pH: x  Gluc: x / Ketone: Negative  / Bili: Small / Urobili: <2 mg/dL   Blood: x / Protein: 30 mg/dL / Nitrite: Negative   Leuk Esterase: Negative / RBC: 712 /HPF / WBC 7 /HPF   Sq Epi: x / Non Sq Epi: 1 /HPF / Bacteria: Negative        .Blood Blood-Peripheral  19   No growth to date.  --  --          WBC Count: 2.02 (11-15-19 @ 08:37)  WBC Count: 2.17 (19 @ 08:22)  WBC Count: 2.58 (19 @ 10:49)  WBC Count: 1.62 (19 @ 00:46)  WBC Count: 2.43 (19 @ 09:02)  WBC Count: 2.13 (19 @ 09:26)  WBC Count: 2.36 (11-10-19 @ 09:19)  WBC Count: 2.88 (19 @ 11:05)

## 2019-11-15 NOTE — PROGRESS NOTE ADULT - SUBJECTIVE AND OBJECTIVE BOX
No new complaints.     VITAL:  T(C): , Max: 37.4 (19 @ 15:37)  T(F): , Max: 99.3 (19 @ 15:37)  HR: 97 (11-15-19 @ 11:16)  BP: 92/50 (11-15-19 @ 04:57)  RR: 20 (11-15-19 @ 11:16)  SpO2: 94% (11-15-19 @ 11:16)      PHYSICAL EXAM:    Constitutional: NAD; Alert  HEENT:  NCAT; DMM  Neck: No JVD; supple  Respiratory: clear lung sounds  Cardiac: RRR s1s2  Gastrointestinal: BS+, soft, NT/ND  Urologic: No casas  Extremities: No peripheral edema  Back: No CVAT b/l    LABS:                        7.5    2.02  )-----------(        ( 15 Nov 2019 08:37 )             23.0     Na(136)/K(3.9)/Cl(106)/HCO3(18)/BUN(70)/Cr(1.89)Glu(82)/Ca(7.5)/Mg(--)/PO4(--)    11-15 @ 06:48  Na(138)/K(4.2)/Cl(102)/HCO3(19)/BUN(66)/Cr(1.68)Glu(73)/Ca(7.8)/Mg(1.8)/PO4(3.5)     @ 06:26  Na(137)/K(3.9)/Cl(103)/HCO3(17)/BUN(68)/Cr(1.75)Glu(102)/Ca(7.5)/Mg(--)/PO4(--)     @ 10:49  Na(136)/K(4.1)/Cl(105)/HCO3(18)/BUN(58)/Cr(1.61)Glu(91)/Ca(7.5)/Mg(--)/PO4(--)     @ 00:46    Urinalysis Basic - ( 2019 23:46 )    Color: Lolly / Appearance: Slightly Turbid / S.021 / pH: x  Gluc: x / Ketone: Negative  / Bili: Small / Urobili: <2 mg/dL   Blood: x / Protein: 30 mg/dL / Nitrite: Negative   Leuk Esterase: Negative / RBC: 712 /HPF / WBC 7 /HPF   Sq Epi: x / Non Sq Epi: 1 /HPF / Bacteria: Negative    Constitutional: NAD, Alert; thin to cachectic  HEENT: NCAT, MMM  Neck: Supple, No JVD  Respiratory: CTA-b/l  Cardiovascular: RRR s1s2, no m/r/g  Gastrointestinal: hyperactive, NTND  Extremities: tr edema  Neurological: reduced generalized strength  Back: no CVAT b/l  Skin: No rashes, no nevi    IMPRESSION: 79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 GFR now ~35-45ml/min.    (2)CV - tenuous hemodynamics; soft Bps    (3)Metabolic acidosis - AG is 12 today, HCO3 slightly decreased to 18.      RECOMMEND:  (1)Consider changing lasix 40mg iv to lasix 20mg iv given rise in cr  (2)Dose new meds for GFR 35-45ml/min (present dosing is acceptable)  (3)High-protein diet  (4)Palliative f/u regarding goals of care    Imelda Ji NP  [facility]  [phone] No new complaints.     VITAL:  T(C): , Max: 37.4 (19 @ 15:37)  T(F): , Max: 99.3 (19 @ 15:37)  HR: 97 (11-15-19 @ 11:16)  BP: 92/50 (11-15-19 @ 04:57)  RR: 20 (11-15-19 @ 11:16)  SpO2: 94% (11-15-19 @ 11:16)      PHYSICAL EXAM:    Constitutional: NAD; Alert  HEENT:  NCAT; DMM  Neck: No JVD; supple  Respiratory: clear lung sounds  Cardiac: RRR s1s2  Gastrointestinal: BS+, soft, NT/ND  Urologic: No casas  Extremities: No peripheral edema  Back: No CVAT b/l    LABS:                        7.5    2.02  )-----------(        ( 15 Nov 2019 08:37 )             23.0     Na(136)/K(3.9)/Cl(106)/HCO3(18)/BUN(70)/Cr(1.89)Glu(82)/Ca(7.5)/Mg(--)/PO4(--)    11-15 @ 06:48  Na(138)/K(4.2)/Cl(102)/HCO3(19)/BUN(66)/Cr(1.68)Glu(73)/Ca(7.8)/Mg(1.8)/PO4(3.5)     @ 06:26  Na(137)/K(3.9)/Cl(103)/HCO3(17)/BUN(68)/Cr(1.75)Glu(102)/Ca(7.5)/Mg(--)/PO4(--)     @ 10:49  Na(136)/K(4.1)/Cl(105)/HCO3(18)/BUN(58)/Cr(1.61)Glu(91)/Ca(7.5)/Mg(--)/PO4(--)     @ 00:46    Urinalysis Basic - ( 2019 23:46 )    Color: Lolly / Appearance: Slightly Turbid / S.021 / pH: x  Gluc: x / Ketone: Negative  / Bili: Small / Urobili: <2 mg/dL   Blood: x / Protein: 30 mg/dL / Nitrite: Negative   Leuk Esterase: Negative / RBC: 712 /HPF / WBC 7 /HPF   Sq Epi: x / Non Sq Epi: 1 /HPF / Bacteria: Negative    Constitutional: NAD, Alert; thin to cachectic  HEENT: NCAT, MMM  Neck: Supple, No JVD  Respiratory: CTA-b/l  Cardiovascular: RRR s1s2, no m/r/g  Gastrointestinal: hyperactive, NTND  Extremities: tr edema  Neurological: reduced generalized strength  Back: no CVAT b/l  Skin: No rashes, no nevi    IMPRESSION: 79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 GFR now ~35-45ml/min.    (2)CV - tenuous hemodynamics; soft Bps    (3)Metabolic acidosis - AG is 12 today, HCO3 slightly decreased to 18.      RECOMMEND:  (1)Consider changing lasix 40mg iv to lasix 20mg iv given rise in cr  (2)Dose new meds for GFR 35-45ml/min (present dosing is acceptable)  (3)High-protein diet  (4)Palliative f/u regarding goals of care    Imelda Ji NP  [facility]  [phone]    *******************RENAL ATTENDING**********************  Seen/examined with NP. I agree with NP assessment as above.    VS as above; NAD; CTA-B/L; RRR; no edema b/l      IMPRESSION: 79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 GFR now ~35-45ml/min.    (2)CV - tenuous hemodynamics; soft Bps - ordered for NS 60cc/h IV and ordered for IV lasix x 1. If there is concern that he is getting overloaded from the IVF, we can stop the IVF. Given his tenuous hemodynamics and his (slowly) rising creatinine, I would not give diuretics unless he develops acute SOB presumed to be from pulmonary edema.    (3)Metabolic acidosis - mild/reasonably stable as of today    (4)Goals of care - poor overall prognosis - primary team input/Palliative input appreciated - family wants aggressive life-extending care      RECOMMEND:  (1)D/C IVF  (2)No diuretics for now  (3)BMP daily  (4)Dose new meds for GFR 20-30ml/min (present dosing is acceptable)            Bernardo Galdamez MD  [facility]  [phone] No new complaints.     VITAL:  T(C): , Max: 37.4 (19 @ 15:37)  T(F): , Max: 99.3 (19 @ 15:37)  HR: 97 (11-15-19 @ 11:16)  BP: 92/50 (11-15-19 @ 04:57)  RR: 20 (11-15-19 @ 11:16)  SpO2: 94% (11-15-19 @ 11:16)      PHYSICAL EXAM:    Constitutional: NAD; Alert  HEENT:  NCAT; DMM  Neck: No JVD; supple  Respiratory: clear lung sounds  Cardiac: RRR s1s2  Gastrointestinal: BS+, soft, NT/ND  Urologic: No casas  Extremities: No peripheral edema  Back: No CVAT b/l    LABS:                        7.5    2.02  )-----------(        ( 15 Nov 2019 08:37 )             23.0     Na(136)/K(3.9)/Cl(106)/HCO3(18)/BUN(70)/Cr(1.89)Glu(82)/Ca(7.5)/Mg(--)/PO4(--)    11-15 @ 06:48  Na(138)/K(4.2)/Cl(102)/HCO3(19)/BUN(66)/Cr(1.68)Glu(73)/Ca(7.8)/Mg(1.8)/PO4(3.5)     @ 06:26  Na(137)/K(3.9)/Cl(103)/HCO3(17)/BUN(68)/Cr(1.75)Glu(102)/Ca(7.5)/Mg(--)/PO4(--)     @ 10:49  Na(136)/K(4.1)/Cl(105)/HCO3(18)/BUN(58)/Cr(1.61)Glu(91)/Ca(7.5)/Mg(--)/PO4(--)     @ 00:46    Urinalysis Basic - ( 2019 23:46 )    Color: Lolly / Appearance: Slightly Turbid / S.021 / pH: x  Gluc: x / Ketone: Negative  / Bili: Small / Urobili: <2 mg/dL   Blood: x / Protein: 30 mg/dL / Nitrite: Negative   Leuk Esterase: Negative / RBC: 712 /HPF / WBC 7 /HPF   Sq Epi: x / Non Sq Epi: 1 /HPF / Bacteria: Negative    Constitutional: NAD, Alert; thin to cachectic  HEENT: NCAT, MMM  Neck: Supple, No JVD  Respiratory: CTA-b/l  Cardiovascular: RRR s1s2, no m/r/g  Gastrointestinal: hyperactive, NTND  Extremities: tr edema  Neurological: reduced generalized strength  Back: no CVAT b/l  Skin: No rashes, no nevi    IMPRESSION: 79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 GFR now ~35-45ml/min.    (2)CV - tenuous hemodynamics; soft Bps    (3)Metabolic acidosis - AG is 12 today, HCO3 slightly decreased to 18.      RECOMMEND:  (1)Consider changing lasix 40mg iv to lasix 20mg iv given rise in cr  (2)Dose new meds for GFR 35-45ml/min (present dosing is acceptable)  (3)High-protein diet  (4)Palliative f/u regarding goals of care    Imelda Ji NP  [facility]  [phone]    *******************RENAL ATTENDING**********************  Seen/examined with NP. I agree with NP assessment as above.    VS as above; frail; cachectic; tachypnic; CTA-B/L; RRR; no edema b/l      IMPRESSION: 79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 GFR now ~35-45ml/min.    (2)CV - tenuous hemodynamics; soft Bps - ordered for NS 60cc/h IV and ordered for IV lasix x 1. Rather than giving the lasix for now, I would favor now stopping the IVF. Could check CXR as well. If dyspnea worsens and if CXR c/w pulm edema, we can then give lasix.    (3)Metabolic acidosis - mild/reasonably stable as of today    (4)Goals of care - poor overall prognosis - primary team input/Palliative input appreciated - family wants aggressive life-extending care      RECOMMEND:  (1)D/C IVF  (2)No diuretics for now; could give 20-40mg iv lasix x1 if dyspnea worsens  (3)CXR  (4)BMP daily  (5)Dose new meds for GFR 20-30ml/min (present dosing is acceptable)        Bernardo Galdamez MD  [facility]  [phone]

## 2019-11-15 NOTE — PROGRESS NOTE ADULT - SUBJECTIVE AND OBJECTIVE BOX
INTERVAL HPI/OVERNIGHT EVENTS:    MEDICATIONS  (STANDING):  alfuzosin 10 milliGRAM(s) Oral at bedtime  ascorbic acid 500 milliGRAM(s) Oral daily  atovaquone Suspension 1500 milliGRAM(s) Oral daily  Biotene Dry Mouth Oral Rinse 5 milliLiter(s) Swish and Spit two times a day  finasteride 5 milliGRAM(s) Oral daily  lidocaine   Patch 1 Patch Transdermal daily  multivitamin 1 Tablet(s) Oral daily  pantoprazole    Tablet 40 milliGRAM(s) Oral before breakfast  piperacillin/tazobactam IVPB.. 3.375 Gram(s) IV Intermittent every 8 hours  sodium chloride 0.9%. 1000 milliLiter(s) (60 mL/Hr) IV Continuous <Continuous>    MEDICATIONS  (PRN):  acetaminophen   Tablet .. 650 milliGRAM(s) Oral every 6 hours PRN Temp greater or equal to 38C (100.4F), Mild Pain (1 - 3)  artificial tears (preservative free) Ophthalmic Solution 2 Drop(s) Right EYE two times a day PRN irritation  loperamide 2 milliGRAM(s) Oral four times a day PRN Diarrhea  ondansetron Injectable 4 milliGRAM(s) IV Push every 6 hours PRN Nausea and/or Vomiting      Allergies    No Known Allergies    Intolerances            PHYSICAL EXAM:   Vital Signs:  Vital Signs Last 24 Hrs  T(C): 36.8 (15 Nov 2019 04:57), Max: 37.4 (2019 15:37)  T(F): 98.3 (15 Nov 2019 04:57), Max: 99.3 (2019 15:37)  HR: 108 (15 Nov 2019 04:57) (97 - 108)  BP: 92/50 (15 Nov 2019 04:57) (86/52 - 106/61)  BP(mean): --  RR: 20 (15 Nov 2019 04:57) (18 - 20)  SpO2: 96% (15 Nov 2019 04:57) (93% - 96%)  Daily     Daily     GENERAL:  no distress  HEENT:  NC/AT,  icteric  CHEST:   no increased effort, breath sounds clear  HEART:  Regular rhythm  ABDOMEN:  Soft, non-tender, non-distended, normoactive bowel sounds,  no masses ,no hepato-splenomegaly, no signs of chronic liver disease  EXTEREMITIES:  no cyanosis      LABS:                        8.9    2.17  )-----------( 21       ( 2019 08:22 )             27.8     11-15    136  |  106  |  70<H>  ----------------------------<  82  3.9   |  18<L>  |  1.89<H>    Ca    7.5<L>      15 Nov 2019 06:48  Phos  3.5     -  Mg     1.8         TPro  3.2<L>  /  Alb  1.6<L>  /  TBili  8.2<H>  /  DBili  x   /  AST  221<H>  /  ALT  109<H>  /  AlkPhos  853<H>  11-15      Urinalysis Basic - ( 2019 23:46 )    Color: Lolly / Appearance: Slightly Turbid / S.021 / pH: x  Gluc: x / Ketone: Negative  / Bili: Small / Urobili: <2 mg/dL   Blood: x / Protein: 30 mg/dL / Nitrite: Negative   Leuk Esterase: Negative / RBC: 712 /HPF / WBC 7 /HPF   Sq Epi: x / Non Sq Epi: 1 /HPF / Bacteria: Negative        RADIOLOGY & ADDITIONAL TESTS:

## 2019-11-15 NOTE — PROGRESS NOTE ADULT - SUBJECTIVE AND OBJECTIVE BOX
Patient is a 79y old  Male who presents with a chief complaint of weakness, anemia (2019 15:35)      SUBJECTIVE / OVERNIGHT EVENTS: back pain better today, ptn stool w PT, states he feels breathless, appears breathless    MEDICATIONS  (STANDING):  alfuzosin 10 milliGRAM(s) Oral at bedtime  ascorbic acid 500 milliGRAM(s) Oral daily  atovaquone Suspension 1500 milliGRAM(s) Oral daily  Biotene Dry Mouth Oral Rinse 5 milliLiter(s) Swish and Spit two times a day  finasteride 5 milliGRAM(s) Oral daily  lidocaine   Patch 1 Patch Transdermal daily  multivitamin 1 Tablet(s) Oral daily  pantoprazole    Tablet 40 milliGRAM(s) Oral before breakfast    MEDICATIONS  (PRN):  acetaminophen   Tablet .. 650 milliGRAM(s) Oral every 6 hours PRN Temp greater or equal to 38C (100.4F), Mild Pain (1 - 3)  artificial tears (preservative free) Ophthalmic Solution 2 Drop(s) Right EYE two times a day PRN irritation  loperamide 2 milliGRAM(s) Oral four times a day PRN Diarrhea  ondansetron Injectable 4 milliGRAM(s) IV Push every 6 hours PRN Nausea and/or Vomiting      Vital Signs Last 24 Hrs  T(F): 97.4 (11-15-19 @ 11:16), Max: 98.3 (11-15-19 @ 04:57)  HR: 97 (11-15-19 @ 11:16) (97 - 108)  BP: 92/50 (11-15-19 @ 04:57) (92/50 - 106/61)  RR: 20 (11-15-19 @ 11:16) (20 - 20)  SpO2: 94% (11-15-19 @ 11:16) (94% - 96%)  Telemetry:   CAPILLARY BLOOD GLUCOSE        I&O's Summary    2019 07:  -  15 Nov 2019 07:00  --------------------------------------------------------  IN: 3130 mL / OUT: 0 mL / NET: 3130 mL    15 Nov 2019 07:01  -  15 Nov 2019 16:06  --------------------------------------------------------  IN: 240 mL / OUT: 0 mL / NET: 240 mL        PHYSICAL EXAM:  GENERAL: NAD, well-developed  HEAD:  Atraumatic, Normocephalic  EYES: EOMI, PERRLA, conjunctiva and sclera clear  NECK: Supple, No JVD  CHEST/LUNG: Clear to auscultation bilaterally; No wheeze  HEART: Regular rate and rhythm; No murmurs, rubs, or gallops  ABDOMEN: Soft, Nontender, Nondistended; Bowel sounds present  EXTREMITIES:  2+ Peripheral Pulses, No clubbing, cyanosis, or edema  PSYCH: AAOx3  NEUROLOGY: non-focal  SKIN: No rashes or lesions    LABS:                        7.5    2.02  )-----------( 19       ( 15 Nov 2019 08:37 )             23.0     11-15    136  |  106  |  70<H>  ----------------------------<  82  3.9   |  18<L>  |  1.89<H>    Ca    7.5<L>      15 Nov 2019 06:48  Phos  3.5     11-14  Mg     1.8     11-14    TPro  3.2<L>  /  Alb  1.6<L>  /  TBili  8.2<H>  /  DBili  x   /  AST  221<H>  /  ALT  109<H>  /  AlkPhos  853<H>  11-15          Urinalysis Basic - ( 2019 23:46 )    Color: Lolly / Appearance: Slightly Turbid / S.021 / pH: x  Gluc: x / Ketone: Negative  / Bili: Small / Urobili: <2 mg/dL   Blood: x / Protein: 30 mg/dL / Nitrite: Negative   Leuk Esterase: Negative / RBC: 712 /HPF / WBC 7 /HPF   Sq Epi: x / Non Sq Epi: 1 /HPF / Bacteria: Negative        RADIOLOGY & ADDITIONAL TESTS:    Imaging Personally Reviewed:    Consultant(s) Notes Reviewed:      Care Discussed with Consultants/Other Providers:

## 2019-11-15 NOTE — PROVIDER CONTACT NOTE (OTHER) - ACTION/TREATMENT ORDERED:
NP aware. per NP, vitals are okay. No new orders at this time. will continue to monitor pt. NP aware. per NP, vitals are okay. No new orders at this time. will continue to monitor pt. Will encourage fluid intake

## 2019-11-15 NOTE — PROGRESS NOTE ADULT - ATTENDING COMMENTS
Patient has fever, d/w with medical team, infectious workup is negative and fevers are attributed to lymphoma dx
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 Patient PFS very poor right now and did not respond to previous chemotherapy very well. Prognosis guarded. Discussed with family that currently chemotherapy is not a good option for him. Family has indicated in previous discussions with Dr. Clay that they do not want to go the palliative route. Now with DVT with pancytopenia, s/p IVC filter. S/p RRT for hypotension found with worsening H/H and s/p PRBC with improvement, radiation therapy plan pushed back to tomorrow.
79 male with known lymphoma and recent PET with mets to hip/spine. presents with b/l leg pains and weakness, back pain. exam shows distal > proximal weakness of L>R leg and radicular type pain down left leg, described as burning, otherwise normal mentation and no other focality    recs:    MRI C/T/L spines w/wo evaluate extent of lymphoma mets   depending on results may consider MRI brain w/wo if upper cervical lesion are found   pain management  gabapentin renally dosed 100mg TID for radicular pain  PT fall precautions, strengthening  heme/onc following ? rad-onc eval needed after MRI spine studies done  goals of care to be clarified  dvt prophylaxis  will follow  reviewed with RN, MRI for 630pm today
79 male with known lymphoma and recent PET with mets to hip/spine. presents with b/l leg pains and weakness, back pain. exam shows distal > proximal weakness of L>R leg and radicular type pain down left leg, described as burning, otherwise normal mentation and no other focality    recs:    MRI C/T/L spines w/wo evaluate extent of lymphoma mets  - results pending    depending on results may consider MRI brain w/wo if upper cervical lesion are found   pain management  gabapentin renally dosed 100mg TID for radicular pain, still has pain when touch legs, will inc to 200 TID  PT fall precautions, strengthening  heme/onc following ? rad-onc eval needed after MRI spine studies done  goals of care to be clarified  dvt prophylaxis  will follow
79 male with known lymphoma and recent PET with mets to hip/spine. presents with b/l leg pains and weakness, back pain. exam shows distal > proximal weakness of L>R leg and radicular type pain down left leg, described as burning, otherwise normal mentation and no other focality    recs:  MRis noted above, pain from bone involvment  appreciate rad onc note, for radiation at [facility]  pain management  gabapentin d/c at request of patient, ok ay with tylenol  PT fall precautions, strengthening  no further inpt neuro workup needed, reconsult prn
Agree with above NP note.  cv stable  remains off diuretics in setting of recent hypotension, shelly  monitor volume status   GOC per family, pall
Agree with above NP note.  cv stable  remains off diuretics in setting of recent hypotension, shelly  monitor volume status   GOC per family, pall
Libia Kimball M.D. ,   Pager [phone]     after 5PM/ weekends [phone]
Mr. Benitez is seen in follow up today. family at bedside, including his brother, a retired doctor. He continues to have pain in the lower back, with movements. He feels his legs are stronger, but there is no objective change. A sono was done, revealed a small liver lesion at 1.8 cms, which corresponds to the hot area on the PET. MRI is scheduled for tomorrow. 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 This would be an ominous development is proven to be the case. His wife had questions bout his transfusion reaction in the ER. He had a febrile reaction after the termination, and the blood bank assessed this as a febrile transfusion reaction. Discussed with Dr Clay, who said he has had alloantibodies in the past. I agree with the assessment and plan as in Dr Schmitt's note
Patient seen and examined, agree with the above assessment and plan by NIMISHA Chakraborty.  resp status improved w lasix  cont diuresis w transfusion  heme/onc followup
Patient seen and examined, agree with the above assessment and plan by NIMISHA Chakraborty.  resp status improved w lasix, cont PO  cont diuresis w transfusion  heme/onc followup
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 Patient PFS very poor right now and did not respond to previous chemotherapy very well. Prognosis guarded. Discussed with family that currently chemotherapy is not a good option for him. Family has indicated in previous discussions  that they do not want to go the palliative route. Now with DVT with pancytopenia, s/p IVC filter. S/p RRT for hypotension found with worsening H/H and s/p PRBC with improvement, radiation therapy plan pushed back to yesterday, now s/p RT. Cholestasis, continue to follow bilirubin level. Antibiotics as per ID. Physical therapy, patient stood today for around 10 seconds.
Libia Kimball M.D. ,   Pager [phone]     after 5PM/ weekends [phone]
Libia Kimball M.D. ,   Pager [phone]     after 5PM/ weekends [phone]
Libia Kimball M.D. ,   Pager [phone]     after 5PM/ weekends [phone]
agree with the above assessment and plan by NIMISHA Chakraborty.  resp status improved w lasix, cont PO  cont diuresis w transfusion  heme/onc followup
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 Patient PFS very poor right now and did not respond to previous chemotherapy very well. Prognosis guarded. Discussed with family that currently chemotherapy is not a good option for him. Family has indicated in previous discussions with Dr. Clay that they do not want to go the palliative route. Now with DVT with pancytopenia, given family goals would need IVC filter.

## 2019-11-15 NOTE — PROVIDER CONTACT NOTE (CRITICAL VALUE NOTIFICATION) - SITUATION
Platelet count 19
H/H 6.4/19.7
Lab called to report critical value of hct 21.0
Patietn platelet count 18
Patient with LOW RBC's

## 2019-11-15 NOTE — PROGRESS NOTE ADULT - ASSESSMENT
Echo from 4/2019 ef 75%, nl lv sys fx.    a/p  79M w/ HLH/NHL, and BPH admitted with diarrhea, YEE, anemia . Patient being evaluated for SOB.     1. SOB/acute on chronic Diastolic CHF   -stable, resolved  -in the setting of multiple PRBC/IVF ressuc  -s/p diuresis   -hypotensive readings noted ,hypovolemic;  lasix on hold  -hypoproteinemia/ hypoalbuminemia/ worsening liver disease likely playing a role in LE edema   -echo with nl LV sys fx no significant valvular disease     2. YEE   -renal f/u, trend creat    3. Anemia  hem/onc/ med, GI,  f/u   prbc/ platelets prn per med/ hem/onc    4. HLH / lymphoma  + dvt to left femoral vein in the proximal and mid thigh  s/p placement of retrievable inferior vena cava filter  hem/onc f/u  palliative care f/u    5, Hyponatremia , resolved  renal f/u    dvt ppx

## 2019-11-15 NOTE — PROGRESS NOTE ADULT - SUBJECTIVE AND OBJECTIVE BOX
CARDIOLOGY FOLLOW UP - Dr. Frank    CC no cp or sob       PHYSICAL EXAM:  T(C): 36.8 (11-15-19 @ 04:57), Max: 37.4 (11-14-19 @ 15:37)  HR: 108 (11-15-19 @ 04:57) (97 - 108)  BP: 92/50 (11-15-19 @ 04:57) (86/52 - 106/61)  RR: 20 (11-15-19 @ 04:57) (18 - 20)  SpO2: 96% (11-15-19 @ 04:57) (93% - 96%)  Wt(kg): --  I&O's Summary    14 Nov 2019 07:01  -  15 Nov 2019 07:00  --------------------------------------------------------  IN: 3130 mL / OUT: 0 mL / NET: 3130 mL    15 Nov 2019 07:01  -  15 Nov 2019 10:13  --------------------------------------------------------  IN: 240 mL / OUT: 0 mL / NET: 240 mL        Appearance: Normal	  Cardiovascular: Normal S1 S2,RRR, No JVD, No murmurs  Respiratory: diminished at base   Gastrointestinal:  Soft, Non-tender, + BS	  Extremities: Normal range of motion, No clubbing, cyanosis or edema        MEDICATIONS  (STANDING):  alfuzosin 10 milliGRAM(s) Oral at bedtime  ascorbic acid 500 milliGRAM(s) Oral daily  atovaquone Suspension 1500 milliGRAM(s) Oral daily  Biotene Dry Mouth Oral Rinse 5 milliLiter(s) Swish and Spit two times a day  finasteride 5 milliGRAM(s) Oral daily  lidocaine   Patch 1 Patch Transdermal daily  multivitamin 1 Tablet(s) Oral daily  pantoprazole    Tablet 40 milliGRAM(s) Oral before breakfast  piperacillin/tazobactam IVPB.. 3.375 Gram(s) IV Intermittent every 8 hours  sodium chloride 0.9%. 1000 milliLiter(s) (60 mL/Hr) IV Continuous <Continuous>      TELEMETRY: 	    ECG:  	  RADIOLOGY:   DIAGNOSTIC TESTING:  [ ] Echocardiogram:  [ ]  Catheterization:  [ ] Stress Test:    OTHER: 	    LABS:	 	                            7.5    2.02  )-----------( 19       ( 15 Nov 2019 08:37 )             23.0     11-15    136  |  106  |  70<H>  ----------------------------<  82  3.9   |  18<L>  |  1.89<H>    Ca    7.5<L>      15 Nov 2019 06:48  Phos  3.5     11-14  Mg     1.8     11-14    TPro  3.2<L>  /  Alb  1.6<L>  /  TBili  8.2<H>  /  DBili  x   /  AST  221<H>  /  ALT  109<H>  /  AlkPhos  853<H>  11-15

## 2019-11-15 NOTE — PROVIDER CONTACT NOTE (CRITICAL VALUE NOTIFICATION) - NAME OF MD/NP/PA/DO NOTIFIED:
Argenis
EBONY Ambrose
Eric BANSAL
Lisa BANSAL
Lisa BANSAL
NIMISHA Hernandez
NP Zeta Alicja
Zeta Price Boland, NP
Amelia Berg, NP

## 2019-11-15 NOTE — PROGRESS NOTE ADULT - ASSESSMENT
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 Treated with brentuximab and CHOP x 3, relapsed. Recent PET without much improvement.. Recent fracture of lumbar vertebra, diagnosis made in ortho office, no films available. No MRI. Has notable pains with movements. Has very minimal LE power proximally and decreased to absent KJ reflexes. He was unable to get off of the toilet due to weakness PTA.   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 alternative chemo regimen was discussed but ptn is too frail. AT THIS POINT NO PLANS FOR CHEMO  Had CT C/abd/pelvis on 11/5.   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 Radiation/Oncology input appreciated, s/p mapping and a single treatment 11.14  wife visited one facility near her home in [place], wasnt impressed, plans to visit others. still hasnt given her list to care management  Palliative care consult reviewed, ptn's wife is hoping mr Benitez will walk again after RTx. Ptn stool today w PT  ongoing weakness, LE pain and LBP, LLE DVT, not a candidate for full AC. Has IVC filter  ptn s/p hypotensive RRT , Blood Cx NTD, Urine Cx neg, was on empiric ZOSYN, now DCed by ID   ptn appears breathless today , will give 20 mg IV lasix  Pancytopenia 2/2 HLH/Tcell lymphoma  Michael Diaz on 11/5 felt ptn is too frail to get more chemo w his performance level at 4 ( needs assistance w most ADLs)  Diarrhea has resolved since admission, GI PCR is negative, all Cx NTD  Afebrile after the fever after a transfusion on 11/5 and 11/7, s/p IVF  LE pain 2/2 metastatic dz to spine. .   s/p acute on chronic diastolic CHF 4 days ago, did well post diuresis w  IV Lasix 40 mg  BCx NTD, off ABx, ID on board  off IVF, YEE resolved,  Hyponatremia improving  Hypoalbuminemia  LE edema, new Left femoral DVT  Functional paraplegia   GOC d/w ptn/wife, ptn is now DNR  will cont discussion of realistic GOC  OOB to chair not tolerable 2/2 back pain  DVT ppx w PAS

## 2019-11-15 NOTE — PROGRESS NOTE ADULT - SUBJECTIVE AND OBJECTIVE BOX
Interventional Radiology Follow- Up Note      This is a 79y Male s/p retrievable IVC filter placement on 11/12 in Interventional Radiology with Dr. España.     Patient seen and examined @ bedside with wife. Patient resting comfortably. Tolerating regular diet, ambulating to bathroom, voiding without difficulty. No complaints offered.     Vitals: T(F): 97.4 (11-15-19 @ 11:16), Max: 99.3 (11-14-19 @ 15:37)  HR: 97 (11-15-19 @ 11:16) (97 - 108)  BP: 92/50 (11-15-19 @ 04:57) (92/50 - 106/61)  RR: 20 (11-15-19 @ 11:16) (18 - 20)  SpO2: 94% (11-15-19 @ 11:16) (94% - 96%)      LABS:                        7.5    2.02  )-----------( 19       ( 15 Nov 2019 08:37 )             23.0     11-15    136  |  106  |  70<H>  ----------------------------<  82  3.9   |  18<L>  |  1.89<H>    Ca    7.5<L>      15 Nov 2019 06:48  Phos  3.5     11-14  Mg     1.8     11-14    TPro  3.2<L>  /  Alb  1.6<L>  /  TBili  8.2<H>  /  DBili  x   /  AST  221<H>  /  ALT  109<H>  /  AlkPhos  853<H>  11-15        PHYSICAL EXAM:  General: Nontoxic, in NAD, A&O x3  Abdomen: soft, NTND  Extremities: right IJ neck, dressing removed- site healed well, soft with no evidence of hematoma.No pedal edema or calf tenderness noted      Assessment/Plan:    80yo Male with NHL with thrombocytopenia, c/b left femoral DVT, unable to anticoagulate, now s/p placement of retrievable IVC filter.      -encourage ambulation   -advance diet as tolerated  -monitor h/h; transfuse as needed  -instructed patient to follow up as outpatient once cleared for removal of IVC filter; gave patient and wife clinic card.  -will discuss with IR attending       Please call IR at extension [phone] with any questions, concerns, or issues regarding above.        Rika Hickman, [facility]  Spectra # [phone]

## 2019-11-16 NOTE — PROGRESS NOTE ADULT - ASSESSMENT
79M w/ HLH/NHL, and BPH, 11/4/19 a/w diarrhea, c/b YEE    (1)Renal - CKD3; fluctuating numbers based on hemodynamic status- Creatinine raised this am:  GFR now ~20 ml/min.    (2)CV - tenuous hemodynamics; soft SBP 89    (3)Higher anion gap Metabolic acidosis - AG is 19 today,  HCO3 dropped to 15 from 18.    (4) s/p IVF at 60cc/h yesterday    (5) CXR 11/15/19- Small right pleural effusion and mild pulmonary edema    (6) poor overall prognosis: per Palliative input, family wants aggressive life-extending care      RECOMMEND:  (1) agree with holding lasix for given rise in cr and soft BP       : could give 20-40mg iv lasix x1 if dyspnea worsens  (2) Dose new meds for GFR 20 ml/min  (3) High-protein diet  (4) Palliative f/u regarding goals of care   (5) BMP daily for AG 79M w/ HLH/NHL, and BPH, 11/4/19 a/w diarrhea, c/b YEE    (1)Renal - CKD3; fluctuating numbers based on hemodynamic status- Creatinine raised this am:  GFR now ~20 ml/min.    (2)CV - tenuous hemodynamics; echo with nl LV systolic fx w/o significant valvular disease- soft SBP 89    (3) Higher anion gap Metabolic acidosis - AG is 19 today,  HCO3 dropped to 15 from 18.    (4) s/p IVF at 60cc/h yesterday    (5) CXR 11/15/19- Small right pleural effusion and mild pulmonary edema    (6) poor overall prognosis: per Palliative input, family wants aggressive life-extending care      RECOMMEND:  (1) agree with holding lasix for given rise in cr and soft BP       : could give 20-40mg iv lasix x1 if dyspnea worsens  (2) Dose new meds for GFR 20 ml/min  (3) High-protein diet  (4) Palliative f/u regarding goals of care   (5) BMP daily for AG  (6) no IVF for now    d/w Dr. Stanford

## 2019-11-16 NOTE — PROGRESS NOTE ADULT - SUBJECTIVE AND OBJECTIVE BOX
Patient is a 79y old  Male who presents with a chief complaint of diarrhea (16 Nov 2019 10:32)      SUBJECTIVE / OVERNIGHT EVENTS:    MEDICATIONS  (STANDING):  alfuzosin 10 milliGRAM(s) Oral at bedtime  ascorbic acid 500 milliGRAM(s) Oral daily  atovaquone Suspension 1500 milliGRAM(s) Oral daily  Biotene Dry Mouth Oral Rinse 5 milliLiter(s) Swish and Spit two times a day  finasteride 5 milliGRAM(s) Oral daily  lidocaine   Patch 1 Patch Transdermal daily  multivitamin 1 Tablet(s) Oral daily  pantoprazole    Tablet 40 milliGRAM(s) Oral before breakfast    MEDICATIONS  (PRN):  acetaminophen   Tablet .. 650 milliGRAM(s) Oral every 6 hours PRN Temp greater or equal to 38C (100.4F), Mild Pain (1 - 3)  artificial tears (preservative free) Ophthalmic Solution 2 Drop(s) Right EYE two times a day PRN irritation  loperamide 2 milliGRAM(s) Oral four times a day PRN Diarrhea  ondansetron Injectable 4 milliGRAM(s) IV Push every 6 hours PRN Nausea and/or Vomiting      Vital Signs Last 24 Hrs  T(F): 97.5 (11-16-19 @ 11:34), Max: 98.1 (11-15-19 @ 22:45)  HR: 104 (11-16-19 @ 11:34) (99 - 110)  BP: 89/53 (11-16-19 @ 11:34) (89/53 - 101/63)  RR: 20 (11-16-19 @ 11:34) (20 - 20)  SpO2: 94% (11-16-19 @ 11:34) (92% - 94%)  Telemetry:   CAPILLARY BLOOD GLUCOSE        I&O's Summary    15 Nov 2019 07:01  -  16 Nov 2019 07:00  --------------------------------------------------------  IN: 820 mL / OUT: 0 mL / NET: 820 mL        PHYSICAL EXAM:  GENERAL: NAD, well-developed  HEAD:  Atraumatic, Normocephalic  EYES: EOMI, PERRLA, conjunctiva and sclera clear  NECK: Supple, No JVD  CHEST/LUNG: Clear to auscultation bilaterally; No wheeze  HEART: Regular rate and rhythm; No murmurs, rubs, or gallops  ABDOMEN: Soft, Nontender, Nondistended; Bowel sounds present  EXTREMITIES:  2+ Peripheral Pulses, No clubbing, cyanosis, or edema  PSYCH: AAOx3  NEUROLOGY: non-focal  SKIN: No rashes or lesions    LABS:                        7.6    2.63  )-----------( 24       ( 16 Nov 2019 10:17 )             23.2     11-16    136  |  102  |  91<H>  ----------------------------<  66<L>  4.3   |  15<L>  |  2.46<H>    Ca    7.3<L>      16 Nov 2019 07:22    TPro  3.2<L>  /  Alb  1.6<L>  /  TBili  8.2<H>  /  DBili  x   /  AST  221<H>  /  ALT  109<H>  /  AlkPhos  853<H>  11-15              RADIOLOGY & ADDITIONAL TESTS:    Imaging Personally Reviewed:    Consultant(s) Notes Reviewed:      Care Discussed with Consultants/Other Providers:

## 2019-11-16 NOTE — PROGRESS NOTE ADULT - SUBJECTIVE AND OBJECTIVE BOX
Patient seen and examined.  He was falling asleep during my encounter.  O2 via NC.  NAD noted.     REVIEW OF SYSTEMS:  As per HPI, otherwise 8 full 10 ROS were unremarkable    MEDICATIONS  (STANDING):  alfuzosin 10 milliGRAM(s) Oral at bedtime  ascorbic acid 500 milliGRAM(s) Oral daily  atovaquone Suspension 1500 milliGRAM(s) Oral daily  Biotene Dry Mouth Oral Rinse 5 milliLiter(s) Swish and Spit two times a day  finasteride 5 milliGRAM(s) Oral daily  lidocaine   Patch 1 Patch Transdermal daily  multivitamin 1 Tablet(s) Oral daily  pantoprazole    Tablet 40 milliGRAM(s) Oral before breakfast      VITAL:  T(C): , Max: 36.7 (11-15-19 @ 22:45)  T(F): , Max: 98.1 (11-15-19 @ 22:45)  HR: 104 (11-16-19 @ 11:34)  BP: 89/53 (11-16-19 @ 11:34)  BP(mean): --  RR: 20 (11-16-19 @ 11:34)  SpO2: 94% (11-16-19 @ 11:34)  Wt(kg): --    I and O's:    11-15 @ 07:01  -  11-16 @ 07:00  --------------------------------------------------------  IN: 820 mL / OUT: 0 mL / NET: 820 mL    11-16 @ 07:01  -  11-16 @ 19:44  --------------------------------------------------------  IN: 240 mL / OUT: 0 mL / NET: 240 mL          PHYSICAL EXAM:    Constitutional: NAD  Neck: No JVD  Respiratory: clear lung sounds  Cardiac: RRR s1s2  Gastrointestinal: BS+, soft, NT/ND  Urologic: No casas  Extremities: No peripheral edema      LABS:                        7.6    2.63  )-----------( 24 ( 16 Nov 2019 10:17 )             23.2     11-16    136  |  102  |  91<H>  ----------------------------<  66<L>  4.3   |  15<L>  |  2.46<H>    Ca    7.3<L>      16 Nov 2019 07:22    TPro  3.2<L>  /  Alb  1.6<L>  /  TBili  8.2<H>  /  DBili  x   /  AST  221<H>  /  ALT  109<H>  /  AlkPhos  853<H>  11-15

## 2019-11-16 NOTE — PROGRESS NOTE ADULT - SUBJECTIVE AND OBJECTIVE BOX
LEODAN BURGOS:[id number],   79yMale followed for:  No Known Allergies    PAST MEDICAL & SURGICAL HISTORY:  Acute on chronic anemia  HLH (hemophagocytic lymphohistiocytosis)  BPH (benign prostatic hyperplasia)  H/O knee surgery  Hernia    FAMILY HISTORY:    MEDICATIONS  (STANDING):  alfuzosin 10 milliGRAM(s) Oral at bedtime  ascorbic acid 500 milliGRAM(s) Oral daily  atovaquone Suspension 1500 milliGRAM(s) Oral daily  Biotene Dry Mouth Oral Rinse 5 milliLiter(s) Swish and Spit two times a day  finasteride 5 milliGRAM(s) Oral daily  lidocaine   Patch 1 Patch Transdermal daily  multivitamin 1 Tablet(s) Oral daily  pantoprazole    Tablet 40 milliGRAM(s) Oral before breakfast    MEDICATIONS  (PRN):  acetaminophen   Tablet .. 650 milliGRAM(s) Oral every 6 hours PRN Temp greater or equal to 38C (100.4F), Mild Pain (1 - 3)  artificial tears (preservative free) Ophthalmic Solution 2 Drop(s) Right EYE two times a day PRN irritation  loperamide 2 milliGRAM(s) Oral four times a day PRN Diarrhea  ondansetron Injectable 4 milliGRAM(s) IV Push every 6 hours PRN Nausea and/or Vomiting      Vital Signs Last 24 Hrs  T(C): 36.7 (16 Nov 2019 05:49), Max: 36.7 (15 Nov 2019 22:45)  T(F): 98.1 (16 Nov 2019 05:49), Max: 98.1 (15 Nov 2019 22:45)  HR: 103 (16 Nov 2019 05:49) (97 - 110)  BP: 92/54 (16 Nov 2019 05:49) (92/54 - 101/63)  BP(mean): --  RR: 20 (16 Nov 2019 05:49) (20 - 20)  SpO2: 93% (16 Nov 2019 05:49) (92% - 94%)  nc/at  s1s2  cta  soft, nt, nd no guarding or rebound  no c/c/e    CBC Full  -  ( 15 Nov 2019 08:37 )  WBC Count : 2.02 K/uL  RBC Count : 2.45 M/uL  Hemoglobin : 7.5 g/dL  Hematocrit : 23.0 %  Platelet Count - Automated : 19 K/uL  Mean Cell Volume : 93.9 fl  Mean Cell Hemoglobin : 30.6 pg  Mean Cell Hemoglobin Concentration : 32.6 gm/dL  Auto Neutrophil # : x  Auto Lymphocyte # : x  Auto Monocyte # : x  Auto Eosinophil # : x  Auto Basophil # : x  Auto Neutrophil % : x  Auto Lymphocyte % : x  Auto Monocyte % : x  Auto Eosinophil % : x  Auto Basophil % : x    11-16    136  |  102  |  91<H>  ----------------------------<  66<L>  4.3   |  15<L>  |  2.46<H>    Ca    7.3<L>      16 Nov 2019 07:22    TPro  3.2<L>  /  Alb  1.6<L>  /  TBili  8.2<H>  /  DBili  x   /  AST  221<H>  /  ALT  109<H>  /  AlkPhos  853<H>  11-15

## 2019-11-16 NOTE — PROGRESS NOTE ADULT - ASSESSMENT
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 Treated with brentuximab and CHOP x 3, relapsed. Recent PET without much improvement.. Recent fracture of lumbar vertebra, diagnosis made in ortho office, no films available. No MRI. Has notable pains with movements. Has very minimal LE power proximally and decreased to absent KJ reflexes. He was unable to get off of the toilet due to weakness PTA.   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 alternative chemo regimen was discussed but ptn is too frail. AT THIS POINT NO PLANS FOR CHEMO  Had CT C/abd/pelvis on 11/5.   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 Radiation/Oncology input appreciated, s/p mapping and a single treatment 11.14  wife visited one facility near her home in [place], wasnt impressed, plans to visit others. still hasnt given her list to care management  Palliative care consult reviewed, ptn's wife is hoping mr Benitez will walk again after RTx. Ptn stool today w PT  ongoing weakness, LE pain and LBP, LLE DVT, not a candidate for full AC. Has IVC filter  ptn s/p hypotensive RRT , Blood Cx NTD, Urine Cx neg, was on empiric ZOSYN, now DCed by ID   ptn appears breathless today , will give 20 mg IV lasix  Pancytopenia 2/2 HLH/Tcell lymphoma  Michael Diaz on 11/5 felt ptn is too frail to get more chemo w his performance level at 4 ( needs assistance w most ADLs)  Diarrhea has resolved since admission, GI PCR is negative, all Cx NTD  Afebrile after the fever after a transfusion on 11/5 and 11/7, s/p IVF  LE pain 2/2 metastatic dz to spine. .   s/p acute on chronic diastolic CHF 4 days ago, did well post diuresis w  IV Lasix 40 mg  BCx NTD, off ABx, ID on board  off IVF, YEE resolved,  Hyponatremia improving  Hypoalbuminemia  LE edema, new Left femoral DVT  Functional paraplegia   GOC d/w ptn/wife, ptn is now DNR  will cont discussion of realistic GOC  OOB to chair not tolerable 2/2 back pain  DVT ppx w PAS

## 2019-11-16 NOTE — PROGRESS NOTE ADULT - SUBJECTIVE AND OBJECTIVE BOX
CC: no overnight events    TELEMETRY:     PHYSICAL EXAM:    T(C): 36.7 (11-16-19 @ 05:49), Max: 36.7 (11-15-19 @ 22:45)  HR: 103 (11-16-19 @ 05:49) (97 - 110)  BP: 92/54 (11-16-19 @ 05:49) (92/54 - 101/63)  RR: 20 (11-16-19 @ 05:49) (20 - 20)  SpO2: 93% (11-16-19 @ 05:49) (92% - 94%)  Wt(kg): --  I&O's Summary    15 Nov 2019 07:01  -  16 Nov 2019 07:00  --------------------------------------------------------  IN: 820 mL / OUT: 0 mL / NET: 820 mL        Appearance: Normal	  Cardiovascular: Normal S1 S2,RRR, No JVD, No murmurs  Respiratory: Lungs clear to auscultation	  Gastrointestinal:  Soft, Non-tender, + BS	  Extremities: Normal range of motion, No clubbing, cyanosis or edema  Vascular: Peripheral pulses palpable 2+ bilaterally     LABS:	 	                          7.5    2.02  )-----------( 19       ( 15 Nov 2019 08:37 )             23.0     11-16    136  |  102  |  91<H>  ----------------------------<  66<L>  4.3   |  15<L>  |  2.46<H>    Ca    7.3<L>      16 Nov 2019 07:22    TPro  3.2<L>  /  Alb  1.6<L>  /  TBili  8.2<H>  /  DBili  x   /  AST  221<H>  /  ALT  109<H>  /  AlkPhos  853<H>  11-15          CARDIAC MARKERS:

## 2019-11-17 NOTE — PROGRESS NOTE ADULT - ASSESSMENT
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 Treated with brentuximab and CHOP x 3, relapsed. Recent PET without much improvement.. Recent fracture of lumbar vertebra, diagnosis made in ortho office, no films available. No MRI. Has notable pains with movements. Has very minimal LE power proximally and decreased to absent KJ reflexes. He was unable to get off of the toilet due to weakness PTA.   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 alternative chemo regimen was discussed but ptn is too frail. AT THIS POINT NO PLANS FOR CHEMO  Had CT C/abd/pelvis on 11/5.   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 Radiation/Oncology input appreciated, s/p mapping and a single treatment 11/14  post treatment ptn was able to stand but today ptn is tachypneic,  jaundiced,  states its hard for him to breathe, RR: 32, worsening YEE, worsening transaminitis,    palliative care consult called re comfort care recalled  [place], his wife is tearful but agrees w comfort care. made him DNR  ptn will be transferred to PCU,   Prognosis is grave

## 2019-11-17 NOTE — PROGRESS NOTE ADULT - REASON FOR ADMISSION
weakness, anemia, falls
Anemia
diarrea
diarrhea
diarrhea
inc lfts
increaseed lfts, diarrhea
lymphoma, HLH
mutltifactoral, diarrhea
sepsis. diarrhea, dvt
anemia , weakness, fall
anemia, weakness
anemia, weakness
anemia, weakness, falls
anemia, weakness, falls
fall, anemia
fall, weakness, anemia
weakness, anemia
weakness, anemia
weakness, anemia, falls
weakness, anemia, frequent falls
weakness, anemia, functional paraplegia
weakness

## 2019-11-17 NOTE — PROGRESS NOTE ADULT - SUBJECTIVE AND OBJECTIVE BOX
SUBJECTIVE AND OBJECTIVE:  INTERVAL HPI/OVERNIGHT EVENTS:    DNR on chart:   Allergies    No Known Allergies    Intolerances    MEDICATIONS  (STANDING):  alfuzosin 10 milliGRAM(s) Oral at bedtime  ascorbic acid 500 milliGRAM(s) Oral daily  atovaquone Suspension 1500 milliGRAM(s) Oral daily  Biotene Dry Mouth Oral Rinse 5 milliLiter(s) Swish and Spit two times a day  finasteride 5 milliGRAM(s) Oral daily  lidocaine   Patch 1 Patch Transdermal daily  multivitamin 1 Tablet(s) Oral daily  pantoprazole    Tablet 40 milliGRAM(s) Oral before breakfast    MEDICATIONS  (PRN):  acetaminophen   Tablet .. 650 milliGRAM(s) Oral every 6 hours PRN Temp greater or equal to 38C (100.4F), Mild Pain (1 - 3)  artificial tears (preservative free) Ophthalmic Solution 2 Drop(s) Right EYE two times a day PRN irritation  loperamide 2 milliGRAM(s) Oral four times a day PRN Diarrhea  ondansetron Injectable 4 milliGRAM(s) IV Push every 6 hours PRN Nausea and/or Vomiting      ITEMS UNCHECKED ARE NOT PRESENT    PRESENT SYMPTOMS: [ ]Unable to obtain due to poor mentation   Source if other than patient:  [ ]Family   [ ]Team     Pain (Impact on QOL):    Location:  Minimal acceptable level (0-10 scale):                   Aggravating factors:  Quality:  Radiation:  Severity (0-10 scale):    Timing:    Dyspnea:                           [ ]Mild [ ]Moderate [ ]Severe  Anxiety:                             [ ]Mild [ ]Moderate [ ]Severe  Fatigue:                             [ ]Mild [ ]Moderate [ ]Severe  Nausea:                             [ ]Mild [ ]Moderate [ ]Severe  Loss of appetite:              [ ]Mild [ ]Moderate [ ]Severe  Constipation:                    [ ]Mild [ ]Moderate [ ]Severe    PAIN AD Score:	  [email or web address]/painad.pdf (Ctrl + left click to view)    Other Symptoms:  [ ]All other review of systems negative     Karnofsky Performance Score/Palliative Performance Status Version 2:         %    http://palliative.info/resource_material/[email or web address].pdf  PHYSICAL EXAM:  Vital Signs Last 24 Hrs  T(C): 36.4 (17 Nov 2019 08:10), Max: 36.7 (17 Nov 2019 04:51)  T(F): 97.5 (17 Nov 2019 08:10), Max: 98 (17 Nov 2019 04:51)  HR: 96 (17 Nov 2019 08:10) (96 - 104)  BP: 80/43 (17 Nov 2019 08:13) (69/41 - 100/61)  BP(mean): --  RR: 32 (17 Nov 2019 08:10) (20 - 32)  SpO2: 95% (17 Nov 2019 08:10) (92% - 95%) I&O's Summary    16 Nov 2019 07:01  -  17 Nov 2019 07:00  --------------------------------------------------------  IN: 240 mL / OUT: 0 mL / NET: 240 mL     GENERAL:  [ ]Alert  [ ]Oriented x   [ ]Lethargic  [ ]Cachexia  [ ]Unarousable  [ ]Verbal  [ ]Non-Verbal  Behavioral:   [ ] Anxiety  [ ] Delirium [ ] Agitation [ ] Other  HEENT:  [ ]Normal   [ ]Dry mouth   [ ]ET Tube/Trach  [ ]Oral lesions  PULMONARY:   [ ]Clear [ ]Tachypnea  [ ]Audible excessive secretions   [ ]Rhonchi        [ ]Right [ ]Left [ ]Bilateral  [ ]Crackles        [ ]Right [ ]Left [ ]Bilateral  [ ]Wheezing     [ ]Right [ ]Left [ ]Bilateral  CARDIOVASCULAR:    [ ]Regular [ ]Irregular [ ]Tachy  [ ]Jorge [ ]Murmur [ ]Other  GASTROINTESTINAL:  [ ]Soft  [ ]Distended   [ ]+BS  [ ]Non tender [ ]Tender  [ ]PEG [ ]OGT/ NGT   Last BM:    GENITOURINARY:  [ ]Normal [ ] Incontinent   [ ]Oliguria/Anuria   [ ]Arnold  MUSCULOSKELETAL:   [ ]Normal   [ ]Weakness  [ ]Bed/Wheelchair bound [ ]Edema  NEUROLOGIC:   [ ]No focal deficits  [ ] Cognitive impairment  [ ] Dysphagia [ ]Dysarthria [ ] Paresis [ ]Other   SKIN:   [ ]Normal   [ ]Pressure ulcer(s)  [ ]Rash    CRITICAL CARE:  [ ] Shock Present  [ ]Septic [ ]Cardiogenic [ ]Neurologic [ ]Hypovolemic  [ ]  Vasopressors [ ]  Inotropes   [ ] Respiratory failure present  [ ] Acute  [ ] Chronic [ ] Hypoxic  [ ] Hypercarbic [ ] Other  [ ] Other organ failure     LABS:                        7.3    3.08  )-----------( 24       ( 17 Nov 2019 09:37 )             22.3   11-17    137  |  101  |  100<H>  ----------------------------<  65<L>  4.7   |  12<L>  |  3.15<H>    Ca    7.3<L>      17 Nov 2019 07:29          RADIOLOGY & ADDITIONAL STUDIES:    Protein Calorie Malnutrition Present: [ ] yes [ ] no  [ ] PPSV2 < or = 30%  [ ] significant weight loss [ ] poor nutritional intake [ ] anasarca [ ] catabolic state Albumin, Serum: 1.6 g/dL (11-15-19 @ 06:48)  Artificial Nutrition [ ]     REFERRALS:   [ ]Chaplaincy  [ ] Hospice  [ ]Child Life  [ ]Social Work  [ ]Case management [ ]Holistic Therapy   Goals of Care Document: SUBJECTIVE AND OBJECTIVE: Patient seen and examined, jaundiced, confused. describes feeling short of breath. wife Savanah at bedside.    Wife familiar with palliative care. She is tearful, knows her  is dying but trying to preserve his life. Explained that given his respiratory distress and ongoing decline, concern at this point is that the outcome may be the same regardless of how aggressive we are- ICU vs. comfort focused care. Explained that in the palliative world, we would utilize medications such as opiates to help with work of breathing and transtion to focus of care to symptom management. discussed PCU, code status. Wife states she doesnt know what she wants to do, would prefer aggressive management but with him comfortable. Attempted to explain that they may not be realistic given his ongoing decline and if respiratory status worsens with goals of being aggressive, that may include MICU evaluation/life support. Wife wants time to speak with her family, considering options at this time. remains full code.     INTERVAL HPI/OVERNIGHT EVENTS: becoming short of breath    DNR on chart:   Allergies    No Known Allergies    Intolerances    MEDICATIONS  (STANDING):  alfuzosin 10 milliGRAM(s) Oral at bedtime  ascorbic acid 500 milliGRAM(s) Oral daily  atovaquone Suspension 1500 milliGRAM(s) Oral daily  Biotene Dry Mouth Oral Rinse 5 milliLiter(s) Swish and Spit two times a day  finasteride 5 milliGRAM(s) Oral daily  lidocaine   Patch 1 Patch Transdermal daily  multivitamin 1 Tablet(s) Oral daily  pantoprazole    Tablet 40 milliGRAM(s) Oral before breakfast    MEDICATIONS  (PRN):  acetaminophen   Tablet .. 650 milliGRAM(s) Oral every 6 hours PRN Temp greater or equal to 38C (100.4F), Mild Pain (1 - 3)  artificial tears (preservative free) Ophthalmic Solution 2 Drop(s) Right EYE two times a day PRN irritation  loperamide 2 milliGRAM(s) Oral four times a day PRN Diarrhea  ondansetron Injectable 4 milliGRAM(s) IV Push every 6 hours PRN Nausea and/or Vomiting      ITEMS UNCHECKED ARE NOT PRESENT    PRESENT SYMPTOMS: [ ]Unable to obtain due to poor mentation   Source if other than patient:  [ ]Family   [ ]Team     Pain (Impact on QOL):    Location:  Minimal acceptable level (0-10 scale):                   Aggravating factors:  Quality:  Radiation:  Severity (0-10 scale):    Timing:    Dyspnea:                           [ ]Mild [x ]Moderate [ ]Severe  Anxiety:                             [ ]Mild [ ]Moderate [ ]Severe  Fatigue:                             [ ]Mild [x ]Moderate [ ]Severe  Nausea:                             [ ]Mild [ ]Moderate [ ]Severe  Loss of appetite:              [ ]Mild [ ]Moderate [ ]Severe  Constipation:                    [ ]Mild [ ]Moderate [ ]Severe    PAIN AD Score:	  [email or web address]/painad.[email or web address] (Ctrl + left click to view)    Other Symptoms:  [ x]All other review of systems negative     Karnofsky Performance Score/Palliative Performance Status Version 2:       20  %    http://palliative.info/resource_material/PPSv2.pdf  PHYSICAL EXAM:  Vital Signs Last 24 Hrs  T(C): 36.4 (17 Nov 2019 08:10), Max: 36.7 (17 Nov 2019 04:51)  T(F): 97.5 (17 Nov 2019 08:10), Max: 98 (17 Nov 2019 04:51)  HR: 96 (17 Nov 2019 08:10) (96 - 104)  BP: 80/43 (17 Nov 2019 08:13) (69/41 - 100/61)  BP(mean): --  RR: 32 (17 Nov 2019 08:10) (20 - 32)  SpO2: 95% (17 Nov 2019 08:10) (92% - 95%) I&O's Summary    16 Nov 2019 07:01  -  17 Nov 2019 07:00  --------------------------------------------------------  IN: 240 mL / OUT: 0 mL / NET: 240 mL     GENERAL:  [ ]Alert  [ ]Oriented x   [ x]Lethargic  [ ]Cachexia  [ ]Unarousable  [ ]Verbal  [ ]Non-Verbal  Behavioral:   [ ] Anxiety  [ ] Delirium [ ] Agitation [ ] Other  HEENT:  [ ]Normal   [ x]Dry mouth   [ ]ET Tube/Trach  [ ]Oral lesions  PULMONARY:   [ ]Clear [x ]Tachypnea  [ ]Audible excessive secretions   [ ]Rhonchi        [ ]Right [ ]Left [ ]Bilateral  [ ]Crackles        [ ]Right [ ]Left [ ]Bilateral  [ ]Wheezing     [ ]Right [ ]Left [ ]Bilateral  CARDIOVASCULAR:    [x ]Regular [ ]Irregular [ ]Tachy  [ ]Jorge [ ]Murmur [ ]Other  GASTROINTESTINAL:  [ x]Soft  [ ]Distended   [x ]+BS  [ x]Non tender [ ]Tender  [ ]PEG [ ]OGT/ NGT   Last BM:    GENITOURINARY:  [ x]Normal [ ] Incontinent   [ ]Oliguria/Anuria   [ ]Arnold  MUSCULOSKELETAL:   [ ]Normal   [x ]Weakness  [ ]Bed/Wheelchair bound [ ]Edema  NEUROLOGIC: confused, not able to answer questions completely  [ ]No focal deficits  [ ] Cognitive impairment  [ ] Dysphagia [ ]Dysarthria [ ] Paresis [ ]Other   SKIN:   [ ]Normal   [ ]Pressure ulcer(s)  [ ]Rash    CRITICAL CARE:  [ ] Shock Present  [ ]Septic [ ]Cardiogenic [ ]Neurologic [ ]Hypovolemic  [ ]  Vasopressors [ ]  Inotropes   [ ] Respiratory failure present  [ ] Acute  [ ] Chronic [ ] Hypoxic  [ ] Hypercarbic [ ] Other  [ ] Other organ failure     LABS:                        7.3    3.08  )-----------( 24       ( 17 Nov 2019 09:37 )             22.3   11-17    137  |  101  |  100<H>  ----------------------------<  65<L>  4.7   |  12<L>  |  3.15<H>    Ca    7.3<L>      17 Nov 2019 07:29    RADIOLOGY & ADDITIONAL STUDIES:  < from: Xray Chest 1 View- PORTABLE-Routine (11.17.19 @ 12:16) >    EXAM:  XR CHEST PORTABLE ROUTINE 1V                            PROCEDURE DATE:  11/17/2019            INTERPRETATION:  Indication: Shortness of breath.    Technique: Single portable view of the chest.    Comparison: 11/15/2019    Findings: The cardiac silhouette is normal in size. There is a small   right pleural effusion and right basilar atelectasis, unchanged. The left   lung is clear. The hilar and mediastinal structures appear unremarkable.    Impression: Small right pleural effusion and right lower lobe   atelectasis, unchanged.    NEAL HOFFMAN M.D., ATTENDING RADIOLOGIST  This document has been electronically signed. Nov 17 2019 12:19PM     < end of copied text >    Protein Calorie Malnutrition Present: [x ] yes [ ] no  [x ] PPSV2 < or = 30%  [ ] significant weight loss [ x] poor nutritional intake [ ] anasarca [x ] catabolic state Albumin, Serum: 1.6 g/dL (11-15-19 @ 06:48)  Artificial Nutrition [ ]     REFERRALS:   [ ]Chaplaincy  [ ] Hospice  [ ]Child Life  [ ]Social Work  [ x]Case management [ ]Holistic Therapy   Goals of Care Document: SUBJECTIVE AND OBJECTIVE: Patient seen and examined, jaundiced, confused. describes feeling short of breath. wife Savanah at bedside.    Wife familiar with palliative care. She is tearful, knows her  is dying but trying to preserve his life. Explained that given his respiratory distress and ongoing decline, concern at this point is that the outcome may be the same regardless of how aggressive we are- ICU vs. comfort focused care. Explained that in the palliative world, we would utilize medications such as opiates to help with work of breathing and transtion to focus of care to symptom management. discussed PCU, code status. Wife states she doesnt know what she wants to do, would prefer aggressive management but with him comfortable. Attempted to explain that they may not be realistic given his ongoing decline and if respiratory status worsens with goals of being aggressive, that may include MICU evaluation/life support.     Discussion with wife at 1pm and with Dr. Judge- wife on board with comfort focused approach, is still considering blood transfusions if we feel as though it will help his symptoms and his dyspnea. We discussed utilization of opiates for symptom management. DNR/DNI confirmed. Plan to transfer to PCU when bed available.     INTERVAL HPI/OVERNIGHT EVENTS: becoming short of breath    DNR on chart:   Allergies    No Known Allergies    Intolerances    MEDICATIONS  (STANDING):  alfuzosin 10 milliGRAM(s) Oral at bedtime  ascorbic acid 500 milliGRAM(s) Oral daily  atovaquone Suspension 1500 milliGRAM(s) Oral daily  Biotene Dry Mouth Oral Rinse 5 milliLiter(s) Swish and Spit two times a day  finasteride 5 milliGRAM(s) Oral daily  lidocaine   Patch 1 Patch Transdermal daily  multivitamin 1 Tablet(s) Oral daily  pantoprazole    Tablet 40 milliGRAM(s) Oral before breakfast    MEDICATIONS  (PRN):  acetaminophen   Tablet .. 650 milliGRAM(s) Oral every 6 hours PRN Temp greater or equal to 38C (100.4F), Mild Pain (1 - 3)  artificial tears (preservative free) Ophthalmic Solution 2 Drop(s) Right EYE two times a day PRN irritation  loperamide 2 milliGRAM(s) Oral four times a day PRN Diarrhea  ondansetron Injectable 4 milliGRAM(s) IV Push every 6 hours PRN Nausea and/or Vomiting      ITEMS UNCHECKED ARE NOT PRESENT    PRESENT SYMPTOMS: [ ]Unable to obtain due to poor mentation   Source if other than patient:  [ ]Family   [ ]Team     Pain (Impact on QOL):    Location:  Minimal acceptable level (0-10 scale):                   Aggravating factors:  Quality:  Radiation:  Severity (0-10 scale):    Timing:    Dyspnea:                           [ ]Mild [x ]Moderate [ ]Severe  Anxiety:                             [ ]Mild [ ]Moderate [ ]Severe  Fatigue:                             [ ]Mild [x ]Moderate [ ]Severe  Nausea:                             [ ]Mild [ ]Moderate [ ]Severe  Loss of appetite:              [ ]Mild [ ]Moderate [ ]Severe  Constipation:                    [ ]Mild [ ]Moderate [ ]Severe    PAIN AD Score:	  [email or web address]/painad.pdf (Ctrl + left click to view)    Other Symptoms:  [ x]All other review of systems negative     Karnofsky Performance Score/Palliative Performance Status Version 2:       20  %    http://palliative.info/resource_material/PPSv2.pdf  PHYSICAL EXAM:  Vital Signs Last 24 Hrs  T(C): 36.4 (17 Nov 2019 08:10), Max: 36.7 (17 Nov 2019 04:51)  T(F): 97.5 (17 Nov 2019 08:10), Max: 98 (17 Nov 2019 04:51)  HR: 96 (17 Nov 2019 08:10) (96 - 104)  BP: 80/43 (17 Nov 2019 08:13) (69/41 - 100/61)  BP(mean): --  RR: 32 (17 Nov 2019 08:10) (20 - 32)  SpO2: 95% (17 Nov 2019 08:10) (92% - 95%) I&O's Summary    16 Nov 2019 07:01  -  17 Nov 2019 07:00  --------------------------------------------------------  IN: 240 mL / OUT: 0 mL / NET: 240 mL     GENERAL:  [ ]Alert  [ ]Oriented x   [ x]Lethargic  [ ]Cachexia  [ ]Unarousable  [ ]Verbal  [ ]Non-Verbal  Behavioral:   [ ] Anxiety  [ ] Delirium [ ] Agitation [ ] Other  HEENT:  [ ]Normal   [ x]Dry mouth   [ ]ET Tube/Trach  [ ]Oral lesions  PULMONARY:   [ ]Clear [x ]Tachypnea  [ ]Audible excessive secretions   [ ]Rhonchi        [ ]Right [ ]Left [ ]Bilateral  [ ]Crackles        [ ]Right [ ]Left [ ]Bilateral  [ ]Wheezing     [ ]Right [ ]Left [ ]Bilateral  CARDIOVASCULAR:    [x ]Regular [ ]Irregular [ ]Tachy  [ ]Jorge [ ]Murmur [ ]Other  GASTROINTESTINAL:  [ x]Soft  [ ]Distended   [x ]+BS  [ x]Non tender [ ]Tender  [ ]PEG [ ]OGT/ NGT   Last BM:    GENITOURINARY:  [ x]Normal [ ] Incontinent   [ ]Oliguria/Anuria   [ ]Arnold  MUSCULOSKELETAL:   [ ]Normal   [x ]Weakness  [ ]Bed/Wheelchair bound [ ]Edema  NEUROLOGIC: confused, not able to answer questions completely  [ ]No focal deficits  [ ] Cognitive impairment  [ ] Dysphagia [ ]Dysarthria [ ] Paresis [ ]Other   SKIN:   [ ]Normal   [ ]Pressure ulcer(s)  [ ]Rash    CRITICAL CARE:  [ ] Shock Present  [ ]Septic [ ]Cardiogenic [ ]Neurologic [ ]Hypovolemic  [ ]  Vasopressors [ ]  Inotropes   [ ] Respiratory failure present  [ ] Acute  [ ] Chronic [ ] Hypoxic  [ ] Hypercarbic [ ] Other  [ ] Other organ failure     LABS:                        7.3    3.08  )-----------( 24       ( 17 Nov 2019 09:37 )             22.3   11-17    137  |  101  |  100<H>  ----------------------------<  65<L>  4.7   |  12<L>  |  3.15<H>    Ca    7.3<L>      17 Nov 2019 07:29    RADIOLOGY & ADDITIONAL STUDIES:  < from: Xray Chest 1 View- PORTABLE-Routine (11.17.19 @ 12:16) >    EXAM:  XR CHEST PORTABLE ROUTINE 1V                            PROCEDURE DATE:  11/17/2019            INTERPRETATION:  Indication: Shortness of breath.    Technique: Single portable view of the chest.    Comparison: 11/15/2019    Findings: The cardiac silhouette is normal in size. There is a small   right pleural effusion and right basilar atelectasis, unchanged. The left   lung is clear. The hilar and mediastinal structures appear unremarkable.    Impression: Small right pleural effusion and right lower lobe   atelectasis, unchanged.    NEAL HOFFMAN M.D., ATTENDING RADIOLOGIST  This document has been electronically signed. Nov 17 2019 12:19PM     < end of copied text >    Protein Calorie Malnutrition Present: [x ] yes [ ] no  [x ] PPSV2 < or = 30%  [ ] significant weight loss [ x] poor nutritional intake [ ] anasarca [x ] catabolic state Albumin, Serum: 1.6 g/dL (11-15-19 @ 06:48)  Artificial Nutrition [ ]     REFERRALS:   [ ]Chaplaincy  [ ] Hospice  [ ]Child Life  [ ]Social Work  [ x]Case management [ ]Holistic Therapy   Goals of Care Document:

## 2019-11-17 NOTE — PROGRESS NOTE ADULT - SUBJECTIVE AND OBJECTIVE BOX
NEPHROLOGY-[facility] [phone]        Patient seen and examined in bed.  He was visibly SOB   No NV  Did not urinate well yesterday     ROS-+weakness + fatigue; all other ros were reviewed and were negative         MEDICATIONS  (STANDING):  alfuzosin 10 milliGRAM(s) Oral at bedtime  ascorbic acid 500 milliGRAM(s) Oral daily  atovaquone Suspension 1500 milliGRAM(s) Oral daily  Biotene Dry Mouth Oral Rinse 5 milliLiter(s) Swish and Spit two times a day  finasteride 5 milliGRAM(s) Oral daily  lidocaine   Patch 1 Patch Transdermal daily  multivitamin 1 Tablet(s) Oral daily  pantoprazole    Tablet 40 milliGRAM(s) Oral before breakfast      VITAL:  T(C): , Max: 36.7 (11-17-19 @ 04:51)  T(F): , Max: 98 (11-17-19 @ 04:51)  HR: 96 (11-17-19 @ 08:10)  BP: 80/43 (11-17-19 @ 08:13)  BP(mean): --  RR: 32 (11-17-19 @ 08:10)  SpO2: 95% (11-17-19 @ 08:10)  Wt(kg): --    I and O's:    11-16 @ 07:01  -  11-17 @ 07:00  --------------------------------------------------------  IN: 240 mL / OUT: 0 mL / NET: 240 mL          PHYSICAL EXAM:    Constitutional: resp distress   Neck:  No JVD  Respiratory: + wheeze   Cardiovascular: S1 and S2  Gastrointestinal: BS+, soft, NT/ND  Extremities: No peripheral edema  Neurological: A/O x 3, no focal deficits  Psychiatric: Normal mood, normal affect  : No Arnold  Skin: No rashes  Access: Not applicable    LABS:                        7.3    3.08  )-----------( 24       ( 17 Nov 2019 09:37 )             22.3     11-17    137  |  101  |  100<H>  ----------------------------<  65<L>  4.7   |  12<L>  |  3.15<H>    Ca    7.3<L>      17 Nov 2019 07:29            Urine Studies:          RADIOLOGY & ADDITIONAL STUDIES:      < from: Xray Chest 1 View- PORTABLE-Routine (11.15.19 @ 16:24) >    EXAM:  XR CHEST PORTABLE ROUTINE 1V                            PROCEDURE DATE:  11/15/2019            INTERPRETATION:  Indication: Rule out pulmonary edema. Shortness of   breath.    Technique: Single portable view of the chest.    Comparison: 11/10/2019    Findings: The cardiac silhouette is normal in size. There is a small   right pleural effusion. There is mild pulmonary edema. The hilar and   mediastinal structures appear unremarkable.    Impression: Small right pleural effusion and mild pulmonary edema                    NEAL HOFFMAN M.D., ATTENDING RADIOLOGIST  This document has been electronically signed. Nov 16 2019 12:00PM                < end of copied text >

## 2019-11-17 NOTE — PROGRESS NOTE ADULT - PROBLEM SELECTOR PLAN 3
pt requires full assist with all ADL's
dilaudid 0.2mg q1h prn pain started today  bowel regimen as tolerated

## 2019-11-17 NOTE — PROGRESS NOTE ADULT - PROBLEM SELECTOR PLAN 4
Spoke with Savanah on the phone.  Reviewed what Palliative care is and how our service may be helpful.  Savanah states that "the goal is for the pt to walk again"  We discussed why he is having pain and the location of the metastatic lesion.  Savanah wants to have the pt receive single fraction RT/Mapping today and speak again next week.  She understands that at present he is not a candidate for Chemo as his functional status is poor and that he would not be able to tolerate it.  She is hopeful that after the radiation the pts symptoms will improve and he will be able to tolerate chemo.  Savanah requests that we have further conversations after RT is completed and we have a better idea as to what he can tolerate.  Palliative care will continue to follow.
PPS 20%, requires total care by RN  confused, not able to participate in conversation  appears to lack medical decision making capacity at this time

## 2019-11-17 NOTE — PROGRESS NOTE ADULT - ASSESSMENT
Echo from 4/2019 ef 75%, nl lv sys fx.    a/p  79M w/ HLH/NHL, and BPH admitted with diarrhea, YEE, anemia . Patient being evaluated for SOB.     1. SOB/acute on chronic Diastolic CHF   -stable, resolved  -in the setting of multiple PRBC/IVF ressuc  -s/p diuresis   -hypotensive readings noted ,hypovolemic;  lasix on hold  -start midodrine to augment BP  -hypoproteinemia/ hypoalbuminemia/ worsening liver disease likely playing a role in LE edema   -echo with nl LV sys fx no significant valvular disease     2. YEE   -renal f/u, trend creat    3. Anemia  hem/onc/ med, GI,  f/u   prbc/ platelets prn per med/ hem/onc    4. HLH / lymphoma  + dvt to left femoral vein in the proximal and mid thigh  s/p placement of retrievable inferior vena cava filter  hem/onc f/u  palliative care f/u    5, Hyponatremia , resolved  renal f/u    dvt ppx

## 2019-11-17 NOTE — PROVIDER CONTACT NOTE (OTHER) - ASSESSMENT
pt A&Ox4 denies back/flank pain, denies any pain, denies shortness of breath, nausea, chills, headache, itching/hives. pt in no distress. pt otherwise stable. all other vitals stable.
pt A&Ox4 vitals taken
A&Ox1-2. 97.8, BP 69/43 repeated 76/41, 80/43 palpated, 96, R 32, 95% on 02
No response to external stimuli, no spontaneous respirations, no apical heart rate, negative papillary response to light
Patient aox4, states he does not feel his heart racing but is experiencing mild shortness of breath. Also complaining of 8/10 back pain
Pt vs wnl pt showing no signs of distress
Pt vss no signs of distress
a&o x4, denies weakness/blurry vision/lightheadedness. pt BP 92/50 manually, heart rate 108. pt receiving continuous fluids of NS @60 cc/h.
pt mildly restless, pale

## 2019-11-17 NOTE — PROGRESS NOTE ADULT - ASSESSMENT
79M w/ HLH/NHL, and BPH, 11/4/19 a/w diarrhea, c/b YEE    (1)Renal - CKD3; Worse now;  Bladder scan;  Likely metabolic acidosis vs resp alkalosis    (2)CV Hypotensive     (3) CXR 11/15/19- Small right pleural effusion and mild pulmonary edema    (6) poor overall prognosis: per Palliative input, family wants aggressive life-extending care      RECOMMEND:  (1) I am not sure I can give him IVF given the prior CXR and lasix can not be given with such a low BP;  CXR ordered  (2) Bladder scan to be done to ro out obstruction   (3) DC glucerna start Nephro protein diet due to potential potassium load    (4) Palliative f/u regarding goals of care .  He seems to be actively dying   (5) Will add LFt's to labs     He would be a poor candidate for HD and at present NOT a candidate due to hypotension

## 2019-11-17 NOTE — PROVIDER CONTACT NOTE (OTHER) - RECOMMENDATIONS
per provider
per provider
Awaiting orders
Continue plan of care
notify NP
pt pronounced dead at 2223, spouse Savanah called and made aware. Declined autopsy
take temp again in 15 minutes notify provider

## 2019-11-17 NOTE — PROVIDER CONTACT NOTE (OTHER) - SITUATION
pts Vs 131/69,107,19,96% room air temp 100.8. tylenol admin as ordered
Patient tachycardic with HR ranging from 100's up to 130
Patient wife called stated pt having difficulty breathing she wants MD to look at him. Has been having difficulty breathing
Patient without spontaneous respirations or pulse
Pt admitted for anemia sp 1 unit prbc due to get another unit. Temp 99.1 oral
Pt has dyspnea, use of accessory muscles, loud RR
Unable to draw blood from [facility]. Site wnl.
pt BP 92/50 manually, heart rate 108

## 2019-11-17 NOTE — PROGRESS NOTE ADULT - PROBLEM SELECTOR PLAN 2
Pt declines opiate medications  Pt reports tylenol prn controls symptoms
poor prognosis discussed at length  rising bili and creatinine  life expectancy limited, discussed with wife

## 2019-11-17 NOTE — PROGRESS NOTE ADULT - NSHPATTENDINGPLANDISCUSS_GEN_ALL_CORE
Dr Judge and NP
NP
ptn, wife, ID, Heme/Onc, NP, Neuro, GI
Cards attd/ PCP and wife in detail
Dr Judge, np
Sergo Clay
nurse
nurse
ptn, wife, ID, Heme/Onc, NP, Neuro, GI
team
team
pgy3 medicine resident
team

## 2019-11-17 NOTE — PROGRESS NOTE ADULT - SUBJECTIVE AND OBJECTIVE BOX
INFECTIOUS DISEASES FOLLOW UP-- Mariam Valencia  [phone]    This is a follow up note for this  79yMale with  Anemia  somnolent, wife at bedside, palliative care involved and possible move to hospice today      ROS:  CONSTITUTIONAL:  sleeping    Allergies    No Known Allergies    Intolerances        ANTIBIOTICS/RELEVANT:  antimicrobials  atovaquone Suspension 1500 milliGRAM(s) Oral daily    immunologic:    OTHER:  acetaminophen   Tablet .. 650 milliGRAM(s) Oral every 6 hours PRN  alfuzosin 10 milliGRAM(s) Oral at bedtime  artificial tears (preservative free) Ophthalmic Solution 2 Drop(s) Right EYE two times a day PRN  ascorbic acid 500 milliGRAM(s) Oral daily  Biotene Dry Mouth Oral Rinse 5 milliLiter(s) Swish and Spit two times a day  bisacodyl Suppository 10 milliGRAM(s) Rectal daily PRN  finasteride 5 milliGRAM(s) Oral daily  HYDROmorphone  Injectable 0.2 milliGRAM(s) IV Push every 1 hour PRN  HYDROmorphone  Injectable 0.2 milliGRAM(s) IV Push every 1 hour PRN  lidocaine   Patch 1 Patch Transdermal daily  loperamide 2 milliGRAM(s) Oral four times a day PRN  LORazepam   Injectable 0.5 milliGRAM(s) IV Push every 2 hours PRN  multivitamin 1 Tablet(s) Oral daily  ondansetron Injectable 4 milliGRAM(s) IV Push every 6 hours PRN  pantoprazole    Tablet 40 milliGRAM(s) Oral before breakfast      Objective:  Vital Signs Last 24 Hrs  T(C): 36.4 (17 Nov 2019 08:10), Max: 36.7 (17 Nov 2019 04:51)  T(F): 97.5 (17 Nov 2019 08:10), Max: 98 (17 Nov 2019 04:51)  HR: 96 (17 Nov 2019 08:10) (96 - 102)  BP: 80/43 (17 Nov 2019 08:13) (69/41 - 100/61)  BP(mean): --  RR: 32 (17 Nov 2019 08:10) (20 - 32)  SpO2: 95% (17 Nov 2019 08:10) (92% - 95%)    PHYSICAL EXAM:  Constitutional:no acute distress, juandiced  wife asked to not wake pt.    LABS:                        7.3    3.08  )-----------( 24       ( 17 Nov 2019 09:37 )             22.3     11-17    137  |  101  |  100<H>  ----------------------------<  65<L>  4.7   |  12<L>  |  3.15<H>    Ca    7.3<L>      17 Nov 2019 07:29    TPro  3.0<L>  /  Alb  1.5<L>  /  TBili  14.0<H>  /  DBili  >10.0<H>  /  AST  307<H>  /  ALT  135<H>  /  AlkPhos  819<H>  11-17          MICROBIOLOGY:            RECENT CULTURES:  11-13 @ 03:22  .Blood Blood-Peripheral  --  --  --    No growth to date.  --      RADIOLOGY & ADDITIONAL STUDIES:    < from: Xray Chest 1 View- PORTABLE-Routine (11.17.19 @ 12:16) >  Impression: Small right pleural effusion and right lower lobe   atelectasis, unchanged.    < end of copied text >

## 2019-11-17 NOTE — PROGRESS NOTE ADULT - PROBLEM SELECTOR PLAN 1
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
called to bedside for acute change, respiratory distress  discussed with wife, focus transitioned to symptom based  DNR/DNI  start dilaudid 0.2mg q1h prn for pain/dyspnea  ativan 0.5mg q2h prn for anxiety/agitation  wife open to transfusions if warranted at this time, ongoing discussion

## 2019-11-17 NOTE — PROGRESS NOTE ADULT - SUBJECTIVE AND OBJECTIVE BOX
CC: no acute events    TELEMETRY:     PHYSICAL EXAM:    T(C): 36.4 (11-17-19 @ 08:10), Max: 36.7 (11-17-19 @ 04:51)  HR: 96 (11-17-19 @ 08:10) (96 - 104)  BP: 80/43 (11-17-19 @ 08:13) (69/41 - 100/61)  RR: 32 (11-17-19 @ 08:10) (20 - 32)  SpO2: 95% (11-17-19 @ 08:10) (92% - 95%)  Wt(kg): --  I&O's Summary    16 Nov 2019 07:01  -  17 Nov 2019 07:00  --------------------------------------------------------  IN: 240 mL / OUT: 0 mL / NET: 240 mL        Appearance: Normal	  Cardiovascular: Normal S1 S2,RRR, No JVD, No murmurs  Respiratory: Lungs clear to auscultation	  Gastrointestinal:  Soft, Non-tender, + BS	  Extremities: Normal range of motion, No clubbing, cyanosis or edema  Vascular: Peripheral pulses palpable 2+ bilaterally     LABS:	 	                          7.6    2.63  )-----------( 24       ( 16 Nov 2019 10:17 )             23.2     11-17    137  |  101  |  100<H>  ----------------------------<  65<L>  4.7   |  12<L>  |  3.15<H>    Ca    7.3<L>      17 Nov 2019 07:29            CARDIAC MARKERS:      MEDICATIONS  (STANDING):  alfuzosin 10 milliGRAM(s) Oral at bedtime  ascorbic acid 500 milliGRAM(s) Oral daily  atovaquone Suspension 1500 milliGRAM(s) Oral daily  Biotene Dry Mouth Oral Rinse 5 milliLiter(s) Swish and Spit two times a day  finasteride 5 milliGRAM(s) Oral daily  lidocaine   Patch 1 Patch Transdermal daily  multivitamin 1 Tablet(s) Oral daily  pantoprazole    Tablet 40 milliGRAM(s) Oral before breakfast

## 2019-11-17 NOTE — PROGRESS NOTE ADULT - PROBLEM SELECTOR PLAN 5
discussion held with wife Savanah as patient appears to lack medical decision making capacity at this time. transition focus of care to symptom based. DNR/DNI, medications for symptoms and transfer to PCU when bed available. discussed with wife Savanah, NIMISHA Santiago and Dr. Judge. >16 minutes spent on ACP.   MOLST completed for DNR/DNI

## 2019-11-17 NOTE — PROGRESS NOTE ADULT - SUBJECTIVE AND OBJECTIVE BOX
Patient is a 79y old  Male who presents with a chief complaint of lymphoma, HLH (17 Nov 2019 13:25)      SUBJECTIVE / OVERNIGHT EVENTS: today ptn is tachypneic, jaundiced states its hard for him to breathe, RR: 32, worsening YEE, worsening transaminitis,  palliative care consult called re comfort care and transfer to PCU, wife agrees    MEDICATIONS  (STANDING):  alfuzosin 10 milliGRAM(s) Oral at bedtime  ascorbic acid 500 milliGRAM(s) Oral daily  atovaquone Suspension 1500 milliGRAM(s) Oral daily  Biotene Dry Mouth Oral Rinse 5 milliLiter(s) Swish and Spit two times a day  finasteride 5 milliGRAM(s) Oral daily  lidocaine   Patch 1 Patch Transdermal daily  multivitamin 1 Tablet(s) Oral daily  pantoprazole    Tablet 40 milliGRAM(s) Oral before breakfast    MEDICATIONS  (PRN):  acetaminophen   Tablet .. 650 milliGRAM(s) Oral every 6 hours PRN Temp greater or equal to 38C (100.4F), Mild Pain (1 - 3)  artificial tears (preservative free) Ophthalmic Solution 2 Drop(s) Right EYE two times a day PRN irritation  bisacodyl Suppository 10 milliGRAM(s) Rectal daily PRN Constipation  HYDROmorphone  Injectable 0.2 milliGRAM(s) IV Push every 1 hour PRN dyspnea  HYDROmorphone  Injectable 0.2 milliGRAM(s) IV Push every 1 hour PRN pain  loperamide 2 milliGRAM(s) Oral four times a day PRN Diarrhea  LORazepam   Injectable 0.5 milliGRAM(s) IV Push every 2 hours PRN anxiety/agitation  ondansetron Injectable 4 milliGRAM(s) IV Push every 6 hours PRN Nausea and/or Vomiting      Vital Signs Last 24 Hrs  T(F): 97.6 (11-17-19 @ 15:15), Max: 98 (11-17-19 @ 04:51)  HR: 90 (11-17-19 @ 15:15) (90 - 102)  BP: 75/36 (11-17-19 @ 15:15) (69/41 - 100/61)  RR: 32 (11-17-19 @ 08:10) (20 - 32)  SpO2: 96% (11-17-19 @ 15:15) (92% - 96%)  Telemetry:   CAPILLARY BLOOD GLUCOSE        I&O's Summary    16 Nov 2019 07:01  -  17 Nov 2019 07:00  --------------------------------------------------------  IN: 240 mL / OUT: 0 mL / NET: 240 mL        PHYSICAL EXAM:  GENERAL: NAD, well-developed  HEAD:  Atraumatic, Normocephalic  EYES: EOMI, PERRLA, conjunctiva and sclera clear  NECK: Supple, No JVD  CHEST/LUNG: Clear to auscultation bilaterally; No wheeze  HEART: Regular rate and rhythm; No murmurs, rubs, or gallops  ABDOMEN: Soft, Nontender, Nondistended; Bowel sounds present  EXTREMITIES:  2+ Peripheral Pulses, No clubbing, cyanosis, or edema  PSYCH: AAOx3  NEUROLOGY: non-focal  SKIN: No rashes or lesions    LABS:                        7.3    3.08  )-----------( 24       ( 17 Nov 2019 09:37 )             22.3     11-17    137  |  101  |  100<H>  ----------------------------<  65<L>  4.7   |  12<L>  |  3.15<H>    Ca    7.3<L>      17 Nov 2019 07:29    TPro  3.0<L>  /  Alb  1.5<L>  /  TBili  14.0<H>  /  DBili  >10.0<H>  /  AST  307<H>  /  ALT  135<H>  /  AlkPhos  819<H>  11-17              RADIOLOGY & ADDITIONAL TESTS:    Imaging Personally Reviewed:    Consultant(s) Notes Reviewed:      Care Discussed with Consultants/Other Providers:

## 2019-11-17 NOTE — PROVIDER CONTACT NOTE (OTHER) - DATE AND TIME:
05-Nov-2019 12:11
05-Nov-2019 02:03
05-Nov-2019 06:15
06-Nov-2019 01:39
08-Nov-2019 05:20
11-Nov-2019 20:00
11-Nov-2019 21:07
13-Nov-2019 00:00
13-Nov-2019 04:32
15-Nov-2019 05:09
17-Nov-2019 08:15
17-Nov-2019 20:40
17-Nov-2019 23:00

## 2019-11-17 NOTE — PROGRESS NOTE ADULT - SUBJECTIVE AND OBJECTIVE BOX
Patient is a 79y Male     Patient is a 79y old  Male who presents with a chief complaint of Anemia (16 Nov 2019 19:44)      HPI:  80yo M h/o HLH diagnosed in 4/19, has lost 40 pounds since and in September was told it converted to NHL, has had 2 cycles of "new" chemo since, has lost 6 pounds since. Has had Diarrhea for the past month: describes it as explosive, about 5x/day,  occasionally watery, states it has not been tested.   tonight ptn presents w generalized weakness, was unable to get off the toilet 2/2 generalized weakness and lightheadedness.  denies fever, chills, nausea, vomiting, cough, cp, sob, dark stools, states appetite is poor at baseline.   List of meds not available at this time, will check w joy's  wife in am (04 Nov 2019 21:48)      PAST MEDICAL & SURGICAL HISTORY:  Acute on chronic anemia  HLH (hemophagocytic lymphohistiocytosis)  BPH (benign prostatic hyperplasia)  H/O knee surgery  Hernia      MEDICATIONS  (STANDING):  alfuzosin 10 milliGRAM(s) Oral at bedtime  ascorbic acid 500 milliGRAM(s) Oral daily  atovaquone Suspension 1500 milliGRAM(s) Oral daily  Biotene Dry Mouth Oral Rinse 5 milliLiter(s) Swish and Spit two times a day  finasteride 5 milliGRAM(s) Oral daily  lidocaine   Patch 1 Patch Transdermal daily  multivitamin 1 Tablet(s) Oral daily  pantoprazole    Tablet 40 milliGRAM(s) Oral before breakfast      Allergies    No Known Allergies    Intolerances        SOCIAL HISTORY:  Denies ETOh,Smoking,     FAMILY HISTORY:      REVIEW OF SYSTEMS:    CONSTITUTIONAL: No weakness, fevers or chills  EYES/ENT: No visual changes;  No vertigo or throat pain   NECK: No pain or stiffness  RESPIRATORY: No cough, wheezing, hemoptysis; No shortness of breath  CARDIOVASCULAR: No chest pain or palpitations  GASTROINTESTINAL: No abdominal or epigastric pain. No nausea, vomiting, or hematemesis; No diarrhea or constipation. No melena or hematochezia.  GENITOURINARY: No dysuria, frequency or hematuria  NEUROLOGICAL: No numbness or weakness  SKIN: No itching, burning, rashes, or lesions   All other review of systems is negative unless indicated above.    VITAL:  T(C): , Max: 36.7 (11-17-19 @ 04:51)  T(F): , Max: 98 (11-17-19 @ 04:51)  HR: 96 (11-17-19 @ 08:10)  BP: 80/43 (11-17-19 @ 08:13)  BP(mean): --  RR: 32 (11-17-19 @ 08:10)  SpO2: 95% (11-17-19 @ 08:10)  Wt(kg): --    I and O's:    11-15 @ 07:01  -  11-16 @ 07:00  --------------------------------------------------------  IN: 820 mL / OUT: 0 mL / NET: 820 mL    11-16 @ 07:01  -  11-17 @ 07:00  --------------------------------------------------------  IN: 240 mL / OUT: 0 mL / NET: 240 mL          PHYSICAL EXAM:    Constitutional: NAD  HEENT: PERRLA,   Neck: No JVD  Respiratory: CTA B/L  Cardiovascular: S1 and S2  Gastrointestinal: BS+, soft, NT/ND  Extremities: No peripheral edema  Neurological: A/O x 3, no focal deficits  Psychiatric: Normal mood, normal affect  : No Arnold  Skin: No rashes  Access: Not applicable  Back: No CVA tenderness    LABS:                        7.3    3.08  )-----------( 24       ( 17 Nov 2019 09:37 )             22.3     11-17    137  |  101  |  100<H>  ----------------------------<  65<L>  4.7   |  12<L>  |  3.15<H>    Ca    7.3<L>      17 Nov 2019 07:29            RADIOLOGY & ADDITIONAL STUDIES:

## 2019-11-17 NOTE — DISCHARGE NOTE FOR THE EXPIRED PATIENT - HOSPITAL COURSE
78 yo m w/ HLH diagnosed in , wt loss 40 pounds with conversion to NHL, s/p chemo. p/w weakness. Palliative care called for GOC    Patient transferred to the PCU yesterday afternoon and symptoms were managed with escalating doses of IV Dilaudid prn. Patient  in the PCU yesterday evening. 80 yo m w/ HLH diagnosed in , wt loss 40 pounds with conversion to NHL, s/p chemo. p/w weakness. Palliative care called for GOC    Patient transferred to the PCU yesterday afternoon and symptoms were managed with escalating doses of IV Dilaudid prn. Patient  in the PCU yesterday evening on 2019

## 2019-11-17 NOTE — PROGRESS NOTE ADULT - PROBLEM SELECTOR PLAN 6
discussion held with wife Savanah as patient appears to lack medical decision making capacity at this time. transition focus of care to symptom based. DNR/DNI, medications for symptoms and transfer to PCU when bed available. discussed with wife Savanah, NIMISHA Santiago and Dr. Judge.

## 2019-11-18 LAB
CULTURE RESULTS: SIGNIFICANT CHANGE UP
CULTURE RESULTS: SIGNIFICANT CHANGE UP
SPECIMEN SOURCE: SIGNIFICANT CHANGE UP
SPECIMEN SOURCE: SIGNIFICANT CHANGE UP

## 2019-11-20 ENCOUNTER — APPOINTMENT (OUTPATIENT)
Dept: HEMATOLOGY ONCOLOGY | Facility: CLINIC | Age: 79
End: 2019-11-20

## 2019-11-20 ENCOUNTER — APPOINTMENT (OUTPATIENT)
Dept: INFUSION THERAPY | Facility: HOSPITAL | Age: 79
End: 2019-11-20

## 2019-11-21 ENCOUNTER — APPOINTMENT (OUTPATIENT)
Dept: INFUSION THERAPY | Facility: HOSPITAL | Age: 79
End: 2019-11-21

## 2019-11-26 ENCOUNTER — APPOINTMENT (OUTPATIENT)
Dept: HEMATOLOGY ONCOLOGY | Facility: CLINIC | Age: 79
End: 2019-11-26

## 2019-11-26 ENCOUNTER — APPOINTMENT (OUTPATIENT)
Dept: INFUSION THERAPY | Facility: HOSPITAL | Age: 79
End: 2019-11-26

## 2019-11-27 ENCOUNTER — APPOINTMENT (OUTPATIENT)
Dept: INFUSION THERAPY | Facility: HOSPITAL | Age: 79
End: 2019-11-27

## 2019-12-04 ENCOUNTER — APPOINTMENT (OUTPATIENT)
Dept: INFUSION THERAPY | Facility: HOSPITAL | Age: 79
End: 2019-12-04

## 2019-12-11 ENCOUNTER — APPOINTMENT (OUTPATIENT)
Dept: INFUSION THERAPY | Facility: HOSPITAL | Age: 79
End: 2019-12-11

## 2019-12-11 ENCOUNTER — APPOINTMENT (OUTPATIENT)
Dept: HEMATOLOGY ONCOLOGY | Facility: CLINIC | Age: 79
End: 2019-12-11

## 2019-12-12 ENCOUNTER — APPOINTMENT (OUTPATIENT)
Dept: INFUSION THERAPY | Facility: HOSPITAL | Age: 79
End: 2019-12-12

## 2019-12-18 ENCOUNTER — APPOINTMENT (OUTPATIENT)
Dept: INFUSION THERAPY | Facility: HOSPITAL | Age: 79
End: 2019-12-18

## 2019-12-18 ENCOUNTER — APPOINTMENT (OUTPATIENT)
Dept: HEMATOLOGY ONCOLOGY | Facility: CLINIC | Age: 79
End: 2019-12-18

## 2019-12-19 ENCOUNTER — APPOINTMENT (OUTPATIENT)
Dept: INFUSION THERAPY | Facility: HOSPITAL | Age: 79
End: 2019-12-19

## 2019-12-24 ENCOUNTER — APPOINTMENT (OUTPATIENT)
Dept: INFUSION THERAPY | Facility: HOSPITAL | Age: 79
End: 2019-12-24

## 2019-12-31 ENCOUNTER — APPOINTMENT (OUTPATIENT)
Dept: INFUSION THERAPY | Facility: HOSPITAL | Age: 79
End: 2019-12-31

## 2019-12-31 ENCOUNTER — APPOINTMENT (OUTPATIENT)
Dept: HEMATOLOGY ONCOLOGY | Facility: CLINIC | Age: 79
End: 2019-12-31

## 2020-01-08 ENCOUNTER — APPOINTMENT (OUTPATIENT)
Dept: INFUSION THERAPY | Facility: HOSPITAL | Age: 80
End: 2020-01-08

## 2020-01-15 ENCOUNTER — APPOINTMENT (OUTPATIENT)
Dept: INFUSION THERAPY | Facility: HOSPITAL | Age: 80
End: 2020-01-15

## 2020-01-20 NOTE — PHARMACOTHERAPY INTERVENTION NOTE - INTERVENTION CATEGORIES
Therapy Cheek-To-Nose Interpolation Flap Text: A decision was made to reconstruct the defect utilizing an interpolation axial flap and a staged reconstruction.  A telfa template was made of the defect.  This telfa template was then used to outline the Cheek-To-Nose Interpolation flap.  The donor area for the pedicle flap was then injected with anesthesia.  The flap was excised through the skin and subcutaneous tissue down to the layer of the underlying musculature.  The interpolation flap was carefully excised within this deep plane to maintain its blood supply.  The edges of the donor site were undermined.   The donor site was closed in a primary fashion.  The pedicle was then rotated into position and sutured.  Once the tube was sutured into place, adequate blood supply was confirmed with blanching and refill.  The pedicle was then wrapped with xeroform gauze and dressed appropriately with a telfa and gauze bandage to ensure continued blood supply and protect the attached pedicle.

## 2020-01-22 ENCOUNTER — APPOINTMENT (OUTPATIENT)
Dept: HEMATOLOGY ONCOLOGY | Facility: CLINIC | Age: 80
End: 2020-01-22

## 2020-01-29 ENCOUNTER — APPOINTMENT (OUTPATIENT)
Dept: INFUSION THERAPY | Facility: HOSPITAL | Age: 80
End: 2020-01-29

## 2020-02-24 RX ORDER — ALFUZOSIN HYDROCHLORIDE 10 MG/1
1 TABLET, EXTENDED RELEASE ORAL
Qty: 0 | Refills: 0 | DISCHARGE

## 2020-02-24 RX ORDER — AZTREONAM 2 G
1 VIAL (EA) INJECTION
Qty: 0 | Refills: 0 | DISCHARGE

## 2020-02-24 RX ORDER — PREGABALIN 225 MG/1
1 CAPSULE ORAL
Qty: 0 | Refills: 0 | DISCHARGE

## 2020-02-24 RX ORDER — FOLIC ACID 0.8 MG
1 TABLET ORAL
Qty: 0 | Refills: 0 | DISCHARGE

## 2020-02-24 RX ORDER — CYCLOSPORINE 100 MG/1
1 CAPSULE ORAL
Qty: 0 | Refills: 0 | DISCHARGE

## 2020-02-24 RX ORDER — ACYCLOVIR SODIUM 500 MG
1 VIAL (EA) INTRAVENOUS
Qty: 0 | Refills: 0 | DISCHARGE

## 2020-02-24 RX ORDER — FINASTERIDE 5 MG/1
1 TABLET, FILM COATED ORAL
Qty: 0 | Refills: 0 | DISCHARGE

## 2020-06-01 NOTE — PROVIDER CONTACT NOTE (OTHER) - REASON
No blood return on rt chest wall mediport Pt comes to clinic for follow up anticoagulation visit. Last INR on 5/6 was 2.3. Dose maintained per protocol. Today's INR is 2.5 and is within goal range. Dose maintained per protocol. Teaching: INR result/instructions reviewed with patient. Patient reminded to call the [facility] prior to next lab appt with any changes in responses to COVID 19 universal screening questions (fever >100.4, URI symptoms, GI issues, loss of taste or smell, headache, muscle pains, or chills or repeated shaking with chills), which were reviewed today with patient. . Patient was instructed to contact the [facility] with any unusual bleeding or bruising, any changes in medications or over the counter medications, start of antibiotics, changes in diet or health status, any acute illnesses, and if there are any other questions or concerns. Patient verbalized understanding of above.

## 2022-02-14 NOTE — ED CLERICAL - NS ED CLERK NOTE PRE-ARRIVAL INFOMATION; PCP NAME
RX refill request from the patient/pharmacy. Patient last seen 12- with labs, and next appt. scheduled for 03-  Requested Prescriptions     Pending Prescriptions Disp Refills    cyclobenzaprine (FLEXERIL) 10 mg tablet [Pharmacy Med Name: CYCLOBENZAPRINE 10MG TABLETS] 30 Tablet 1     Sig: TAKE 1 TABLET BY MOUTH THREE TIMES DAILY AS NEEDED FOR MUSCLE SPASMS   .
DR JONES

## 2023-06-11 NOTE — DISCHARGE NOTE NURSING/CASE MANAGEMENT/SOCIAL WORK - NSPROEXTENSIONSOFSELF_GEN_A_NUR
" Case: [id number] Date/Time: 06/10/23 1914    Procedure: LAPAROTOMY, EXPLORATORY - INCARCERATED HERNIA, POSSIBLE BOWEL RESECTION (Abdomen)    Anesthesia type: General    Location:  OR  / SURGERY [facility]    Surgeons: Darius Sánchez M.D.          Relevant Problems   CARDIAC   (positive) Cardiac pacemaker in situ   (positive) RBBB   (positive) Sinus bradycardia      GI   (positive) GERD (gastroesophageal reflux disease)      ENDO   (positive) Hypothyroidism     /75   Pulse 74   Temp 36.1 °C (96.9 °F) (Temporal)   Resp 20   Ht 1.753 m (5' 9\")   Wt 74.3 kg (163 lb 12.8 oz)   SpO2 98%   BMI 24.19 kg/m²       SBO      Physical Exam    Airway   Mallampati: II  TM distance: >3 FB  Neck ROM: full       Cardiovascular - normal exam  Rhythm: regular  Rate: normal  (-) murmur     Dental - normal exam           Pulmonary - normal exam  Breath sounds clear to auscultation     Abdominal    Neurological - normal exam                 Anesthesia Plan    ASA 3- EMERGENT   ASA physical status emergent criteria: compromised vital organ, limb or tissue    Plan - general       Airway plan will be ETT          Induction: intravenous    Postoperative Plan: Postoperative administration of opioids is intended.    Pertinent diagnostic labs and testing reviewed    Informed Consent:    Anesthetic plan and risks discussed with patient.    Use of blood products discussed with: patient whom consented to blood products.         "
none

## 2023-11-21 NOTE — PROGRESS NOTE ADULT - ASSESSMENT
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 Treated with brentuximab and CHOP x 3, relapsed. Images reviewed. Recent fracture of lumbar vertebra, diagnosis made in ortho office, no films available. No MRI. Has notable pains with movements. Has very minimal LE power proximally and decreased to absent KJ reflexes. He was unable to get off of the toilet due to weakness. Had CT abd/pelvis today. Needs MRI (preferable, but will not tolerate) or CT scan of the lumbar spine. Concerns of leptomeningeal involvement a possibility. 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 alternative chemo regimen was discussed but ptn is too frail    no further dyspnea today , ongoing weakness, LE pain and LBP, found to have Left Femoral DVT, V/Q w low prob PE, NOT A CANDIDATE FOR FULL AC, HEME/ONC.Family  want everything done and do not want comfort care, ptn has had a total 3 units PRBC in 7 day period, needs premedication 2/2 having alloABx. ptn is full code.    ptn s/p r IVC filter in IR,   MRI of C/T/LS spine done 11/8: has a metastatic focus in L3 w cauda equina involvement. Radiation/Oncology input appreciated, palliation RTx is  scheduled     Lymphoma and HLH  cultures are negative and presently off antibiotics    appreciate palliative care input- possible transfer to inpt hospice    Available if condition changes    Paul Valencia MD  [phone]  After 5pm/weekends [phone] Statement Selected

## 2024-01-25 NOTE — ED ADULT NURSE NOTE - NURSING MUSC STRENGTH

## 2025-04-14 NOTE — CHART NOTE - NSCHARTNOTESELECT_GEN_ALL_CORE
Sleep Medicine Clinic Rooming Note    Patient was identified appropriately by name and date of birth.    Medication Reconciliation: complete    Chief complaint: WatchPAT Resluts      Height: 70 ft/inches  Weight: 240 lbs  SpO2: 95  HR: 58  BP: 125/75  Neck circumference: 17.5 inches     Hugo Sleepiness Scale    How likely are you to doze off or fall asleep in the following situations, in contrast to just feeling tired?    This refers to your usual way of life recently.    Even if you haven't done some of these things recently, try to figure out how they would have affected you.    Use the following scale to choose the most appropriate number for each situation:  0 = NO CHANCE of dozing  1 = SLIGHT CHANCE of dozing  2 = MODERATE CHANCE of dozing  3 = HIGH CHANCE of dozing    Sitting and Readin  Watching television: 1  Sitting inactive in a public place:0  Riding in car:0  Laying down to rest in the afternoon:2  Sitting and talking to someone:0  Sitting quietly after lunch without alcohol:1  In a car, stopped in traffic for a few minutes:0    Score: 5    DME needs: -    Additional Notes: -      Transfusion reaction note/Event Note